# Patient Record
Sex: FEMALE | HISPANIC OR LATINO | Employment: OTHER | URBAN - METROPOLITAN AREA
[De-identification: names, ages, dates, MRNs, and addresses within clinical notes are randomized per-mention and may not be internally consistent; named-entity substitution may affect disease eponyms.]

---

## 2017-01-05 ENCOUNTER — TELEPHONE (OUTPATIENT)
Dept: FAMILY MEDICINE | Facility: CLINIC | Age: 77
End: 2017-01-05

## 2017-01-05 NOTE — TELEPHONE ENCOUNTER
Called # listed in message and it is the # for the son-in-law, Jeancarlos,  And he states that patient was to have surgery on 11/28/16, patient  Ate the am of the procedure and surgery was cancelled and re-scheduled  To Monday, 1/9/17. Wants to know if they need to have another pre-op  Physical before then. He also states that the patient tried taking the Sertraline   and then she threw it out. Please advise on pre-op.  Deyanira Cheng MA/  For Teams Alicia

## 2017-01-05 NOTE — TELEPHONE ENCOUNTER
Reason for Call:  Other     Detailed comments: surg again on 1/9 does she need another physical last one was 11/16    Phone Number Patient can be reached at: [phone]    Best Time: Any    Can we leave a detailed message on this number? NO    Call taken on 1/5/2017 at 12:45 PM by Thu Alcantar

## 2017-01-05 NOTE — TELEPHONE ENCOUNTER
Spoke to Jeancarlos and scheduled patient for a pre-op physical   For tomorrow with Lashawn Son.  Deyanira Cheng MA/  For Teams [employer] and Yasmeen

## 2017-01-06 ENCOUNTER — OFFICE VISIT (OUTPATIENT)
Dept: FAMILY MEDICINE | Facility: CLINIC | Age: 77
End: 2017-01-06
Payer: COMMERCIAL

## 2017-01-06 VITALS
HEIGHT: 56 IN | DIASTOLIC BLOOD PRESSURE: 71 MMHG | OXYGEN SATURATION: 98 % | SYSTOLIC BLOOD PRESSURE: 130 MMHG | HEART RATE: 64 BPM | WEIGHT: 134.2 LBS | TEMPERATURE: 97.4 F | BODY MASS INDEX: 30.19 KG/M2

## 2017-01-06 DIAGNOSIS — F32.1 MODERATE SINGLE CURRENT EPISODE OF MAJOR DEPRESSIVE DISORDER (H): ICD-10-CM

## 2017-01-06 DIAGNOSIS — Z01.818 PREOP GENERAL PHYSICAL EXAM: Primary | ICD-10-CM

## 2017-01-06 DIAGNOSIS — H02.839 DERMATOCHALASIS OF EYELID: ICD-10-CM

## 2017-01-06 PROCEDURE — 99214 OFFICE O/P EST MOD 30 MIN: CPT | Performed by: NURSE PRACTITIONER

## 2017-01-06 NOTE — PROGRESS NOTES
[address] [address]  [phone]  Dept: [phone]    PRE-OP EVALUATION:  Today's date: 2017    Bethany Logan (: 1940) presents for pre-operative evaluation assessment as requested by Keven Byrne.  She requires evaluation and anesthesia risk assessment prior to undergoing surgery/procedure for treatment of Repair ptosis with blepharoplasty .  Proposed procedure: COMBINED REPAIR PTOSIS WITH BLEPHAROPLASTY    Date of Surgery/ Procedure: 2017  Time of Surgery/ Procedure: 7:30AM  Hospital/Surgical Facility: [facility]   Fax number for surgical facility:   Primary Physician: Ernestina Alvarado  Type of Anesthesia Anticipated: Local with MAC    Patient has a Health Care Directive or Living Will:  NO    1. NO - Do you have a history of heart attack, stroke, stent, bypass or surgery on an artery in the head, neck, heart or legs?  2. NO - Do you ever have any pain or discomfort in your chest?  3. NO - Do you have a history of  Heart Failure?  4. NO - Are you troubled by shortness of breath when: walking on the level, up a slight hill or at night?  5. NO - Do you currently have a cold, bronchitis or other respiratory infection?  6. NO - Do you have a cough, shortness of breath or wheezing?  7. NO - Do you sometimes get pains in the calves of your legs when you walk?  8. NO - Do you or anyone in your family have previous history of blood clots?  9. NO - Do you or does anyone in your family have a serious bleeding problem such as prolonged bleeding following surgeries or cuts?  10. NO - Have you ever had problems with anemia or been told to take iron pills?  11. NO - Have you had any abnormal blood loss such as black, tarry or bloody stools, or abnormal vaginal bleeding?  12. NO - Have you ever had a blood transfusion?  13. NO - Have you or any of your relatives ever had problems with  anesthesia?  14. NO - Do you have sleep apnea, excessive snoring or daytime drowsiness?  15. NO - Do you have any prosthetic heart valves?  16. YES - Do you have prosthetic joints? Right knee replacement, left hip replacement.   17. NO - Is there any chance that you may be pregnant?      HPI:                                                      Brief HPI related to upcoming procedure:excessive upper eyelid skin blocking visual field./ Repair ptosis with blepharoplasty.      See problem list for active medical problems.  Problems all longstanding and stable, except as noted/documented.  See ROS for pertinent symptoms related to these conditions.                                                                                             DEPRESSION- well controlled on current 50 mg dose of Zoloft, tolerating the medication without adverse effects.         .    MEDICAL HISTORY:                                                      Patient Active Problem List    Diagnosis Date Noted     Moderate single current episode of major depressive disorder (H) 10/26/2016     Priority: Medium     Macular drusen, bilateral 10/25/2016     Priority: Medium     Dermatochalasis of eyelid 10/25/2016     Priority: Medium     Pseudophakia of both eyes 10/25/2016     Priority: Medium     Lumbar spinal stenosis 01/19/2016     Priority: Medium     History of total hip arthroplasty, left 12/08/2015     Priority: Medium     Osteoporosis 12/02/2015     Priority: Medium     Sciatica - left 05/19/2015     Priority: Medium     Advance care planning 01/28/2015     Priority: Medium     Advance Care Planning 8/3/2016 and 11/2/16: ACP Review of Chart / Resources Provided:  Reviewed chart for advance care plan.  Bethany Logan has no plan or code status on file. Discussed available resources and provided with information. Confirmed code status reflects current choices pending further ACP discussions.  Confirmed/documented legally designated  decision makers.  Added by Markus Caro  Advance Care Planning 2016: ACP Review of Chart / Resources Provided:  Reviewed chart for advance care plan.  Bethany Logan has no plan or code status on file. Confirmed/documented legally designated decision makers.  Added by Markus Caro  Advance Care Planning 2015: ACP Review and Resources Provided:  Reviewed chart for advance care plan.  Bethany Logan has no plan or code status on file. Mailed available resources and provided with information. Confirmed code status reflects current choices pending further ACP discussions.  Confirmed/documented designated decision maker(s). See permanent comments section of demographics in clinical tab. Added by Maki Esposito on 2015         CARDIOVASCULAR SCREENING; LDL GOAL LESS THAN 130 2014     Priority: Medium     SNHL (sensory-neural hearing loss), asymmetrical 2013     Priority: Medium     History of total knee arthroplasty - right 10/08/2013     Priority: Medium     Constipation      Priority: Medium     Health Care Home 2013     Priority: Medium     [facility]   Markus Caro RN  [phone]    [facility] CARE PLAN SUMMARY    Client Name:  Bethany Logan  Address:  [address] [place] [address] Phone: Member's Cell: [phone] (speaks Yakut)  [phone] (cell # at the home - speak English)     :  1940 Date of Assessment: 16   Health Plan:  [facility]  Health Plan Number: [id number]   Medical Assistance Number: [id number]  Financial Worker:  [facility]  Case #:  [id number]   [facility] :  Markus Caro RN CM Phone:  [phone]  CM Fax:  [phone]   [facility] Enrollment Date: 2013 Case Mix:  L  Rate Cell:  B  Waiver Type:  EW - open 16   Emergency Contact:  Extended Emergency Contact Information  Primary Emergency Contact: Kathy Willard (member lives with)   Home  "Phone: [phone] (cell # at the home)  Katya cell: [phone]  Relation: daughter & son-in-law  Secondary Emergency Contact: Gina High  Home Phone: [phone]  Relation: family friend  Sudhakar Jain (son) [phone]  Jem Jain (son) [phone]   Language:  Upper sorbian  :  Yes: Family Interprets   Health Care Agent/POA:  States son, Jem Hebert ([phone]) will make decisions Advanced Directives/Living Will:  No   Primary Care Clinic/Phone/Fax:  [facility]/(p) [phone], (f) [phone] Primary Dx:  M17.0 Osteoarthritis of knees - bilateral   Secondary Dx:   M48.06 Lumbar Spinal Stenosis   Primary Physician:  Dr. Ernestina Argueta Height:  4' 10\"  Weight:  137 lbs   Specialty Physician:  Dr. Jeancarlos Landa - Ortho - [facility]  Audiologist:  [place]    Eye Care Provider:  [facility]  Dental Care Provider:    [facility]: [employer] [phone]/See a Dentist [phone]   Other:          [facility] CURRENT SERVICES SUMMARY  Equipment owned/DME history: cane, walker, scooter, shower chair, grab bars   SERVICE TYPE/PROVIDER NAME/PHONE AUTH DATE FREQUENCY Units OR $ Amt DESCRIPTION   Medical Transportation: [facility] [phone] 11/1/16 - 10/31/17 as needed N/A  N/A     [facility]ult [facility] [phone]  Fax: [phone]        [facility] Transportation - [facility] 11/21/16 - 10/31/17          11/21/16 - 10/31/17 2x week            2 RT/week         ADC: 24 units x 2 days/week (48 units/week)        ADC Transportation: 2 RT/week        Go To Card - [employer]                  through [facility] (EW) 11/16/16 - 10/31/17 monthly $30/mo on Go To Card                    * For ADC please select ADC provider and EW Transportation in order to process auth                                                 OA (osteoarthritis) of knee - bilateral 11/14/2012     Priority: Medium      Past Medical History   Diagnosis Date     Constipation " "     Hypertension      Past Surgical History   Procedure Laterality Date     Surgical history of -        Left hip bone surgery     Cataract iol, rt/lt       C total knee arthroplasty  9/25/13     Right     Current Outpatient Prescriptions   Medication Sig Dispense Refill     sertraline (ZOLOFT) 50 MG tablet Take 1 tablet (50 mg) by mouth daily 90 tablet 1     Naproxen Sodium (ALEVE PO) Takes 2 tabs in a.m. And 2 tabs at night  PRN pain       [DISCONTINUED] sertraline (ZOLOFT) 50 MG tablet Take 1 tablet (50 mg) by mouth daily 30 tablet 1     OTC products: None, except as noted above    No Known Allergies   Latex Allergy: NO    Social History   Substance Use Topics     Smoking status: Never Smoker      Smokeless tobacco: Never Used     Alcohol Use: No     History   Drug Use No       REVIEW OF SYSTEMS:                                                    C: NEGATIVE for fever, chills, change in weight  E/M: NEGATIVE for ear, mouth and throat problems  R: NEGATIVE for significant cough or SOB  CV: NEGATIVE for chest pain, palpitations or peripheral edema  GI: NEGATIVE for nausea, abdominal pain, heartburn, or change in bowel habits  MUSCULOSKELETAL: NEGATIVE for significant arthralgias or myalgia  PSYCHIATRIC: NEGATIVE for changes in mood or affect  ROS otherwise negative    EXAM:                                                    /71 mmHg  Pulse 64  Temp(Src) 97.4  F (36.3  C) (Oral)  Ht 4' 8\" (1.422 m)  Wt 134 lb 3.2 oz (60.873 kg)  BMI 30.10 kg/m2  SpO2 98%  GENERAL APPEARANCE: healthy, alert and no distress  HENT: ear canals and TM's normal and nose and mouth without ulcers or lesions  RESP: lungs clear to auscultation - no rales, rhonchi or wheezes  CV: regular rate and rhythm, normal S1 S2, no S3 or S4 and no murmur, click or rub   ABDOMEN: soft, nontender, no HSM or masses and bowel sounds normal  NEURO: Normal strength and tone, sensory exam grossly normal, mentation intact and speech normal  PSYCH: " mentation appears normal and affect normal/bright    DIAGNOSTICS:                                                    EKG: Not indicated due to non-vascular surgery and last ekg on 16 (within 30 days for CAD history or last year for cardiac risk factors)  Patient Information      Patient Name Sex      Bethany Chirinos ([id number]) Female 1940       Results    EKG 12-lead complete w/read - Clinics (Order [id number])         Result Information      Status Provider Status        Final result (2016 10:07 AM) Open        Impression      NSR, no acute ST changes.  No significant change from previous.   Ernestina Springer M.D.            Recent Labs   Lab Test  16   1028  05/13/15   1116   HGB  13.0  13.1   PLT  280   --    NA  141  142   POTASSIUM  4.2  4.2   CR  0.65  0.65        IMPRESSION:                                                    Reason for surgery/procedure: ptosis/ COMBINED REPAIR PTOSIS WITH BLEPHAROPLASTY  Diagnosis/reason for consult: Preoperative physical    The proposed surgical procedure is considered LOW risk.    REVISED CARDIAC RISK INDEX  The patient has the following serious cardiovascular risks for perioperative complications such as (MI, PE, VFib and 3  AV Block):  No serious cardiac risks  INTERPRETATION: 0 risks: Class I (very low risk - 0.4% complication rate)    The patient has the following additional risks for perioperative complications:  No identified additional risks      ICD-10-CM    1. Preop general physical exam Z01.818    2. Dermatochalasis of eyelid H02.839    3. Moderate single current episode of major depressive disorder (H) F32.1 sertraline (ZOLOFT) 50 MG tablet       RECOMMENDATIONS:                                                          --Patient is to take all scheduled medications on the day of surgery EXCEPT for modifications listed below.    Anticoagulant or Antiplatelet Medication Use  NSAIDS: Naproxen (Naprosyn):   Stop 2-3 days  prior to surgery        APPROVAL GIVEN to proceed with proposed procedure, without further diagnostic evaluation   Refilled Zoloft, follow back in 3 months, sooner if concerns.    Signed Electronically by: NILS Mueller CNP    Copy of this evaluation report is provided to requesting physician.    [facility] Preop Guidelines

## 2017-01-06 NOTE — PROGRESS NOTES
Faxed Pre-op notes from today, and labs and EKG from 11/21/16,  To [facility], [phone], right fax confirmed at 1:37   pm today.  Deyanira Cheng MA/  For Teams [employer] and Yasmeen

## 2017-01-06 NOTE — MR AVS SNAPSHOT
After Visit Summary   1/6/2017    Bethany Logan    MRN: [id number]           Patient Information     Date Of Birth          1940        Visit Information        Provider Department      1/6/2017 11:15 AM Glo Son APRN CNP; [facility]        Today's Diagnoses     Preop general physical exam    -  1     Dermatochalasis of eyelid         Moderate single current episode of major depressive disorder (H)           Care Instructions    Based on your medical history and these are the current health maintenance or preventive care services that you are due for (some may have been done at this visit)  There are no preventive care reminders to display for this patient.    At [facility], we strive to deliver an exceptional experience to you, every time we see you.    If you receive a survey in the mail, please send us back your thoughts. We really do value your feedback.    Your care team's suggested websites for health information:  Www.Surround App.Oobafit : Up to date and easily searchable information on multiple topics.  Www.medlineplus.gov : medication info, interactive tutorials, watch real surgeries online  Www.familydoctor.org : good info from the Academy of Family Physicians  Www.cdc.gov : public health info, travel advisories, epidemics (H1N1)  Www.aap.org : children's health info, normal development, vaccinations  Www.health.[facility].[place].us : [place] dept of health, public health issues in [place], N1N1    How to contact your care team:   Team Yasmeen/Chance [phone]         Pharmacy [phone]    Dr. Springer, Natalia Del Toro PA-C, Mulu Sewell CNP, Kathleen Joyner PA-C, Dr. Rowell, and Lashawn Son, JACQUELIN    Team RNs: Reyna & Ryanne      Clinic hours  M-Th 7 am-7 pm   Fri 7 am-5 pm.   Urgent care M-F 11 am-9 pm,   Sat/Sun 9 am-5 pm.  Pharmacy M-Th 8 am-8 pm Fri 8 am-6 pm  Sat/Sun 9 am-5 pm.      All password changes, disabled accounts, or ID changes in Local [employer]/MyHealth will be done by our Access Services Department.    If you need help with your account or password, call: [phone]. Clinic staff no longer has the ability to change passwords.       Before Your Surgery      Call your surgeon if there is any change in your health. This includes signs of a cold or flu (such as a sore throat, runny nose, cough, rash or fever).    Do not smoke, drink alcohol or take over the counter medicine (unless your surgeon or primary care doctor tells you to) for the 24 hours before and after surgery.    If you take prescribed drugs: Follow your doctor s orders about which medicines to take and which to stop until after surgery.    Eating and drinking prior to surgery: follow the instructions from your surgeon  Take a shower or bath the night before surgery. Use the soap your surgeon gave you to gently clean your skin. If you do not have soap from your surgeon, use your regular soap. Do not shave or scrub the surgery site.  Wear clean pajamas and have clean sheets on your bed.   Presurgery Checklist  You are scheduled to have surgery. The healthcare staff will try to make your stay comfortable. Use the guidelines below to remind yourself what to do before surgery. Be sure to follow any specific pre-op instructions from your surgeon or nurse.  Preparing for Surgery    Ask your surgeon if you ll need a blood transfusion during surgery and if so, how to prepare for it. In some cases, you can donate blood before surgery. If needed, this blood can be given back (transfused) to you during or after surgery.    If you are having abdominal surgery, ask what you need to do to clear your bowel.    Tell your surgeon if you have allergies to any medications or foods.    Arrange for an adult family member or friend to drive you home after surgery. If possible, have someone ready to help you at home as you recover.    Call the  surgeon if you get a cold, fever, sore throat, diarrhea, or other health problem just before surgery. Your surgeon can decide whether or not to postpone the surgery.  Medications    Tell your surgeon about all medications you take, including prescription and over-the-counter products such as herbal remedies and vitamins. Ask if you should continue taking them.    If you take ibuprofen, naproxen, or  blood thinners  such as aspirin, clopidogrel (Plavix), or warfarin (Coumadin), ask your surgeon whether you should stop taking them and how long before surgery you should stop.    You may be told to take antibiotics just before surgery to prevent infection. If so, follow instructions carefully on how to take them.    If you are told to take medications called anticoagulants to prevent blood clots after surgery, be sure to follow the instructions on how to take them.  Stop Smoking  If you smoke, healing may take longer. So at least 2 week(s) before surgery, stop smoking.  Bathing or Showering Before Surgery    If instructed, wash with antibacterial soap. Afterward, do not use lotions or powders.    If you are having surgery on the head, you may be asked to shampoo with antibacterial soap. Follow instructions for doing so.  Do Not Remove Hair from the Surgery Site  Do not shave hair from the incision site, unless you are given specific instructions to do so. Usually, if hair needs to be removed, it will be done at the hospital right before surgery.  Don t Eat or Drink    Your doctor will tell you when to stop eating and drinking. If you do not follow your doctor's instructions, your procedure may be postponed or rescheduled for another day.    If your surgeon tells you to continue any medications, take them with small sips of water.    You can brush your teeth and rinse your mouth, but don t swallow any water.  Day of Surgery    Do not wear makeup. Do not use perfume, deodorant, or hairspray. Remove nail polish and  artificial nails.    Leave jewelry (including rings), watches, and other valuables at home.    Be sure to bring health insurance cards or forms and a photo ID.    Bring a list of your medications (include the name, dose, how often you take them, and the time last dose was taken).    Arrive on time at the hospital or surgery facility.    8445-7107 [facility], [address] [place] [address], [place] [address]. All rights reserved. This information is not intended as a substitute for professional medical care. Always follow your healthcare professional's instructions.            Follow-ups after your visit        Your next 10 appointments already scheduled     Jan 09, 2017  6:30 AM   [facility] with [facility]    Services Department ([facility])    [facility] [address]               Jan 09, 2017   Procedure with Keven Vázquez MD   [facility] (--)    [address] [place]  [facility] [address] [phone]              Who to contact     If you have questions or need follow up information about today's clinic visit or your schedule please contact [facility] directly at [phone].  Normal or non-critical lab and imaging results will be communicated to you by PerceptiMedhart, letter or phone within 4 business days after the clinic has received the results. If you do not hear from us within 7 days, please contact the clinic through PerceptiMedhart or phone. If you have a critical or abnormal lab result, we will notify you by phone as soon as possible.  Submit refill requests through eSecure Systems or call your pharmacy and they will forward the refill request to us. Please allow 3 business days for your refill to be completed.          Additional Information About Your Visit        PerceptiMedhart Information     e[employer]ure Systems lets you send messages to your doctor, view your test results, renew your  "prescriptions, schedule appointments and more. To sign up, go to [email or web address] . Click on \"Log in\" on the left side of the screen, which will take you to the Welcome page. Then click on \"Sign up Now\" on the right side of the page.     You will be asked to enter the access code listed below, as well as some personal information. Please follow the directions to create your username and password.     Your access code is: [email or web address]  Expires: 2017  9:43 AM     Your access code will  in 90 days. If you need help or a new code, please call your [place] clinic or [phone].        Care EveryWhere ID     This is your Care EveryWhere ID. This could be used by other organizations to access your [place] medical records  [id number]        Your Vitals Were     Pulse Temperature Height BMI (Body Mass Index) Pulse Oximetry       64 97.4  F (36.3  C) (Oral) 4' 8\" (1.422 m) 30.10 kg/m2 98%        Blood Pressure from Last 3 Encounters:   17 130/71   16 133/76   16 132/72    Weight from Last 3 Encounters:   17 134 lb 3.2 oz (60.873 kg)   17 130 lb (58.968 kg)   16 132 lb (59.875 kg)              Today, you had the following     No orders found for display         Today's Medication Changes          These changes are accurate as of: 17 12:08 PM.  If you have any questions, ask your nurse or doctor.               Start taking these medicines.        Dose/Directions    sertraline 50 MG tablet   Commonly known as:  ZOLOFT   Used for:  Moderate single current episode of major depressive disorder (H)   Started by:  Glo Son APRN CNP        Dose:  50 mg   Take 1 tablet (50 mg) by mouth daily   Quantity:  90 tablet   Refills:  1            Where to get your medicines      These medications were sent to [facility] [address] [place] [address]     Phone:  [phone]    - sertraline 50 MG " tablet             Primary Care Provider Office Phone # Fax #    Ernestina Ema Argueta [phone]       [facility] [address]  [facility] [address]        Thank you!     Thank you for choosing [facility]  for your care. Our goal is always to provide you with excellent care. Hearing back from our patients is one way we can continue to improve our services. Please take a few minutes to complete the written survey that you may receive in the mail after your visit with us. Thank you!             Your Updated Medication List - Protect others around you: Learn how to safely use, store and throw away your medicines at www.disposemymeds.org.          This list is accurate as of: 1/6/17 12:08 PM.  Always use your most recent med list.                   Brand Name Dispense Instructions for use    ALEVE PO      Takes 2 tabs in a.m. And 2 tabs at night  PRN pain       sertraline 50 MG tablet    ZOLOFT    90 tablet    Take 1 tablet (50 mg) by mouth daily

## 2017-01-06 NOTE — PATIENT INSTRUCTIONS
Based on your medical history and these are the current health maintenance or preventive care services that you are due for (some may have been done at this visit)  There are no preventive care reminders to display for this patient.    At [facility], we strive to deliver an exceptional experience to you, every time we see you.    If you receive a survey in the mail, please send us back your thoughts. We really do value your feedback.    Your care team's suggested websites for health information:  Www.Jones.org : Up to date and easily searchable information on multiple topics.  Www.medlineplus.gov : medication info, interactive tutorials, watch real surgeries online  Www.familydoctor.org : good info from the Academy of Family Physicians  Www.cdc.gov : public health info, travel advisories, epidemics (H1N1)  Www.aap.org : children's health info, normal development, vaccinations  Www.health.[facility].[place].us : [place] dept of health, public health issues in [place], N1N1    How to contact your care team:   Team Yasmeen/Spirit [phone]         Pharmacy [phone]    Dr. Springer, Natalia Del Toro PA-C, Dr. Amezcua, Mulu WRAY CNP, Kathleen Joyner PA-C, Dr. Rowell, and Lashawn Son CNP    Team RNs: Reyna Pearl      Clinic hours  M-Th 7 am-7 pm   Fri 7 am-5 pm.   Urgent care M-F 11 am-9 pm,   Sat/Sun 9 am-5 pm.  Pharmacy M-Th 8 am-8 pm Fri 8 am-6 pm  Sat/Sun 9 am-5 pm.     All password changes, disabled accounts, or ID changes in AlertaPhone/MyHealth will be done by our Access Services Department.    If you need help with your account or password, call: [phone]. Clinic staff no longer has the ability to change passwords.       Before Your Surgery      Call your surgeon if there is any change in your health. This includes signs of a cold or flu (such as a sore throat, runny nose, cough, rash or fever).    Do not smoke, drink alcohol or take over the counter medicine (unless your  surgeon or primary care doctor tells you to) for the 24 hours before and after surgery.    If you take prescribed drugs: Follow your doctor s orders about which medicines to take and which to stop until after surgery.    Eating and drinking prior to surgery: follow the instructions from your surgeon  Take a shower or bath the night before surgery. Use the soap your surgeon gave you to gently clean your skin. If you do not have soap from your surgeon, use your regular soap. Do not shave or scrub the surgery site.  Wear clean pajamas and have clean sheets on your bed.   Presurgery Checklist  You are scheduled to have surgery. The healthcare staff will try to make your stay comfortable. Use the guidelines below to remind yourself what to do before surgery. Be sure to follow any specific pre-op instructions from your surgeon or nurse.  Preparing for Surgery    Ask your surgeon if you ll need a blood transfusion during surgery and if so, how to prepare for it. In some cases, you can donate blood before surgery. If needed, this blood can be given back (transfused) to you during or after surgery.    If you are having abdominal surgery, ask what you need to do to clear your bowel.    Tell your surgeon if you have allergies to any medications or foods.    Arrange for an adult family member or friend to drive you home after surgery. If possible, have someone ready to help you at home as you recover.    Call the surgeon if you get a cold, fever, sore throat, diarrhea, or other health problem just before surgery. Your surgeon can decide whether or not to postpone the surgery.  Medications    Tell your surgeon about all medications you take, including prescription and over-the-counter products such as herbal remedies and vitamins. Ask if you should continue taking them.    If you take ibuprofen, naproxen, or  blood thinners  such as aspirin, clopidogrel (Plavix), or warfarin (Coumadin), ask your surgeon whether you should stop  taking them and how long before surgery you should stop.    You may be told to take antibiotics just before surgery to prevent infection. If so, follow instructions carefully on how to take them.    If you are told to take medications called anticoagulants to prevent blood clots after surgery, be sure to follow the instructions on how to take them.  Stop Smoking  If you smoke, healing may take longer. So at least 2 week(s) before surgery, stop smoking.  Bathing or Showering Before Surgery    If instructed, wash with antibacterial soap. Afterward, do not use lotions or powders.    If you are having surgery on the head, you may be asked to shampoo with antibacterial soap. Follow instructions for doing so.  Do Not Remove Hair from the Surgery Site  Do not shave hair from the incision site, unless you are given specific instructions to do so. Usually, if hair needs to be removed, it will be done at the hospital right before surgery.  Don t Eat or Drink    Your doctor will tell you when to stop eating and drinking. If you do not follow your doctor's instructions, your procedure may be postponed or rescheduled for another day.    If your surgeon tells you to continue any medications, take them with small sips of water.    You can brush your teeth and rinse your mouth, but don t swallow any water.  Day of Surgery    Do not wear makeup. Do not use perfume, deodorant, or hairspray. Remove nail polish and artificial nails.    Leave jewelry (including rings), watches, and other valuables at home.    Be sure to bring health insurance cards or forms and a photo ID.    Bring a list of your medications (include the name, dose, how often you take them, and the time last dose was taken).    Arrive on time at the hospital or surgery facility.    3902-5310 [employer], [address] [place] [address], [place] [address]. All rights reserved. This information is not intended as a substitute for professional medical care. Always follow your  healthcare professional's instructions.

## 2017-01-09 ENCOUNTER — ANESTHESIA (OUTPATIENT)
Dept: SURGERY | Facility: AMBULATORY SURGERY CENTER | Age: 77
End: 2017-01-09
Payer: COMMERCIAL

## 2017-01-09 ENCOUNTER — ANESTHESIA EVENT (OUTPATIENT)
Dept: SURGERY | Facility: AMBULATORY SURGERY CENTER | Age: 77
End: 2017-01-09
Payer: COMMERCIAL

## 2017-01-09 ENCOUNTER — CARE COORDINATION (OUTPATIENT)
Dept: GERIATRIC MEDICINE | Facility: CLINIC | Age: 77
End: 2017-01-09

## 2017-01-09 ENCOUNTER — TELEPHONE (OUTPATIENT)
Dept: FAMILY MEDICINE | Facility: CLINIC | Age: 77
End: 2017-01-09

## 2017-01-09 ENCOUNTER — SURGERY (OUTPATIENT)
Age: 77
End: 2017-01-09
Payer: COMMERCIAL

## 2017-01-09 PROCEDURE — 67900 REPAIR BROW DEFECT: CPT | Mod: 79 | Performed by: OPHTHALMOLOGY

## 2017-01-09 PROCEDURE — 15823 BLEPHARP UPR EYELID XCSV SKN: CPT | Mod: 79 | Performed by: OPHTHALMOLOGY

## 2017-01-09 RX ORDER — LIDOCAINE HYDROCHLORIDE 20 MG/ML
INJECTION, SOLUTION INFILTRATION; PERINEURAL PRN
Status: DISCONTINUED | OUTPATIENT
Start: 2017-01-09 | End: 2017-01-09

## 2017-01-09 RX ORDER — PROPOFOL 10 MG/ML
INJECTION, EMULSION INTRAVENOUS PRN
Status: DISCONTINUED | OUTPATIENT
Start: 2017-01-09 | End: 2017-01-09

## 2017-01-09 RX ORDER — ONDANSETRON 2 MG/ML
INJECTION INTRAMUSCULAR; INTRAVENOUS PRN
Status: DISCONTINUED | OUTPATIENT
Start: 2017-01-09 | End: 2017-01-09

## 2017-01-09 RX ADMIN — PROPOFOL 10 MG: 10 INJECTION, EMULSION INTRAVENOUS at 08:15

## 2017-01-09 RX ADMIN — Medication 7 ML: at 07:45

## 2017-01-09 RX ADMIN — PROPOFOL 20 MG: 10 INJECTION, EMULSION INTRAVENOUS at 07:45

## 2017-01-09 RX ADMIN — PROPOFOL 10 MG: 10 INJECTION, EMULSION INTRAVENOUS at 08:00

## 2017-01-09 RX ADMIN — SODIUM CHLORIDE, SODIUM LACTATE, POTASSIUM CHLORIDE, CALCIUM CHLORIDE: 600; 310; 30; 20 INJECTION, SOLUTION INTRAVENOUS at 07:37

## 2017-01-09 RX ADMIN — PROPOFOL 30 MG: 10 INJECTION, EMULSION INTRAVENOUS at 07:37

## 2017-01-09 RX ADMIN — ERYTHROMYCIN 2 INCH: 5 OINTMENT OPHTHALMIC at 08:35

## 2017-01-09 RX ADMIN — ONDANSETRON 4 MG: 2 INJECTION INTRAMUSCULAR; INTRAVENOUS at 08:30

## 2017-01-09 RX ADMIN — LIDOCAINE HYDROCHLORIDE 60 MG: 20 INJECTION, SOLUTION INFILTRATION; PERINEURAL at 07:37

## 2017-01-09 RX ADMIN — TETRACAINE HYDROCHLORIDE 1 DROP: 5 SOLUTION OPHTHALMIC at 07:40

## 2017-01-09 NOTE — TELEPHONE ENCOUNTER
Reason for Call:  Other prescription    Detailed comments: Too much sertraline please call and advise. Has been not taking any for a long time.     Phone Number Jeancarlos MULLINS can be reached at: [phone]    Best Time: Any    Can we leave a detailed message on this number? YES    Call taken on 1/9/2017 at 1:57 PM by Thu Alcantar

## 2017-01-09 NOTE — PROGRESS NOTES
1/9/17: Received epic notification that member had blepharoplasty surgery.  Spoke with Jeancarlos felipe, he states member is recovering at this time.  He also states that he is trying to assist and handle more of member's medical again now.  He is currently picking up her meds at the pharmacy for her.     Markus Caro RN, N  [facility]

## 2017-01-09 NOTE — ANESTHESIA CARE TRANSFER NOTE
Patient: Bethany Logan    COMBINED REPAIR PTOSIS WITH BLEPHAROPLASTY (Bilateral Eye)  Additional InformationProcedure(s):  Bilateral ptosis repair with bilateral blepharoplasty, bilateral brow ptosis repair - Wound Class: I-Clean    Diagnosis: ptosis, dermatochalasis  Diagnosis Additional Information: No value filed.    Anesthesia Type:   MAC     Note:  Airway :Room Air  Patient transferred to:Phase II  Comments: Patient calm cooperative, /78, HR 68, RR15, Temp 98.0, Sats 97. Patient stated no pain but needed to void.      Vitals: (Last set prior to Anesthesia Care Transfer)              Electronically Signed By: NILS Colvin CRNA  January 9, 2017  8:40 AM

## 2017-01-09 NOTE — ANESTHESIA PREPROCEDURE EVALUATION
Anesthesia Evaluation     .        ROS/MED HX    ENT/Pulmonary:  - neg pulmonary ROS     Neurologic:       Cardiovascular:  - neg cardiovascular ROS       METS/Exercise Tolerance:     Hematologic:         Musculoskeletal:         GI/Hepatic:         Renal/Genitourinary:         Endo:         Psychiatric:     (+) psychiatric history depression      Infectious Disease:         Malignancy:         Other:               Physical Exam  Normal systems: pulmonary and dental    Airway   Mallampati: II  TM distance: >3 FB  Neck ROM: full    Dental     Cardiovascular   Rhythm and rate: regular and normal      Pulmonary                     Anesthesia Plan      History & Physical Review  History and physical reviewed and following examination; no interval change.    ASA Status:  2 .    NPO Status:  > 8 hours    Plan for MAC Reason for MAC:  Procedure to face, neck, head or breast         Postoperative Care  Postoperative pain management:  Multi-modal analgesia.      Consents                          .

## 2017-01-09 NOTE — ANESTHESIA POSTPROCEDURE EVALUATION
Patient: Bethany Logan    COMBINED REPAIR PTOSIS WITH BLEPHAROPLASTY (Bilateral Eye)  Additional InformationProcedure(s):  Bilateral ptosis repair with bilateral blepharoplasty, bilateral brow ptosis repair - Wound Class: I-Clean    Diagnosis:ptosis, dermatochalasis  Diagnosis Additional Information: No value filed.    Anesthesia Type:  MAC    Note:  Anesthesia Post Evaluation    Patient location during evaluation: PACU  Patient participation: Able to fully participate in evaluation  Level of consciousness: awake  Pain management: adequate  Airway patency: patent  Cardiovascular status: acceptable  Respiratory status: acceptable  Hydration status: balanced  PONV: none     Anesthetic complications: None          Last vitals:  Filed Vitals:    01/09/17 0648   BP: 167/64   Pulse: 57   Temp: 98.6  F (37  C)   Resp: 18   SpO2: 97%       Electronically Signed By: Bert Worley MD  January 9, 2017  8:42 AM

## 2017-01-09 NOTE — TELEPHONE ENCOUNTER
Called Jeancarlos and discussed no LOLIS noted on file. He will come in to have that signed. He would like to let is know that the patient recently received a refill on sertraline and patient is not currently taking this. She stopped this a while back and does not feel the need to be on depression medications at this time. Per Jeancarlos, the patient was under the impression this medication was for pain. Advised Jeancarlos the patient should rtc for medication follow up. Understanding verbalized and they will follow up in the next week or so.     Patient just had eye surgery and everything is going well so far.     Will forward to pcp as GONZÁLEZ Altamirano, Clinical RN [facility].

## 2017-01-10 ENCOUNTER — CARE COORDINATION (OUTPATIENT)
Dept: GERIATRIC MEDICINE | Facility: CLINIC | Age: 77
End: 2017-01-10

## 2017-01-13 NOTE — PROGRESS NOTES
1/12/17:  CM received call from Tom stating that he apologized for not calling back yesterday however he talked with Katya and as member is safe (tom has spoke with member) he and his brothers and Katya are having a conversation on Saturday regarding this.  Tom agrees that best plan is for member to live with him or his brother.   Explained to Tom that Cm spoke with Melissa at [facility] and that member s name remains on housing waiting list.      Markus Caro RN, PHN  [employer]

## 2017-01-13 NOTE — PROGRESS NOTES
1/11/17: CM received vm from son, Sudhaakr.  CM placed call back and Sudhakar explained that since member s eye surgery on Monday, 1/9, family is taking turns helping member at her home (she lives with daughter, Katya, and jone, Jeancarlos).  Sudhakar states that he went to member s home last satnam and was about an hour late and Jeancarlos (JONE) became very upset that Sudhakar was late and stated that he could not come in the house if he was going to be late and Jeancarlos and Katya would just take care of everything.   Sudhakar feels bad for his mom.  Sudhakar has good relationship with sister, Katya. He states Katya was not home when this happened as she was at work but Katya became aware of it and apologized.  Sudhakar states that both he and his wife spoke with member and she is safe but  does think best to move out and move with Sudhakar or another son either permanently or temporarily.   CM discussed with Sudhakar and agreed that as member is safe and he has good relationship with Katya that he should discuss with Katya first.  Sudhakar states she is sleeping right now as she works nights however he would plan to call her this afternoon and discuss and then also call CM to inform her.     CM also had received email from member s family friend, Gina, asking CM to call Sudhakar as he had also called her.   CM placed call to Melissa at [facility] as per this Fall, Melissa had put member on housing waiting list where member s friends live.  In Nov., CM was notified that member was not going to move and was off the waiting list.   Per Melissa, member remained on the waiting list as Melissa and member agreed that best to stay on as waiting list can be long and when member s time came she could always decline.   Melissa did state that member is sad living with daughter and jone  - she is safe but just sad and wants to live alone and have own place. She knows she can live with her sons but she also wants to still live alone eventually when time is right.   LILO  will relay this to son.   Markus Caro RN, N  [employer]

## 2017-01-25 ENCOUNTER — OFFICE VISIT (OUTPATIENT)
Dept: OPHTHALMOLOGY | Facility: CLINIC | Age: 77
End: 2017-01-25
Payer: COMMERCIAL

## 2017-01-25 DIAGNOSIS — Z98.890 POSTOPERATIVE EYE STATE: Primary | ICD-10-CM

## 2017-01-25 PROCEDURE — 99024 POSTOP FOLLOW-UP VISIT: CPT | Performed by: OPHTHALMOLOGY

## 2017-01-25 RX ORDER — ERYTHROMYCIN 5 MG/G
OINTMENT OPHTHALMIC
Qty: 3.5 G | Refills: 3 | Status: SHIPPED | OUTPATIENT
Start: 2017-01-25 | End: 2017-08-08

## 2017-01-25 ASSESSMENT — VISUAL ACUITY
OD_SC: 20/40
METHOD: SNELLEN - LINEAR
OS_SC: 20/70

## 2017-01-25 NOTE — MR AVS SNAPSHOT
"              After Visit Summary   1/25/2017    Bethany Logan    MRN: [id number]           Patient Information     Date Of Birth          1940        Visit Information        Provider Department      1/25/2017 11:00 AM Keven Vázquez MD; [employer] [facility]        Today's Diagnoses     Postoperative eye state    -  1       Care Instructions    Erythromycin ophthalmic ointment 1/2\" strip right eye twice a day   Vaseline to incisions 2 x daily  Warm compresses both eyes 2 x daily for 4-5 minutes each        Follow-ups after your visit        Your next 10 appointments already scheduled     Mar 15, 2017 10:45 AM   Return Visit with Keven Vázquez MD   [facility]    [id number] [place] [address] [phone]              Who to contact     If you have questions or need follow up information about today's clinic visit or your schedule please contact [facility] directly at [phone].  Normal or non-critical lab and imaging results will be communicated to you by MyChart, letter or phone within 4 business days after the clinic has received the results. If you do not hear from us within 7 days, please contact the clinic through [employer] or phone. If you have a critical or abnormal lab result, we will notify you by phone as soon as possible.  Submit refill requests through [employer] or call your pharmacy and they will forward the refill request to us. Please allow 3 business days for your refill to be completed.          Additional Information About Your Visit        [employer] Information     [employer] is an electronic gateway that provides easy, online access to your medical records. With [employer], you can request a clinic appointment, read your test results, renew a prescription or communicate with your care team.     To sign up for [employer] visit the website at " "[email or web address]   You will be asked to enter the access code listed below, as well as some personal information. Please follow the directions to create your username and password.     Your access code is: [email or web address]  Expires: 2017 11:24 AM     Your access code will  in 90 days. If you need help or a new code, please contact your [facility] or call [phone] for assistance.        Care EveryWhere ID     This is your Care EveryWhere ID. This could be used by other organizations to access your [place] medical records  [id number]         Blood Pressure from Last 3 Encounters:   17 173/76   17 130/71   16 133/76    Weight from Last 3 Encounters:   17 58.968 kg (130 lb)   17 60.873 kg (134 lb 3.2 oz)   16 59.875 kg (132 lb)              Today, you had the following     No orders found for display         Today's Medication Changes          These changes are accurate as of: 17 11:24 AM.  If you have any questions, ask your nurse or doctor.               These medicines have changed or have updated prescriptions.        Dose/Directions    erythromycin ophthalmic ointment   Commonly known as:  ROMYCIN   This may have changed:  additional instructions   Used for:  Postoperative eye state   Changed by:  Keven Vázquez MD        Apply \" strip to right eye twice a day   Quantity:  3.5 g   Refills:  3            Where to get your medicines      These medications were sent to [facility] [address] [place] [address]  [address] [facility] [address]     Phone:  [phone]    - erythromycin ophthalmic ointment             Primary Care Provider Office Phone # Fax #    Ernestina Argueta [phone]       [facility] [address] [facility] [address]        Thank you!     Thank you for choosing [facility]  for your " "care. Our goal is always to provide you with excellent care. Hearing back from our patients is one way we can continue to improve our services. Please take a few minutes to complete the written survey that you may receive in the mail after your visit with us. Thank you!             Your Updated Medication List - Protect others around you: Learn how to safely use, store and throw away your medicines at www.disposemymeds.org.          This list is accurate as of: 1/25/17 11:24 AM.  Always use your most recent med list.                   Brand Name Dispense Instructions for use    ALEVE PO      Takes 2 tabs in a.m. And 2 tabs at night  PRN pain       erythromycin ophthalmic ointment    ROMYCIN    3.5 g    Apply 1/2\" strip to right eye twice a day       sertraline 50 MG tablet    ZOLOFT    90 tablet    Take 1 tablet (50 mg) by mouth daily         "

## 2017-01-25 NOTE — PROGRESS NOTES
Two weeks post   1. Bilateral upper blepharoplasty.   2. Bilateral internal browpexy with brassiere suture.   3. Bilateral upper eyelid ptosis repair by Muellers muscle conjunctival resection.    Doing well.   Right eye occasionally feels like grain of sand    punctate epithelial erosions but no other staining on sle right eye.  No lagophthalmos  Moderate edema, incisions healing well.    Use warm soaks  Ana to incisions  ees to right eye twice a day until resolution of symptoms    F/u 2 months, sooner as needed.     Attending Physician Attestation:  Complete documentation of historical and exam elements from today's encounter can be found in the full encounter summary report (not reduplicated in this progress note).  I personally obtained the chief complaint(s) and history of present illness.  I confirmed and edited as necessary the review of systems, past medical/surgical history, family history, social history, and examination findings as documented by others; and I examined the patient myself.  I personally reviewed the relevant tests, images, and reports as documented above.  I formulated and edited as necessary the assessment and plan and discussed the findings and management plan with the patient and family. - Keven Vázquez MD

## 2017-01-25 NOTE — NURSING NOTE
Patient presents with:  Post-op Blepharaplasty: Bilateral ptosis repair with bilateral blepharoplasty, bilateral brow ptosis repair 1/9/17      Referring Provider:  No referring provider defined for this encounter.    HPI    Affected eye(s):  Right   Symptoms:     Foreign body sensation (Comment: right eye)         Do you have eye pain now?:  No

## 2017-01-25 NOTE — PATIENT INSTRUCTIONS
"Erythromycin ophthalmic ointment 1/2\" strip right eye twice a day   Vaseline to incisions 2 x daily  Warm compresses both eyes 2 x daily for 4-5 minutes each  "

## 2017-01-30 NOTE — PROGRESS NOTES
1/17/17: CM spoke with sonSudhakar - he states that member moved in with him in [place] over the weekend.  Member is doing well.  He states that move is not permanent as she may move with her other son in [place].  Sudhakar states that he will contact CM next week when he knows more information on whether member will choose to stay in [place] with him or with his brother.  Member is not going to [facility] this week due to recovering from eye surgery.      CM will update [facility] once hear back on permanent residence.      Markus Caro RN, N  [employer]      CM also received call from Jeancarlos, son-in-law, also informing Cm that member moved out and her family will be caring for her.

## 2017-01-30 NOTE — PROGRESS NOTES
1/30/17: Received vm form Edward, stating that member is moving to [place] with her son.  They are planning to move her today.  He will call  back with address and information once he has it.     Markus Caro RN, N  [facility]

## 2017-01-31 NOTE — PROGRESS NOTES
1/31/17: Cm spoke with son, Sudhakar, he states member's move to his brother's went well.  Member is happy there.   Sudhakar states he does not have address but will notify CM with it.   will update and notify [facility] once received.  Sudhakar states that member still wants to remain on wait list for apartment and will determine at that time if she will remain in [place] or move.     Markus Caro RN, PHN  [employer]

## 2017-02-07 NOTE — PROGRESS NOTES
2/6/17: CM received notification from member's family friend, Gina, providing CM with updated address: [address] [place] [address]  [phone]    Continue using Sudhakar Shepherd as main contact.   CM confirmed with son, Sudhakar, of updated address and that member will plan to stay there until her name is up on the wait list and then she will determine what to do at that time.    CM will notify [facility] of address change with [phone].      CM reminded Sudhakar of MA renewal  - he is unsure when member is due.  Cm placed call to [facility] - member is due in May for renewal.  They will send out renewal paperwork in mid march and paperwork will be due by April 8th.   Relayed this to Sudhakar.     [phone] faxed to [facility].     Markus Caro RN, N  [employer]

## 2017-02-28 ENCOUNTER — TELEPHONE (OUTPATIENT)
Dept: OPHTHALMOLOGY | Facility: CLINIC | Age: 77
End: 2017-02-28

## 2017-02-28 NOTE — TELEPHONE ENCOUNTER
email rec'd from [employer] regarding prior authorization for code 27729 dos 1/9/17 w/ Dr Vázquez. [facility] denied the code for no PA aquired prior to procedure.  Notes in chart dated 11/7/16 state that PA was faxed in to [facility] on 11/3/17, and that there was a return fax dated 11/7/16 to clinic informing that no PA was needed.     Notes below copied from email from Ligia Reinoso in [employer]     got a hold of [facility] .. very long hold times.  I was told by Lashawn that she is not sure why the clinic was told a PA was not needed  it is on their grid.  So  a Status Adj Form(on their website) along with Medical Records needs to be faxed in.     Adjustment form for claim number Claim # [id number] was faxed to [facility] on February 28, 2017 at 0832 fax recall = success for 11 total pages.  The notes from the original office visit on 11/2/16. As well as a copy of the PA for that was filled out and faxed in originally stating that it was requested for a code of 57922.    Madison COURTNEY. CO[employer].

## 2017-03-15 ENCOUNTER — CARE COORDINATION (OUTPATIENT)
Dept: GERIATRIC MEDICINE | Facility: CLINIC | Age: 77
End: 2017-03-15

## 2017-03-15 ENCOUNTER — OFFICE VISIT (OUTPATIENT)
Dept: OPHTHALMOLOGY | Facility: CLINIC | Age: 77
End: 2017-03-15
Payer: COMMERCIAL

## 2017-03-15 DIAGNOSIS — H02.833 DERMATOCHALASIS OF EYELIDS OF BOTH EYES: Primary | ICD-10-CM

## 2017-03-15 DIAGNOSIS — H02.423 MYOGENIC PTOSIS, BILATERAL: ICD-10-CM

## 2017-03-15 DIAGNOSIS — Z76.89 HEALTH CARE HOME: ICD-10-CM

## 2017-03-15 DIAGNOSIS — H02.836 DERMATOCHALASIS OF EYELIDS OF BOTH EYES: Primary | ICD-10-CM

## 2017-03-15 PROCEDURE — 99024 POSTOP FOLLOW-UP VISIT: CPT | Performed by: OPHTHALMOLOGY

## 2017-03-15 ASSESSMENT — VISUAL ACUITY
OD_SC: 20/40
METHOD: SNELLEN - LINEAR
OS_SC: 20/70

## 2017-03-15 NOTE — PROGRESS NOTES
3/15/17: LILO had received email from Renee regarding Go To card with [facility] verifying that member received Go To card - checked with son, Sudhakar, he states that member states she did not received - most likely due to address not changed until 3/6/17 and therefore [facility] not having correct address.   Lilo sent email back to Renee informing her of address change and new card needed.     Renee clarified with [facility] that they will send new prepaid card - cannot put lump sum on - needs to be rush or non rush  - applied a book of rush (5 RT - $40) per month.     Markus Caro RN, N  [employer]

## 2017-03-15 NOTE — PROGRESS NOTES
3/14/17: CM received call from son, Sudhakar, following up with CM regarding MA renewal paperwork as he remember CM stating it would come sometime in March for a May renewal and then also confirming that address change was made to [facility] as Edward states that daughter/destinee are still receiving member's mail and destinee was upset that address wasn't changed.  CM confirmed that she faxed address change on 2/71/7 to [facility].   CM placed call to [facility] to confirm  - called general #  - member's financial worker is Gustavo at [phone]  - zpfi voice message.     Markus Caro RN, N  [employer]

## 2017-03-15 NOTE — PROGRESS NOTES
3/15/17: CM received call back from Ainsley with [facility] - she is covering for Gustavo.  Ainsley states they did not receive case/address change from [facility] until 3/6/17.  She verified that renewal is in May which means that paperwork will be sent out within next few weeks - she states that if not received by 27th then to f/u with Gustavo.  Paperwork will be due back by April 8th.  She confirmed that no one is listed as auth rep therefore if member does want her son she should put this in writing along with renewal form stating that she wants her son listed and then include son name, address, phone # and both member and son will need to sign and date.   Verified address with Ainsley as son had previously given a [address] end of address but then stated that it is not a PO box but rather a unit # (member lives with son in [place] and it's a unit box within the trailer courty, not a PO box at post office).   Erik stated will confirm with son one more time and will call back and leave  with correct address as Ainsley states that they have the street address but then also have mailing address as [address].    Ainsley also verified that no reason for member to come to office on Friday - Gustavo is on vacation and also paperwork is not ready and will be mailed when it is ready.     Erik spoke with radha De La Fuente, relayed above information to him.  He verbalized understanding.  He confirmed that address is not a PO box but rather a unit # (member lives with other son in [place] and it's a unit box within the trailer court, not a PO box at post office).   Also informed him member does not need to go to [facility] on Friday - he verbalized understanding.   He will contact CM when paperwork is received or by 27th if not received.     CM left  for Gustavo (ainsley stated that she will be reviewing voice messages all day) clarifying address.     Markus Caro RN, N  [employer]

## 2017-03-15 NOTE — MR AVS SNAPSHOT
After Visit Summary   3/15/2017    Bethany Logan    MRN: [id number]           Patient Information     Date Of Birth          1940        Visit Information        Provider Department      3/15/2017 10:30 AM Keven Vázquez MD; [employer] [facility]         Follow-ups after your visit        Your next 10 appointments already scheduled     Apr 10, 2017  3:00 PM CDT   Return Visit with Gabriele Whiting OD   [facility]    [address] [place] [address] [phone]              Who to contact     If you have questions or need follow up information about today's clinic visit or your schedule please contact [facility] directly at [phone].  Normal or non-critical lab and imaging results will be communicated to you by MyChart, letter or phone within 4 business days after the clinic has received the results. If you do not hear from us within 7 days, please contact the clinic through MyChart or phone. If you have a critical or abnormal lab result, we will notify you by phone as soon as possible.  Submit refill requests through [employer] or call your pharmacy and they will forward the refill request to us. Please allow 3 business days for your refill to be completed.          Additional Information About Your Visit        [employer] Information     [employer] is an electronic gateway that provides easy, online access to your medical records. With [employer] you can request a clinic appointment, read your test results, renew a prescription or communicate with your care team.     To sign up for [employer] visit the website at [email or web address]   You will be asked to enter the access code listed below, as well as some personal information. Please follow the directions to create your username and password.     Your access code is: [email or web address]  Expires: 4/25/2017 12:24  "PM     Your access code will  in 90 days. If you need help or a new code, please contact your [facility] or call [phone] for assistance.        Care EveryWhere ID     This is your Care EveryWhere ID. This could be used by other organizations to access your [place] medical records  [id number]         Blood Pressure from Last 3 Encounters:   17 173/76   17 130/71   16 133/76    Weight from Last 3 Encounters:   17 59 kg (130 lb)   17 60.9 kg (134 lb 3.2 oz)   16 59.9 kg (132 lb)              Today, you had the following     No orders found for display       Primary Care Provider Office Phone # Fax #    Ernestina Ema Argueta [phone]       [facility] [address] [facility] [address]        Thank you!     Thank you for choosing [facility]  for your care. Our goal is always to provide you with excellent care. Hearing back from our patients is one way we can continue to improve our services. Please take a few minutes to complete the written survey that you may receive in the mail after your visit with us. Thank you!             Your Updated Medication List - Protect others around you: Learn how to safely use, store and throw away your medicines at www.disposemymeds.org.          This list is accurate as of: 3/15/17 11:16 AM.  Always use your most recent med list.                   Brand Name Dispense Instructions for use    ALEVE PO      Takes 2 tabs in a.m. And 2 tabs at night  PRN pain       erythromycin ophthalmic ointment    ROMYCIN    3.5 g    Apply 1/2\" strip to right eye twice a day       sertraline 50 MG tablet    ZOLOFT    90 tablet    Take 1 tablet (50 mg) by mouth daily         "

## 2017-03-15 NOTE — PROGRESS NOTES
Doing well post both upper eyelid blepharoplasty, perea muscle conjunctival resection  Ptosis repair, brassiere browpexy. Conservative due to dry eye.   Happy with outcome.   Has been using ees sharon for lubrication and it helps but makes her vision blurry.   Also last time obtained transition lenses and does not like, would like new refraction.     I recommend waiting another month before refraction for swelling to go down.   F/u with Dr. Whiting for refraction in one month, last complete was in October.   Try refresh tears or similar instead of sharon for dry eye symptoms.    F/u with me as needed.    Complete documentation of historical and exam elements from today's encounter can be found in the full encounter summary report (not reduplicated in this progress note). I personally obtained the chief complaint(s) and history of present illness.  I confirmed and edited as necessary the review of systems, past medical/surgical history, family history, social history, and examination findings as documented by others; and I examined the patient myself. I personally reviewed the relevant tests, images, and reports as documented above. I formulated and edited as necessary the assessment and plan and discussed the findings and management plan with the patient and family. - Keven Vázquez MD

## 2017-03-15 NOTE — NURSING NOTE
Patient presents with:  Post Op (Ophthalmology) Both Eyes: Bilateral ptosis repair with bilateral blepharoplasty, bilateral brow ptosis repair 1-9-17      Referring Provider:  No referring provider defined for this encounter.    HPI    Affected eye(s):  Both   Symptoms:     Redness (Comment: right eye)   Foreign body sensation (Comment: right eye)            Comments:     Post Op (Ophthalmology) Both Eyes: Bilateral ptosis repair with bilateral blepharoplasty, bilateral brow ptosis repair 1-9-17

## 2017-04-03 ENCOUNTER — CARE COORDINATION (OUTPATIENT)
Dept: GERIATRIC MEDICINE | Facility: CLINIC | Age: 77
End: 2017-04-03

## 2017-04-28 ENCOUNTER — CARE COORDINATION (OUTPATIENT)
Dept: GERIATRIC MEDICINE | Facility: CLINIC | Age: 77
End: 2017-04-28

## 2017-05-01 NOTE — PROGRESS NOTES
4/3/17: Received call from Gina, member's friend, re: MA renewal.  Gina is in process of assisting member with annual renewal.  Gina states she will assure member's renewal is in by April 8th.     Markus Caro RN, N  [employer]

## 2017-05-01 NOTE — PROGRESS NOTES
[facility] Six-Month Telephone Assessment    6 month telephone assessment completed on 4/28/17 with member, son Sudhakar, and also friend, Gina.    ER visits: No  Hospitalizations: No  TCU stays: No  Significant health status changes: None  Falls/Injuries: No  ADL/IADL changes: No  Changes in services: No    Caregiver Assessment follow up:  N/A    Goals: See POC in chart for goal progress documentation.  Member is doing well - happy living with her son in [place].  At this time member is still on wait list to live independently at an apartment that [facility] has set up for her - member still states that her goal is to live alone at some point.  At this time - CM will keep member as unsure when she will be moving.    Gina assured that MA was renewed and no issues.       Will see client in 6 months for an annual health risk assessment.   Encouraged client to call CM with any questions or concerns in the meantime.     Markus Caro RN, PHN  [facility]

## 2017-07-27 ENCOUNTER — CARE COORDINATION (OUTPATIENT)
Dept: GERIATRIC MEDICINE | Facility: CLINIC | Age: 77
End: 2017-07-27

## 2017-07-27 NOTE — PROGRESS NOTES
7/27/17: CM received notification per [facility] that [facility] was [facility] 7[facility].   CM placed call to Sudhakar, son, left voice message.   CM spoke with Gina, family friend, she states that member was admitted for left hip pain - sharp shooting pain.  They have ruled out any infection however member is still having severe pain.  Plan was to d/c today.     CM received call back from Sudhakar - he states member is supposed to be d/c today but they are worried as member is till in pain.   He states PT is ordered for at home - they are unsure of agency but will let me know once they find out.  Encouraged Sudhakar to contact PCP for appt.     CM placed call to [facility] at [phone] - transferred to Ortho floor - spoke with member's nurse, Tram.  She states that member is having severe pain still and is currently down at MRI for further evaluation.   Discharge is on hold at this time.  Tram stated she is unsure if SW is following member but states if so SW is Verónica Aguayo - she does not have contact #.  LILO placed call back to main line/riri Sanches contact # is: [phone].  CM placed call and left detailed voice message and request call back with update.     BRIAN log started.  PCP notified.      Markus Caro RN, N  [employer]

## 2017-07-27 NOTE — PROGRESS NOTES
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   She was admitted on 7/24.  Anticipated d/c home is today however member continues with severe pain.     Markus Caro RN, PHN  [facility]

## 2017-07-28 NOTE — PROGRESS NOTES
7/28/17: CM spoke with son, Sudhakar, for update - he states member is still at [facility] - possible discarge today.  He believes that nothing showed on MRI but member continues to have pain in hip.   He will keep CM updated on d/c.     Markus Caro RN, N  [employer]

## 2017-07-29 ENCOUNTER — TRANSFERRED RECORDS (OUTPATIENT)
Dept: HEALTH INFORMATION MANAGEMENT | Facility: CLINIC | Age: 77
End: 2017-07-29

## 2017-07-29 NOTE — PROGRESS NOTES
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 She has an appt scheduled with you Monday post hospitalization.     Markus Caro RN, PHN  [facility]

## 2017-07-29 NOTE — PROGRESS NOTES
7/28/17: CM received call from Sudhakar, son, stating that member was d/c home today.   He states she is doing ok but continues with pain in left hip.  Conference call made to schedule f/u appt with PCP - scheduled for 7/31 at 11:20.   Sudhakar states they have no needs at this time but will f/u with CM.     PCP notified.     Markus Caro RN, PHN  [facility]

## 2017-08-01 ENCOUNTER — CARE COORDINATION (OUTPATIENT)
Dept: GERIATRIC MEDICINE | Facility: CLINIC | Age: 77
End: 2017-08-01

## 2017-08-01 DIAGNOSIS — Z76.89 HEALTH CARE HOME: ICD-10-CM

## 2017-08-01 NOTE — PROGRESS NOTES
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   She will be transferring to [facility] today, 8/1/17, for TCU stay.     Thank you,   Markus Caro RN, N  [facility]

## 2017-08-01 NOTE — PROGRESS NOTES
8/1/17: CM received voice message from ADAM Ariza, at [facility] at [phone] stating that member was admitted on 7/29/17 for left hip pain - she was recently d/c from [facility] on 7/28 but continue with increase left hip pain and swelling.   Annita states shows sacral insufficiency which should hopefully heal per the dr in the next few weeks.  CRP and ESR levels are also elevated and will continue to monitor these.   Not treating infection as of now.  Plan is for member to go to TCU however member wants private room with no extra cost so waiting to hear back from a few.  Also, the one place they are waiting for [facility] is having issues with Medicare card - they understand member has [employer] and can't have that without Medicare however it is their policy to have Medicare card and card is showing a different name - Annita is getting worked out and will let CM know.       CM received call back form ADAM Ariza, at [place] stating that [facility] has accepted member and will be d/c today.   BRIAN log started. PCP notified.  Brandie MASCORRO Notified.     Markus Caro RN, PHN  [facility]

## 2017-08-03 NOTE — PROGRESS NOTES
8/3/17: CM received message from family friend, Gina, regarding language barrier for member while at TCU.  TCU made exception last night and allowed member's DIL to spend night, however, this was only for one night.  They also have  24 hr line  service for member.   Member continues to still have pain and was having pain with repositioning last evening.   Gina states she spoke with the nurse regarding the pain meds as member was on Dilaudid while at [facility] and then on oxy while at [place] and is still on Oxy however neither seem to be taking care of the pain.   The nurse was going to look into switching pain meds and trying Morphine or Percocet.     CM has a vm into [facility] [employer].   CM also placed call to the floor - member's nurse was currently busy but left message for call back from nurse.     Markus Caro, RN, N  [facility]

## 2017-08-03 NOTE — PROGRESS NOTES
8/3/17: CM received call back form nurse, Harley, he states that Oxy was d/c for member as it was not helping her pain and she was feeling side effects form it.   They have switched her to Tramadol q6hrs, Flexeril, and Tylenol.       Markus Caro RN, PHN  [facility]

## 2017-08-03 NOTE — PROGRESS NOTES
8/2/17: LILO has vm into [facility], at [facility].      Markus Caro RN, N  [employer]

## 2017-08-08 ENCOUNTER — OFFICE VISIT (OUTPATIENT)
Dept: ORTHOPEDICS | Facility: CLINIC | Age: 77
End: 2017-08-08
Payer: COMMERCIAL

## 2017-08-08 VITALS
OXYGEN SATURATION: 96 % | SYSTOLIC BLOOD PRESSURE: 107 MMHG | DIASTOLIC BLOOD PRESSURE: 64 MMHG | HEART RATE: 82 BPM | RESPIRATION RATE: 16 BRPM

## 2017-08-08 DIAGNOSIS — S32.110A CLOSED NONDISPLACED ZONE I FRACTURE OF SACRUM, INITIAL ENCOUNTER (H): Primary | ICD-10-CM

## 2017-08-08 PROCEDURE — 99213 OFFICE O/P EST LOW 20 MIN: CPT | Performed by: ORTHOPAEDIC SURGERY

## 2017-08-08 NOTE — PROGRESS NOTES
Bethany Logan is a 76 year old female who is seen in hospital follow-up for left sacral fracture.  She sustained this in late July. She was initially admitted at [facility] in then [facility] in [place] have been [place] no problems with her left total hip arthroplasty. MRI scan at [facility] showed a left sacral alar fracture. 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 She was seen by Dr. Suman Hardwick who felt that the spine was not the source of pain. He felt the sacrum was the source. Her pain is gradually improved, but still has occasional sharp pains.    Past Medical History:   Diagnosis Date     Constipation      Hypertension        Past Surgical History:   Procedure Laterality Date     C TOTAL KNEE ARTHROPLASTY  9/25/13    Right     CATARACT IOL, RT/LT       COMBINED REPAIR PTOSIS WITH BLEPHAROPLASTY Bilateral 1/9/2017    Procedure: COMBINED REPAIR PTOSIS WITH BLEPHAROPLASTY;  Surgeon: Keven Vázquez MD;  Location: [facility] OR     SURGICAL HISTORY OF -       Left hip bone surgery       Family History   Problem Relation Age of Onset     C.A.D. No family hx of      DIABETES No family hx of      Hypertension No family hx of      CEREBROVASCULAR DISEASE No family hx of        Social History     Social History     Marital status: Single     Spouse name: N/A     Number of children: 9     Years of education: N/A     Occupational History     Farming Retired     Social History Main Topics     Smoking status: Never Smoker     Smokeless tobacco: Never Used     Alcohol use No     Drug use: No     Sexual activity: No     Other Topics Concern     Not on file     Social History Narrative       Current Outpatient Prescriptions   Medication Sig Dispense Refill     sertraline (ZOLOFT) 50 MG tablet Take 1 tablet (50 mg) by mouth daily 90 tablet 1     Naproxen Sodium (ALEVE PO) Takes 2 tabs in a.m. And 2 tabs at night  PRN pain         Allergies   Allergen Reactions      Aspirin Other (See Comments)       REVIEW OF SYSTEMS:  CONSTITUTIONAL:  NEGATIVE for fever, chills, change in weight, not feeling tired  SKIN:  NEGATIVE for worrisome rashes, no skin lumps, no skin ulcers and no non-healing wounds  EYES:  NEGATIVE for vision changes or irritation.  ENT/MOUTH:  NEGATIVE.  No hearing loss, no hoarseness, no difficulty swallowing.  RESP:  NEGATIVE. No cough or shortness of breath.  CV:  NEGATIVE for chest pain, palpitations or peripheral edema  GI:  NEGATIVE for nausea, abdominal pain, heartburn, or change in bowel habits  :  Negative. No dysuria, no hematuria  MUSCULOSKELETAL:  See HPI above  NEURO:  NEGATIVE . No headaches, no dizziness,  no numbness  ENDOCRINE:  NEGATIVE for temperature intolerance, skin/hair changes  HEME/ALLERGY/IMMUNE:  NEGATIVE for bleeding problems  PSYCHIATRIC:  NEGATIVE. no anxiety, no depression.     Exam:  Vitals: /64  Pulse 82  Resp 16  SpO2 96%  BMI= There is no height or weight on file to calculate BMI.  Constitutional:  healthy, alert and no distress  Neuro: Alert and Oriented x 3, Gait normal. Sensation grossly WNL.  Psych: Affect normal   Respiratory: Breathing not labored.  Cardiovascular: normal peripheral pulses  Lymph: no adenopathy  Skin: No rashes,worrisome lesions or skin problems  Positive tenderness at the left sacrum. No tenderness on the right sacrum.  No pain with rotation of the hips.  Negative straight leg raise to 90  bilaterally.  Sensation, motor and circulation are intact.    Assessment:  Left sacral fracture gradually healing.  This will take 6 weeks to heal.  She may resume activity as tolerated with a walker.  RTC 1 month with x-ray of the pelvis only if she has continued pain.

## 2017-08-08 NOTE — MR AVS SNAPSHOT
"              After Visit Summary   2017    Bethany Logan    MRN: [id number]           Patient Information     Date Of Birth          1940        Visit Information        Provider Department      2017 10:15 AM Jeancarlos Landa MD; [place] [employer] [facility]        Today's Diagnoses     Closed nondisplaced zone I fracture of sacrum, initial encounter (H)    -  1       Follow-ups after your visit        Who to contact     If you have questions or need follow up information about today's clinic visit or your schedule please contact [facility] directly at [phone].  Normal or non-critical lab and imaging results will be communicated to you by MyChart, letter or phone within 4 business days after the clinic has received the results. If you do not hear from us within 7 days, please contact the clinic through MyChart or phone. If you have a critical or abnormal lab result, we will notify you by phone as soon as possible.  Submit refill requests through [employer] or call your pharmacy and they will forward the refill request to us. Please allow 3 business days for your refill to be completed.          Additional Information About Your Visit        MyChart Information     [employer] lets you send messages to your doctor, view your test results, renew your prescriptions, schedule appointments and more. To sign up, go to [email or web address] . Click on \"Log in\" on the left side of the screen, which will take you to the Welcome page. Then click on \"Sign up Now\" on the right side of the page.     You will be asked to enter the access code listed below, as well as some personal information. Please follow the directions to create your username and password.     Your access code is: [email or web address]  Expires: 2017 11:14 AM     Your access code will  in 90 days. If you need help or a new code, please call your [facility] or " [phone].        Care EveryWhere ID     This is your Care EveryWhere ID. This could be used by other organizations to access your [facility] medical records  [id number]        Your Vitals Were     Pulse Respirations Pulse Oximetry             82 16 96%          Blood Pressure from Last 3 Encounters:   08/08/17 107/64   01/09/17 173/76   01/06/17 130/71    Weight from Last 3 Encounters:   01/03/17 59 kg (130 lb)   01/06/17 60.9 kg (134 lb 3.2 oz)   11/21/16 59.9 kg (132 lb)              Today, you had the following     No orders found for display       Primary Care Provider Office Phone # Fax #    Ernestina Emacliff Argueta [phone]       [facility] [address]  [facility] [address]        Equal Access to Services     [facility]: Hadii aad ku hadasho Soomaali, waaxda luqadaha, qaybta kaalmada adeegyada, waxay idiin hayaan adewyatt foyaraveronica gama . So [facility] [phone].    ATENCIÓN: Si habla español, tiene a harris disposición servicios gratuitos de asistencia lingüística. Llame al [phone].    We comply with applicable federal civil rights laws and [place] laws. We do not discriminate on the basis of race, color, national origin, age, disability sex, sexual orientation or gender identity.            Thank you!     Thank you for choosing [facility]  for your care. Our goal is always to provide you with excellent care. Hearing back from our patients is one way we can continue to improve our services. Please take a few minutes to complete the written survey that you may receive in the mail after your visit with us. Thank you!             Your Updated Medication List - Protect others around you: Learn how to safely use, store and throw away your medicines at www.disposemymeds.org.          This list is accurate as of: 8/8/17 11:14 AM.  Always use your most recent med list.                   Brand Name Dispense Instructions for use Diagnosis     ALEVE PO      Takes 2 tabs in a.m. And 2 tabs at night  PRN pain        sertraline 50 MG tablet    ZOLOFT    90 tablet    Take 1 tablet (50 mg) by mouth daily    Moderate single current episode of major depressive disorder (H)

## 2017-08-08 NOTE — LETTER
8/8/2017         RE: Bethany Logan  [address] [place] [address] [place] [address]        Dear Colleague,    Thank you for referring your patient, Bethany Logan, to the [facility]. Please see a copy of my visit note below.    Bethany Logan is a 76 year old female who is seen in hospital follow-up for left sacral fracture.  She sustained this in late July. She was initially admitted at [facility] in then [facility] in [place]rays that [place] no problems with her left total hip arthroplasty. MRI scan at [facility] showed a left sacral alar fracture. 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 She was seen by Dr. Suman Hardwick who felt that the spine was not the source of pain. He felt the sacrum was the source. Her pain is gradually improved, but still has occasional sharp pains.    Past Medical History:   Diagnosis Date     Constipation      Hypertension        Past Surgical History:   Procedure Laterality Date     C TOTAL KNEE ARTHROPLASTY  9/25/13    Right     CATARACT IOL, RT/LT       COMBINED REPAIR PTOSIS WITH BLEPHAROPLASTY Bilateral 1/9/2017    Procedure: COMBINED REPAIR PTOSIS WITH BLEPHAROPLASTY;  Surgeon: Keven Vázquez MD;  Location: [facility] OR     SURGICAL HISTORY OF -       Left hip bone surgery       Family History   Problem Relation Age of Onset     C.A.D. No family hx of      DIABETES No family hx of      Hypertension No family hx of      CEREBROVASCULAR DISEASE No family hx of        Social History     Social History     Marital status: Single     Spouse name: N/A     Number of children: 9     Years of education: N/A     Occupational History     Farming Retired     Social History Main Topics     Smoking status: Never Smoker     Smokeless tobacco: Never Used     Alcohol use No     Drug use: No     Sexual activity: No     Other Topics Concern     Not on file     Social History Narrative       Current  Outpatient Prescriptions   Medication Sig Dispense Refill     sertraline (ZOLOFT) 50 MG tablet Take 1 tablet (50 mg) by mouth daily 90 tablet 1     Naproxen Sodium (ALEVE PO) Takes 2 tabs in a.m. And 2 tabs at night  PRN pain         Allergies   Allergen Reactions     Aspirin Other (See Comments)       REVIEW OF SYSTEMS:  CONSTITUTIONAL:  NEGATIVE for fever, chills, change in weight, not feeling tired  SKIN:  NEGATIVE for worrisome rashes, no skin lumps, no skin ulcers and no non-healing wounds  EYES:  NEGATIVE for vision changes or irritation.  ENT/MOUTH:  NEGATIVE.  No hearing loss, no hoarseness, no difficulty swallowing.  RESP:  NEGATIVE. No cough or shortness of breath.  CV:  NEGATIVE for chest pain, palpitations or peripheral edema  GI:  NEGATIVE for nausea, abdominal pain, heartburn, or change in bowel habits  :  Negative. No dysuria, no hematuria  MUSCULOSKELETAL:  See HPI above  NEURO:  NEGATIVE . No headaches, no dizziness,  no numbness  ENDOCRINE:  NEGATIVE for temperature intolerance, skin/hair changes  HEME/ALLERGY/IMMUNE:  NEGATIVE for bleeding problems  PSYCHIATRIC:  NEGATIVE. no anxiety, no depression.     Exam:  Vitals: /64  Pulse 82  Resp 16  SpO2 96%  BMI= There is no height or weight on file to calculate BMI.  Constitutional:  healthy, alert and no distress  Neuro: Alert and Oriented x 3, Gait normal. Sensation grossly WNL.  Psych: Affect normal   Respiratory: Breathing not labored.  Cardiovascular: normal peripheral pulses  Lymph: no adenopathy  Skin: No rashes,worrisome lesions or skin problems  Positive tenderness at the left sacrum. No tenderness on the right sacrum.  No pain with rotation of the hips.  Negative straight leg raise to 90  bilaterally.  Sensation, motor and circulation are intact.    Assessment:  Left sacral fracture gradually healing.  This will take 6 weeks to heal.  She may resume activity as tolerated with a walker.  RTC 1 month with x-ray of the pelvis only if  she has continued pain.    Again, thank you for allowing me to participate in the care of your patient.        Sincerely,        Jeancarlos Landa MD

## 2017-08-14 NOTE — PROGRESS NOTES
8/14/17: CM received vm from ADAM Hendricks at [facility] at [phone] stating that member will be receiving therapy services for about another week or so.  CM placed call back and left vm for update.     Markus Caro RN, N  [facility]

## 2017-08-15 NOTE — PROGRESS NOTES
8/15/17: CM received vm from ADAM Hendricks, at [facility] stating that member will be d/c tomorrow. She states member is ambulating independently and getting in/out bed independently.   She is on Tylenol and Tramadol for pain.   She will be d/c with [facility] with [facility], [facility]HA, and PT.   Cm spoke with son, Sudhakar, he states member is still having pain but is ready for home.  Discussed f/u appt with PCP.  He will call CM once member is home.     Markus Caro RN, N  [employer] [facility]

## 2017-08-16 ENCOUNTER — CARE COORDINATION (OUTPATIENT)
Dept: GERIATRIC MEDICINE | Facility: CLINIC | Age: 77
End: 2017-08-16

## 2017-08-17 NOTE — PROGRESS NOTES
Dr. Juan Argueta -     This member was d/c from [facility] back to home with [facility] on 8/16/17.     Markus Caro RN, N  [facility]

## 2017-08-17 NOTE — PROGRESS NOTES
8/17/17: CM left message for son, Sudhakar, to f/u discharge.     Markus Caro RN, N  [employer]

## 2017-08-21 ENCOUNTER — MEDICAL CORRESPONDENCE (OUTPATIENT)
Dept: HEALTH INFORMATION MANAGEMENT | Facility: CLINIC | Age: 77
End: 2017-08-21

## 2017-08-21 ENCOUNTER — TELEPHONE (OUTPATIENT)
Dept: FAMILY MEDICINE | Facility: CLINIC | Age: 77
End: 2017-08-21

## 2017-08-21 NOTE — TELEPHONE ENCOUNTER
Left vague message at the number listed for someone to return call. No patient information left since the voice mail did not state company or that it was confidential.    Ryanne Santana RN, [facility]

## 2017-08-21 NOTE — TELEPHONE ENCOUNTER
Reason for Call: Request for an order or referral:    Order or referral being requested: Skilled nursing 3 times a week for 1 week , 2 times a week for 2 weeks, 1 time a week for 2 weeks.    Home health Aid 3 times a week for 5 weeks.  PT eval and treat.   eval and treat     Date needed: as soon as possible    Has the patient been seen by the PCP for this problem?     Additional comments:     Phone number Patient can be reached at:  Other phone number:  [phone]    Best Time:  Any    Can we leave a detailed message on this number?  YES    Call taken on 8/21/2017 at 11:25 AM by Nigel Graham

## 2017-08-22 NOTE — TELEPHONE ENCOUNTER
Left vague message at the number listed for someone to return call. No patient information left since the voice mail did not state company or that it was confidential.     Ryanne Santana RN, [facility]

## 2017-08-22 NOTE — PROGRESS NOTES
8/18/17: CM spoke with Sudhakar - he states member is doing pretty well at home - still having some pain but managing.  They are making sure to be with member and assist her as needed.  Home Care through [facility] have not contacted them yet but he believes this weekend.  He states he will f/u with CM next week.  Reminded Sudhakar to f/u with PT if any equip needs - shower chair, etc.   Encouraged to schedule and CM to assist PCP f/u s/p TCU stay - Sudhakar states they will discuss but states that they have f/u with Ortho.  Encouraged importance of meeting with PCP as well.     Markus Caro RN, PHN  [employer]

## 2017-08-22 NOTE — PROGRESS NOTES
8/22/17: CM placed call to [facility] to f/u with RN for status of member - left message for Ryanne at [phone]    Markus Caro RN, N  [facility]

## 2017-08-23 ENCOUNTER — MEDICAL CORRESPONDENCE (OUTPATIENT)
Dept: HEALTH INFORMATION MANAGEMENT | Facility: CLINIC | Age: 77
End: 2017-08-23

## 2017-08-23 NOTE — PROGRESS NOTES
8/22/17: LILO received call from Radha, [facility] nurse at [phone].   Spoke with Radha - she states member continues with pain - she states that member was not taking pain medications correctly or as often as she could.   She states that they have gone over with member and family and so hoping that member's pain will be under better control.  Radha is asking for a [place] med box (at least TID).  She states member is also in need of heating pad.  LILO will order via [facility].   Radha will talk with PT in regards to shower chair and measurements.       LILO placed order with [facility] - with [facility].  CPS and POC updated.       CM spoke with Sudhakar, son, updated him on equipment.  He states they are doing ok now - no needs at this time - will continue to f/u.     Markus Caro RN, PHN  [employer]

## 2017-08-25 ENCOUNTER — CARE COORDINATION (OUTPATIENT)
Dept: GERIATRIC MEDICINE | Facility: CLINIC | Age: 77
End: 2017-08-25

## 2017-08-25 NOTE — PROGRESS NOTES
Dr. Juan Gould I am Bethany Kimbroughos  with [facility].  Since her back/hip pain and hospital/TCU stay she is in need of DME/Supplies per recommendation of home care.   [facility] will be sending the follow rx for a heating pad and shower stool.     [facility] will be sending rx for pull ups and Ensure.      If you agree, will you please complete and send back.     Thank you,   Markus Caro RN, PHN  [employer]

## 2017-08-25 NOTE — PROGRESS NOTES
Order placed with Sheri (p: [phone]; f: [phone]) for Pull-Ups & Ensure.  Order placed on 08/25/2017. Access updated.  As required, authorization submitted to health plan.      Krystal Puentes  Case Management Specialist  [facility]   [phone]

## 2017-08-25 NOTE — PROGRESS NOTES
8/23/17: CM received email from Jacqueline at [facility] stating pill box was approved to send out - will be sent out next week.  Heating pad pending rx from PCP.     CM received call from Radha, [facility] nurse requesting pull ups for member - she is having some incontinence issues at night and is using pull ups at night - she does at times wear throughout day too.  Radha also states that member is not eating very well right now and is requesting Ensure.  CM placed referral for [facility] for order.   CPS/POC updated.     Radha also states she believes member/family have better handle on pain medication - continuing to work on getting member up and out of bed more throughout the day.      LILO spoke with Sudhakar, son, updated him on equipment.  He states he, his wife, and brother are helping member.  No other needs right now.  Suggested member stay with Sudhakar and his wife to ease burden of them driving to brother's house  - he agreed but stated that member prefers to stay at brother's as Sudhakar has dogs and member does not care for.  He states that he will continue to talk to member however.      Marksu Caro RN, PHN  [employer]

## 2017-08-28 NOTE — PROGRESS NOTES
8/24/17: LILO spoke with CARRIE Johnson with [facility] - she states member has a small stand up shower - there is a bench in the shower however member is afraid to sit on - Frida recommended putting a non skid pad on the bench so member could sit and prevent slipping - member declines to try.  Frida is recommending a shower stool w/ back for member to shower safely.  CM sent request to Jacqueline at [facility].  Elizabeth confirmed [facility] is assisting with showers for member.   Elizabeth states that member rates pain 8/10 however member is doing very well with ambulating and shows no facial grimace however with transferring member does show facial grimace and has more pain.  Encouraging member to be up out of bed more during day.       Markus Caro RN, N  [facility]

## 2017-08-31 ENCOUNTER — CARE COORDINATION (OUTPATIENT)
Dept: GERIATRIC MEDICINE | Facility: CLINIC | Age: 77
End: 2017-08-31

## 2017-08-31 NOTE — PATIENT INSTRUCTIONS
Per SCOOTER Gould CM notified  Emilee MelloBethany 1940 [place] Pill Box 8/23/2017 Delivered 8/25/17

## 2017-09-05 ENCOUNTER — RADIANT APPOINTMENT (OUTPATIENT)
Dept: GENERAL RADIOLOGY | Facility: CLINIC | Age: 77
End: 2017-09-05
Attending: FAMILY MEDICINE
Payer: COMMERCIAL

## 2017-09-05 ENCOUNTER — OFFICE VISIT (OUTPATIENT)
Dept: URGENT CARE | Facility: URGENT CARE | Age: 77
End: 2017-09-05
Payer: COMMERCIAL

## 2017-09-05 VITALS
DIASTOLIC BLOOD PRESSURE: 79 MMHG | SYSTOLIC BLOOD PRESSURE: 116 MMHG | WEIGHT: 125 LBS | BODY MASS INDEX: 28.02 KG/M2 | OXYGEN SATURATION: 96 % | HEART RATE: 79 BPM

## 2017-09-05 DIAGNOSIS — M54.42 ACUTE LEFT-SIDED LOW BACK PAIN WITH LEFT-SIDED SCIATICA: ICD-10-CM

## 2017-09-05 DIAGNOSIS — K29.00 OTHER ACUTE GASTRITIS: ICD-10-CM

## 2017-09-05 DIAGNOSIS — M54.42 ACUTE LEFT-SIDED LOW BACK PAIN WITH LEFT-SIDED SCIATICA: Primary | ICD-10-CM

## 2017-09-05 PROCEDURE — 72170 X-RAY EXAM OF PELVIS: CPT

## 2017-09-05 PROCEDURE — 99214 OFFICE O/P EST MOD 30 MIN: CPT | Performed by: FAMILY MEDICINE

## 2017-09-05 RX ORDER — CYCLOBENZAPRINE HCL 5 MG
5 TABLET ORAL
COMMUNITY
Start: 2017-07-28 | End: 2017-09-19

## 2017-09-05 RX ORDER — TRAMADOL HYDROCHLORIDE 50 MG/1
50 TABLET ORAL EVERY 6 HOURS PRN
Qty: 30 TABLET | Refills: 2 | Status: SHIPPED | OUTPATIENT
Start: 2017-09-05 | End: 2017-09-19

## 2017-09-05 RX ORDER — ACETAMINOPHEN 325 MG/1
650 TABLET ORAL
COMMUNITY
Start: 2017-07-28 | End: 2017-09-19

## 2017-09-05 ASSESSMENT — PAIN SCALES - GENERAL: PAINLEVEL: MODERATE PAIN (5)

## 2017-09-05 NOTE — PROGRESS NOTES
Some of this note was populated by a medical assistant.        SUBJECTIVE:   Bethany Logan is a 77 year old female who presents to clinic today for the following health issues:    Musculoskeletal problem/pain      Duration: this weekend    Description  Location: left leg and sciatic area over the past 3 days.     Intensity:  5/10    Accompanying signs and symptoms: numbness and tingling; nausea, loss of appetite, coughing at night    History  Previous similar problem: no   Previous evaluation:  none    Precipitating or alleviating factors:  Trauma or overuse: no   Aggravating factors include: standing, walking    Therapies tried and outcome: tylenol- no relief.      Hx of zone 1 sacral fracture from a fall 3 months ago and was seen by orthopedics at that time. Advised this will heal over 6 weeks time. This pain has improved. Also noted a hx of left hip arthroplasty.       Problem list and histories reviewed & adjusted, as indicated.  Additional history: as documented    Patient Active Problem List   Diagnosis     OA (osteoarthritis) of knee - bilateral     Health Care Home     Constipation     History of total knee arthroplasty - right     SNHL (sensory-neural hearing loss), asymmetrical     CARDIOVASCULAR SCREENING; LDL GOAL LESS THAN 130     Advance care planning     Sciatica - left     Osteoporosis     History of total hip arthroplasty, left     Lumbar spinal stenosis     Macular drusen, bilateral     Dermatochalasis of eyelid     Pseudophakia of both eyes     Moderate single current episode of major depressive disorder (H)     Closed nondisplaced zone I fracture of sacrum, initial encounter (H)     Past Surgical History:   Procedure Laterality Date     C TOTAL KNEE ARTHROPLASTY  9/25/13    Right     CATARACT IOL, RT/LT       COMBINED REPAIR PTOSIS WITH BLEPHAROPLASTY Bilateral 1/9/2017    Procedure: COMBINED REPAIR PTOSIS WITH BLEPHAROPLASTY;  Surgeon: Keven Vázquez MD;  Location:  OR      SURGICAL HISTORY OF -       Left hip bone surgery       Social History   Substance Use Topics     Smoking status: Never Smoker     Smokeless tobacco: Never Used     Alcohol use No     Family History   Problem Relation Age of Onset     C.A.D. No family hx of      DIABETES No family hx of      Hypertension No family hx of      CEREBROVASCULAR DISEASE No family hx of          Current Outpatient Prescriptions   Medication Sig Dispense Refill     acetaminophen (TYLENOL) 325 MG tablet Take 650 mg by mouth       calcium carb 1250 mg, 500 mg [place],/vitamin D 200 units (OSCAL WITH D) 500-200 MG-UNIT per tablet        cyclobenzaprine (FLEXERIL) 5 MG tablet Take 5 mg by mouth       ketotifen (ZADITOR/REFRESH ANTI-ITCH) 0.025 % SOLN ophthalmic solution 1 drop       sertraline (ZOLOFT) 50 MG tablet Take 1 tablet (50 mg) by mouth daily 90 tablet 1     Naproxen Sodium (ALEVE PO) Takes 2 tabs in a.m. And 2 tabs at night  PRN pain       TRAMADOL HCL PO Take 50 mg by mouth       Allergies   Allergen Reactions     Aspirin Other (See Comments)         Reviewed and updated as needed this visit by clinical staff     Reviewed and updated as needed this visit by Provider         ROS:  Constitutional, HEENT, cardiovascular, pulmonary, gi and gu systems are negative, except as otherwise noted.      OBJECTIVE:   /79 (BP Location: Left arm, Patient Position: Chair, Cuff Size: Adult Regular)  Pulse 79  Wt 125 lb (56.7 kg)  SpO2 96%  Breastfeeding? No  BMI 28.02 kg/m2  Body mass index is 28.02 kg/(m^2).  GENERAL: healthy, alert and no distress  NECK: no adenopathy, no asymmetry, masses, or scars and thyroid normal to palpation  RESP: lungs clear to auscultation - no rales, rhonchi or wheezes  CV: regular rate and rhythm, normal S1 S2, no S3 or S4, no murmur, click or rub, no peripheral edema and peripheral pulses strong  ABDOMEN: soft, nontender, no hepatosplenomegaly, no masses and bowel sounds normal  MS: hip not focally tender.  Noted sciatic area tenderness to palpation. Gait assist with walker.   Sacral area not particularly tender. Noted increased pain on lateral tilt and forward bend from waist.     Diagnostic Test Results:  none     ASSESSMENT/PLAN:       ICD-10-CM    1. Acute left-sided low back pain with left-sided sciatica M54.42 XR Pelvis 1/2 Views        PLAN  Conservative cares over the next 2-6 weeks as long as improving.   Stay active however avoid painful activities.   Close follow-up with primary doctor if pain worsening or directly to ER for red flags which were discussed verbally and in writing.   Patient educational/instructional material provided including reasons for follow-up   The patient indicates understanding of these issues and agrees with the plan.  Willi Cardoza MD      [facility]

## 2017-09-05 NOTE — NURSING NOTE
"Chief Complaint   Patient presents with     Musculoskeletal Problem     Pt c/o left leg pain since this weekend.        Initial /79 (BP Location: Left arm, Patient Position: Chair, Cuff Size: Adult Regular)  Pulse 79  Wt 125 lb (56.7 kg)  SpO2 96%  Breastfeeding? No  BMI 28.02 kg/m2 Estimated body mass index is 28.02 kg/(m^2) as calculated from the following:    Height as of 1/6/17: 4' 8\" (1.422 m).    Weight as of this encounter: 125 lb (56.7 kg).  Medication Reconciliation: complete     Carolyn Cheek CMA (AAMA)      "

## 2017-09-05 NOTE — MR AVS SNAPSHOT
"              After Visit Summary   2017    Bethany Logan    MRN: [id number]           Patient Information     Date Of Birth          1940        Visit Information        Provider Department      2017 1:50 PM Willi Cardoza MD [facility]        Today's Diagnoses     Acute left-sided low back pain with left-sided sciatica    -  1    Other acute gastritis           Follow-ups after your visit        Who to contact     If you have questions or need follow up information about today's clinic visit or your schedule please contact [facility] directly at [phone].  Normal or non-critical lab and imaging results will be communicated to you by MyChart, letter or phone within 4 business days after the clinic has received the results. If you do not hear from us within 7 days, please contact the clinic through [employer] or phone. If you have a critical or abnormal lab result, we will notify you by phone as soon as possible.  Submit refill requests through [employer] or call your pharmacy and they will forward the refill request to us. Please allow 3 business days for your refill to be completed.          Additional Information About Your Visit        MyChart Information     [employer] lets you send messages to your doctor, view your test results, renew your prescriptions, schedule appointments and more. To sign up, go to [email or web address] . Click on \"Log in\" on the left side of the screen, which will take you to the Welcome page. Then click on \"Sign up Now\" on the right side of the page.     You will be asked to enter the access code listed below, as well as some personal information. Please follow the directions to create your username and password.     Your access code is: [email or web address]  Expires: 2017 11:14 AM     Your access code will  in 90 days. If you need help or a new code, please call your [facility] or [phone].        Care " EveryWhere ID     This is your Care EveryWhere ID. This could be used by other organizations to access your [facility] medical records  [id number]        Your Vitals Were     Pulse Pulse Oximetry Breastfeeding? BMI (Body Mass Index)          79 96% No 28.02 kg/m2         Blood Pressure from Last 3 Encounters:   09/05/17 116/79   08/08/17 107/64   01/09/17 173/76    Weight from Last 3 Encounters:   09/05/17 125 lb (56.7 kg)   01/03/17 130 lb (59 kg)   01/06/17 134 lb 3.2 oz (60.9 kg)                 Today's Medication Changes          These changes are accurate as of: 9/5/17  3:09 PM.  If you have any questions, ask your nurse or doctor.               Start taking these medicines.        Dose/Directions    omeprazole 20 MG CR capsule   Commonly known as:  priLOSEC   Used for:  Other acute gastritis   Started by:  Willi Cardoza MD        Dose:  20 mg   Take 1 capsule (20 mg) by mouth daily for 21 days   Quantity:  21 capsule   Refills:  0         These medicines have changed or have updated prescriptions.        Dose/Directions    * TRAMADOL HCL PO   This may have changed:  Another medication with the same name was added. Make sure you understand how and when to take each.   Changed by:  Willi Cardoza MD        Dose:  50 mg   Take 50 mg by mouth   Refills:  0       * traMADol 50 MG tablet   Commonly known as:  ULTRAM   This may have changed:  You were already taking a medication with the same name, and this prescription was added. Make sure you understand how and when to take each.   Used for:  Acute left-sided low back pain with left-sided sciatica   Changed by:  Willi Cardoza MD        Dose:  50 mg   Take 1 tablet (50 mg) by mouth every 6 hours as needed for moderate pain or breakthrough pain   Quantity:  30 tablet   Refills:  2       * Notice:  This list has 2 medication(s) that are the same as other medications prescribed for you. Read the directions carefully, and ask your doctor or  other care provider to review them with you.         Where to get your medicines      These medications were sent to [facility] [address] [place] [address] - [address]  [address], [facility] [address]     Phone:  [phone]     omeprazole 20 MG CR capsule         Some of these will need a paper prescription and others can be bought over the counter.  Ask your nurse if you have questions.     Bring a paper prescription for each of these medications     traMADol 50 MG tablet                Primary Care Provider Office Phone # Fax #    Ernestina Emacliff Argueta [phone]       [address]  [facility] [address]        Equal Access to Services     DUYEN GONZALEZ : Hadii anusha duckworth hadasho Soomaali, waaxda luqadaha, qaybta kaalmada adeegyada, renae gama . So [facility] [phone].    ATENCIÓN: Si habla español, tiene a harris disposición servicios gratuitos de asistencia lingüística. Llame al [phone].    We comply with applicable federal civil rights laws and [place] laws. We do not discriminate on the basis of race, color, national origin, age, disability sex, sexual orientation or gender identity.            Thank you!     Thank you for choosing [facility]  for your care. Our goal is always to provide you with excellent care. Hearing back from our patients is one way we can continue to improve our services. Please take a few minutes to complete the written survey that you may receive in the mail after your visit with us. Thank you!             Your Updated Medication List - Protect others around you: Learn how to safely use, store and throw away your medicines at www.disposemymeds.org.          This list is accurate as of: 9/5/17  3:09 PM.  Always use your most recent med list.                   Brand Name Dispense Instructions for use Diagnosis    acetaminophen 325 MG tablet    TYLENOL     Take 650 mg by mouth         ALEVE PO      Takes 2 tabs in a.m. And 2 tabs at night  PRN pain        calcium carb 1250 mg (500 mg [place])/vitamin D 200 units 500-200 MG-UNIT per tablet    OSCAL with D          cyclobenzaprine 5 MG tablet    FLEXERIL     Take 5 mg by mouth        ketotifen 0.025 % Soln ophthalmic solution    ZADITOR/REFRESH ANTI-ITCH     1 drop        omeprazole 20 MG CR capsule    priLOSEC    21 capsule    Take 1 capsule (20 mg) by mouth daily for 21 days    Other acute gastritis       sertraline 50 MG tablet    ZOLOFT    90 tablet    Take 1 tablet (50 mg) by mouth daily    Moderate single current episode of major depressive disorder (H)       * TRAMADOL HCL PO      Take 50 mg by mouth        * traMADol 50 MG tablet    ULTRAM    30 tablet    Take 1 tablet (50 mg) by mouth every 6 hours as needed for moderate pain or breakthrough pain    Acute left-sided low back pain with left-sided sciatica       * Notice:  This list has 2 medication(s) that are the same as other medications prescribed for you. Read the directions carefully, and ask your doctor or other care provider to review them with you.

## 2017-09-06 ENCOUNTER — CARE COORDINATION (OUTPATIENT)
Dept: GERIATRIC MEDICINE | Facility: CLINIC | Age: 77
End: 2017-09-06

## 2017-09-06 NOTE — PROGRESS NOTES
Per Sheri - Pull-Ups (Size Lg); Ensure (1 can/day) shipped today. CM notified.    Rayne Digatobayron  Case Management Specialist   [facility]   [phone]

## 2017-09-11 DIAGNOSIS — Z53.9 DIAGNOSIS NOT YET DEFINED: Primary | ICD-10-CM

## 2017-09-11 PROCEDURE — G0180 MD CERTIFICATION HHA PATIENT: HCPCS | Performed by: FAMILY MEDICINE

## 2017-09-13 NOTE — PROGRESS NOTES
9/1/17: LILO spoke with son, Sudhakar, he states member continues with pain - he and his wife and brother are assisting member.  Updated him on equip - no further needs at this time.     Markus Caro RN, N  [employer]

## 2017-09-13 NOTE — PROGRESS NOTES
9/6/17: CM f/u with [facility] on shower bench w/ back - spoke with Jacqueline - she states the order did not get ordered  - CM had sent to Jacqueline Novoa at [facility] - LILO was speaking with Jacqueline Mason - she apologized and states that she will placed order now.       Markus Caro RN, N  [employer] [facility]

## 2017-09-13 NOTE — PROGRESS NOTES
9/13/17: CM received call from [employer] -  will be Ana.     Markus Caro RN, PHN  [facility]

## 2017-09-13 NOTE — PROGRESS NOTES
9/8/17: LILO received call from MEAGHAN Garcia with [employer] - she states that she was out at home today and was talking with dil, Ana.  Ana has not been working as she has been assiting with caring for member.  Ana states that member likes when Ana is there to help and is assisting her with IADL and ADL's.  Radha is wondering if CM can contact family to discuss options.   CM placed call to son, Sudhakar, unable to leave  - mailbox full.     Markus Caro RN, N  [employer] [employer] [facility]

## 2017-09-13 NOTE — PROGRESS NOTES
9/11/17: LILO placed f/u call to Sudhakar, son, regarding services - discussed PCA assessment, homemaking services, etc.   Cm offered home visit again - Sudhakar agreed.  Scheduled for 9/14/17 at 12:30 p.m.  LILO called [facility] to schedule .     Markus Caro RN, N  [facility]

## 2017-09-19 ENCOUNTER — CARE COORDINATION (OUTPATIENT)
Dept: GERIATRIC MEDICINE | Facility: CLINIC | Age: 77
End: 2017-09-19

## 2017-09-19 ENCOUNTER — OFFICE VISIT (OUTPATIENT)
Dept: ORTHOPEDICS | Facility: CLINIC | Age: 77
End: 2017-09-19
Payer: COMMERCIAL

## 2017-09-19 ENCOUNTER — RADIANT APPOINTMENT (OUTPATIENT)
Dept: GENERAL RADIOLOGY | Facility: CLINIC | Age: 77
End: 2017-09-19
Attending: ORTHOPAEDIC SURGERY
Payer: COMMERCIAL

## 2017-09-19 VITALS — WEIGHT: 125 LBS | TEMPERATURE: 98.6 F | HEIGHT: 56 IN | RESPIRATION RATE: 18 BRPM | BODY MASS INDEX: 28.12 KG/M2

## 2017-09-19 DIAGNOSIS — M54.42 ACUTE LEFT-SIDED LOW BACK PAIN WITH LEFT-SIDED SCIATICA: Primary | ICD-10-CM

## 2017-09-19 DIAGNOSIS — S32.110D CLOSED NONDISPLACED ZONE I FRACTURE OF SACRUM WITH ROUTINE HEALING, SUBSEQUENT ENCOUNTER: ICD-10-CM

## 2017-09-19 DIAGNOSIS — S32.110A CLOSED NONDISPLACED ZONE I FRACTURE OF SACRUM, INITIAL ENCOUNTER (H): ICD-10-CM

## 2017-09-19 DIAGNOSIS — Z71.89 ADVANCE CARE PLANNING: Chronic | ICD-10-CM

## 2017-09-19 DIAGNOSIS — Z76.89 HEALTH CARE HOME: ICD-10-CM

## 2017-09-19 PROCEDURE — 72170 X-RAY EXAM OF PELVIS: CPT

## 2017-09-19 PROCEDURE — 99213 OFFICE O/P EST LOW 20 MIN: CPT | Performed by: ORTHOPAEDIC SURGERY

## 2017-09-19 RX ORDER — ACETAMINOPHEN 325 MG/1
650 TABLET ORAL EVERY 6 HOURS PRN
Qty: 100 TABLET | Refills: 1 | Status: SHIPPED | OUTPATIENT
Start: 2017-09-19 | End: 2017-12-18

## 2017-09-19 RX ORDER — TRAMADOL HYDROCHLORIDE 50 MG/1
50 TABLET ORAL EVERY 6 HOURS PRN
Qty: 30 TABLET | Refills: 2 | Status: SHIPPED | OUTPATIENT
Start: 2017-09-19 | End: 2017-12-18

## 2017-09-19 NOTE — PATIENT INSTRUCTIONS
Fracture appears to be healing well.  Continue to use walker to prevent falling.  Use walker until you feel you have good strength.  Ok to do more walking now.  Pain will gradually fade.  May stop tramadol as pain gets less.    Fractura parece ser curativo sae.  Continuar con walker para evitar caer.  Walker de uso hasta que sientes que tiene buena fuerza.  OK para hacer más que caminar ahora.  Dolor se desvanecerá poco a poco.  Puede dejar de tramadol edmundo dolor obtiene menos.

## 2017-09-19 NOTE — NURSING NOTE
"Chief Complaint   Patient presents with     RECHECK     Left sacral fx late July 2017.       Initial Temp 98.6  F (37  C)  Resp 18  Ht 1.422 m (4' 8\")  Wt 56.7 kg (125 lb)  BMI 28.02 kg/m2 Estimated body mass index is 28.02 kg/(m^2) as calculated from the following:    Height as of this encounter: 1.422 m (4' 8\").    Weight as of this encounter: 56.7 kg (125 lb).  Medication Reconciliation: complete   Ewa Jain MA      "

## 2017-09-19 NOTE — LETTER
9/19/2017         RE: Bethany Logan  [address] [place] [address]        Dear Colleague,    Thank you for referring your patient, Bethany Logan, to the [facility]. Please see a copy of my visit note below.    Bethany Logan is a 76 year old female who is seen in hospital follow-up for left sacral fracture.  She sustained this in late July. She was initially admitted at [facility] and then [facility].   On  x-rays [facility] report no problems with her left total hip arthroplasty. MRI scan at [facility] showed a left sacral alar fracture. 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 She was seen by Dr. Suman Hardwick who felt that the spine was not the source of pain. He felt the sacrum was the source. Her pain is gradually improved, but still has occasional sharp pains.  Seen in follow up now.  She is using a walker for balance.,    X-ray shows no fracture displacement.    Past Medical History:   Diagnosis Date     Constipation      Hypertension        Past Surgical History:   Procedure Laterality Date     C TOTAL KNEE ARTHROPLASTY  9/25/13    Right     CATARACT IOL, RT/LT       COMBINED REPAIR PTOSIS WITH BLEPHAROPLASTY Bilateral 1/9/2017    Procedure: COMBINED REPAIR PTOSIS WITH BLEPHAROPLASTY;  Surgeon: Keven Vázquez MD;  Location:  OR     SURGICAL HISTORY OF -       Left hip bone surgery       Family History   Problem Relation Age of Onset     C.A.D. No family hx of      DIABETES No family hx of      Hypertension No family hx of      CEREBROVASCULAR DISEASE No family hx of        Social History     Social History     Marital status: Single     Spouse name: N/A     Number of children: 9     Years of education: N/A     Occupational History     Farming Retired     Social History Main Topics     Smoking status: Never Smoker     Smokeless tobacco: Never Used     Alcohol use No     Drug use: No      "Sexual activity: No     Other Topics Concern     Not on file     Social History Narrative       Current Outpatient Prescriptions   Medication Sig Dispense Refill     acetaminophen (TYLENOL) 325 MG tablet Take 2 tablets (650 mg) by mouth every 6 hours as needed for mild pain 100 tablet 1     traMADol (ULTRAM) 50 MG tablet Take 1 tablet (50 mg) by mouth every 6 hours as needed for moderate pain or breakthrough pain 30 tablet 2     calcium carb 1250 mg, 500 mg [place],/vitamin D 200 units (OSCAL WITH D) 500-200 MG-UNIT per tablet        ketotifen (ZADITOR/REFRESH ANTI-ITCH) 0.025 % SOLN ophthalmic solution 1 drop       omeprazole (PRILOSEC) 20 MG CR capsule Take 1 capsule (20 mg) by mouth daily for 21 days 21 capsule 0       Allergies   Allergen Reactions     Aspirin Other (See Comments)       REVIEW OF SYSTEMS:  CONSTITUTIONAL:  NEGATIVE for fever, chills, change in weight, not feeling tired  SKIN:  NEGATIVE for worrisome rashes, no skin lumps, no skin ulcers and no non-healing wounds  EYES:  NEGATIVE for vision changes or irritation.  ENT/MOUTH:  NEGATIVE.  No hearing loss, no hoarseness, no difficulty swallowing.  RESP:  NEGATIVE. No cough or shortness of breath.  CV:  NEGATIVE for chest pain, palpitations or peripheral edema  GI:  NEGATIVE for nausea, abdominal pain, heartburn, or change in bowel habits  :  Negative. No dysuria, no hematuria  MUSCULOSKELETAL:  See HPI above  NEURO:  NEGATIVE . No headaches, no dizziness,  no numbness  ENDOCRINE:  NEGATIVE for temperature intolerance, skin/hair changes  HEME/ALLERGY/IMMUNE:  NEGATIVE for bleeding problems  PSYCHIATRIC:  NEGATIVE. no anxiety, no depression.     Exam:  Vitals: Temp 98.6  F (37  C)  Resp 18  Ht 1.422 m (4' 8\")  Wt 56.7 kg (125 lb)  BMI 28.02 kg/m2  BMI= Body mass index is 28.02 kg/(m^2).  Constitutional:  healthy, alert and no distress  Neuro: Alert and Oriented x 3, Gait slow with walker. Sensation grossly WNL.  Psych: Affect normal   Respiratory: " Breathing not labored.  Cardiovascular: normal peripheral pulses  Lymph: no adenopathy  Skin: No rashes,worrisome lesions or skin problems  Positive tenderness at the left sacrum. No tenderness on the right sacrum.  No pain with rotation of the hips.  Negative straight leg raise to 90  bilaterally.  Sensation, motor and circulation are intact.    Assessment:  Left sacral fracture gradually healing.  She may resume activity as tolerated with a walker.  RTC as needed.    Again, thank you for allowing me to participate in the care of your patient.        Sincerely,        Jeancarlos Landa MD

## 2017-09-19 NOTE — MR AVS SNAPSHOT
After Visit Summary   9/19/2017    Bethany Logan    MRN: [id number]           Patient Information     Date Of Birth          1940        Visit Information        Provider Department      9/19/2017 10:15 AM Jeancarlos Landa MD; [employer] [facility]        Today's Diagnoses     Closed nondisplaced zone I fracture of sacrum, initial encounter (H)    -  1    Acute left-sided low back pain with left-sided sciatica          Care Instructions    Fracture appears to be healing well.  Continue to use walker to prevent falling.  Use walker until you feel you have good strength.  Ok to do more walking now.  Pain will gradually fade.  May stop tramadol as pain gets less.    Fractura parece ser curativo sae.  Continuar con walker para evitar caer.  Walker de uso hasta que sientes que tiene buena fuerza.  OK para hacer más que caminar ahora.  Dolor se desvanecerá poco a poco.  Puede dejar de tramadol edmundo dolor obtiene menos.          Follow-ups after your visit        Who to contact     If you have questions or need follow up information about today's clinic visit or your schedule please contact [facility] directly at [phone].  Normal or non-critical lab and imaging results will be communicated to you by [employer], letter or phone within 4 business days after the clinic has received the results. If you do not hear from us within 7 days, please contact the clinic through [employer] or phone. If you have a critical or abnormal lab result, we will notify you by phone as soon as possible.  Submit refill requests through [employer] or call your pharmacy and they will forward the refill request to us. Please allow 3 business days for your refill to be completed.          Additional Information About Your Visit        [employer] Information     [employer] lets you send messages to your doctor, view your test results, renew your prescriptions,  "schedule appointments and more. To sign up, go to [email or web address] . Click on \"Log in\" on the left side of the screen, which will take you to the Welcome page. Then click on \"Sign up Now\" on the right side of the page.     You will be asked to enter the access code listed below, as well as some personal information. Please follow the directions to create your username and password.     Your access code is: [email or web address]  Expires: 2017 11:14 AM     Your access code will  in 90 days. If you need help or a new code, please call your [place] clinic or [phone].        Care EveryWhere ID     This is your Care EveryWhere ID. This could be used by other organizations to access your [place] medical records  [id number]        Your Vitals Were     Temperature Respirations Height BMI (Body Mass Index)          98.6  F (37  C) 18 1.422 m (4' 8\") 28.02 kg/m2         Blood Pressure from Last 3 Encounters:   17 116/79   17 107/64   17 173/76    Weight from Last 3 Encounters:   17 56.7 kg (125 lb)   17 56.7 kg (125 lb)   17 59 kg (130 lb)                 Today's Medication Changes          These changes are accurate as of: 17 10:57 AM.  If you have any questions, ask your nurse or doctor.               These medicines have changed or have updated prescriptions.        Dose/Directions    acetaminophen 325 MG tablet   Commonly known as:  TYLENOL   This may have changed:    - when to take this  - reasons to take this   Used for:  Acute left-sided low back pain with left-sided sciatica   Changed by:  Jeancarlos Landa MD        Dose:  650 mg   Take 2 tablets (650 mg) by mouth every 6 hours as needed for mild pain   Quantity:  100 tablet   Refills:  1            Where to get your medicines      Some of these will need a paper prescription and others can be bought over the counter.  Ask your nurse if you have questions.     Bring a paper prescription for each of these " medications     acetaminophen 325 MG tablet    traMADol 50 MG tablet                Primary Care Provider Office Phone # Fax #    Ernestina Boo Juan Argueta [phone] [address]  [facility] [address]        Equal Access to Services     AZALEA GONZALEZ : Hadii aad ku hadasho Soomaali, waaxda luqadaha, qaybta kaalmada adeegyada, waxay calosin hayaan adewyatt nimeshveronica singh. So [facility] [phone].    ATENCIÓN: Si habla español, tiene a harris disposición servicios gratuitos de asistencia lingüística. Llame al [phone].    We comply with applicable federal civil rights laws and [place] laws. We do not discriminate on the basis of race, color, national origin, age, disability sex, sexual orientation or gender identity.            Thank you!     Thank you for choosing [facility]  for your care. Our goal is always to provide you with excellent care. Hearing back from our patients is one way we can continue to improve our services. Please take a few minutes to complete the written survey that you may receive in the mail after your visit with us. Thank you!             Your Updated Medication List - Protect others around you: Learn how to safely use, store and throw away your medicines at www.disposemymeds.org.          This list is accurate as of: 9/19/17 10:57 AM.  Always use your most recent med list.                   Brand Name Dispense Instructions for use Diagnosis    acetaminophen 325 MG tablet    TYLENOL    100 tablet    Take 2 tablets (650 mg) by mouth every 6 hours as needed for mild pain    Acute left-sided low back pain with left-sided sciatica       calcium carb 1250 mg (500 mg [place])/vitamin D 200 units 500-200 MG-UNIT per tablet    OSCAL with D          ketotifen 0.025 % Soln ophthalmic solution    ZADITOR/REFRESH ANTI-ITCH     1 drop        omeprazole 20 MG CR capsule    priLOSEC    21 capsule    Take 1 capsule (20 mg) by mouth daily for 21 days     Other acute gastritis       traMADol 50 MG tablet    ULTRAM    30 tablet    Take 1 tablet (50 mg) by mouth every 6 hours as needed for moderate pain or breakthrough pain    Acute left-sided low back pain with left-sided sciatica

## 2017-09-19 NOTE — PROGRESS NOTES
Bethany Logan is a 76 year old female who is seen in hospital follow-up for left sacral fracture.  She sustained this in late July. She was initially admitted at [facility] and then [facility].   On  x-rays [facility] report no problems with her left total hip arthroplasty. MRI scan at [facility] showed a left sacral alar fracture. 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 She was seen by Dr. Suman Hardwick who felt that the spine was not the source of pain. He felt the sacrum was the source. Her pain is gradually improved, but still has occasional sharp pains.  Seen in follow up now.  She is using a walker for balance.,    X-ray shows no fracture displacement.    Past Medical History:   Diagnosis Date     Constipation      Hypertension        Past Surgical History:   Procedure Laterality Date     C TOTAL KNEE ARTHROPLASTY  9/25/13    Right     CATARACT IOL, RT/LT       COMBINED REPAIR PTOSIS WITH BLEPHAROPLASTY Bilateral 1/9/2017    Procedure: COMBINED REPAIR PTOSIS WITH BLEPHAROPLASTY;  Surgeon: Keven Vázquez MD;  Location: [facility] OR     SURGICAL HISTORY OF -       Left hip bone surgery       Family History   Problem Relation Age of Onset     C.A.D. No family hx of      DIABETES No family hx of      Hypertension No family hx of      CEREBROVASCULAR DISEASE No family hx of        Social History     Social History     Marital status: Single     Spouse name: N/A     Number of children: 9     Years of education: N/A     Occupational History     Farming Retired     Social History Main Topics     Smoking status: Never Smoker     Smokeless tobacco: Never Used     Alcohol use No     Drug use: No     Sexual activity: No     Other Topics Concern     Not on file     Social History Narrative       Current Outpatient Prescriptions   Medication Sig Dispense Refill     acetaminophen (TYLENOL) 325 MG tablet Take 2 tablets (650 mg) by mouth every 6 hours as needed for mild  "pain 100 tablet 1     traMADol (ULTRAM) 50 MG tablet Take 1 tablet (50 mg) by mouth every 6 hours as needed for moderate pain or breakthrough pain 30 tablet 2     calcium carb 1250 mg, 500 mg [place],/vitamin D 200 units (OSCAL WITH D) 500-200 MG-UNIT per tablet        ketotifen (ZADITOR/REFRESH ANTI-ITCH) 0.025 % SOLN ophthalmic solution 1 drop       omeprazole (PRILOSEC) 20 MG CR capsule Take 1 capsule (20 mg) by mouth daily for 21 days 21 capsule 0       Allergies   Allergen Reactions     Aspirin Other (See Comments)       REVIEW OF SYSTEMS:  CONSTITUTIONAL:  NEGATIVE for fever, chills, change in weight, not feeling tired  SKIN:  NEGATIVE for worrisome rashes, no skin lumps, no skin ulcers and no non-healing wounds  EYES:  NEGATIVE for vision changes or irritation.  ENT/MOUTH:  NEGATIVE.  No hearing loss, no hoarseness, no difficulty swallowing.  RESP:  NEGATIVE. No cough or shortness of breath.  CV:  NEGATIVE for chest pain, palpitations or peripheral edema  GI:  NEGATIVE for nausea, abdominal pain, heartburn, or change in bowel habits  :  Negative. No dysuria, no hematuria  MUSCULOSKELETAL:  See HPI above  NEURO:  NEGATIVE . No headaches, no dizziness,  no numbness  ENDOCRINE:  NEGATIVE for temperature intolerance, skin/hair changes  HEME/ALLERGY/IMMUNE:  NEGATIVE for bleeding problems  PSYCHIATRIC:  NEGATIVE. no anxiety, no depression.     Exam:  Vitals: Temp 98.6  F (37  C)  Resp 18  Ht 1.422 m (4' 8\")  Wt 56.7 kg (125 lb)  BMI 28.02 kg/m2  BMI= Body mass index is 28.02 kg/(m^2).  Constitutional:  healthy, alert and no distress  Neuro: Alert and Oriented x 3, Gait slow with walker. Sensation grossly WNL.  Psych: Affect normal   Respiratory: Breathing not labored.  Cardiovascular: normal peripheral pulses  Lymph: no adenopathy  Skin: No rashes,worrisome lesions or skin problems  Positive tenderness at the left sacrum. No tenderness on the right sacrum.  No pain with rotation of the hips.  Negative straight " leg raise to 90  bilaterally.  Sensation, motor and circulation are intact.    Assessment:  Left sacral fracture gradually healing.  She may resume activity as tolerated with a walker.  RTC as needed.

## 2017-09-19 NOTE — PROGRESS NOTES
Home visit/Donovan Risk Assessment/EW screening completed on: 9/14/17.  Present were member, son - Sudhakar, daughter-in-law - Kusum,  through [employer] - Ana.  *Change in condition  - PCA assess as ADl's have changed.   Member resides: trailer home with son.  Member's other son, Sudhakar, and DIL, Kusum, come to member's home to assist her with IADL and ADL's.  Member prefers to continue to live in trailer with son as she prefers the one level and no stairs.   Member currently receiving the following services: Home Care - RN and HHA visits, Adult Day Care 2d/week, and monthly incontinence supplies.   See EMR for a list of client's diagnoses and medications.   Medication management: Medications reviewed. Family sets up medication and gives reminders. MTM offered.    Member Mood/behavior-PHQ9 score: declined.   Any needs to f/u with PCP on PHQ9 score.   Member has sacral fx  - has had hospitalization as well as TCU stay - change in ADL needs - requested PCA assessment.   Assessment completed - qualifies for 3.5 hours/day.  Kusum (daughter-in-law) will be PCA.  Discussed agencies with member and family.     Also, discussed need for lifeline unit - referral to  [facility] for cellular unit - spoke with Katey.  Unit is on backorder - they will contact family when in stock.   CM will f/u with MEAGHAN Garcia with [facility] re: HHA and RN visits. Will cont with HHA visits until PCA starts.  PT/OT is complete.   Plan of Care: 1. RN visits + HHA (until PCA is to start), 2. PCA 3.5 hours/day, 3. Lifeline, 4. ADC + transportation 2 day/week, 5. Monthly Incontinence supplies.    Follow-Up Plan: Member informed of future contact, plan to f/u with member with a 6 month telephone assessment.  Contact information shared with member and family, encouraged member to call with any questions or concerns prior to this.  See [facility] for further detailed information    Markus Caro RN, N  [facility]

## 2017-09-21 NOTE — PROGRESS NOTES
9/21/17: LILO spoke with Naty Gomez at [facility] at [phone].  Referral given.   Will have CMS fax PCA assess to Naty.  Naty will f/u with family and will mail out application to ASHLEY Noe.     LILO placed call to Sudhakar, radha, no answer - no voice mail.     Markus Caro RN, N  [facility]

## 2017-09-21 NOTE — PROGRESS NOTES
9/19/17: Spoke with member's son, Sudhakar, re: PCA agency.   Gina, friend, has  agency for [facility].   CM placed call and verified they are in network with [facility].   Contact for  Clients is Naty Gomez.   Discussed with Sudhakar - they would like to go with this agency.   CM unable to find on [facility] provider website  - spoke with [facility] PCA Intake - agency goes by [facility] for PCA - they are covered provider.  CM placed call and left vm for Naty for referral.    Sudhakar states that they are working through [employer]  - they will discussed with PCA agency as well.     Markus Caro RN, PHN  [facility]

## 2017-09-22 ENCOUNTER — TELEPHONE (OUTPATIENT)
Dept: FAMILY MEDICINE | Facility: CLINIC | Age: 77
End: 2017-09-22

## 2017-09-22 NOTE — TELEPHONE ENCOUNTER
Spoke with RN(Ryanne)Home[facility] and verbalize the note below.  The RN verbalize understood.  DANETTE Preston (AA)

## 2017-09-22 NOTE — TELEPHONE ENCOUNTER
Last office visit: 01/06/17 for Pre-op    Routing to provider. Please advise.  Svitlana Perales RN

## 2017-09-22 NOTE — TELEPHONE ENCOUNTER
Reason for Call: Request for an order or referral:    Order or referral being requested: ORDER    Date needed: as soon as possible    Has the patient been seen by the PCP for this problem? YES    Additional comments: ONE MORE SKILLED NURSE VISIT, FOR DISCHARGE    Phone number Patient can be reached at:  Other phone number:    [facility] [phone]         Best Time:  ANY    Can we leave a detailed message on this number?  YES    Call taken on 9/22/2017 at 10:14 AM by Negar Ribeiro

## 2017-09-25 ENCOUNTER — CARE COORDINATION (OUTPATIENT)
Dept: GERIATRIC MEDICINE | Facility: CLINIC | Age: 77
End: 2017-09-25

## 2017-09-25 NOTE — PROGRESS NOTES
Per [facility] - Item delivered CM notified    Bethany Ryder 1940 shower bench with back 9/6/2017 Delivered 9/15/17     Rayne Salazaratobayron  Case Management Specialist   [facility]   [phone]

## 2017-09-26 ENCOUNTER — CARE COORDINATION (OUTPATIENT)
Dept: GERIATRIC MEDICINE | Facility: CLINIC | Age: 77
End: 2017-09-26

## 2017-09-26 NOTE — PROGRESS NOTES
Received after visit chart from care coordinator.  Completed following tasks: Mailed copy of care plan to client  Emailed CPS to [facility] for [facility]  Updated services in access  Entered MMIS  Chart was returned to CC.     [facility]:  Emailed completed PCA assessment to [facility].  Faxed copy of PCA assessment to PCA Agency and mailed copy to member.  Faxed MD Communication to PCP.     Rayne Olivo  Case Management Specialist   [facility]   [phone]

## 2017-09-26 NOTE — PROGRESS NOTES
9/26/17: LILO spoke with Sudhakar, son, and member - provided them with info for [facility]. Sudhakar will look for application for his wife, Kusum.  They will work with Naty at [facility].     Member states she got her shower bench.   Member states she has no other needs at this time.     Markus Caro RN, PHN  [facility]

## 2017-10-03 ENCOUNTER — CARE COORDINATION (OUTPATIENT)
Dept: GERIATRIC MEDICINE | Facility: CLINIC | Age: 77
End: 2017-10-03

## 2017-10-03 DIAGNOSIS — Z76.89 HEALTH CARE HOME: ICD-10-CM

## 2017-10-03 NOTE — PROGRESS NOTES
Per [facility] Secure Site:   Bethany Hebert Sidney Anglin ([birth year]) - [facility] - 09/27/17-09/30/18 - 3.5 hrs/day PCA    CPS and POC updated.     Spoke with family - they are aware - Kusum RAMIREZ, working with agency on hire paperowrk.     Markus Caro RN, PHN  [facility]

## 2017-10-04 ENCOUNTER — TRANSFERRED RECORDS (OUTPATIENT)
Dept: HEALTH INFORMATION MANAGEMENT | Facility: CLINIC | Age: 77
End: 2017-10-04

## 2017-10-13 NOTE — PROGRESS NOTES
10/13/17: CM spoke with son, Sudhakar, he states that member has meeting today at [facility] to discuss moving in.  Asked Sudhakar about furnishings, etc.  He states member has furnishings.  He declines My Moving Plan as states they have everything in place.  He states he will f/u with CM next week re: member's meeting today.     Sudhakar states Kusum is f/u with [facility] agency this weekend - she is still waiting to complete hiring paperwork.     Markus Caro RN, PHN  [facility]

## 2017-10-13 NOTE — PROGRESS NOTES
10/9/17: LILO received email from Kelsy:   Luis Aparicio,    We're wondering if it's possible for Bethany to get a motor scooter through her waiver.    Her [place] than [place] mile from [place], so she would be able to get there on a scooter.     Would this be possible? Thanks,  Josie NAGY emailed Kelsy back letting her know the process and that could be covered under insurance if she qualifies.   Explained to schedule a face to face   with her PCP and discuss need for power mobility device.  PCP can then make a referral/order to see the OT (occupational therapist) at the  [facility]. PCP needs to document   why member needs a mobility device.    When/if the referral/order is placed then someone from the [facility] will call to schedule an appt and then they will work with Bethany and choose a vendor for the mobility device and determine what is the best option.    Advised member/Kelsy to schedule PCP appt as soon as possible as process can be lengthy.   They verbalized understanding.       Markus Caro RN, PHN  [facility]

## 2017-10-13 NOTE — PROGRESS NOTES
10/4/17: Kelsy states she spoke with member and family feels better about member moving as Kusum (ASHLEY) will be PCA and will see her daily.  Member also has a Uzbek speaking friend in the building.   She will be moving to [place] [address] hopefully by end of month if all paperwork is in. Kelsy will keep CM up to date.     Markus Caro RN, PHN  [facility]

## 2017-10-13 NOTE — PROGRESS NOTES
10/3/17: CM received call from son, Sudhakar, stating that member came up on wait list at [facility] for senior apartment.   Discussed further with Sudhakar as at 9/14 home visit, member had stated that she wanted to stay living with her son in [place] and did not want to live alone.   Sudhakar states that is what he thought as well however when member was notified of apartment she decided she wanted to live independently.   He states they prefer member to live with family however he states he is comfortable with member living on her own.  His wife, Kusum, will stay as PCA and will care for her. She will continue with [facility] as well and he states she will be close that family can visit and she can visit family anytime.   Sudhakar states that Kelsy, family friend, will be going with member this Thurs to [place] to review paperwork.   Sudhakar states they have no needs and have everything they need for her to move if she chooses to.  Declines need for completing move plan form as member is set.     CM received email from Kelsy stating they are going to [place] on 10/5 and are in need of paperwork verifying income, etc. From [facility].  LILO provided member/Kelsy with financial worker contact information.  LILO also contacted Gertrude at [facility] - she states she is able to mail some of the information - she states that verification of SSI will need to come from  office and advised member to call  at [phone].  She also states if the housing office sends her a signed release she can fax the [facility] verification directly to housing office.   Relayed this to member/Kelsy.       Markus Caro RN, PHN  [facility]

## 2017-10-17 ENCOUNTER — TRANSFERRED RECORDS (OUTPATIENT)
Dept: HEALTH INFORMATION MANAGEMENT | Facility: CLINIC | Age: 77
End: 2017-10-17

## 2017-10-18 NOTE — PROGRESS NOTES
10/16/17: CM received email from Gina and call from Sudhakar stating that member moved this weekend on 10/15 to [facility] apartment and is very happy.   Her new address is: [address] [place] [id number], [address].    Member has all furniture needed.  Daughter in law, Kusum, is assisting member daily and will be PCA once hire paperwork is completed.   CM placed call to  [employer] and updated address as well as switch to [employer] unit as member will better benefit from this as living alone.  Per Katey, [employer] is on back order - they will reach out to member and install current unit and then switch out when [employer] unit is available.    Brittany is aware.       Will send [phone] to [facility] with new address.  Notified CMS of updated address.     Markus Caro RN, PHN  [facility]

## 2017-10-23 ENCOUNTER — OFFICE VISIT (OUTPATIENT)
Dept: FAMILY MEDICINE | Facility: CLINIC | Age: 77
End: 2017-10-23
Payer: COMMERCIAL

## 2017-10-23 VITALS
DIASTOLIC BLOOD PRESSURE: 82 MMHG | BODY MASS INDEX: 28.47 KG/M2 | OXYGEN SATURATION: 98 % | SYSTOLIC BLOOD PRESSURE: 134 MMHG | WEIGHT: 127 LBS | TEMPERATURE: 98.1 F | HEART RATE: 78 BPM

## 2017-10-23 DIAGNOSIS — F32.1 MODERATE SINGLE CURRENT EPISODE OF MAJOR DEPRESSIVE DISORDER (H): ICD-10-CM

## 2017-10-23 DIAGNOSIS — S32.110D CLOSED NONDISPLACED ZONE I FRACTURE OF SACRUM WITH ROUTINE HEALING, SUBSEQUENT ENCOUNTER: ICD-10-CM

## 2017-10-23 DIAGNOSIS — M81.0 AGE-RELATED OSTEOPOROSIS WITHOUT CURRENT PATHOLOGICAL FRACTURE: ICD-10-CM

## 2017-10-23 DIAGNOSIS — H10.13 ALLERGIC CONJUNCTIVITIS, BILATERAL: ICD-10-CM

## 2017-10-23 DIAGNOSIS — Z23 NEED FOR PROPHYLACTIC VACCINATION AND INOCULATION AGAINST INFLUENZA: ICD-10-CM

## 2017-10-23 DIAGNOSIS — M48.062 SPINAL STENOSIS OF LUMBAR REGION WITH NEUROGENIC CLAUDICATION: Primary | ICD-10-CM

## 2017-10-23 DIAGNOSIS — Z91.81 AT RISK FOR FALLING: ICD-10-CM

## 2017-10-23 PROCEDURE — 99214 OFFICE O/P EST MOD 30 MIN: CPT | Mod: 25 | Performed by: FAMILY MEDICINE

## 2017-10-23 PROCEDURE — G0008 ADMIN INFLUENZA VIRUS VAC: HCPCS | Performed by: FAMILY MEDICINE

## 2017-10-23 PROCEDURE — 90662 IIV NO PRSV INCREASED AG IM: CPT | Performed by: FAMILY MEDICINE

## 2017-10-23 NOTE — PATIENT INSTRUCTIONS
Based on your medical history and these are the current health maintenance or preventive care services that you are due for (some may have been done at this visit)  Health Maintenance Due   Topic Date Due     INFLUENZA VACCINE (SYSTEM ASSIGNED)  09/01/2017     EYE EXAM Q1 YEAR  10/25/2017     FALL RISK ASSESSMENT  10/26/2017         At [facility], we strive to deliver an exceptional experience to you, every time we see you.    If you receive a survey in the mail, please send us back your thoughts. We really do value your feedback.    Your care team's suggested websites for health information:  Www.[employer].org : Up to date and easily searchable information on multiple topics.  Www.medlineplus.gov : medication info, interactive tutorials, watch real surgeries online  Www.familydoctor.org : good info from the Academy of Family Physicians  Www.cdc.gov : public health info, travel advisories, epidemics (H1N1)  Www.aap.org : children's health info, normal development, vaccinations  Www.health.[facility].[place].us : [place] dept of health, public health issues in [place], N1N1    How to contact your care team:   Team Yasmeen/Spirit [phone]         Pharmacy [phone]    Dr. Springer, Natalia Del Toro PA-C, Dr. Amezcua, Mulu WRAY CNP, Kathleen Joyner PA-C, Dr. Rowell, and NILS Thakur CNP    Team RN: Ryanne      Clinic hours  M-Th 7 am-7 pm   Fri 7 am-5 pm.   Urgent care M-F 11 am-9 pm,   Sat/Sun 9 am-5 pm.  Pharmacy M-Th 8 am-8 pm Fri 8 am-6 pm  Sat/Sun 9 am-5 pm.     All password changes, disabled accounts, or ID changes in [facility]MyHealth will be done by our Access Services Department.    If you need help with your account or password, call: [phone]. Clinic staff no longer has the ability to change passwords.

## 2017-10-23 NOTE — MR AVS SNAPSHOT
After Visit Summary   10/23/2017    Bethany Logan    MRN: [id number]           Patient Information     Date Of Birth          1940        Visit Information        Provider Department      10/23/2017 3:00 PM Ernestina Alvarado MD; [employer] [facility]        Today's Diagnoses     Spinal stenosis of lumbar region with neurogenic claudication    -  1    Moderate single current episode of major depressive disorder (H)        Allergic conjunctivitis, bilateral        Closed nondisplaced zone I fracture of sacrum with routine healing, subsequent encounter        Age-related osteoporosis without current pathological fracture        At risk for falling        Need for prophylactic vaccination and inoculation against influenza          Care Instructions    Based on your medical history and these are the current health maintenance or preventive care services that you are due for (some may have been done at this visit)  Health Maintenance Due   Topic Date Due     INFLUENZA VACCINE (SYSTEM ASSIGNED)  09/01/2017     EYE EXAM Q1 YEAR  10/25/2017     FALL RISK ASSESSMENT  10/26/2017         At [facility], we strive to deliver an exceptional experience to you, every time we see you.    If you receive a survey in the mail, please send us back your thoughts. We really do value your feedback.    Your care team's suggested websites for health information:  Www.[employer].org : Up to date and easily searchable information on multiple topics.  Www.medlineplus.gov : medication info, interactive tutorials, watch real surgeries online  Www.familydoctor.org : good info from the Academy of Family Physicians  Www.cdc.gov : public health info, travel advisories, epidemics (H1N1)  Www.aap.org : children's health info, normal development, vaccinations  Www.health.[facility].[place].us : [place] dept of health, public health issues in [place], N1N1    How to  contact your care team:   Team Yasmeen/Chance [phone]         Pharmacy [phone]    Dr. Springer, Natalia Del Toro PA-C, Dr. Amezcua, Mulu WRAY CNP, Kathleen Joyner PA-C, Dr. Rowell, and NILS Thakur CNP    Team RN: Ryanne      Clinic hours  M-Th 7 am-7 pm   Fri 7 am-5 pm.   Urgent care M-F 11 am-9 pm,   Sat/Sun 9 am-5 pm.  Pharmacy M-Th 8 am-8 pm Fri 8 am-6 pm  Sat/Sun 9 am-5 pm.     All password changes, disabled accounts, or ID changes in [employer]/MyHealth will be done by our Access Services Department.    If you need help with your account or password, call: [phone]. Clinic staff no longer has the ability to change passwords.             Follow-ups after your visit        Additional Services     MENTAL HEALTH REFERRAL       Your provider has referred you to: FM[facility]: [facility] - Counseling (Individual/Couples/Family) - [facility] [phone]   [email or web address]/   *Patient will be contacted by [facility]'s scheduling partner, [facility]), to schedule an appointment.  Patients may also call P to schedule.    All scheduling is subject to the client's specific insurance plan & benefits, provider/location availability, and provider clinical specialities.  Please arrive 15 minutes early for your first appointment and bring your completed paperwork.    Please be aware that coverage of these services is subject to the terms and limitations of your health insurance plan.  Call member services at your health plan with any benefit or coverage questions.            OCCUPATIONAL THERAPY REFERRAL       *This therapy referral will be filtered to a centralized scheduling office at [facility] and the patient will receive a call to schedule an appointment at a [facility] location most convenient for them. *     [facility] provides  "Occupational Therapy evaluation and treatment and many specialty services across the [facility] system.  If requesting a specialty program, please choose from the list below.    If you have not heard from the scheduling office within 2 business days, please call [phone] for all locations, with the exception of [place], please call [phone].     Treatment: Evaluation & Treatment  Special Instructions/Modalities: scooter  Special Programs: Assistive Technology    Please be aware that coverage of these services is subject to the terms and limitations of your health insurance plan.  Call member services at your health plan with any benefit or coverage questions.      **Note to Provider:  If you are referring outside of [facility] for the therapy appointment, please list the name of the location in the \"special instructions\" above, print the referral and give to the patient to schedule the appointment.                  Who to contact     If you have questions or need follow up information about today's clinic visit or your schedule please contact [facility] directly at [phone].  Normal or non-critical lab and imaging results will be communicated to you by [employer], letter or phone within 4 business days after the clinic has received the results. If you do not hear from us within 7 days, please contact the clinic through [employer] or phone. If you have a critical or abnormal lab result, we will notify you by phone as soon as possible.  Submit refill requests through [employer] or call your pharmacy and they will forward the refill request to us. Please allow 3 business days for your refill to be completed.          Additional Information About Your Visit        [employer] Information     [employer] lets you send messages to your doctor, view your test results, renew your prescriptions, schedule appointments and more. To sign up, go to [email or web address] . Click on \"Log in\" on the left side of the " "screen, which will take you to the Welcome page. Then click on \"Sign up Now\" on the right side of the page.     You will be asked to enter the access code listed below, as well as some personal information. Please follow the directions to create your username and password.     Your access code is: [email or web address]  Expires: 2017 11:14 AM     Your access code will  in 90 days. If you need help or a new code, please call your [facility] clinic or [phone].        Care EveryWhere ID     This is your Care EveryWhere ID. This could be used by other organizations to access your [facility] medical records  [id number]        Your Vitals Were     Pulse Temperature Pulse Oximetry Breastfeeding? BMI (Body Mass Index)       78 98.1  F (36.7  C) (Oral) 98% No 28.47 kg/m2        Blood Pressure from Last 3 Encounters:   10/23/17 134/82   17 116/79   17 107/64    Weight from Last 3 Encounters:   10/23/17 127 lb (57.6 kg)   17 125 lb (56.7 kg)   17 125 lb (56.7 kg)              We Performed the Following     FLU VACCINE, INCREASED ANTIGEN, PRESV FREE, AGE 65+ [07358]     MENTAL HEALTH REFERRAL     OCCUPATIONAL THERAPY REFERRAL     PAF COMPLETED     Vaccine Administration, Initial [43820]          Today's Medication Changes          These changes are accurate as of: 10/23/17  3:55 PM.  If you have any questions, ask your nurse or doctor.               Start taking these medicines.        Dose/Directions    ketotifen 0.025 % Soln ophthalmic solution   Commonly known as:  ZADITOR/REFRESH ANTI-ITCH   Used for:  Allergic conjunctivitis, bilateral   Started by:  Ernestina Alvarado MD        Dose:  1 drop   Place 1 drop into both eyes 2 times daily   Quantity:  10 mL   Refills:  1       order for DME   Used for:  Spinal stenosis of lumbar region with neurogenic claudication   Started by:  Ernestina Alvarado MD        Equipment being ordered: Scooter   Quantity:  1 Device   Refills:  " 0            Where to get your medicines      Some of these will need a paper prescription and others can be bought over the counter.  Ask your nurse if you have questions.     Bring a paper prescription for each of these medications     ketotifen 0.025 % Soln ophthalmic solution    order for DME                Primary Care Provider Office Phone # Fax Cora Boo Juan Argueta [phone] [address]  [facility] [address]        Equal Access to Services     DUYEN GONZALEZ : Hadii aad ku hadasho Soomaali, waaxda luqadaha, qaybta kaalmada adeegyada, waxay idiin hayaan adeeg kharash la'wolf . So [facility] [phone].    ATENCIÓN: Si habla español, tiene a harris disposición servicios gratuitos de asistencia lingüística. [facility] [phone].    We comply with applicable federal civil rights laws and [place] laws. We do not discriminate on the basis of race, color, national origin, age, disability, sex, sexual orientation, or gender identity.            Thank you!     Thank you for choosing [facility]  for your care. Our goal is always to provide you with excellent care. Hearing back from our patients is one way we can continue to improve our services. Please take a few minutes to complete the written survey that you may receive in the mail after your visit with us. Thank you!             Your Updated Medication List - Protect others around you: Learn how to safely use, store and throw away your medicines at www.disposemymeds.org.          This list is accurate as of: 10/23/17  3:55 PM.  Always use your most recent med list.                   Brand Name Dispense Instructions for use Diagnosis    acetaminophen 325 MG tablet    TYLENOL    100 tablet    Take 2 tablets (650 mg) by mouth every 6 hours as needed for mild pain    Acute left-sided low back pain with left-sided sciatica       calcium carb 1250 mg (500 mg [place])/vitamin D 200 units 500-200 MG-UNIT per  tablet    OSCAL with D          ketotifen 0.025 % Soln ophthalmic solution    ZADITOR/REFRESH ANTI-ITCH    10 mL    Place 1 drop into both eyes 2 times daily    Allergic conjunctivitis, bilateral       order for DME     1 Device    Equipment being ordered: Scooter    Spinal stenosis of lumbar region with neurogenic claudication       traMADol 50 MG tablet    ULTRAM    30 tablet    Take 1 tablet (50 mg) by mouth every 6 hours as needed for moderate pain or breakthrough pain    Acute left-sided low back pain with left-sided sciatica

## 2017-10-23 NOTE — PROGRESS NOTES
Injectable Influenza Immunization Documentation    1.  Is the person to be vaccinated sick today?   No    2. Does the person to be vaccinated have an allergy to a component   of the vaccine?   No  Egg Allergy Algorithm Link    3. Has the person to be vaccinated ever had a serious reaction   to influenza vaccine in the past?   No    4. Has the person to be vaccinated ever had Guillain-Barré syndrome?   No    Form completed by Kary,DANETTE (AMAA)           SUBJECTIVE:   Bethany Logan is a 77 year old female who presents to clinic today for the following health issues:     The  verbalize notes below:    Pt is here for a mobility device documentation needed   Her requesting scooter to help with mobility.  She fell a few week ago which lead to hospital and extended care treatment.  Having trouble getting around due to knee and back pain. Has a walker with seat but still having trouble keeping up with the family.  Currently living independently and will have more mobility with scooter.  No trouble with arms and upper back. Has lumbar spinal stenosis with previous fracture related to fall contributing to low walk tolerance.  Age related osteoporosis contributed to current symptoms.    Depression - feeling more down with all the changes.  Not interested in medication but would like someone to talk to.    Allergic conjunctivitis - well controlled with zaditor and would like a prescription for this.    Problem list and histories reviewed & adjusted, as indicated.  Additional history: as documented    Patient Active Problem List   Diagnosis     OA (osteoarthritis) of knee - bilateral     Health Care Home     Constipation     History of total knee arthroplasty - right     SNHL (sensory-neural hearing loss), asymmetrical     CARDIOVASCULAR SCREENING; LDL GOAL LESS THAN 130     Advance care planning     Sciatica - left     Osteoporosis     History of total hip arthroplasty, left     Lumbar spinal stenosis      Macular drusen, bilateral     Dermatochalasis of eyelid     Pseudophakia of both eyes     Moderate single current episode of major depressive disorder (H)     Closed nondisplaced zone I fracture of sacrum with routine healing, subsequent encounter     Past Surgical History:   Procedure Laterality Date     C TOTAL KNEE ARTHROPLASTY  9/25/13    Right     CATARACT IOL, RT/LT       COMBINED REPAIR PTOSIS WITH BLEPHAROPLASTY Bilateral 1/9/2017    Procedure: COMBINED REPAIR PTOSIS WITH BLEPHAROPLASTY;  Surgeon: Keven Vázquez MD;  Location: [facility] OR     SURGICAL HISTORY OF -       Left hip bone surgery       Social History   Substance Use Topics     Smoking status: Never Smoker     Smokeless tobacco: Never Used     Alcohol use No     Family History   Problem Relation Age of Onset     C.A.D. No family hx of      DIABETES No family hx of      Hypertension No family hx of      CEREBROVASCULAR DISEASE No family hx of              Reviewed and updated as needed this visit by clinical staffTobacco  Allergies  Meds  Problems       Reviewed and updated as needed this visit by Provider  Allergies  Meds  Problems         ROS:  Constitutional, HEENT, cardiovascular, pulmonary, gi and gu systems are negative, except as otherwise noted.      OBJECTIVE:   /82 (BP Location: Right arm, Patient Position: Chair, Cuff Size: Adult Large)  Pulse 78  Temp 98.1  F (36.7  C) (Oral)  Wt 127 lb (57.6 kg)  SpO2 98%  Breastfeeding? No  BMI 28.47 kg/m2  Body mass index is 28.47 kg/(m^2).  GENERAL: healthy, alert and no distress  NECK: no adenopathy, no asymmetry, masses, or scars and thyroid normal to palpation  RESP: lungs clear to auscultation - no rales, rhonchi or wheezes  CV: regular rate and rhythm, normal S1 S2, no S3 or S4, no murmur, click or rub, no peripheral edema and peripheral pulses strong  ABDOMEN: soft, nontender, no hepatosplenomegaly, no masses and bowel sounds normal  MS: slow gait with walker  SKIN: no  suspicious lesions or rashes  NEURO: Normal strength and tone, mentation intact and speech normal  PSYCH: mentation appears normal and affect flat    Diagnostic Test Results:  none     ASSESSMENT/PLAN:     1. Spinal stenosis of lumbar region with neurogenic claudication  Scooter rx with OT referral for assessment  - PAF COMPLETED  - order for DME; Equipment being ordered: Scooter  Dispense: 1 Device; Refill: 0  - OCCUPATIONAL THERAPY REFERRAL    2. Moderate single current episode of major depressive disorder (H)  Referral to therapy  - MENTAL HEALTH REFERRAL    3. Allergic conjunctivitis, bilateral  New rx given  - ketotifen (ZADITOR/REFRESH ANTI-ITCH) 0.025 % SOLN ophthalmic solution; Place 1 drop into both eyes 2 times daily  Dispense: 10 mL; Refill: 1    4. Closed nondisplaced zone I fracture of sacrum with routine healing, subsequent encounter  Contributing to mobility issues    5. Age-related osteoporosis without current pathological fracture  Contributing to mobility issues    6. At risk for falling  MA assessment done    7. Need for prophylactic vaccination and inoculation against influenza    - FLU VACCINE, INCREASED ANTIGEN, PRESV FREE, AGE 65+ [81620]  - Vaccine Administration, Initial [26676]    The uses and side effects, including black box warnings as appropriate, were discussed in detail.  All patient questions were answered.  The patient was instructed to call immediately if any side effects developed.     FUTURE APPOINTMENTS:       - Follow-up visit nery Argueta MD  [facility]

## 2017-10-23 NOTE — NURSING NOTE
"Chief Complaint   Patient presents with     Forms     Mobility Device Documentation needed       Initial /82 (BP Location: Right arm, Patient Position: Chair, Cuff Size: Adult Large)  Pulse 78  Temp 98.1  F (36.7  C) (Oral)  Wt 127 lb (57.6 kg)  SpO2 98%  Breastfeeding? No  BMI 28.47 kg/m2 Estimated body mass index is 28.47 kg/(m^2) as calculated from the following:    Height as of 9/19/17: 4' 8\" (1.422 m).    Weight as of this encounter: 127 lb (57.6 kg).  Medication Reconciliation: complete   An,CMA (AMAA)      "

## 2017-10-26 NOTE — PROGRESS NOTES
10/26/17: CM spoke with family friend, Gina, asking if CM is aware if member's case has been transferred from [facility] to [facility] - CM informed them that received letter in mail dated 10/19 that case was transferred.     Gina also states that no on has contacted them from [employer] for install - CM placed call to [employer] - spoke with Madison - she spoke with Makeda - Makeda spoke with son, Sudhakar and installation is tomorrow.  They will then switch out units once [employer] is in stock.   Relayed this to Gina and .      Gina states that member had appt with PCP re: power mobility device on 10/23.  PCP was to make referral to mobility clinic for OT.   CM reviewed in Epic - appt for OT is scheuled on 11/9.      Gina states member is doing really well in her apartment.   [employer] is caring for member - she is waiting for paperwork to be processed still with SS before she can start as PCA.     CM spoke with Naty at [facility] re: PCA.  Naty states she has been keeping in contact with member/family - and still waiting on [employer]'s SS - Naty states that she has discussed with member/family to have another family member be hired as PCA in mean time or they also have agency staff.  Member/family stated they would think about.  Naty states so far they want to just wait for [employer]'s paperwork to be processed. [employer] continues to do the care for member.     Markus Caro RN, PHN  [facility]

## 2017-11-01 NOTE — PROGRESS NOTES
10/31/17: CM received vm from Ligia at  [employer] stating that member's lifeline was installed on 10/27/17.     Markus Caro RN, N  [facility]

## 2017-11-07 ENCOUNTER — CARE COORDINATION (OUTPATIENT)
Dept: GERIATRIC MEDICINE | Facility: CLINIC | Age: 77
End: 2017-11-07

## 2017-11-08 NOTE — PROGRESS NOTES
11/7/17CM received request from Gina, friend, and member requesting another day at [facility].   Now that member is living alone she would like to attend [facility] another day to help with socialization/depression as well as there is a therapist that helps her with exercises for her knee pain.   CM reviewed POC and budget - will approve 3 days/week of ADC + transportation.  POC and CPS updated - CPS sent to CMS to process. [facility] notified.     Markus Caro RN, PHN  [facility]

## 2017-11-09 ENCOUNTER — HOSPITAL ENCOUNTER (OUTPATIENT)
Dept: OCCUPATIONAL THERAPY | Facility: CLINIC | Age: 77
Setting detail: THERAPIES SERIES
End: 2017-11-09
Attending: FAMILY MEDICINE
Payer: COMMERCIAL

## 2017-11-09 PROCEDURE — 40000132 ZZH STATISTIC OT SEATING/WHEELED MOBILITY VISIT: Performed by: OCCUPATIONAL THERAPIST

## 2017-11-09 PROCEDURE — 97542 WHEELCHAIR MNGMENT TRAINING: CPT | Mod: GO | Performed by: OCCUPATIONAL THERAPIST
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
## 2017-11-15 ENCOUNTER — OFFICE VISIT (OUTPATIENT)
Dept: PSYCHOLOGY | Facility: CLINIC | Age: 77
End: 2017-11-15
Payer: COMMERCIAL

## 2017-11-15 DIAGNOSIS — F32.89 OTHER SPECIFIED DEPRESSIVE EPISODES: Primary | ICD-10-CM

## 2017-11-15 PROCEDURE — 90837 PSYTX W PT 60 MINUTES: CPT | Performed by: SOCIAL WORKER

## 2017-11-15 ASSESSMENT — ANXIETY QUESTIONNAIRES
3. WORRYING TOO MUCH ABOUT DIFFERENT THINGS: SEVERAL DAYS
7. FEELING AFRAID AS IF SOMETHING AWFUL MIGHT HAPPEN: NOT AT ALL
5. BEING SO RESTLESS THAT IT IS HARD TO SIT STILL: SEVERAL DAYS
6. BECOMING EASILY ANNOYED OR IRRITABLE: SEVERAL DAYS
2. NOT BEING ABLE TO STOP OR CONTROL WORRYING: SEVERAL DAYS
GAD7 TOTAL SCORE: 6
1. FEELING NERVOUS, ANXIOUS, OR ON EDGE: SEVERAL DAYS

## 2017-11-15 ASSESSMENT — PATIENT HEALTH QUESTIONNAIRE - PHQ9
5. POOR APPETITE OR OVEREATING: SEVERAL DAYS
SUM OF ALL RESPONSES TO PHQ QUESTIONS 1-9: 3

## 2017-11-15 NOTE — MR AVS SNAPSHOT
"                  MRN:[id number]                      After Visit Summary   11/15/2017    Bethany Logan    MRN: [id number]           Visit Information        Provider Department      11/15/2017 10:00 AM Alicia Lobo; Lashay Ryan LGSW [facility] Generic      Your next 10 appointments already scheduled     Dec 01, 2017  9:00 AM CST   Return Visit with DANII Schafer   [facility] ([facility])    [facility]  [address] [facility]  [facility] [address]   [phone]              MyChart Information     [employer] lets you send messages to your doctor, view your test results, renew your prescriptions, schedule appointments and more. To sign up, go to [email or web address] . Click on \"Log in\" on the left side of the screen, which will take you to the Welcome page. Then click on \"Sign up Now\" on the right side of the page.     You will be asked to enter the access code listed below, as well as some personal information. Please follow the directions to create your username and password.     Your access code is: [email or web address]  Expires: 2018  9:56 AM     Your access code will  in 90 days. If you need help or a new code, please call your [place] clinic or [phone].        Care EveryWhere ID     This is your Care EveryWhere ID. This could be used by other organizations to access your [facility] medical records  [id number]        Equal Access to Services     DUYEN GONZALEZ : Hadii anusha arreolao Sobeatriz, waaxda luqadaha, qaybta kaalmada adeegyada, renae gama . So [facility] [phone].    ATENCIÓN: Si habla español, tiene a harris disposición servicios gratuitos de asistencia lingüística. Llame al [phone].    We comply with applicable federal civil rights laws and [place] laws. We do not discriminate on the basis of race, color, national origin, age, disability, sex, sexual " orientation, or gender identity.

## 2017-11-15 NOTE — Clinical Note
Luis Flores, I completed this session with client and an . Please review if I coded the LOS correctly.  Lashay
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
## 2017-11-16 ASSESSMENT — ANXIETY QUESTIONNAIRES: GAD7 TOTAL SCORE: 6

## 2017-11-22 ENCOUNTER — MEDICAL CORRESPONDENCE (OUTPATIENT)
Dept: HEALTH INFORMATION MANAGEMENT | Facility: CLINIC | Age: 77
End: 2017-11-22

## 2017-11-29 ENCOUNTER — CARE COORDINATION (OUTPATIENT)
Dept: GERIATRIC MEDICINE | Facility: CLINIC | Age: 77
End: 2017-11-29

## 2017-11-29 DIAGNOSIS — Z76.89 HEALTH CARE HOME: ICD-10-CM

## 2017-11-29 NOTE — PROGRESS NOTES
CM will internally tx member to [facility] effective 12/1.  CM placed call to son, Sudhakar, to f/u on member.  He and member are aware of internal tx.  Sudhakar states that member is very happy living in her new apartment.  They are still waiting for [employer] to be hired as PCA.  They have continued to be in contact with PCA agency and waiting for [employer] - she has appt with immigration on 12/5.  Sudhakar states they decided not to have another family get hired as PCA but rather wait for [employer].   Erik also spoke with Naty at PCA agency - she verified waiting for [employer] to obtain   Sudhakar states member had appt on 11/9 with seating eval clinic and is in process of obtaining power mobility device through:   Radha Gleason           OTR/L, [facility]                                      Occupational Therapist, Assistive   [phone]      fax: [phone]      [email or web address]  Seating Clinic- [facility], [address]  [address]  [place]   [address]     Sudhakar states no other needs at this time.     Markus Caro RN, PHN  [facility]

## 2017-11-30 NOTE — ADDENDUM NOTE
Encounter addended by: Radha Gleason OT on: 11/30/2017 11:54 AM<BR>     Actions taken: Sign clinical note

## 2017-11-30 NOTE — PROGRESS NOTES
REQUISITION AND JUSTIFICATION FOR DURABLE MEDICAL EQUIPMENT    Patient Name:  Bethany Logan  MR #:  [id number]  :  1940  Age/Gender:  77 year old female  Visit Date:  Patient seen for seating and wheeled mobility evaluation by Radha Gleason OTR/L, ATP and ATP from [facility] on 17.      CLINICAL CRITERIA FOR MOBILITY ASSISTIVE EQUIPMENT   Coverage Criteria Per Local Coverage Determination    A) Bethany Logan has  mobility limitations due to Spinal stenosis, OA, sacrum fracture, SNHL, depression, L ISHAN, and L TKA that significantly impairs her ability to participate in all of her mobility-related activities of daily living (MRADL). Specifically affected are toileting (being able to get there in time to prevent accidents), dressing, and bathing (getting into the bathroom of designated place). Current equipment used is four wheeled walker. This patient needs the new equipment requested to be able to increase safe and independent participation in all MRADLS. Please see additional documentation in the seating and wheeled mobility report for details.   Bethany had a successful clinical trial here, and also a successful trial at home with the recommended equipment. She is very willing and physically / cognitively able to use the recommended equipment to assist her with mobility related activities of daily living and general mobility.     B) Bethany's mobility limitation cannot be sufficiently and safely resolved by the use of an appropriately fitted cane or walker because she is limited due to pain and weakness. Distance and time to ambulate 25 feet in 15.74 seconds. Strength of legs is 3+/5 for one maximal repetition. Fatigue also  impacts this patient's ability to ambulate, regardless of the gait aid.    C) Bethany does not have sufficient upper extremity function to self-propel an optimally-configured manual wheelchair in her home to perform MRADLs during a typical day due to  limitations in strength, endurance, range of motion, and coordination. Distance and time to propel a light weight manual wheelchair NT due to pain and range limitations.  Strength of arms is 3+/5 at shoulders and 4/5 otherwise.    D)  Bethany is able to use a POV/scooter because it will fit in her home environment. She is able to safely transfer to and from a POV, able to operate the tiller steering system, and able to maintain postural stability and position while operating the POV.     E) The need for this equipment is LIFETIME.     RECOMMENDATIONS FOR MOBILITY BASE, SEATING SYSTEM AND COMPONENTS  Victory 9 4 wheeled Power Operated Vehicle / Scooter -  This device is being requested for this patient with mobility impairments to allow her to be able to complete all of her mobility related activities of daily living in a safe fashion, without risk of injury from falling, and in a reasonable time frame. She demonstrated during the clinical evaluation that she was able to transfer to/from the requested scooter, operate the tiller steering system, and the on/off mechanism and the speed dial appropriately and safely. The same has been seen during a home trial as provided by the rehabilitation technology supplier.    This equipment is reasonable and necessary with reference to accepted standards of medical practice and treatment of this patient's condition and is not being recommended as a convenience item. Without this recommended equipment, she is highly likely to sustain injuries from falls, develop pressure sores or postural compensation, and/or be bed confined, which those costs far exceed the cost of the requested equipment.    Electronically signed by:  Radha XAVIER/L, ATP       Occupational Therapist, Assistive   [phone]      fax: [phone]      [email or web address]  Seating Clinic- [facility], [address]  [address]  [place]    [address]  November 30, 2017    I have read and concur with the above recommendations.    Physician Printed Name __________________________________________    Physician SIgnature  _____________________________________________    Date of SIgnature ______________________________    Physician Phone  ______________________________

## 2017-12-01 ENCOUNTER — FCC EXTENDED DOCUMENTATION (OUTPATIENT)
Dept: PSYCHOLOGY | Facility: CLINIC | Age: 77
End: 2017-12-01

## 2017-12-01 ENCOUNTER — OFFICE VISIT (OUTPATIENT)
Dept: PSYCHOLOGY | Facility: CLINIC | Age: 77
End: 2017-12-01
Payer: COMMERCIAL

## 2017-12-01 DIAGNOSIS — F43.23 ADJUSTMENT DISORDER WITH MIXED ANXIETY AND DEPRESSED MOOD: Primary | ICD-10-CM

## 2017-12-01 PROCEDURE — 90791 PSYCH DIAGNOSTIC EVALUATION: CPT | Performed by: SOCIAL WORKER

## 2017-12-01 NOTE — Clinical Note
Luis Flores, I believe I made the necessary corrections on this. Please let me know if I missed anything.   Lashay

## 2017-12-01 NOTE — MR AVS SNAPSHOT
"                  MRN:[id number]                      After Visit Summary   2017    Bethany Logan    MRN: [id number]           Visit Information        Provider Department      2017 8:45 AM Lashay Ryan, DANII; [employer] [facility] Generic      Your next 10 appointments already scheduled     2018 10:40 AM CST   [facility]    [address] [facility] [address] [phone]              MyChart Information     [employer] lets you send messages to your doctor, view your test results, renew your prescriptions, schedule appointments and more. To sign up, go to [email or web address] . Click on \"Log in\" on the left side of the screen, which will take you to the Welcome page. Then click on \"Sign up Now\" on the right side of the page.     You will be asked to enter the access code listed below, as well as some personal information. Please follow the directions to create your username and password.     Your access code is: [email or web address]  Expires: 2018  9:56 AM     Your access code will  in 90 days. If you need help or a new code, please call your [facility] clinic or [phone].        Care EveryWhere ID     This is your Care EveryWhere ID. This could be used by other organizations to access your [facility] medical records  [id number]        Equal Access to Services     DUYEN GONZALEZ : Hadii anusha arerolao Sobeatriz, waaxda luqadaha, qaybta kaalmada adequyen, renae gama . So [facility] [phone].    ATENCIÓN: Si habla español, tiene a harris disposición servicios gratuitos de asistencia lingüística. Llame al [phone].    We comply with applicable federal civil rights laws and [place] laws. We do not discriminate on the basis of race, color, national origin, age, disability, sex, sexual orientation, or gender " identity.

## 2017-12-04 NOTE — PROGRESS NOTES
12/4/17: CM handed case off to Jennifer Mahmood.     CM followed up with  [facility] re: changing out to [facility] unit.  Spoke with Madison - she states this was completed 11/10/17.  CM updated POC and CPS - sent to CMS to update auth.     This encounter will be closed.     Markus Caro RN, N  [facility]

## 2017-12-08 PROBLEM — F43.23 ADJUSTMENT DISORDER WITH MIXED ANXIETY AND DEPRESSED MOOD: Status: ACTIVE | Noted: 2017-12-08
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
## 2017-12-18 ENCOUNTER — OFFICE VISIT (OUTPATIENT)
Dept: FAMILY MEDICINE | Facility: CLINIC | Age: 77
End: 2017-12-18
Payer: COMMERCIAL

## 2017-12-18 VITALS
DIASTOLIC BLOOD PRESSURE: 74 MMHG | BODY MASS INDEX: 29.47 KG/M2 | WEIGHT: 131 LBS | HEART RATE: 76 BPM | HEIGHT: 56 IN | TEMPERATURE: 97.3 F | SYSTOLIC BLOOD PRESSURE: 136 MMHG | OXYGEN SATURATION: 97 %

## 2017-12-18 DIAGNOSIS — H90.3 SNHL (SENSORY-NEURAL HEARING LOSS), ASYMMETRICAL: ICD-10-CM

## 2017-12-18 DIAGNOSIS — H10.13 ALLERGIC CONJUNCTIVITIS, BILATERAL: ICD-10-CM

## 2017-12-18 DIAGNOSIS — M54.42 ACUTE LEFT-SIDED LOW BACK PAIN WITH LEFT-SIDED SCIATICA: ICD-10-CM

## 2017-12-18 DIAGNOSIS — Z91.81 AT RISK FOR FALLING: ICD-10-CM

## 2017-12-18 DIAGNOSIS — Z00.00 ENCOUNTER FOR ROUTINE ADULT HEALTH EXAMINATION WITHOUT ABNORMAL FINDINGS: Primary | ICD-10-CM

## 2017-12-18 PROCEDURE — 99397 PER PM REEVAL EST PAT 65+ YR: CPT | Performed by: FAMILY MEDICINE

## 2017-12-18 RX ORDER — TRAMADOL HYDROCHLORIDE 50 MG/1
50 TABLET ORAL EVERY 6 HOURS PRN
Qty: 30 TABLET | Refills: 2 | Status: SHIPPED | OUTPATIENT
Start: 2017-12-18 | End: 2018-03-01

## 2017-12-18 RX ORDER — ACETAMINOPHEN 325 MG/1
650 TABLET ORAL EVERY 6 HOURS PRN
Qty: 100 TABLET | Refills: 1 | Status: SHIPPED | OUTPATIENT
Start: 2017-12-18 | End: 2018-05-02

## 2017-12-18 NOTE — NURSING NOTE
"Chief Complaint   Patient presents with     Physical       Initial /74 (BP Location: Left arm, Patient Position: Chair, Cuff Size: Adult Regular)  Pulse 76  Temp 97.3  F (36.3  C) (Oral)  Ht 4' 8\" (1.422 m)  Wt 131 lb (59.4 kg)  SpO2 97%  BMI 29.37 kg/m2 Estimated body mass index is 29.37 kg/(m^2) as calculated from the following:    Height as of this encounter: 4' 8\" (1.422 m).    Weight as of this encounter: 131 lb (59.4 kg).  Medication Reconciliation: complete     João Ryan MA      "

## 2017-12-18 NOTE — PROGRESS NOTES
SUBJECTIVE:   Bethany Logan is a 77 year old female who presents for Preventive Visit.    Are you in the first 12 months of your Medicare Part B coverage?  No    Healthy Habits:    Do you get at least three servings of calcium containing foods daily (dairy, green leafy vegetables, etc.)? yes    Amount of exercise or daily activities, outside of work: 4 day(s) per week    Problems taking medications regularly No    Medication side effects: No    Have you had an eye exam in the past two years? yes    Do you see a dentist twice per year? yes    Do you have sleep apnea, excessive snoring or daytime drowsiness?no      Ability to successfully perform activities of daily living: No, needs assistance with: phone, transportation, shopping, preparing meals, housework, bathing, laundry and medications    Home safety:  none identified     Hearing impairment: Yes, Feel that people are mumbling or not speaking clearly.    Need to ask people to speak up or repeat themselves.    Fall risk:  Fallen 2 or more times in the past year?: Yes  Any fall with injury in the past year?: Yes  Timed Up and Go Test (>13.5 is fall risk; contact physician) :  (unable to stand up)        COGNITIVE SCREEN  1) Repeat 3 items (Banana, Sunrise, Chair)    2) Clock draw: unable to draw  3) 3 item recall: Recalls 3 objects  Results: 3 items recalled: COGNITIVE IMPAIRMENT LESS LIKELY    Mini-CogTM Copyright ALICIA Burnett. Licensed by the author for use in [facility]; reprinted with permission (rubi@[facility]). All rights reserved.              -------------------------------------    Reviewed and updated as needed this visit by clinical staff  Tobacco  Allergies  Meds  Problems  Med Hx  Surg Hx  Fam Hx  Soc Hx          Reviewed and updated as needed this visit by Provider  Allergies  Meds  Problems        Social History   Substance Use Topics     Smoking status: Never Smoker     Smokeless tobacco: Never Used     Alcohol use  No       If you drink alcohol do you typically have >3 drinks per day or >7 drinks per week? No                        Today's PHQ-2 Score:   PHQ-2 ( 1999 [employer]) 12/18/2017 10/26/2016   Q1: Little interest or pleasure in doing things 0 0   Q2: Feeling down, depressed or hopeless 1 0   PHQ-2 Score 1 0         Do you feel safe in your environment - Yes    Do you have a Health Care Directive?: No: Advance care planning reviewed with patient; information given to patient to review.      Current providers sharing in care for this patient include: Patient Care Team:  Ernestina Alvarado MD as PCP - General (Family Practice)  Diane Wang as Other (see comments)  Alyssa Gonzalez as Other (see comments)  Jennifer Mahmood RN as     The following health maintenance items are reviewed in Epic and correct as of today:  Health Maintenance   Topic Date Due     DEPRESSION ACTION PLAN Q1 YR  08/18/1958     EYE EXAM Q1 YEAR  10/25/2017     FALL RISK ASSESSMENT  10/26/2017     PHQ-9 Q6 MONTHS  05/15/2018     LIPID SCREEN Q5 YR FEMALE (SYSTEM ASSIGNED)  05/19/2020     ADVANCE DIRECTIVE PLANNING Q5 YRS  09/19/2022     TETANUS IMMUNIZATION (SYSTEM ASSIGNED)  07/25/2024     INFLUENZA VACCINE (SYSTEM ASSIGNED)  Completed     DEXA SCAN SCREENING (SYSTEM ASSIGNED)  Completed     PNEUMOCOCCAL  Completed     Patient Active Problem List   Diagnosis     OA (osteoarthritis) of knee - bilateral     Health Care Home     Constipation     History of total knee arthroplasty - right     SNHL (sensory-neural hearing loss), asymmetrical     CARDIOVASCULAR SCREENING; LDL GOAL LESS THAN 130     Advance care planning     Sciatica - left     Osteoporosis     History of total hip arthroplasty, left     Lumbar spinal stenosis     Macular drusen, bilateral     Dermatochalasis of eyelid     Pseudophakia of both eyes     Moderate single current episode of major depressive disorder (H)     Closed nondisplaced zone I fracture of sacrum with  "routine healing, subsequent encounter     Other specified depressive episodes     Adjustment disorder with mixed anxiety and depressed mood     Past Surgical History:   Procedure Laterality Date     C TOTAL KNEE ARTHROPLASTY  9/25/13    Right     CATARACT IOL, RT/LT       COMBINED REPAIR PTOSIS WITH BLEPHAROPLASTY Bilateral 1/9/2017    Procedure: COMBINED REPAIR PTOSIS WITH BLEPHAROPLASTY;  Surgeon: Keven Vázquez MD;  Location:  OR     SURGICAL HISTORY OF -       Left hip bone surgery       Social History   Substance Use Topics     Smoking status: Never Smoker     Smokeless tobacco: Never Used     Alcohol use No     Family History   Problem Relation Age of Onset     C.A.D. No family hx of      DIABETES No family hx of      Hypertension No family hx of      CEREBROVASCULAR DISEASE No family hx of                ROS:  Constitutional, HEENT, cardiovascular, pulmonary, GI, , musculoskeletal, neuro, skin, endocrine and psych systems are negative, except as otherwise noted.      OBJECTIVE:   /74 (BP Location: Left arm, Patient Position: Chair, Cuff Size: Adult Regular)  Pulse 76  Temp 97.3  F (36.3  C) (Oral)  Ht 4' 8\" (1.422 m)  Wt 131 lb (59.4 kg)  SpO2 97%  BMI 29.37 kg/m2 Estimated body mass index is 29.37 kg/(m^2) as calculated from the following:    Height as of this encounter: 4' 8\" (1.422 m).    Weight as of this encounter: 131 lb (59.4 kg).  EXAM:   GENERAL APPEARANCE: healthy, alert and no distress  EYES: Eyes grossly normal to inspection, PERRL and conjunctivae and sclerae normal  HENT: ear canals and TM's normal, nose and mouth without ulcers or lesions, oropharynx clear and oral mucous membranes moist  NECK: no adenopathy, no asymmetry, masses, or scars and thyroid normal to palpation  RESP: lungs clear to auscultation - no rales, rhonchi or wheezes  CV: regular rate and rhythm, normal S1 S2, no S3 or S4, no murmur, click or rub, no peripheral edema and peripheral pulses strong  ABDOMEN: " "soft, nontender, no hepatosplenomegaly, no masses and bowel sounds normal  MS: arthritic changes of joints, slow gait  SKIN: no suspicious lesions or rashes  NEURO: Normal strength and tone, sensory exam grossly normal, mentation intact, speech normal and hearing loss right ear  PSYCH: mentation appears normal and affect normal/bright    ASSESSMENT / PLAN:   1. Encounter for routine adult health examination without abnormal findings  Routine preventive discussed.  Paperwork for adult  completed last week    2. Acute left-sided low back pain with left-sided sciatica  Stable - continue current treatment  - traMADol (ULTRAM) 50 MG tablet; Take 1 tablet (50 mg) by mouth every 6 hours as needed for moderate pain or breakthrough pain  Dispense: 30 tablet; Refill: 2  - acetaminophen (TYLENOL) 325 MG tablet; Take 2 tablets (650 mg) by mouth every 6 hours as needed for mild pain  Dispense: 100 tablet; Refill: 1  - calcium carbonate-vitamin D 600-400 MG-UNIT CHEW; Take 1 chew tab by mouth 2 times daily  Dispense: 180 tablet; Refill: 3    3. Allergic conjunctivitis, bilateral  Stable - refilled  - ketotifen (ZADITOR/REFRESH ANTI-ITCH) 0.025 % SOLN ophthalmic solution; Place 1 drop into both eyes 2 times daily  Dispense: 10 mL; Refill: 1    4. At risk for falling  MA assessment done    5. SNHL (sensory-neural hearing loss), asymmetrical  Referral for audiology assessment for new hearing aid since other is not working even after battery change.  - AUDIOLOGY ADULT REFERRAL    End of Life Planning:  Patient currently has an advanced directive: Yes.  Practitioner is supportive of decision.    COUNSELING:  Reviewed preventive health counseling, as reflected in patient instructions      Estimated body mass index is 29.37 kg/(m^2) as calculated from the following:    Height as of this encounter: 4' 8\" (1.422 m).    Weight as of this encounter: 131 lb (59.4 kg).       reports that she has never smoked. She has never used " smokeless tobacco.        Appropriate preventive services were discussed with this patient, including applicable screening as appropriate for cardiovascular disease, diabetes, osteopenia/osteoporosis, and glaucoma.  As appropriate for age/gender, discussed screening for colorectal cancer, prostate cancer, breast cancer, and cervical cancer. Checklist reviewing preventive services available has been given to the patient.    Reviewed patients plan of care and provided an AVS. The Intermediate Care Plan ( asthma action plan, low back pain action plan, and migraine action plan) for Bethany meets the Care Plan requirement. This Care Plan has been established and reviewed with the Patient and daughter.    Counseling Resources:  ATP IV Guidelines  Pooled Cohorts Equation Calculator  Breast Cancer Risk Calculator  FRAX Risk Assessment  ICSI Preventive Guidelines  Dietary Guidelines for Americans, 2010  USDA's MyPlate  ASA Prophylaxis  Lung CA Screening    Ernestina Argueta MD  [facility]

## 2017-12-19 ENCOUNTER — CARE COORDINATION (OUTPATIENT)
Dept: GERIATRIC MEDICINE | Facility: CLINIC | Age: 77
End: 2017-12-19

## 2017-12-19 NOTE — PROGRESS NOTES
Client transferred to this  effective 12/01/17. CM change letter mailed to client. Message left on client's phone using  to introduce CM to client. Call back number left with client. CM also spoke to client's son Sudhakar. He states client is doing well. CM also spoke to Ira at [facility] to inform her of the change in CM. She also states client seems to be doing well since she moved to her own apartment. She has CM's phone number if there are any questions or concerns.   Jennifer Mahmood RN  [facility]   [phone]

## 2018-01-03 ENCOUNTER — MEDICAL CORRESPONDENCE (OUTPATIENT)
Dept: HEALTH INFORMATION MANAGEMENT | Facility: CLINIC | Age: 78
End: 2018-01-03

## 2018-01-09 ENCOUNTER — OFFICE VISIT (OUTPATIENT)
Dept: OPTOMETRY | Facility: CLINIC | Age: 78
End: 2018-01-09
Payer: COMMERCIAL

## 2018-01-09 DIAGNOSIS — H52.03 HYPERMETROPIA OF BOTH EYES: ICD-10-CM

## 2018-01-09 DIAGNOSIS — H10.13 ALLERGIC CONJUNCTIVITIS, BILATERAL: ICD-10-CM

## 2018-01-09 DIAGNOSIS — H52.4 PRESBYOPIA: Primary | ICD-10-CM

## 2018-01-09 DIAGNOSIS — Z96.1 PSEUDOPHAKIA OF BOTH EYES: ICD-10-CM

## 2018-01-09 DIAGNOSIS — H52.223 REGULAR ASTIGMATISM OF BOTH EYES: ICD-10-CM

## 2018-01-09 DIAGNOSIS — H35.363 MACULAR DRUSEN, BILATERAL: ICD-10-CM

## 2018-01-09 DIAGNOSIS — H04.123 INSUFFICIENCY OF TEAR FILM OF BOTH EYES: ICD-10-CM

## 2018-01-09 PROCEDURE — 92015 DETERMINE REFRACTIVE STATE: CPT | Performed by: OPTOMETRIST

## 2018-01-09 PROCEDURE — 92014 COMPRE OPH EXAM EST PT 1/>: CPT | Performed by: OPTOMETRIST

## 2018-01-09 ASSESSMENT — REFRACTION_WEARINGRX
OS_SPHERE: +1.75
OD_AXIS: 165
OS_CYLINDER: SPHERE
OD_ADD: +2.75
OS_AXIS: 003
SPECS_TYPE: DIDNT BRING
OS_ADD: +2.75
OD_SPHERE: +1.75
OS_SPHERE: PLANO
SPECS_TYPE: OTC READERS
OS_CYLINDER: +1.50
OD_SPHERE: -1.25
OD_CYLINDER: SPHERE
OD_CYLINDER: +0.75

## 2018-01-09 ASSESSMENT — REFRACTION_MANIFEST
OD_CYLINDER: +1.50
OS_AXIS: 003
OD_AXIS: 160
OD_SPHERE: -0.75
OS_CYLINDER: +1.25
OD_ADD: +2.75
OS_SPHERE: PLANO
OS_ADD: +2.75

## 2018-01-09 ASSESSMENT — EXTERNAL EXAM - LEFT EYE: OS_EXAM: NORMAL

## 2018-01-09 ASSESSMENT — VISUAL ACUITY
OS_CC: 20/120
OD_CC: 20/40
METHOD: SNELLEN - LINEAR
OS_SC+: -1
OD_SC: 20/40
OS_SC: 20/40
OD_SC+: -1

## 2018-01-09 ASSESSMENT — CONF VISUAL FIELD
OD_NORMAL: 1
OS_NORMAL: 1

## 2018-01-09 ASSESSMENT — CUP TO DISC RATIO
OD_RATIO: 0.3
OS_RATIO: 0.3

## 2018-01-09 ASSESSMENT — TONOMETRY
OS_IOP_MMHG: 13
IOP_METHOD: TONOPEN
OD_IOP_MMHG: 14

## 2018-01-09 ASSESSMENT — EXTERNAL EXAM - RIGHT EYE: OD_EXAM: NORMAL

## 2018-01-09 NOTE — PROGRESS NOTES
Chief Complaint   Patient presents with     COMPREHENSIVE EYE EXAM      Accompanied by  and Accompanied by daughter in law  Last Eye Exam: 10-  Dilated Previously: Yes    What are you currently using to see? otc readers,pt didn't like bifocals       Distance Vision Acuity: Satisfied with vision with bifocals which she does not wear    Near Vision Acuity: Satisfied with vision while reading  With otc readers    Eye Comfort: dry, watery and itchy  Do you use eye drops? : Yes: zaditor  Occupation or Hobbies: none    Clara Alvarez Optometric Assistant, A.B.O.C.          Medical, surgical and family histories reviewed and updated 1/9/2018.       OBJECTIVE: See Ophthalmology exam    ASSESSMENT:    ICD-10-CM    1. Presbyopia H52.4    2. Regular astigmatism of both eyes H52.223    3. Hypermetropia of both eyes H52.03    4. Allergic conjunctivitis, bilateral H10.13 ketotifen (ZADITOR/REFRESH ANTI-ITCH) 0.025 % SOLN ophthalmic solution   5. Insufficiency of tear film of both eyes H04.123 carboxymethylcellulose (REFRESH) 1 % ophthalmic solution   6. Macular drusen, bilateral H35.363    7. Pseudophakia of both eyes Z96.1       PLAN:     Patient Instructions   Recommend new glasses.    Optivar- 1 drop both eyes 2 x day as needed for itchy eyes.  Refresh tears 1 drop 2-4x daily.    You have mild macular degeneration.  I recommend taking supplements for the eyes, PreserVision AREDS 2 formula daily as recommended dosage on the bottle.  Check with your pharmacist first to make sure there are not any interactions with your medications.    Return in 1 year for a complete eye exam or sooner if needed.    Gabriele Whiting, OD

## 2018-01-09 NOTE — PATIENT INSTRUCTIONS
Recommend new glasses.    Optivar- 1 drop both eyes 2 x day as needed for itchy eyes.  Refresh tears 1 drop 2-4x daily.    You have mild macular degeneration.  I recommend taking supplements for the eyes, PreserVision AREDS 2 formula daily as recommended dosage on the bottle.  Check with your pharmacist first to make sure there are not any interactions with your medications.    Return in 1 year for a complete eye exam or sooner if needed.    Gabriele Whiting, OD

## 2018-01-09 NOTE — MR AVS SNAPSHOT
After Visit Summary   1/9/2018    Bethany Logan    MRN: [id number]           Patient Information     Date Of Birth          1940        Visit Information        Provider Department      1/9/2018 10:30 AM Gabriele Whiting, OD; [employer] [facility]        Today's Diagnoses     Presbyopia    -  1    Regular astigmatism of both eyes        Hypermetropia of both eyes        Allergic conjunctivitis, bilateral        Insufficiency of tear film of both eyes        Macular drusen, bilateral        Pseudophakia of both eyes          Care Instructions    Recommend new glasses.    Optivar- 1 drop both eyes 2 x day as needed for itchy eyes.  Refresh tears 1 drop 2-4x daily.    You have mild macular degeneration.  I recommend taking supplements for the eyes, PreserVision AREDS 2 formula daily as recommended dosage on the bottle.  Check with your pharmacist first to make sure there are not any interactions with your medications.    Return in 1 year for a complete eye exam or sooner if needed.    Gabriele Whiting, ERUM            Follow-ups after your visit        Follow-up notes from your care team     Return in about 1 year (around 1/9/2019) for Annual Visit.      Your next 10 appointments already scheduled     Jan 16, 2018  2:15 PM CST   New Visit with Lashon Hernandez   [facility]    [address] [place] [id number] [phone]              Who to contact     If you have questions or need follow up information about today's clinic visit or your schedule please contact [facility] directly at [phone].  Normal or non-critical lab and imaging results will be communicated to you by MyChart, letter or phone within 4 business days after the clinic has received the results. If you do not hear from us within 7 days, please contact the clinic through MyChart or phone. If you  "have a critical or abnormal lab result, we will notify you by phone as soon as possible.  Submit refill requests through [employer] or call your pharmacy and they will forward the refill request to us. Please allow 3 business days for your refill to be completed.          Additional Information About Your Visit        [employer] Information     [employer] lets you send messages to your doctor, view your test results, renew your prescriptions, schedule appointments and more. To sign up, go to [email or web address] . Click on \"Log in\" on the left side of the screen, which will take you to the Welcome page. Then click on \"Sign up Now\" on the right side of the page.     You will be asked to enter the access code listed below, as well as some personal information. Please follow the directions to create your username and password.     Your access code is: [email or web address]  Expires: 2018  9:56 AM     Your access code will  in 90 days. If you need help or a new code, please call your [facility] clinic or [phone].        Care EveryWhere ID     This is your Care EveryWhere ID. This could be used by other organizations to access your [facility] medical records  [id number]         Blood Pressure from Last 3 Encounters:   17 136/74   10/23/17 134/82   17 116/79    Weight from Last 3 Encounters:   17 59.4 kg (131 lb)   10/23/17 57.6 kg (127 lb)   17 56.7 kg (125 lb)              We Performed the Following     EYE EXAM (SIMPLE-NONBILLABLE)     REFRACTION          Today's Medication Changes          These changes are accurate as of: 18  1:51 PM.  If you have any questions, ask your nurse or doctor.               Start taking these medicines.        Dose/Directions    carboxymethylcellulose 1 % ophthalmic solution   Commonly known as:  REFRESH   Used for:  Insufficiency of tear film of both eyes   Started by:  Gabriele Whiting, OD        Dose:  1 drop   Place 1 drop into both eyes 3 times daily "   Quantity:  1 Bottle   Refills:  11            Where to get your medicines      These medications were sent to [facility], [place] - [address] [facility]  [address] [facility] [address]     Phone:  [phone]     carboxymethylcellulose 1 % ophthalmic solution    ketotifen 0.025 % Soln ophthalmic solution                Primary Care Provider Office Phone # Fax #    Ernestina Bhagatmi Argueta [phone]       [address]  [facility] [address]        Equal Access to Services     [facility]: Hadii aad ku hadasho Soomaali, waaxda luqadaha, qaybta kaalmada adeegyada, waxay idiin hayaan kedar gama . So [facility] [phone].    ATENCIÓN: Si habla español, tiene a harris disposición servicios gratuitos de asistencia lingüística. [facility] [phone].    We comply with applicable federal civil rights laws and [place] laws. We do not discriminate on the basis of race, color, national origin, age, disability, sex, sexual orientation, or gender identity.            Thank you!     Thank you for choosing [facility]  for your care. Our goal is always to provide you with excellent care. Hearing back from our patients is one way we can continue to improve our services. Please take a few minutes to complete the written survey that you may receive in the mail after your visit with us. Thank you!             Your Updated Medication List - Protect others around you: Learn how to safely use, store and throw away your medicines at www.disposemymeds.org.          This list is accurate as of: 1/9/18  1:51 PM.  Always use your most recent med list.                   Brand Name Dispense Instructions for use Diagnosis    acetaminophen 325 MG tablet    TYLENOL    100 tablet    Take 2 tablets (650 mg) by mouth every 6 hours as needed for mild pain    Acute left-sided low back pain with left-sided sciatica       calcium carbonate-vitamin D  600-400 MG-UNIT Chew     180 tablet    Take 1 chew tab by mouth 2 times daily    Acute left-sided low back pain with left-sided sciatica       carboxymethylcellulose 1 % ophthalmic solution    REFRESH    1 Bottle    Place 1 drop into both eyes 3 times daily    Insufficiency of tear film of both eyes       ketotifen 0.025 % Soln ophthalmic solution    ZADITOR/REFRESH ANTI-ITCH    10 mL    Place 1 drop into both eyes 2 times daily    Allergic conjunctivitis, bilateral       order for DME     1 Device    Equipment being ordered: Scooter    Spinal stenosis of lumbar region with neurogenic claudication       traMADol 50 MG tablet    ULTRAM    30 tablet    Take 1 tablet (50 mg) by mouth every 6 hours as needed for moderate pain or breakthrough pain    Acute left-sided low back pain with left-sided sciatica

## 2018-01-16 ENCOUNTER — OFFICE VISIT (OUTPATIENT)
Dept: AUDIOLOGY | Facility: CLINIC | Age: 78
End: 2018-01-16
Payer: COMMERCIAL

## 2018-01-16 DIAGNOSIS — H90.3 SENSORINEURAL HEARING LOSS, BILATERAL: Primary | ICD-10-CM

## 2018-01-16 PROCEDURE — 92567 TYMPANOMETRY: CPT | Performed by: AUDIOLOGIST

## 2018-01-16 PROCEDURE — 92553 AUDIOMETRY AIR & BONE: CPT | Performed by: AUDIOLOGIST

## 2018-01-16 PROCEDURE — 92555 SPEECH THRESHOLD AUDIOMETRY: CPT | Performed by: AUDIOLOGIST

## 2018-01-16 PROCEDURE — 92593 HC HEARING AID CHECK, BINAURAL: CPT | Performed by: AUDIOLOGIST

## 2018-01-16 PROCEDURE — 99207 ZZC NO CHARGE LOS: CPT | Performed by: AUDIOLOGIST

## 2018-01-16 NOTE — PROGRESS NOTES
AUDIOLOGY REPORT    SUBJECTIVE:  Bethany Logan is a 77 year old female who was seen in the Audiology Clinic at [place] for audiologic evaluation, referred by Ernestina Argueta. The patient has been seen previously in this clinic on 11/6/2013 for assessment and results indicated moderate sensorineural hearing loss bilaterally. Patient currently wears two Widex Clear 220 half shell hearing aids, fit at this clinic in December 2013. The patient reports a suspected decline in hearing, as she has noticed that her hearing aids do not seem to be helping as much as they used to. Patient reports longstanding bilateral tinnitus for approximately the past 8 years. The patient denies dizziness/vertigo, bilateral otalgia, bilateral drainage and bilateral aural fullness.  The patient notes difficulty with communication in a variety of listening situations.  They were accompanied today by their daughter-in-law and .    OBJECTIVE:    Otoscopic exam indicates ears are clear of cerumen bilaterally     Pure Tone Thresholds assessed using conventional audiometry with good  reliability from 250-8000 Hz bilaterally using circumaural headphones     RIGHT:  moderate and moderate-severe sensorineural hearing loss    LEFT:    moderate and moderate-severe sensorineural hearing loss    Tympanogram:    RIGHT: restricted eardrum mobility (Type As)     LEFT:   normal eardrum mobility  NOTE: tympanometric results are consistent with those obtained at patient's last evaluation in 2013.    Speech Detection Threshold:    RIGHT: 60 dB HL    LEFT:   50 dB HL    Word Recognition Score: could not be obtained due to language barrier.    Visual examination of patient's hearing aids finds occluded wax guards. Replaced both wax guards. Subsequent listening check finds left hearing aid amplifying sound appropriately, while right hearing aid is weak and nearly non-functional. Right hearing aid sent for  repair.        ASSESSMENT:   Bilateral sensorineural hearing loss     Compared to patient's previous audiogram dated 11/6/2013, hearing has declined in the left ear by 10 dB at 5334-8053 Hz and by 30 dB at 8000 Hz, as well as in the right eat by 20 dB at 8000 Hz.Today s results were discussed with the patient in detail.     PLAN:  Right hearing aid sent for repair. It is recommended that the patient schedule an appointment for a hearing aid check in 2-3 weeks. At that time, will ensure that hearing aids are amplifying sound appropriately and make any necessary changes to reflect the minor decline in patient's hearing. Please call this clinic with questions regarding these results or recommendations.        Solange Rice, CCC-A  Licensed Audiologist #[id number]  1/16/2018

## 2018-01-16 NOTE — MR AVS SNAPSHOT
"              After Visit Summary   1/16/2018    Bethany Logan    MRN: [id number]           Patient Information     Date Of Birth          1940        Visit Information        Provider Department      1/16/2018 2:15 PM Theresa Connor AuD; LANGUAGE BANC [facility]        Today's Diagnoses     Sensorineural hearing loss, bilateral    -  1       Follow-ups after your visit        Your next 10 appointments already scheduled     Feb 05, 2018 11:00 AM CST   [facility]on [facility]   [facility]    [address] [place] [id number] [phone]              Who to contact     If you have questions or need follow up information about today's clinic visit or your schedule please contact [facility] directly at [phone].  Normal or non-critical lab and imaging results will be communicated to you by MyChart, letter or phone within 4 business days after the clinic has received the results. If you do not hear from us within 7 days, please contact the clinic through MyChart or phone. If you have a critical or abnormal lab result, we will notify you by phone as soon as possible.  Submit refill requests through Kymab or call your pharmacy and they will forward the refill request to us. Please allow 3 business days for your refill to be completed.          Additional Information About Your Visit        MyChart Information     Kymab lets you send messages to your doctor, view your test results, renew your prescriptions, schedule appointments and more. To sign up, go to [email or web address] . Click on \"Log in\" on the left side of the screen, which will take you to the Welcome page. Then click on \"Sign up Now\" on the right side of the page.     You will be asked to enter the access code listed below, as well as some personal information. Please follow the directions to create " your username and password.     Your access code is: [email or web address]  Expires: 2018  9:56 AM     Your access code will  in 90 days. If you need help or a new code, please call your [place] clinic or [phone].        Care EveryWhere ID     This is your Care EveryWhere ID. This could be used by other organizations to access your [place] medical records  [id number]         Blood Pressure from Last 3 Encounters:   17 136/74   10/23/17 134/82   17 116/79    Weight from Last 3 Encounters:   17 131 lb (59.4 kg)   10/23/17 127 lb (57.6 kg)   17 125 lb (56.7 kg)              We Performed the Following     AUD AUDIOMETRY - AIR/BONE     AUDIOGRAM/TYMPANOGRAM - INTERFACE     AUDIOM THRESHOLD     HEARING AID CHECK, BINAURAL     TYMPANOMETRY        Primary Care Provider Office Phone # Fax #    Ernestina Emacliff Argueta [phone]       [address] [facility] [address]        Equal Access to Services     [facility]: Hadii aad ku hadasho Soomaali, waaxda luqadaha, qaybta kaalmada adeegyada, waxay idiin hayaan kedar gama . So [facility] [phone].    ATENCIÓN: Si habla español, tiene a harris disposición servicios gratuitos de asistencia lingüística. Llame al [phone].    We comply with applicable federal civil rights laws and [place] laws. We do not discriminate on the basis of race, color, national origin, age, disability, sex, sexual orientation, or gender identity.            Thank you!     Thank you for choosing [facility]  for your care. Our goal is always to provide you with excellent care. Hearing back from our patients is one way we can continue to improve our services. Please take a few minutes to complete the written survey that you may receive in the mail after your visit with us. Thank you!             Your Updated Medication List - Protect others around you: Learn how to safely use, store and throw away your  medicines at www.disposemymeds.org.          This list is accurate as of: 1/16/18  4:37 PM.  Always use your most recent med list.                   Brand Name Dispense Instructions for use Diagnosis    acetaminophen 325 MG tablet    TYLENOL    100 tablet    Take 2 tablets (650 mg) by mouth every 6 hours as needed for mild pain    Acute left-sided low back pain with left-sided sciatica       calcium carbonate-vitamin D 600-400 MG-UNIT Chew     180 tablet    Take 1 chew tab by mouth 2 times daily    Acute left-sided low back pain with left-sided sciatica       carboxymethylcellulose 1 % ophthalmic solution    REFRESH    1 Bottle    Place 1 drop into both eyes 3 times daily    Insufficiency of tear film of both eyes       ketotifen 0.025 % Soln ophthalmic solution    ZADITOR/REFRESH ANTI-ITCH    10 mL    Place 1 drop into both eyes 2 times daily    Allergic conjunctivitis, bilateral       order for DME     1 Device    Equipment being ordered: Scooter    Spinal stenosis of lumbar region with neurogenic claudication       traMADol 50 MG tablet    ULTRAM    30 tablet    Take 1 tablet (50 mg) by mouth every 6 hours as needed for moderate pain or breakthrough pain    Acute left-sided low back pain with left-sided sciatica

## 2018-01-22 ENCOUNTER — MEDICAL CORRESPONDENCE (OUTPATIENT)
Dept: HEALTH INFORMATION MANAGEMENT | Facility: CLINIC | Age: 78
End: 2018-01-22

## 2018-02-05 ENCOUNTER — OFFICE VISIT (OUTPATIENT)
Dept: AUDIOLOGY | Facility: CLINIC | Age: 78
End: 2018-02-05
Payer: COMMERCIAL

## 2018-02-05 DIAGNOSIS — H90.3 SENSORINEURAL HEARING LOSS, BILATERAL: Primary | ICD-10-CM

## 2018-02-05 PROCEDURE — V5014 HEARING AID REPAIR/MODIFYING: HCPCS | Mod: RB | Performed by: AUDIOLOGIST

## 2018-02-05 NOTE — PROGRESS NOTES
HEARING AID RECHECK    Patient Name:  Bethany Logan    Patient Age:   77 year old    :  1940    Background:   Patient is seen today, accompanied by her daughter-in-law and an , for pick-up of her repaired right hearing aid. Real Ear Measures and possible reprogramming were also planned for today. However, patient reported that she lost her left hearing aid on the way to today's appointment. Patient believes that the hearing aid was lost on the [employer] bus when she removed her hat and scarf.     Procedures:   Patient picked up right hearing aid. She reports improved sound quality and volume from the hearing aid. Repair warranty expires 2019.    Discussed procedure for obtaining replacement of lost left hearing aid if patient cannot find it. Replacement would need to be prior authorized through patient's insurance, as the loss & damage policy  in . Encouraged patient and daughter-in-law to continue to look for the hearing aid and to contact [employer] to see if it can be found. Also informed patient that she would be eligible for new hearing aids in November of this year.      Plan:   Patient will continue looking for lost left hearing aid. If she finds it and wishes to have Real Ear Measures and potential reprogramming done, she will schedule another appointment. If patient cannot find the left hearing aid and needs to obtain a replacement, they will call to begin the prior authorization process.     CHARGES: .002 Hearing Aid Repair with 12 month warranty ($275). Bill to Solange Gandhi, CCC-A  Licensed Audiologist  2018

## 2018-02-05 NOTE — MR AVS SNAPSHOT
"              After Visit Summary   2018    Bethany Logan    MRN: [id number]           Patient Information     Date Of Birth          1940        Visit Information        Provider Department      2018 11:00 AM Theresa Connor AuD; [employer] [facility]        Today's Diagnoses     Sensorineural hearing loss, bilateral    -  1       Follow-ups after your visit        Who to contact     If you have questions or need follow up information about today's clinic visit or your schedule please contact [facility] directly at [phone].  Normal or non-critical lab and imaging results will be communicated to you by MyChart, letter or phone within 4 business days after the clinic has received the results. If you do not hear from us within 7 days, please contact the clinic through [employer] or phone. If you have a critical or abnormal lab result, we will notify you by phone as soon as possible.  Submit refill requests through [employer]Mag or call your pharmacy and they will forward the refill request to us. Please allow 3 business days for your refill to be completed.          Additional Information About Your Visit        MyChart Information     [employer]Mag lets you send messages to your doctor, view your test results, renew your prescriptions, schedule appointments and more. To sign up, go to [email or web address] . Click on \"Log in\" on the left side of the screen, which will take you to the Welcome page. Then click on \"Sign up Now\" on the right side of the page.     You will be asked to enter the access code listed below, as well as some personal information. Please follow the directions to create your username and password.     Your access code is: [email or web address]  Expires: 2018  9:56 AM     Your access code will  in 90 days. If you need help or a new code, please call your [facility] or [phone].        Care EveryWhere ID     " This is your Care EveryWhere ID. This could be used by other organizations to access your [place] medical records  [id number]         Blood Pressure from Last 3 Encounters:   12/18/17 136/74   10/23/17 134/82   09/05/17 116/79    Weight from Last 3 Encounters:   12/18/17 131 lb (59.4 kg)   10/23/17 127 lb (57.6 kg)   09/19/17 125 lb (56.7 kg)              We Performed the Following     REPAIR/MOD OF HA DIGITAL 12 MO FACTORY        Primary Care Provider Office Phone # Fax #    Ernestina Boo Juan Argueta [phone] [address]  [facility] [address]        Equal Access to Services     AZALEA GONZALEZ : Hadii aad ku hadasho Soomaali, waaxda luqadaha, qaybta kaalmada adeegyada, waxay idiin hayaan kedar gama . So [facility] [phone].    ATENCIÓN: Si habla español, tiene a harris disposición servicios gratuitos de asistencia lingüística. [facility] [phone].    We comply with applicable federal civil rights laws and [place] laws. We do not discriminate on the basis of race, color, national origin, age, disability, sex, sexual orientation, or gender identity.            Thank you!     Thank you for choosing [facility]  for your care. Our goal is always to provide you with excellent care. Hearing back from our patients is one way we can continue to improve our services. Please take a few minutes to complete the written survey that you may receive in the mail after your visit with us. Thank you!             Your Updated Medication List - Protect others around you: Learn how to safely use, store and throw away your medicines at www.disposemymeds.org.          This list is accurate as of 2/5/18 11:40 AM.  Always use your most recent med list.                   Brand Name Dispense Instructions for use Diagnosis    acetaminophen 325 MG tablet    TYLENOL    100 tablet    Take 2 tablets (650 mg) by mouth every 6 hours as needed for mild pain    Acute left-sided  low back pain with left-sided sciatica       calcium carbonate-vitamin D 600-400 MG-UNIT Chew     180 tablet    Take 1 chew tab by mouth 2 times daily    Acute left-sided low back pain with left-sided sciatica       carboxymethylcellulose 1 % ophthalmic solution    REFRESH    1 Bottle    Place 1 drop into both eyes 3 times daily    Insufficiency of tear film of both eyes       ketotifen 0.025 % Soln ophthalmic solution    ZADITOR/REFRESH ANTI-ITCH    10 mL    Place 1 drop into both eyes 2 times daily    Allergic conjunctivitis, bilateral       order for DME     1 Device    Equipment being ordered: Scooter    Spinal stenosis of lumbar region with neurogenic claudication       traMADol 50 MG tablet    ULTRAM    30 tablet    Take 1 tablet (50 mg) by mouth every 6 hours as needed for moderate pain or breakthrough pain    Acute left-sided low back pain with left-sided sciatica

## 2018-02-15 ENCOUNTER — TELEPHONE (OUTPATIENT)
Dept: AUDIOLOGY | Facility: CLINIC | Age: 78
End: 2018-02-15

## 2018-02-15 NOTE — TELEPHONE ENCOUNTER
Reason for Call:  Other call back    Detailed comments: PT lost her right hearing aid and needs to get a new one ordered if possible. Dr. Connor said to contact if needing a new one.    Phone Number Patient can be reached at: Other phone number:  [phone]    Best Time: any    Can we leave a detailed message on this number? YES    Call taken on 2/15/2018 at 4:09 PM by Arpan Lucas

## 2018-02-19 NOTE — TELEPHONE ENCOUNTER
Spoke with patient's daughter (patient does not speak English). Informed her that we will need to obtain prior authorization for a replacement hearing aid, as patient's loss and damage coverage has . Warned her that this process can take time. Patient's family will be contacted pending prior authorization approval.    Solange Rice, CCC-A

## 2018-03-01 DIAGNOSIS — M54.42 ACUTE LEFT-SIDED LOW BACK PAIN WITH LEFT-SIDED SCIATICA: ICD-10-CM

## 2018-03-01 NOTE — TELEPHONE ENCOUNTER
Requested Prescriptions   Pending Prescriptions Disp Refills     traMADol (ULTRAM) 50 MG tablet  Last Written Prescription Date:  12/18/17  Last Fill Quantity: 30,  # refills: 2   Last Office Visit with [facility], [facility] or [facility] prescribing provider:  12/18/17   Future Office Visit:    30 tablet 2     Sig: Take 1 tablet (50 mg) by mouth every 6 hours as needed for moderate pain or breakthrough pain    There is no refill protocol information for this order

## 2018-03-01 NOTE — TELEPHONE ENCOUNTER
Routing refill request to provider for review/approval because:  Drug not on the [facility] refill protocol   Kell Gutierres RN

## 2018-03-02 RX ORDER — TRAMADOL HYDROCHLORIDE 50 MG/1
50 TABLET ORAL EVERY 6 HOURS PRN
Qty: 30 TABLET | Refills: 2 | Status: SHIPPED | OUTPATIENT
Start: 2018-03-02 | End: 2018-06-06

## 2018-03-02 NOTE — TELEPHONE ENCOUNTER
Faxed Rx for the Ultram to [facility], [place], right  Fax confirmed at 4:07 pm today.  Deyanira Cheng MA/  For [employer] and Yasmeen

## 2018-03-09 ENCOUNTER — CARE COORDINATION (OUTPATIENT)
Dept: GERIATRIC MEDICINE | Facility: CLINIC | Age: 78
End: 2018-03-09

## 2018-03-09 NOTE — PROGRESS NOTES
[facility] Six-Month Telephone Assessment    6 month telephone assessment completed on 03/09/18.    ER visits: No  Hospitalizations: No  TCU stays: No  Significant health status changes: None  Falls/Injuries: No  ADL/IADL changes: No  Changes in services: Yes: Client received a scooter her PCP ordered. Client states she has not started PCA services yet. CM to call agency to find out why this has not started yet. Client only wants her daughter-in-law to assist. She has applied with the agency but has not heard back from them yet.     Caregiver Assessment follow up:  N/A    Goals: See POC in chart for goal progress documentation.      Will see member in 6 months for an annual health risk assessment.   Encouraged member to call CC with any questions or concerns in the meantime.   Jennifer Mahmood RN  [email or web address]   [phone]

## 2018-03-12 NOTE — PROGRESS NOTES
LILO spoke with Naty Gomez from the PCA agency and she stated she spoke with Peggy today and she will be coming in for orientation on 03/14 and will be able to start working as a PCA after this training is completed.   Jennifer Mahmood RN  [facility]   [phone]

## 2018-04-23 ENCOUNTER — PATIENT OUTREACH (OUTPATIENT)
Dept: GERIATRIC MEDICINE | Facility: CLINIC | Age: 78
End: 2018-04-23

## 2018-04-23 NOTE — PROGRESS NOTES
[facility] Care Coordination Contact  Received a call from client's son Sudhakar stating his mother received a letter from [facility] which said she was no longer active on MA. He states they mailed the renewal forms in March. CM called [facility] [employer] and was told the automatic letter was generated on 4/16 and client's forms were processed on 4/19. She stated client was still active on MA and there is no lapse in service. She will be receiving a new letter stating this. CM called client's son back with this information.   Jennifer Mahmood RN  [facility]   [phone]

## 2018-05-02 ENCOUNTER — OFFICE VISIT (OUTPATIENT)
Dept: FAMILY MEDICINE | Facility: CLINIC | Age: 78
End: 2018-05-02
Payer: COMMERCIAL

## 2018-05-02 VITALS
WEIGHT: 135 LBS | DIASTOLIC BLOOD PRESSURE: 77 MMHG | OXYGEN SATURATION: 97 % | HEIGHT: 56 IN | SYSTOLIC BLOOD PRESSURE: 148 MMHG | BODY MASS INDEX: 30.37 KG/M2 | TEMPERATURE: 98.8 F | HEART RATE: 77 BPM

## 2018-05-02 DIAGNOSIS — M81.0 AGE-RELATED OSTEOPOROSIS WITHOUT CURRENT PATHOLOGICAL FRACTURE: ICD-10-CM

## 2018-05-02 DIAGNOSIS — R10.13 ABDOMINAL PAIN, EPIGASTRIC: Primary | ICD-10-CM

## 2018-05-02 DIAGNOSIS — M54.42 ACUTE LEFT-SIDED LOW BACK PAIN WITH LEFT-SIDED SCIATICA: ICD-10-CM

## 2018-05-02 LAB
ALBUMIN SERPL-MCNC: 3.4 G/DL (ref 3.4–5)
ALBUMIN UR-MCNC: NEGATIVE MG/DL
ALP SERPL-CCNC: 117 U/L (ref 40–150)
ALT SERPL W P-5'-P-CCNC: 41 U/L (ref 0–50)
AMYLASE SERPL-CCNC: 80 U/L (ref 30–110)
ANION GAP SERPL CALCULATED.3IONS-SCNC: 7 MMOL/L (ref 3–14)
APPEARANCE UR: CLEAR
AST SERPL W P-5'-P-CCNC: 33 U/L (ref 0–45)
BASOPHILS # BLD AUTO: 0 10E9/L (ref 0–0.2)
BASOPHILS NFR BLD AUTO: 0 %
BILIRUB SERPL-MCNC: 0.2 MG/DL (ref 0.2–1.3)
BILIRUB UR QL STRIP: NEGATIVE
BUN SERPL-MCNC: 14 MG/DL (ref 7–30)
CALCIUM SERPL-MCNC: 9.3 MG/DL (ref 8.5–10.1)
CHLORIDE SERPL-SCNC: 108 MMOL/L (ref 94–109)
CO2 SERPL-SCNC: 27 MMOL/L (ref 20–32)
COLOR UR AUTO: YELLOW
CREAT SERPL-MCNC: 0.76 MG/DL (ref 0.52–1.04)
DIFFERENTIAL METHOD BLD: NORMAL
EOSINOPHIL # BLD AUTO: 0.1 10E9/L (ref 0–0.7)
EOSINOPHIL NFR BLD AUTO: 1.5 %
ERYTHROCYTE [DISTWIDTH] IN BLOOD BY AUTOMATED COUNT: 13.7 % (ref 10–15)
GFR SERPL CREATININE-BSD FRML MDRD: 74 ML/MIN/1.7M2
GLUCOSE SERPL-MCNC: 86 MG/DL (ref 70–99)
GLUCOSE UR STRIP-MCNC: NEGATIVE MG/DL
HCT VFR BLD AUTO: 40 % (ref 35–47)
HGB BLD-MCNC: 12.9 G/DL (ref 11.7–15.7)
HGB UR QL STRIP: ABNORMAL
KETONES UR STRIP-MCNC: NEGATIVE MG/DL
LEUKOCYTE ESTERASE UR QL STRIP: NEGATIVE
LIPASE SERPL-CCNC: 316 U/L (ref 73–393)
LYMPHOCYTES # BLD AUTO: 3.1 10E9/L (ref 0.8–5.3)
LYMPHOCYTES NFR BLD AUTO: 52.8 %
MCH RBC QN AUTO: 30.9 PG (ref 26.5–33)
MCHC RBC AUTO-ENTMCNC: 32.3 G/DL (ref 31.5–36.5)
MCV RBC AUTO: 96 FL (ref 78–100)
MONOCYTES # BLD AUTO: 0.6 10E9/L (ref 0–1.3)
MONOCYTES NFR BLD AUTO: 10.1 %
NEUTROPHILS # BLD AUTO: 2.1 10E9/L (ref 1.6–8.3)
NEUTROPHILS NFR BLD AUTO: 35.6 %
NITRATE UR QL: NEGATIVE
NON-SQ EPI CELLS #/AREA URNS LPF: NORMAL /LPF
PH UR STRIP: 6 PH (ref 5–7)
PLATELET # BLD AUTO: 279 10E9/L (ref 150–450)
POTASSIUM SERPL-SCNC: 4.2 MMOL/L (ref 3.4–5.3)
PROT SERPL-MCNC: 7.4 G/DL (ref 6.8–8.8)
RBC # BLD AUTO: 4.17 10E12/L (ref 3.8–5.2)
RBC #/AREA URNS AUTO: NORMAL /HPF
SODIUM SERPL-SCNC: 142 MMOL/L (ref 133–144)
SOURCE: ABNORMAL
SP GR UR STRIP: <=1.005 (ref 1–1.03)
UROBILINOGEN UR STRIP-ACNC: 0.2 EU/DL (ref 0.2–1)
WBC # BLD AUTO: 5.9 10E9/L (ref 4–11)
WBC #/AREA URNS AUTO: NORMAL /HPF

## 2018-05-02 PROCEDURE — 36415 COLL VENOUS BLD VENIPUNCTURE: CPT | Performed by: PREVENTIVE MEDICINE

## 2018-05-02 PROCEDURE — 82150 ASSAY OF AMYLASE: CPT | Performed by: PREVENTIVE MEDICINE

## 2018-05-02 PROCEDURE — 99214 OFFICE O/P EST MOD 30 MIN: CPT | Performed by: PREVENTIVE MEDICINE

## 2018-05-02 PROCEDURE — 80053 COMPREHEN METABOLIC PANEL: CPT | Performed by: PREVENTIVE MEDICINE

## 2018-05-02 PROCEDURE — 85025 COMPLETE CBC W/AUTO DIFF WBC: CPT | Performed by: PREVENTIVE MEDICINE

## 2018-05-02 PROCEDURE — 81001 URINALYSIS AUTO W/SCOPE: CPT | Performed by: PREVENTIVE MEDICINE

## 2018-05-02 PROCEDURE — 83690 ASSAY OF LIPASE: CPT | Performed by: PREVENTIVE MEDICINE

## 2018-05-02 RX ORDER — NICOTINE POLACRILEX 4 MG/1
20 GUM, CHEWING ORAL DAILY
Qty: 30 TABLET | Refills: 0 | Status: SHIPPED | OUTPATIENT
Start: 2018-05-02 | End: 2019-01-10

## 2018-05-02 RX ORDER — ACETAMINOPHEN 325 MG/1
650 TABLET ORAL EVERY 6 HOURS PRN
Qty: 100 TABLET | Refills: 1 | Status: SHIPPED | OUTPATIENT
Start: 2018-05-02 | End: 2018-06-06

## 2018-05-02 ASSESSMENT — ANXIETY QUESTIONNAIRES
5. BEING SO RESTLESS THAT IT IS HARD TO SIT STILL: SEVERAL DAYS
GAD7 TOTAL SCORE: 11
6. BECOMING EASILY ANNOYED OR IRRITABLE: MORE THAN HALF THE DAYS
1. FEELING NERVOUS, ANXIOUS, OR ON EDGE: MORE THAN HALF THE DAYS
7. FEELING AFRAID AS IF SOMETHING AWFUL MIGHT HAPPEN: MORE THAN HALF THE DAYS
2. NOT BEING ABLE TO STOP OR CONTROL WORRYING: MORE THAN HALF THE DAYS
3. WORRYING TOO MUCH ABOUT DIFFERENT THINGS: SEVERAL DAYS

## 2018-05-02 ASSESSMENT — PATIENT HEALTH QUESTIONNAIRE - PHQ9: 5. POOR APPETITE OR OVEREATING: SEVERAL DAYS

## 2018-05-02 ASSESSMENT — PAIN SCALES - GENERAL: PAINLEVEL: MILD PAIN (3)

## 2018-05-02 NOTE — PROGRESS NOTES
SUBJECTIVE:   Bethany Logan is a 77 year old female who presents to clinic today for the following health issues:    Phone interpretor used for this visit   Family member also present       Abdominal Pain      Duration: x 2 weeks    Description (location/character/radiation): middle       Associated flank pain: None    Intensity:  3/10    Accompanying signs and symptoms:        Fever/Chills: no        Gas/Bloating: YES, gas       Nausea/vomitting: YES both       Diarrhea: YES       Dysuria or Hematuria: YES    History (previous similar pain/trauma/previous testing): npne    Precipitating or alleviating factors:       Pain worse with eating/BM/urination: yes, urination and eats       Pain relieved by BM: YES    Therapies tried and outcome: antacid and tea     LMP:  not applicable    Not had pain like this before  Decreased appetite  Fluctuates in severity  Pain, nausea with meals   Diarrhea+  No rectal bleeding or melena  No history of gallbladder issues   Nausea with food      Refills on medication also requested.    Problem list and histories reviewed & adjusted, as indicated.  Additional history: as documented    Patient Active Problem List   Diagnosis     OA (osteoarthritis) of knee - bilateral     Health Care Home     Constipation     History of total knee arthroplasty - right     SNHL (sensory-neural hearing loss), asymmetrical     CARDIOVASCULAR SCREENING; LDL GOAL LESS THAN 130     Advance care planning     Sciatica - left     Osteoporosis     History of total hip arthroplasty, left     Lumbar spinal stenosis     Macular drusen, bilateral     Dermatochalasis of eyelid     Pseudophakia of both eyes     Moderate single current episode of major depressive disorder (H)     Closed nondisplaced zone I fracture of sacrum with routine healing, subsequent encounter     Other specified depressive episodes     Adjustment disorder with mixed anxiety and depressed mood     Past Surgical History:   Procedure  Laterality Date     C TOTAL KNEE ARTHROPLASTY  9/25/13    Right     CATARACT IOL, RT/LT       COMBINED REPAIR PTOSIS WITH BLEPHAROPLASTY Bilateral 1/9/2017    Procedure: COMBINED REPAIR PTOSIS WITH BLEPHAROPLASTY;  Surgeon: Keven Vázquez MD;  Location: [facility] OR     SURGICAL HISTORY OF -       Left hip bone surgery       Social History   Substance Use Topics     Smoking status: Never Smoker     Smokeless tobacco: Never Used     Alcohol use No     Family History   Problem Relation Age of Onset     C.A.D. No family hx of      DIABETES No family hx of      Hypertension No family hx of      CEREBROVASCULAR DISEASE No family hx of          Current Outpatient Prescriptions   Medication Sig Dispense Refill     acetaminophen (TYLENOL) 325 MG tablet Take 2 tablets (650 mg) by mouth every 6 hours as needed for mild pain 100 tablet 1     calcium carbonate-vitamin D 600-400 MG-UNIT CHEW Take 1 chew tab by mouth 2 times daily 180 tablet 3     carboxymethylcellulose (REFRESH) 1 % ophthalmic solution Place 1 drop into both eyes 3 times daily 1 Bottle 11     ketotifen (ZADITOR/REFRESH ANTI-ITCH) 0.025 % SOLN ophthalmic solution Place 1 drop into both eyes 2 times daily 10 mL 1     omeprazole 20 MG tablet Take 1 tablet (20 mg) by mouth daily Take 30-60 minutes before a meal. 30 tablet 0     order for DME Equipment being ordered: Scooter 1 Device 0     traMADol (ULTRAM) 50 MG tablet Take 1 tablet (50 mg) by mouth every 6 hours as needed for moderate pain or breakthrough pain 30 tablet 2     Allergies   Allergen Reactions     Aspirin Other (See Comments)     BP Readings from Last 3 Encounters:   05/02/18 148/77   12/18/17 136/74   10/23/17 134/82    Wt Readings from Last 3 Encounters:   05/02/18 135 lb (61.2 kg)   12/18/17 131 lb (59.4 kg)   10/23/17 127 lb (57.6 kg)                  Labs reviewed in EPIC    Reviewed and updated as needed this visit by clinical staff  Tobacco  Allergies  Meds       Reviewed and updated as needed  "this visit by Provider         ROS:  Constitutional, HEENT, cardiovascular, pulmonary, gi and gu systems are negative, except as otherwise noted.    OBJECTIVE:                                                    /77  Pulse 77  Temp 98.8  F (37.1  C) (Oral)  Ht 4' 8\" (1.422 m)  Wt 135 lb (61.2 kg)  SpO2 97%  Breastfeeding? No  BMI 30.27 kg/m2  Body mass index is 30.27 kg/(m^2).  GENERAL APPEARANCE: healthy, alert and no distress  EYES: Eyes grossly normal to inspection and conjunctivae and sclerae normal  RESP: lungs clear to auscultation - no rales, rhonchi or wheezes  CV: regular rates and rhythm, normal S1 S2, no S3 or S4 and no murmur, click or rub  ABDOMEN: Non distended, tender in the epigastric region, no rebound or guarding, bowel sounds+  MS: extremities normal- no gross deformities noted  SKIN: no suspicious lesions or rashes  NEURO: Normal strength and tone, mentation intact and speech normal  PSYCH: mentation appears normal    Diagnostic test results:  Diagnostic Test Results:  No results found for this or any previous visit (from the past 24 hour(s)).     ASSESSMENT/PLAN:                                                    1. Abdominal pain, epigastric  -Await labs   - UA reflex to Microscopic and Culture  - Lipase  - Amylase  - Comprehensive metabolic panel  - CBC with platelets differential  - H Pylori antigen, stool; Future  - omeprazole 20 MG tablet; Take 1 tablet (20 mg) by mouth daily Take 30-60 minutes before a meal.  Dispense: 30 tablet; Refill: 0  - US Abdomen Limited; Future  -ER precautions reviewed in detail. If increased abdominal pain, fever over 101 F, emesis, rectal bleeding, melena, then needs to be seen in ER, patient expressed comprehension of this.     2. Age-related osteoporosis without current pathological fracture  -Refills on medication provided  - calcium carbonate-vitamin D 600-400 MG-UNIT CHEW; Take 1 chew tab by mouth 2 times daily  Dispense: 180 tablet; Refill: " 3    3. Acute left-sided low back pain with left-sided sciatica  -Also wanted refills on Tramadol, those need to come from PCP   - acetaminophen (TYLENOL) 325 MG tablet; Take 2 tablets (650 mg) by mouth every 6 hours as needed for mild pain  Dispense: 100 tablet; Refill: 1    I ended our visit today by discussing the patient's diagnoses and recommended treatment. Please refer to today's diagnoses and orders for further details. I briefly discussed the pathophysiology of these conditions and outlined their expected course. I discussed the warning symptoms and signs that indicate an atypical course that would need urgent or emergent care. I also discussed self care strategies for symptom relief.  Patient voiced complete understanding of plan of care and was in full agreement to proceed. After visit summary discussed and handed to patient.    Common side effects of medications prescribed at this visit were discussed with the patient. Severe side effects, including current applicable black box warnings, were discussed.       Follow up with Provider - will contact with results when available, plan to be determined then      Chanell Amezcua MD MPH    [facility]

## 2018-05-02 NOTE — LETTER
May 7, 2018      Bethany HernándezosSantos  [address]  [facility] [address]              Dear Bethany,    Urine sample did not show an infection.  Basic blood count did not show anemia or infection.  Electrolytes, glucose,kidney function, liver function and pancreas are normal.  Plan of care and follow up as discussed in clinic.   Please let me know if you have any questions and thank you for choosing [place].    Regards,    Chanell Amezcua MD MPH/ag        Results for orders placed or performed in visit on 05/02/18   UA reflex to Microscopic and Culture   Result Value Ref Range    Color Urine Yellow     Appearance Urine Clear     Glucose Urine Negative NEG^Negative mg/dL    Bilirubin Urine Negative NEG^Negative    Ketones Urine Negative NEG^Negative mg/dL    Specific Gravity Urine <=1.005 1.003 - 1.035    Blood Urine Trace (A) NEG^Negative    pH Urine 6.0 5.0 - 7.0 pH    Protein Albumin Urine Negative NEG^Negative mg/dL    Urobilinogen Urine 0.2 0.2 - 1.0 EU/dL    Nitrite Urine Negative NEG^Negative    Leukocyte Esterase Urine Negative NEG^Negative    Source Midstream Urine    Lipase   Result Value Ref Range    Lipase 316 73 - 393 U/L   Amylase   Result Value Ref Range    Amylase 80 30 - 110 U/L   Comprehensive metabolic panel   Result Value Ref Range    Sodium 142 133 - 144 mmol/L    Potassium 4.2 3.4 - 5.3 mmol/L    Chloride 108 94 - 109 mmol/L    Carbon Dioxide 27 20 - 32 mmol/L    Anion Gap 7 3 - 14 mmol/L    Glucose 86 70 - 99 mg/dL    Urea Nitrogen 14 7 - 30 mg/dL    Creatinine 0.76 0.52 - 1.04 mg/dL    GFR Estimate 74 >60 mL/min/1.7m2    GFR Estimate If Black 90 >60 mL/min/1.7m2    Calcium 9.3 8.5 - 10.1 mg/dL    Bilirubin Total 0.2 0.2 - 1.3 mg/dL    Albumin 3.4 3.4 - 5.0 g/dL    Protein Total 7.4 6.8 - 8.8 g/dL    Alkaline Phosphatase 117 40 - 150 U/L    ALT 41 0 - 50 U/L    AST 33 0 - 45 U/L   CBC with platelets differential   Result Value Ref Range    WBC 5.9 4.0 - 11.0 10e9/L    RBC  Count 4.17 3.8 - 5.2 10e12/L    Hemoglobin 12.9 11.7 - 15.7 g/dL    Hematocrit 40.0 35.0 - 47.0 %    MCV 96 78 - 100 fl    MCH 30.9 26.5 - 33.0 pg    MCHC 32.3 31.5 - 36.5 g/dL    RDW 13.7 10.0 - 15.0 %    Platelet Count 279 150 - 450 10e9/L    Diff Method Automated Method     % Neutrophils 35.6 %    % Lymphocytes 52.8 %    % Monocytes 10.1 %    % Eosinophils 1.5 %    % Basophils 0.0 %    Absolute Neutrophil 2.1 1.6 - 8.3 10e9/L    Absolute Lymphocytes 3.1 0.8 - 5.3 10e9/L    Absolute Monocytes 0.6 0.0 - 1.3 10e9/L    Absolute Eosinophils 0.1 0.0 - 0.7 10e9/L    Absolute Basophils 0.0 0.0 - 0.2 10e9/L   Urine Microscopic   Result Value Ref Range    WBC Urine 0 - 5 OTO5^0 - 5 /HPF    RBC Urine O - 2 OTO2^O - 2 /HPF    Squamous Epithelial /LPF Urine Few FEW^Few /LPF

## 2018-05-02 NOTE — MR AVS SNAPSHOT
After Visit Summary   5/2/2018    Bethany Logan    MRN: [id number]           Patient Information     Date Of Birth          1940        Visit Information        Provider Department      5/2/2018 1:45 PM Chanell Amezcua MD; PHONE,  [facility]        Today's Diagnoses     Abdominal pain, epigastric    -  1    Age-related osteoporosis without current pathological fracture          Care Instructions    At [facility], we strive to deliver an exceptional experience to you, every time we see you.  If you receive a survey in the mail, please send us back your thoughts. We really do value your feedback.    Based on your medical history, these are the current health maintenance/preventive care services that you are due for (some may have been done at this visit.)  Health Maintenance Due   Topic Date Due     PHQ-9 Q6 MONTHS  05/15/2018       Suggested websites for health information:  WwwSongwhale : Up to date and easily searchable information on multiple topics.  Www.JollyDeck.gov : medication info, interactive tutorials, watch real surgeries online  Www.familydoctor.org : good info from the Academy of Family Physicians  Www.cdc.gov : public health info, travel advisories, epidemics (H1N1)  Www.aap.org : children's health info, normal development, vaccinations  Www.health.state[place] : [place] dept of health, public health issues in [place], N1N1    Your care team:                            Family Medicine Internal Medicine   MD Ivan Barlow MD Shantel Branch-Fleming, MD Katya Georgiev PA-C Megan Hill, APRN CNP Nam Ho, MD Pediatrics   SAI Kruse, MD Mulu Thornton APRN CNP   MD Janessa Cotton MD Deborah Mielke, MD Kim Thein, APRN [facility]      Clinic hours: Monday - Thursday 7 am-7 pm; Fridays 7 am-5 pm.   Urgent care: Monday - Friday 11 am-9 pm; Saturday and  " 9 am-5 pm.  Pharmacy : Monday -Thursday 8 am-8 pm; Friday 8 am-6 pm; Saturday and  9 am-5 pm.     Clinic: [phone]   Pharmacy: [phone]              Follow-ups after your visit        Future tests that were ordered for you today     Open Future Orders        Priority Expected Expires Ordered    US Abdomen Limited Routine  2019    H Pylori antigen, stool Routine  2018            Who to contact     If you have questions or need follow up information about today's clinic visit or your schedule please contact [facility] directly at [phone].  Normal or non-critical lab and imaging results will be communicated to you by MyChart, letter or phone within 4 business days after the clinic has received the results. If you do not hear from us within 7 days, please contact the clinic through Niterohart or phone. If you have a critical or abnormal lab result, we will notify you by phone as soon as possible.  Submit refill requests through Yamli or call your pharmacy and they will forward the refill request to us. Please allow 3 business days for your refill to be completed.          Additional Information About Your Visit        NiteroharPerformLine Information     Yamli lets you send messages to your doctor, view your test results, renew your prescriptions, schedule appointments and more. To sign up, go to [email or web address] . Click on \"Log in\" on the left side of the screen, which will take you to the Welcome page. Then click on \"Sign up Now\" on the right side of the page.     You will be asked to enter the access code listed below, as well as some personal information. Please follow the directions to create your username and password.     Your access code is: [email or web address]  Expires: 2018  2:49 PM     Your access code will  in 90 days. If you need help or a new code, please call your [place] clinic or [phone].        Care EveryWhere ID     This " "is your Care EveryWhere ID. This could be used by other organizations to access your [place] medical records  [id number]        Your Vitals Were     Pulse Temperature Height Pulse Oximetry Breastfeeding? BMI (Body Mass Index)    77 98.8  F (37.1  C) (Oral) 4' 8\" (1.422 m) 97% No 30.27 kg/m2       Blood Pressure from Last 3 Encounters:   05/02/18 148/77   12/18/17 136/74   10/23/17 134/82    Weight from Last 3 Encounters:   05/02/18 135 lb (61.2 kg)   12/18/17 131 lb (59.4 kg)   10/23/17 127 lb (57.6 kg)              We Performed the Following     Amylase     CBC with platelets differential     Comprehensive metabolic panel     Lipase     UA reflex to Microscopic and Culture          Today's Medication Changes          These changes are accurate as of 5/2/18  2:49 PM.  If you have any questions, ask your nurse or doctor.               Start taking these medicines.        Dose/Directions    omeprazole 20 MG tablet   Used for:  Abdominal pain, epigastric   Started by:  Chanell Amezcua MD        Dose:  20 mg   Take 1 tablet (20 mg) by mouth daily Take 30-60 minutes before a meal.   Quantity:  30 tablet   Refills:  0            Where to get your medicines      Some of these will need a paper prescription and others can be bought over the counter.  Ask your nurse if you have questions.     Bring a paper prescription for each of these medications     calcium carbonate-vitamin D 600-400 MG-UNIT Chew    omeprazole 20 MG tablet                Primary Care Provider Office Phone # Fax #    Ernestina Emacliff Argueta [phone]       [address]  [place] [facility] [address]        Equal Access to Services     [facility] AH: Hadhardeep Davidson, waaxda luqadaha, qaybta kaalmada mechelle, renae singh. So [facility] [phone].    ATENCIÓN: Si habla español, tiene a harris disposición servicios gratuitos de asistencia lingüística. Llame al [phone].    We comply " with applicable federal civil rights laws and [place] laws. We do not discriminate on the basis of race, color, national origin, age, disability, sex, sexual orientation, or gender identity.            Thank you!     Thank you for choosing [facility]  for your care. Our goal is always to provide you with excellent care. Hearing back from our patients is one way we can continue to improve our services. Please take a few minutes to complete the written survey that you may receive in the mail after your visit with us. Thank you!             Your Updated Medication List - Protect others around you: Learn how to safely use, store and throw away your medicines at www.disposemymeds.org.          This list is accurate as of 5/2/18  2:49 PM.  Always use your most recent med list.                   Brand Name Dispense Instructions for use Diagnosis    acetaminophen 325 MG tablet    TYLENOL    100 tablet    Take 2 tablets (650 mg) by mouth every 6 hours as needed for mild pain    Acute left-sided low back pain with left-sided sciatica       calcium carbonate-vitamin D 600-400 MG-UNIT Chew     180 tablet    Take 1 chew tab by mouth 2 times daily    Age-related osteoporosis without current pathological fracture       carboxymethylcellulose 1 % ophthalmic solution    REFRESH    1 Bottle    Place 1 drop into both eyes 3 times daily    Insufficiency of tear film of both eyes       ketotifen 0.025 % Soln ophthalmic solution    ZADITOR/REFRESH ANTI-ITCH    10 mL    Place 1 drop into both eyes 2 times daily    Allergic conjunctivitis, bilateral       omeprazole 20 MG tablet     30 tablet    Take 1 tablet (20 mg) by mouth daily Take 30-60 minutes before a meal.    Abdominal pain, epigastric       order for DME     1 Device    Equipment being ordered: Scooter    Spinal stenosis of lumbar region with neurogenic claudication       traMADol 50 MG tablet    ULTRAM    30 tablet    Take 1 tablet (50 mg) by mouth every 6  hours as needed for moderate pain or breakthrough pain    Acute left-sided low back pain with left-sided sciatica

## 2018-05-03 ENCOUNTER — TELEPHONE (OUTPATIENT)
Dept: FAMILY MEDICINE | Facility: CLINIC | Age: 78
End: 2018-05-03

## 2018-05-03 DIAGNOSIS — R10.13 ABDOMINAL PAIN, EPIGASTRIC: Primary | ICD-10-CM

## 2018-05-03 ASSESSMENT — PATIENT HEALTH QUESTIONNAIRE - PHQ9: SUM OF ALL RESPONSES TO PHQ QUESTIONS 1-9: 16

## 2018-05-03 ASSESSMENT — ANXIETY QUESTIONNAIRES: GAD7 TOTAL SCORE: 11

## 2018-05-03 NOTE — TELEPHONE ENCOUNTER
[facility] sending request for new script for omeprazole 20 MG CAPSULES.      Tablets are not covered by insurance.

## 2018-05-04 DIAGNOSIS — R10.13 ABDOMINAL PAIN, EPIGASTRIC: ICD-10-CM

## 2018-05-04 PROCEDURE — 87338 HPYLORI STOOL AG IA: CPT | Performed by: PREVENTIVE MEDICINE

## 2018-05-04 NOTE — LETTER
[facility]  [address] [facility] [address]  [phone]                                                                                                           Bethany Logan  [address]  [facility] [address]    May 8, 2018    Dear Bethany Logan,     Stool test for bacteria H pylori was negative. Plan of care and follow up as discussed in clinic. Please let me know if you have any questions and thank you for choosing [place].     Regards,     Chanell Amezcua MD MPH/smj    Results for orders placed or performed in visit on 05/04/18   H Pylori antigen, stool   Result Value Ref Range    Specimen Description Feces     H Pylori Antigen       Negative for Helicobacter pylori antigen by enzyme immunoassay. A negative result   indicates the absence of H. pylori antigen or that the level of antigen is below the level   of detection.

## 2018-05-04 NOTE — TELEPHONE ENCOUNTER
Pharmacy stated capsules are covered do you want to change from tablets to capsules? Or just buy OTC?  Rajiv BONNER

## 2018-05-04 NOTE — TELEPHONE ENCOUNTER
Patient can purchase Omeprazole 20 mg tablets over the counter if their insurance does not cover this.   Thanks,  Chanell Amezcua MD MPH

## 2018-05-04 NOTE — PROGRESS NOTES
Please send a letter:    Dear Bethany Logan,    Urine sample did not show an infection.  Basic blood count did not show anemia or infection.  Electrolytes, glucose,kidney function, liver function and pancreas are normal.  Plan of care and follow up as discussed in clinic.   Please let me know if you have any questions and thank you for choosing [place].    Regards,    Chanell Amezcua MD MPH

## 2018-05-07 LAB
H PYLORI AG STL QL IA: NORMAL
SPECIMEN SOURCE: NORMAL

## 2018-05-08 NOTE — PROGRESS NOTES
Please send a letter:    Dear Bethany Logan,    Stool test for bacteria H pylori was negative. Plan of care and follow up as discussed in clinic. Please let me know if you have any questions and thank you for choosing [place].    Regards,    Chanell Amezcua MD MPH

## 2018-05-11 ENCOUNTER — TELEPHONE (OUTPATIENT)
Dept: FAMILY MEDICINE | Facility: CLINIC | Age: 78
End: 2018-05-11

## 2018-05-11 ENCOUNTER — RADIANT APPOINTMENT (OUTPATIENT)
Dept: ULTRASOUND IMAGING | Facility: CLINIC | Age: 78
End: 2018-05-11
Payer: COMMERCIAL

## 2018-05-11 DIAGNOSIS — R10.13 ABDOMINAL PAIN, EPIGASTRIC: ICD-10-CM

## 2018-05-11 PROCEDURE — 76705 ECHO EXAM OF ABDOMEN: CPT

## 2018-05-11 NOTE — TELEPHONE ENCOUNTER
This writer attempted to contact Bethany and Emergency Contact son Jem Jain (Consent to Communicate on file) on 05/11/18 via  # [phone]      Reason for call abnormal results and left message. Attempted patient's home and mobile numbers but voicemail box was not set-up, unable to leave voicemail. Writer found a Consent to Communicate on file with patient's son and Emergency Contact Jem Jain and left a VM asking to have patient or son call clinic back.      If patient calls back:   Patient contacted by a Registered Nurse. Inform patient that someone from the RN group will contact them, document that pt called and route to P DYAD 3 RN POOL [[id number]]      Adeline Mauricio RN      Notes Recorded by Ada Yanes APRN CNP on 5/11/2018 at 10:18 AM  Gallbladder polyps likely evidence of high cholesterol.  Would recommend lipid panel, repeating in 12 months.  Not the cause of her discomfort    Please call patient:  -her ultrasound did not show any reasons for her abdominal pain  -they did find fatty liver which is a result of excess weight and diet  -I recommend patient come in for a follow up visit to discuss further and to reassess her abdominal pain  Thanks!  Ada (covering)

## 2018-05-11 NOTE — LETTER
May 15, 2018      Bethany Logan  [address]  [facility] [address]            Dear Bethany Logan    -Your ultrasound did not show any reasons for your abdominal pain.  -They did find a fatty liver which is a result of excess weight and diet.  -I recommend that you come in for a follow up visit to discuss further   and to reassess your abdominal pain.    Thanks!  Ada Yanes (covering for Dr Amezcua)          Results for orders placed or performed in visit on 05/11/18   US Abdomen Limited    Narrative    RIGHT UPPER QUADRANT ULTRASOUND 5/11/2018 9:09 AM    HISTORY:  ; Abdominal pain, epigastric    COMPARISON: None.    FINDINGS:    Gallbladder: Tiny gallbladder polyps otherwise normal no gallstones.    Bile ducts:  No biliary duct dilatation    Liver:  Echogenicity consistent with fatty infiltration    Pancreas:  Normal where visualized    Right kidney:  Normal 9.2 cm in length    No evidence for abdominal aortic aneurysm.    Proximal IVC unremarkable.      Impression    IMPRESSION:    1. Tiny gallbladder polyps. No gallstones or evidence for acute  cholecystitis.  2. Fatty liver.    IGLESIA SHAFER MD

## 2018-05-14 NOTE — TELEPHONE ENCOUNTER
This writer attempted to contact Bethany Parish's son on 05/14/18      Reason for call abnormal lab results and left message.      If patient calls back:   Patient contacted by a Registered Nurse. Inform patient that someone from the RN group will contact them, document that pt called and route to P DYAD 3 RN POOL [[id number]]    Trenton Saldivar RN, BSN

## 2018-05-15 NOTE — TELEPHONE ENCOUNTER
Requesting to send patient a letter for 5/11/18 results. Per documentation unable to reach patient by phone. Patient does not have working phone number and RN tried calling patient's son and was unsuccessful at reaching patient.    Provider please review and advise.    Kell Gutierres RN

## 2018-05-15 NOTE — TELEPHONE ENCOUNTER
Team please send letter to patient with results from 5/11/18 per Ada WRAY CNP. See message below.  Kell Gutierres RN

## 2018-06-06 ENCOUNTER — OFFICE VISIT (OUTPATIENT)
Dept: FAMILY MEDICINE | Facility: CLINIC | Age: 78
End: 2018-06-06
Payer: COMMERCIAL

## 2018-06-06 VITALS
OXYGEN SATURATION: 97 % | HEIGHT: 56 IN | TEMPERATURE: 98.7 F | RESPIRATION RATE: 20 BRPM | HEART RATE: 87 BPM | WEIGHT: 137 LBS | BODY MASS INDEX: 30.82 KG/M2 | SYSTOLIC BLOOD PRESSURE: 135 MMHG | DIASTOLIC BLOOD PRESSURE: 76 MMHG

## 2018-06-06 DIAGNOSIS — G89.29 CHRONIC LEFT-SIDED LOW BACK PAIN WITH LEFT-SIDED SCIATICA: ICD-10-CM

## 2018-06-06 DIAGNOSIS — M54.42 CHRONIC LEFT-SIDED LOW BACK PAIN WITH LEFT-SIDED SCIATICA: ICD-10-CM

## 2018-06-06 DIAGNOSIS — M54.42 ACUTE LEFT-SIDED LOW BACK PAIN WITH LEFT-SIDED SCIATICA: ICD-10-CM

## 2018-06-06 DIAGNOSIS — K76.0 FATTY LIVER: Primary | ICD-10-CM

## 2018-06-06 DIAGNOSIS — K82.4 GALLBLADDER POLYP: ICD-10-CM

## 2018-06-06 DIAGNOSIS — M81.0 AGE-RELATED OSTEOPOROSIS WITHOUT CURRENT PATHOLOGICAL FRACTURE: ICD-10-CM

## 2018-06-06 PROCEDURE — 99214 OFFICE O/P EST MOD 30 MIN: CPT | Performed by: FAMILY MEDICINE

## 2018-06-06 RX ORDER — ACETAMINOPHEN 325 MG/1
650 TABLET ORAL EVERY 6 HOURS PRN
Qty: 100 TABLET | Refills: 1 | Status: SHIPPED | OUTPATIENT
Start: 2018-06-06 | End: 2019-01-10

## 2018-06-06 RX ORDER — TRAMADOL HYDROCHLORIDE 50 MG/1
50 TABLET ORAL EVERY 6 HOURS PRN
Qty: 30 TABLET | Refills: 2 | Status: SHIPPED | OUTPATIENT
Start: 2018-06-06 | End: 2018-10-04

## 2018-06-06 ASSESSMENT — PAIN SCALES - GENERAL: PAINLEVEL: NO PAIN (0)

## 2018-06-06 ASSESSMENT — ANXIETY QUESTIONNAIRES
6. BECOMING EASILY ANNOYED OR IRRITABLE: NOT AT ALL
IF YOU CHECKED OFF ANY PROBLEMS ON THIS QUESTIONNAIRE, HOW DIFFICULT HAVE THESE PROBLEMS MADE IT FOR YOU TO DO YOUR WORK, TAKE CARE OF THINGS AT HOME, OR GET ALONG WITH OTHER PEOPLE: NOT DIFFICULT AT ALL
2. NOT BEING ABLE TO STOP OR CONTROL WORRYING: SEVERAL DAYS
5. BEING SO RESTLESS THAT IT IS HARD TO SIT STILL: NOT AT ALL
3. WORRYING TOO MUCH ABOUT DIFFERENT THINGS: NOT AT ALL
1. FEELING NERVOUS, ANXIOUS, OR ON EDGE: SEVERAL DAYS
GAD7 TOTAL SCORE: 2
7. FEELING AFRAID AS IF SOMETHING AWFUL MIGHT HAPPEN: NOT AT ALL

## 2018-06-06 ASSESSMENT — PATIENT HEALTH QUESTIONNAIRE - PHQ9: 5. POOR APPETITE OR OVEREATING: NOT AT ALL

## 2018-06-06 NOTE — PATIENT INSTRUCTIONS
At [facility], we strive to deliver an exceptional experience to you, every time we see you.  If you receive a survey in the mail, please send us back your thoughts. We really do value your feedback.    Based on your medical history, these are the current health maintenance/preventive care services that you are due for (some may have been done at this visit.)  Health Maintenance Due   Topic Date Due     URINE DRUG SCREEN Q1 YR  08/18/1955       Suggested websites for health information:  Www.[facility] Hospital[facility].org : Up to date and easily searchable information on multiple topics.  Www.[employer].gov : medication info, interactive tutorials, watch real surgeries online  Www.familydoctor.org : good info from the Academy of Family Physicians  Www.cdc.gov : public health info, travel advisories, epidemics (H1N1)  Www.aap.org : children's health info, normal development, vaccinations  Www.health.[facility].[place].us : [place] dept of health, public health issues in [place], N1N1    Your care team:                            Family Medicine Internal Medicine   MD Ivan Barlow MD Shantel Branch-Fleming, MD Katya Georgiev PA-C Megan Hill, APRN CNP    Golden Connor MD Pediatrics   Jj Rodgers, PABryannaC  Ada Yanes, CNP MD Mulu Ortiz APRN CNP   MD Janessa Cotton MD Deborah Mielke, MD Kim Thein, APRN CNP      Clinic hours: Monday - Thursday 7 am-7 pm; Fridays 7 am-5 pm.   Urgent care: Monday - Friday 11 am-9 pm; Saturday and Sunday 9 am-5 pm.  Pharmacy : Monday -Thursday 8 am-8 pm; Friday 8 am-6 pm; Saturday and Sunday 9 am-5 pm.     Clinic: [phone]   Pharmacy: [phone]

## 2018-06-06 NOTE — MR AVS SNAPSHOT
After Visit Summary   6/6/2018    Bethany Logan    MRN: [id number]           Patient Information     Date Of Birth          1940        Visit Information        Provider Department      6/6/2018 2:15 PM Ernestina Alvarado MD; [employer] [facility]        Today's Diagnoses     Fatty liver    -  1    Gallbladder polyps        Chronic left-sided low back pain with left-sided sciatica        Acute left-sided low back pain with left-sided sciatica        Age-related osteoporosis without current pathological fracture          Care Instructions    At [facility], we strive to deliver an exceptional experience to you, every time we see you.  If you receive a survey in the mail, please send us back your thoughts. We really do value your feedback.    Based on your medical history, these are the current health maintenance/preventive care services that you are due for (some may have been done at this visit.)  Health Maintenance Due   Topic Date Due     URINE DRUG SCREEN Q1 YR  08/18/1955       Suggested websites for health information:  Www.[place].org : Up to date and easily searchable information on multiple topics.  Www.medlineplus.gov : medication info, interactive tutorials, watch real surgeries online  Www.familydoctor.org : good info from the Academy of Family Physicians  Www.cdc.gov : public health info, travel advisories, epidemics (H1N1)  Www.aap.org : children's health info, normal development, vaccinations  Www.health.state.[place].us : [place] dept of health, public health issues in [place], N1N1    Your care team:                            Family Medicine Internal Medicine   MD Ivan Barlow MD Shantel Branch-Fleming, MD Katya Georgiev PA-C Megan Hill, APRN CNP    Golden Connor MD Pediatrics   SAI Kruse, MD Mulu Thornton APRN CNP   MD Janessa Cotton  "MD Mariann Rowell MD Kim Thein, APRN CNP      Clinic hours: Monday - Thursday 7 am-7 pm; Fridays 7 am-5 pm.   Urgent care: Monday - Friday 11 am-9 pm; Saturday and Sunday 9 am-5 pm.  Pharmacy : Monday -Thursday 8 am-8 pm; Friday 8 am-6 pm; Saturday and Sunday 9 am-5 pm.     Clinic: [phone]   Pharmacy: [phone]             Follow-ups after your visit        Who to contact     If you have questions or need follow up information about today's clinic visit or your schedule please contact [facility] directly at [phone].  Normal or non-critical lab and imaging results will be communicated to you by MyChart, letter or phone within 4 business days after the clinic has received the results. If you do not hear from us within 7 days, please contact the clinic through MyChart or phone. If you have a critical or abnormal lab result, we will notify you by phone as soon as possible.  Submit refill requests through [employer] or call your pharmacy and they will forward the refill request to us. Please allow 3 business days for your refill to be completed.          Additional Information About Your Visit        Care EveryWhere ID     This is your Care EveryWhere ID. This could be used by other organizations to access your [place] medical records  [id number]        Your Vitals Were     Pulse Temperature Respirations Height Pulse Oximetry Breastfeeding?    87 98.7  F (37.1  C) (Oral) 20 4' 8\" (1.422 m) 97% No    BMI (Body Mass Index)                   30.71 kg/m2            Blood Pressure from Last 3 Encounters:   06/06/18 135/76   05/02/18 148/77   12/18/17 136/74    Weight from Last 3 Encounters:   06/06/18 137 lb (62.1 kg)   05/02/18 135 lb (61.2 kg)   12/18/17 131 lb (59.4 kg)              We Performed the Following     PAF COMPLETED          Where to get your medicines      Some of these will need a paper prescription and others can be bought over the counter.  Ask your nurse " if you have questions.     Bring a paper prescription for each of these medications     acetaminophen 325 MG tablet    calcium carbonate-vitamin D 600-400 MG-UNIT Chew    traMADol 50 MG tablet         Information about OPIOIDS     PRESCRIPTION OPIOIDS: WHAT YOU NEED TO KNOW   You have a prescription for an opioid (narcotic) pain medicine. Opioids can cause addiction. If you have a history of chemical dependency of any type, you are at a higher risk of becoming addicted to opioids. Only take this medicine after all other options have been tried. Take it for as short a time and as few doses as possible.     Do not:    Drive. If you drive while taking these medicines, you could be arrested for driving under the influence (DUI).    Operate heavy machinery    Do any other dangerous activities while taking these medicines.     Drink any alcohol while taking these medicines.      Take with any other medicines that contain acetaminophen. Read all labels carefully. Look for the word  acetaminophen  or  Tylenol.  Ask your pharmacist if you have questions or are unsure.    Store your pills in a secure place, locked if possible. We will not replace any lost or stolen medicine. If you don t finish your medicine, please throw away (dispose) as directed by your pharmacist. The [place] Pollution Control Agency has more information about safe disposal: https://www.pca.[facility].[place].us/living-green/managing-unwanted-medications    All opioids tend to cause constipation. Drink plenty of water and eat foods that have a lot of fiber, such as fruits, vegetables, prune juice, apple juice and high-fiber cereal. Take a laxative (Miralax, milk of magnesia, Colace, Senna) if you don t move your bowels at least every other day.          Primary Care Provider Office Phone # Fax #    Ernestina Argueta [phone]       [address]  [facility] [address]        Equal Access to Services     DUYEN IGLESIAS:  Hadii anusha arreolao Sokarenali, waaxda luqadaha, qaybta kaalmada mechelle, renae calosin hayaan tayawyatt bedolla lafabriciocliff samantha. So [facility] [phone].    ATENCIÓN: Si karlie delcid, tiene a harris disposición servicios gratuitos de asistencia lingüística. Reid al [phone].    We comply with applicable federal civil rights laws and [place] laws. We do not discriminate on the basis of race, color, national origin, age, disability, sex, sexual orientation, or gender identity.            Thank you!     Thank you for choosing [facility]  for your care. Our goal is always to provide you with excellent care. Hearing back from our patients is one way we can continue to improve our services. Please take a few minutes to complete the written survey that you may receive in the mail after your visit with us. Thank you!             Your Updated Medication List - Protect others around you: Learn how to safely use, store and throw away your medicines at www.disposemymeds.org.          This list is accurate as of 6/6/18  2:38 PM.  Always use your most recent med list.                   Brand Name Dispense Instructions for use Diagnosis    acetaminophen 325 MG tablet    TYLENOL    100 tablet    Take 2 tablets (650 mg) by mouth every 6 hours as needed for mild pain    Acute left-sided low back pain with left-sided sciatica       calcium carbonate-vitamin D 600-400 MG-UNIT Chew     180 tablet    Take 1 chew tab by mouth 2 times daily    Age-related osteoporosis without current pathological fracture       carboxymethylcellulose 1 % ophthalmic solution    REFRESH    1 Bottle    Place 1 drop into both eyes 3 times daily    Insufficiency of tear film of both eyes       ketotifen 0.025 % Soln ophthalmic solution    ZADITOR/REFRESH ANTI-ITCH    10 mL    Place 1 drop into both eyes 2 times daily    Allergic conjunctivitis, bilateral       * omeprazole 20 MG tablet     30 tablet    Take 1 tablet (20 mg) by mouth daily Take 30-60  minutes before a meal.    Abdominal pain, epigastric       * omeprazole 20 MG CR capsule    priLOSEC    30 capsule    Take 1 capsule (20 mg) by mouth daily    Abdominal pain, epigastric       order for DME     1 Device    Equipment being ordered: Scooter    Spinal stenosis of lumbar region with neurogenic claudication       traMADol 50 MG tablet    ULTRAM    30 tablet    Take 1 tablet (50 mg) by mouth every 6 hours as needed for moderate pain or breakthrough pain    Chronic left-sided low back pain with left-sided sciatica       * Notice:  This list has 2 medication(s) that are the same as other medications prescribed for you. Read the directions carefully, and ask your doctor or other care provider to review them with you.

## 2018-06-06 NOTE — PROGRESS NOTES
SUBJECTIVE:   Bethany Logan is a 77 year old female who presents to clinic today for the following health issues:    Follow up  Radiology Test (5/11/18)-Abdominal Pain,Epigastric results.  Feeling fine, no pain today. Had Ultrasound which showed gallbladder polyps and fatty liver.  Abd pain worse with fast foods.    Additional: Recheck medication on tylenol,tramadol, calcium. May need refills    Tylenol for mild pain and tramadol for more severe pain.  Using sparingly but previous rx's not covered at [facility] would like to try RX's at new pharmacy.    Osteoporosis with history of pathologic fracture - would like to get calcium with vitamin D. Not interested in other medication at this time.    Problem list and histories reviewed & adjusted, as indicated.  Additional history: as documented    Patient Active Problem List   Diagnosis     OA (osteoarthritis) of knee - bilateral     Health Care Home     Constipation     History of total knee arthroplasty - right     SNHL (sensory-neural hearing loss), asymmetrical     Chronic left-sided low back pain with left-sided sciatica     CARDIOVASCULAR SCREENING; LDL GOAL LESS THAN 130     Advance care planning     Sciatica - left     Osteoporosis     History of total hip arthroplasty, left     Lumbar spinal stenosis     Macular drusen, bilateral     Dermatochalasis of eyelid     Pseudophakia of both eyes     Moderate single current episode of major depressive disorder (H)     Closed nondisplaced zone I fracture of sacrum with routine healing, subsequent encounter     Other specified depressive episodes     Adjustment disorder with mixed anxiety and depressed mood     Fatty liver     Gallbladder polyps     Past Surgical History:   Procedure Laterality Date     C TOTAL KNEE ARTHROPLASTY  9/25/13    Right     CATARACT IOL, RT/LT       COMBINED REPAIR PTOSIS WITH BLEPHAROPLASTY Bilateral 1/9/2017    Procedure: COMBINED REPAIR PTOSIS WITH BLEPHAROPLASTY;  Surgeon:  "Keven Vázquez MD;  Location: [facility] OR     SURGICAL HISTORY OF -       Left hip bone surgery       Social History   Substance Use Topics     Smoking status: Never Smoker     Smokeless tobacco: Never Used     Alcohol use No     Family History   Problem Relation Age of Onset     C.A.D. No family hx of      DIABETES No family hx of      Hypertension No family hx of      CEREBROVASCULAR DISEASE No family hx of            Reviewed and updated as needed this visit by clinical staff  Tobacco  Allergies  Meds  Problems       Reviewed and updated as needed this visit by Provider  Allergies  Meds  Problems         ROS:  Constitutional, HEENT, cardiovascular, pulmonary, gi and gu systems are negative, except as otherwise noted.    OBJECTIVE:     /76 (BP Location: Left arm, Patient Position: Chair, Cuff Size: Adult Regular)  Pulse 87  Temp 98.7  F (37.1  C) (Oral)  Resp 20  Ht 4' 8\" (1.422 m)  Wt 137 lb (62.1 kg)  SpO2 97%  Breastfeeding? No  BMI 30.71 kg/m2  Body mass index is 30.71 kg/(m^2).  GENERAL: healthy, alert and no distress  NECK: no adenopathy, no asymmetry, masses, or scars and thyroid normal to palpation  RESP: lungs clear to auscultation - no rales, rhonchi or wheezes  CV: regular rate and rhythm, normal S1 S2, no S3 or S4, no murmur, click or rub, no peripheral edema and peripheral pulses strong  ABDOMEN: soft, nontender, no hepatosplenomegaly, no masses and bowel sounds normal  MS: no gross musculoskeletal defects noted, no edema, ambulating with walker  PSYCH: mentation appears normal, affect normal/bright    Diagnostic Test Results:  none     ASSESSMENT/PLAN:     1. Fatty liver  Reassured and recommended she eat low carb    2. Gallbladder polyps  Monitor    3. Chronic left-sided low back pain with left-sided sciatica  Refilled for severe pain  - traMADol (ULTRAM) 50 MG tablet; Take 1 tablet (50 mg) by mouth every 6 hours as needed for moderate pain or breakthrough pain  Dispense: 30 " tablet; Refill: 2    4. Acute left-sided low back pain with left-sided sciatica  Refilled for prn use  - acetaminophen (TYLENOL) 325 MG tablet; Take 2 tablets (650 mg) by mouth every 6 hours as needed for mild pain  Dispense: 100 tablet; Refill: 1    5. Age-related osteoporosis without current pathological fracture  Refilled  - calcium carbonate-vitamin D 600-400 MG-UNIT CHEW; Take 1 chew tab by mouth 2 times daily  Dispense: 180 tablet; Refill: 3    The uses and side effects, including black box warnings as appropriate, were discussed in detail.  All patient questions were answered.  The patient was instructed to call immediately if any side effects developed.     FUTURE APPOINTMENTS:       - Follow-up visit in 6 - 12 months.    Ernestina Argueta MD  [facility]

## 2018-06-07 ASSESSMENT — ANXIETY QUESTIONNAIRES: GAD7 TOTAL SCORE: 2

## 2018-06-07 ASSESSMENT — PATIENT HEALTH QUESTIONNAIRE - PHQ9: SUM OF ALL RESPONSES TO PHQ QUESTIONS 1-9: 0

## 2018-07-02 ENCOUNTER — OFFICE VISIT (OUTPATIENT)
Dept: AUDIOLOGY | Facility: CLINIC | Age: 78
End: 2018-07-02
Payer: COMMERCIAL

## 2018-07-02 DIAGNOSIS — H90.3 SENSORINEURAL HEARING LOSS, BILATERAL: Primary | ICD-10-CM

## 2018-07-02 PROCEDURE — 92592 HC HEARING AID CHECK, MONAURAL: CPT | Mod: LT | Performed by: AUDIOLOGIST

## 2018-07-02 PROCEDURE — V5241 DISPENSING FEE, MONAURAL: HCPCS | Performed by: AUDIOLOGIST

## 2018-07-02 PROCEDURE — V5266 BATTERY FOR HEARING DEVICE: HCPCS | Performed by: AUDIOLOGIST

## 2018-07-02 PROCEDURE — V5020 CONFORMITY EVALUATION: HCPCS | Mod: LT | Performed by: AUDIOLOGIST

## 2018-07-02 PROCEDURE — V5011 HEARING AID FITTING/CHECKING: HCPCS | Mod: LT | Performed by: AUDIOLOGIST

## 2018-07-02 PROCEDURE — V5256 HEARING AID, DIGIT, MON, ITE: HCPCS | Mod: NU | Performed by: AUDIOLOGIST

## 2018-07-02 PROCEDURE — 99207 ZZC NO CHARGE LOS: CPT | Performed by: AUDIOLOGIST

## 2018-07-02 NOTE — PROGRESS NOTES
AUDIOLOGY REPORT    SUBJECTIVE: Bethany Logan, a 77 year old female, was seen in the Audiology Clinic at [facility] today for a Left hearing aid fitting. Previous results have revealed a bilateral  sensorineural hearing loss. The patient was given medical clearance to pursue amplification by  Ernestina Argueta MD. This hearing aid is a replacement for her previous hearing aid which was lost by patient.  Pre-approval for the replacement aid was provided by her insurance.  OBJECTIVE:  Prior to fitting, a hearing aid check was performed to ensure device functionality. The hearing aid conformity evaluation was completed.The hearing aids were placed and they provided a good fit. Real-ear-probe-microphone measurements were completed on the [employer] system and were a good match to NAL-NL2 target with soft sounds audible, moderate sounds comfortable, and loud sounds below discomfort. UCLs are verified through maximum power output measures and demonstrate appropriate limiting of loud inputs. Ms. Emilee Logan was oriented to proper hearing aid use, care, cleaning (no water, dry brush), batteries (size 312, insertion/removal, toxicity, low-battery signal), aid insertion/removal, user booklet, warranty information, storage cases, and other hearing aid details. The patient confirmed understanding of hearing aid use and care, and showed proper insertion of hearing aid and batteries while in the office today. Ms. Emilee Logan reported good volume and sound quality today.    EAR(S) FIT: Left  MA HEARING AID MODEL NAME:  Widex Unique 220   MA HEARING AID MODEL NUMBER: [id number]  HEARING AID STYLE: Full Shell  SERIAL NUMBERS:  Left: [id number]  WARRANTY END DATE: 7/28/2020    CHARGES:   Hearing Aid Check: Monaural, 04634, $49.00  Dispensing Fee: Monaural, , $400.00  Fit/Orientation: Monaural, , $161.00  Hearing Aid Conformity Evaluation: , Qty:1, $87.00  Hearing Aid  Digital: Monaural, ITE, , $1103.00  Batteries: 3 month supply (24 cells) given $24.00    ASSESSMENT: Left hearing aid fitting completed today. Verification measures were performed. The 45 day trial period was explained to patient, and they expressed understanding. Ms. Emilee Logan signed the Hearing Aid Purchase Agreement and was given a copy, as well as details on her hearing aids.    PLAN: Ms. Emilee Logan will return for follow-up as needed. Please call this clinic with questions regarding today s appointment.    Octaviano Galloway MA, CCC-A  [place] Licensed Audiologist #[id number]  7/2/2018

## 2018-07-02 NOTE — MR AVS SNAPSHOT
After Visit Summary   7/2/2018    Bethany Logan    MRN: [id number]           Patient Information     Date Of Birth          1940        Visit Information        Provider Department      7/2/2018 2:45 PM Dex Galloway AuD; PHONE,  [facility]        Today's Diagnoses     Sensorineural hearing loss, bilateral    -  1       Follow-ups after your visit        Who to contact     If you have questions or need follow up information about today's clinic visit or your schedule please contact [facility] directly at [phone].  Normal or non-critical lab and imaging results will be communicated to you by MyChart, letter or phone within 4 business days after the clinic has received the results. If you do not hear from us within 7 days, please contact the clinic through MyChart or phone. If you have a critical or abnormal lab result, we will notify you by phone as soon as possible.  Submit refill requests through [employer] or call your pharmacy and they will forward the refill request to us. Please allow 3 business days for your refill to be completed.          Additional Information About Your Visit        Care EveryWhere ID     This is your Care EveryWhere ID. This could be used by other organizations to access your [place] medical records  [id number]         Blood Pressure from Last 3 Encounters:   06/06/18 135/76   05/02/18 148/77   12/18/17 136/74    Weight from Last 3 Encounters:   06/06/18 137 lb (62.1 kg)   05/02/18 135 lb (61.2 kg)   12/18/17 131 lb (59.4 kg)              We Performed the Following     DISPENSING FEE, MONAURAL HEARING AID     HC HEARING DEVICE BATTERY     HEARING AID CHECK, MONAURAL     HEARING AID CONFORMITY EVALUATION     HEARING AID FIT/ORIENTATION/CHECK     HEARING AID ITE DIGITAL, MONAURAL        Primary Care Provider Office Phone # Fax #    Ernestina Ema Argueta [phone]  [facility] [id number]        Equal Access to Services     DUYEN GONZALEZ : Hadii aad ku hadbelgicanik Sobeatriz, waaxda luqadaha, qaybta kaalmayvette min, renae foychristoveronica gama . So [facility] [phone].    ATENCIÓN: Si habla español, tiene a harris disposición servicios gratuitos de asistencia lingüística. Llame al [phone].    We comply with applicable federal civil rights laws and [place] laws. We do not discriminate on the basis of race, color, national origin, age, disability, sex, sexual orientation, or gender identity.            Thank you!     Thank you for choosing [facility]  for your care. Our goal is always to provide you with excellent care. Hearing back from our patients is one way we can continue to improve our services. Please take a few minutes to complete the written survey that you may receive in the mail after your visit with us. Thank you!             Your Updated Medication List - Protect others around you: Learn how to safely use, store and throw away your medicines at www.disposemymeds.org.          This list is accurate as of 7/2/18  3:21 PM.  Always use your most recent med list.                   Brand Name Dispense Instructions for use Diagnosis    acetaminophen 325 MG tablet    TYLENOL    100 tablet    Take 2 tablets (650 mg) by mouth every 6 hours as needed for mild pain    Acute left-sided low back pain with left-sided sciatica       calcium carbonate-vitamin D 600-400 MG-UNIT Chew     180 tablet    Take 1 chew tab by mouth 2 times daily    Age-related osteoporosis without current pathological fracture       carboxymethylcellulose 1 % ophthalmic solution    REFRESH    1 Bottle    Place 1 drop into both eyes 3 times daily    Insufficiency of tear film of both eyes       ketotifen 0.025 % Soln ophthalmic solution    ZADITOR/REFRESH ANTI-ITCH    10 mL    Place 1 drop into both eyes 2 times daily    Allergic conjunctivitis, bilateral       *  omeprazole 20 MG tablet     30 tablet    Take 1 tablet (20 mg) by mouth daily Take 30-60 minutes before a meal.    Abdominal pain, epigastric       * omeprazole 20 MG CR capsule    priLOSEC    30 capsule    Take 1 capsule (20 mg) by mouth daily    Abdominal pain, epigastric       order for DME     1 Device    Equipment being ordered: Scooter    Spinal stenosis of lumbar region with neurogenic claudication       traMADol 50 MG tablet    ULTRAM    30 tablet    Take 1 tablet (50 mg) by mouth every 6 hours as needed for moderate pain or breakthrough pain    Chronic left-sided low back pain with left-sided sciatica       * Notice:  This list has 2 medication(s) that are the same as other medications prescribed for you. Read the directions carefully, and ask your doctor or other care provider to review them with you.

## 2018-08-06 ENCOUNTER — TELEPHONE (OUTPATIENT)
Dept: FAMILY MEDICINE | Facility: CLINIC | Age: 78
End: 2018-08-06

## 2018-08-06 NOTE — TELEPHONE ENCOUNTER
Reason for call: hearing aid problems  Patient called regarding (reason for call): Son is calling to say that his mom's hearing aids making loud noise, beeping fast. He would like a call back to discuss further  Additional comments:Please call to discuss what to do    Phone number to reach patient: [phone]    Best Time: anytime    Can we leave a detailed message on this number?  YES     -----------------------------------------------------------------  I called and spoke with patient's son, Sudhakar.  Hearing aids are feeding back and patient is unable to wear.  I suspect she is not inserting the aids correctly.  They will come in tomorrow afternoon for me to assess the situation.

## 2018-08-07 ENCOUNTER — OFFICE VISIT (OUTPATIENT)
Dept: AUDIOLOGY | Facility: CLINIC | Age: 78
End: 2018-08-07
Payer: COMMERCIAL

## 2018-08-07 DIAGNOSIS — H90.3 SENSORINEURAL HEARING LOSS, BILATERAL: Primary | ICD-10-CM

## 2018-08-07 PROCEDURE — V5299 HEARING SERVICE: HCPCS | Performed by: AUDIOLOGIST

## 2018-08-07 PROCEDURE — 99207 ZZC NO CHARGE LOS: CPT | Performed by: AUDIOLOGIST

## 2018-08-07 NOTE — PROGRESS NOTES
HEARING AID RECHECK    Patient Name:  Bethany Logan    Patient Age:   77 year old    :  1940    Background:   Patient was fit with a new hearing aid on the left side on 18.  She comes in today with the complaint of feedback.  She is accompanied by her daughter and an .    Procedures:   When asked to insert the hearing aids, she promptly attempted to put the left hearing aid in the right ear.  She had forgotten how to differentiate the sides and was doing so based on the hearing aid box she had used for storage, rather than the color of the hearing aid imprinting.  I showed her how to tell and wrote up a note indicating Red=Right (in Slovak). Additionally, I paired her RC-Dex with the left hearing aid and verified that the remote controlled both instruments.    Plan:   Patient was reminded to contact us in early October to order her 90 day supply of batteries.  She will contact Audiology as needed for additional services.    NO CHARGE VISIT    Octaviano Galloway MA, CCC-A  [place] Licensed Audiologist #[id number]  2018

## 2018-08-07 NOTE — MR AVS SNAPSHOT
After Visit Summary   8/7/2018    Bethany Logan    MRN: [id number]           Patient Information     Date Of Birth          1940        Visit Information        Provider Department      8/7/2018 3:15 PM Dex Galloway AuD; [place] [employer] [facility]        Today's Diagnoses     Sensorineural hearing loss, bilateral    -  1       Follow-ups after your visit        Who to contact     If you have questions or need follow up information about today's clinic visit or your schedule please contact [facility] directly at [phone].  Normal or non-critical lab and imaging results will be communicated to you by MyChart, letter or phone within 4 business days after the clinic has received the results. If you do not hear from us within 7 days, please contact the clinic through MyChart or phone. If you have a critical or abnormal lab result, we will notify you by phone as soon as possible.  Submit refill requests through Shweeb or call your pharmacy and they will forward the refill request to us. Please allow 3 business days for your refill to be completed.          Additional Information About Your Visit        Care EveryWhere ID     This is your Care EveryWhere ID. This could be used by other organizations to access your [place] medical records  [id number]         Blood Pressure from Last 3 Encounters:   06/06/18 135/76   05/02/18 148/77   12/18/17 136/74    Weight from Last 3 Encounters:   06/06/18 137 lb (62.1 kg)   05/02/18 135 lb (61.2 kg)   12/18/17 131 lb (59.4 kg)              We Performed the Following     HEARING AID CHECK/NO CHARGE        Primary Care Provider Office Phone # Fax #    Ernestina Argueta [phone]       [address]  [facility] [address]        Equal Access to Services     DUYEN GONZALEZ : Osvaldo Davidson, werner gustafson, renae espinal  leonor bainwyatt house'aan ah. So [facility] [phone].    ATENCIÓN: Si karlie delcid, tiene a harris disposición servicios gratuitos de asistencia lingüística. Reid al [phone].    We comply with applicable federal civil rights laws and [place] laws. We do not discriminate on the basis of race, color, national origin, age, disability, sex, sexual orientation, or gender identity.            Thank you!     Thank you for choosing [facility]  for your care. Our goal is always to provide you with excellent care. Hearing back from our patients is one way we can continue to improve our services. Please take a few minutes to complete the written survey that you may receive in the mail after your visit with us. Thank you!             Your Updated Medication List - Protect others around you: Learn how to safely use, store and throw away your medicines at www.disposemymeds.org.          This list is accurate as of 8/7/18  3:55 PM.  Always use your most recent med list.                   Brand Name Dispense Instructions for use Diagnosis    acetaminophen 325 MG tablet    TYLENOL    100 tablet    Take 2 tablets (650 mg) by mouth every 6 hours as needed for mild pain    Acute left-sided low back pain with left-sided sciatica       calcium carbonate-vitamin D 600-400 MG-UNIT Chew     180 tablet    Take 1 chew tab by mouth 2 times daily    Age-related osteoporosis without current pathological fracture       carboxymethylcellulose 1 % ophthalmic solution    REFRESH    1 Bottle    Place 1 drop into both eyes 3 times daily    Insufficiency of tear film of both eyes       ketotifen 0.025 % Soln ophthalmic solution    ZADITOR/REFRESH ANTI-ITCH    10 mL    Place 1 drop into both eyes 2 times daily    Allergic conjunctivitis, bilateral       * omeprazole 20 MG tablet     30 tablet    Take 1 tablet (20 mg) by mouth daily Take 30-60 minutes before a meal.    Abdominal pain, epigastric       * omeprazole 20 MG CR  capsule    priLOSEC    30 capsule    Take 1 capsule (20 mg) by mouth daily    Abdominal pain, epigastric       order for DME     1 Device    Equipment being ordered: Scooter    Spinal stenosis of lumbar region with neurogenic claudication       traMADol 50 MG tablet    ULTRAM    30 tablet    Take 1 tablet (50 mg) by mouth every 6 hours as needed for moderate pain or breakthrough pain    Chronic left-sided low back pain with left-sided sciatica       * Notice:  This list has 2 medication(s) that are the same as other medications prescribed for you. Read the directions carefully, and ask your doctor or other care provider to review them with you.

## 2018-08-17 ENCOUNTER — PATIENT OUTREACH (OUTPATIENT)
Dept: GERIATRIC MEDICINE | Facility: CLINIC | Age: 78
End: 2018-08-17

## 2018-08-17 NOTE — PROGRESS NOTES
[facility] Care Coordination Contact  Call placed to member's son to schedule a home visit appointment to complete the annual HRA.  A home visit has been scheduled for 08/27/2018 at 1:30.   has been set up.  Jennifer Mahmood RN  [facility] Care Coordinator  [phone]

## 2018-08-27 ENCOUNTER — PATIENT OUTREACH (OUTPATIENT)
Dept: GERIATRIC MEDICINE | Facility: CLINIC | Age: 78
End: 2018-08-27

## 2018-08-27 ASSESSMENT — ACTIVITIES OF DAILY LIVING (ADL): DEPENDENT_IADLS:: CLEANING;COOKING;LAUNDRY;SHOPPING;MEAL PREPARATION;MEDICATION MANAGEMENT;TRANSPORTATION

## 2018-08-28 NOTE — PROGRESS NOTES
[facility] Care Coordination Contact    [facility] Home Visit Assessment     Home visit for Health Risk Assessment with Bethany Logan completed on August 27, 2018    Type of residence:: Apartment - handicap accessible  Current living arrangement:: I live alone          Current Care Plan  Member currently receiving the following home care services:   None  Member currently receiving the following community resources: Day Care, DME, Lifeline, PCA, Transportation Services      Medication Review  Medication reconciliation completed in Epic: Yes  Medication set-up & administration: Independent-does not set up.  Self-administers medications.  Medication understanding concerns (by member, family or CC): No  Medication adherence concerns (by member, family or CC): No    Mental/Behavioral Health   Depression Screening: See PHQ assessment flowsheet.   Mental health DX:: No      No current MH services-will place referral for None    Falls Assessment:   Fallen 2 or more times in the past year?: No   Any fall with injury in the past year?: No    ADL/IADL Dependencies:   Dependent ADLs:: Ambulation-walker, Bathing, Dressing, Transfers  Dependent IADLs:: Cleaning, Cooking, Laundry, Shopping, Meal Preparation, Medication Management, Transportation    [facility] sponsored benefits: Shared information re: Silver Sneakers/gym memberships, ASA, Calcium +D.    PCA Assessment completed at visit: Yes     Elderly Waiver Eligibility: Yes-will continue on EW    Care Plan & Recommendations: Continue current services. Client would like a walker     See LTCC for detailed assessment information.    Follow-Up Plan: Member informed of future contact, plan to f/u with member with a 6 month telephone assessment.  Contact information shared with member and family, encouraged member to call with any questions or concerns at any time.    [place] care continuum providers: Please refer to Health Care Home on the [facility]  Problem List to view this patient's [facility] Care Plan Summary.  Jennifer Mahmood RN  [facility] Care Coordinator  [phone]

## 2018-08-31 ENCOUNTER — PATIENT OUTREACH (OUTPATIENT)
Dept: GERIATRIC MEDICINE | Facility: CLINIC | Age: 78
End: 2018-08-31

## 2018-08-31 NOTE — PROGRESS NOTES
[facility] Care Coordination Contact  Order placed with [facility] (p: [phone]; f: [phone]) for 4 wheeled walker w/seat.  Order placed on 08/31/2018. Access updated.  As required, authorization submitted to health plan.      Krystal Puentes  Case Management Specialist  [facility]   [phone]

## 2018-09-04 ENCOUNTER — PATIENT OUTREACH (OUTPATIENT)
Dept: GERIATRIC MEDICINE | Facility: CLINIC | Age: 78
End: 2018-09-04

## 2018-09-04 NOTE — PROGRESS NOTES
[facility] Care Coordination Contact  Received after visit chart from care coordinator.  Completed following tasks: Mailed copy of care plan to client, Updated services in access, Submitted referrals/auths for ADC, [employer], and PERS and Entered MMIS  Chart was returned to CC.     [facility]:  Emailed completed PCA assessment to [facility].  Faxed copy of PCA assessment to PCA Agency and mailed copy to member.  Faxed MD Communication to PCP.     Provider Signature - No POC Shared:  Member indicates that they do not want their POC shared with any EW providers.    Krystal Puentes  Case Management Specialist  [facility]   [phone]

## 2018-09-04 NOTE — LETTER
September 4, 2018    KARINA SAUNDERS  [address]  [facility] [address]    Dear Karina,     At [facility], we re dedicated to improving the health and well-being of our members.  Enclosed you will find the Comprehensive Care Plan that was developed with you on 08/27/2018. Please review the Care Plan carefully.    As a reminder, some of the things we discussed at your visit include:    Ways to improve or maintain your physical health such as walking 20 minutes a day.     Ways to reduce the risk of falls.     Health care needs you may have.     Don t forget to contact your care coordinator if you:    Have been hospitalized or plan to be hospitalized.     Have experienced a fall.      Have experienced a change in physical health, which may include bladder control and pain issues.      Are experiencing emotional problems.     If you do not agree with your Care Plan, have questions about it, or have experienced a change in your needs, please contact me, your care coordinator, at [phone]. If you are hearing impaired, please call the [place] Relay at 981 or [phone] (ymwrrv-jc-jftbze relay service).    Sincerely,      Jennifer Mahmood RN, [facility]     [facility] is a health plan that contracts with both Medicare and the [place] Medical Assistance (Medicaid) program to provide benefits of both programs to enrollees. Enrollment in [facility] depends on contract renewal.    MSC+ [id number] ([id number])                                                  (12/16)

## 2018-09-28 DIAGNOSIS — R10.13 ABDOMINAL PAIN, EPIGASTRIC: ICD-10-CM

## 2018-10-02 NOTE — TELEPHONE ENCOUNTER
Contacted the  and talk to the son of the patient to explain providers message below son verbally understands and on the way to get the medication to the pharmacy. No further questions at this time.    Rachell Desir MA 10/2/2018

## 2018-10-04 DIAGNOSIS — M54.42 CHRONIC LEFT-SIDED LOW BACK PAIN WITH LEFT-SIDED SCIATICA: ICD-10-CM

## 2018-10-04 DIAGNOSIS — G89.29 CHRONIC LEFT-SIDED LOW BACK PAIN WITH LEFT-SIDED SCIATICA: ICD-10-CM

## 2018-10-04 NOTE — TELEPHONE ENCOUNTER
Requested Prescriptions   Pending Prescriptions Disp Refills     traMADol (ULTRAM) 50 MG tablet        Last Written Prescription Date:  06/06/18  Last Fill Quantity: 30,   # refills: 2  Last Office Visit: /06/06/18-Juan Argueta  Future Office visit:       Routing refill request to provider for review/approval because:  Drug not on the [facility], [facility] or [facility] refill protocol or controlled substance 30 tablet 2     Sig: Take 1 tablet (50 mg) by mouth every 6 hours as needed for moderate pain or breakthrough pain    There is no refill protocol information for this order

## 2018-10-05 RX ORDER — TRAMADOL HYDROCHLORIDE 50 MG/1
50 TABLET ORAL EVERY 6 HOURS PRN
Qty: 30 TABLET | Refills: 2 | Status: SHIPPED | OUTPATIENT
Start: 2018-10-05 | End: 2019-08-30

## 2018-10-05 NOTE — TELEPHONE ENCOUNTER
Faxed Rx for the Ultram to [facility], [phone], right fax confirmed at 3:16 pm today.  Deyanira Cheng MA/  For [employer] and Yasmeen

## 2018-11-14 ENCOUNTER — OFFICE VISIT (OUTPATIENT)
Dept: AUDIOLOGY | Facility: CLINIC | Age: 78
End: 2018-11-14
Payer: COMMERCIAL

## 2018-11-14 ENCOUNTER — APPOINTMENT (OUTPATIENT)
Dept: OPTOMETRY | Facility: CLINIC | Age: 78
End: 2018-11-14
Payer: COMMERCIAL

## 2018-11-14 DIAGNOSIS — H90.3 SENSORINEURAL HEARING LOSS, BILATERAL: Primary | ICD-10-CM

## 2018-11-14 PROCEDURE — 92593 HC HEARING AID CHECK, BINAURAL: CPT | Performed by: AUDIOLOGIST

## 2018-11-14 PROCEDURE — 92341 FIT SPECTACLES BIFOCAL: CPT | Performed by: OPTOMETRIST

## 2018-11-14 PROCEDURE — V5266 BATTERY FOR HEARING DEVICE: HCPCS | Performed by: AUDIOLOGIST

## 2018-11-14 PROCEDURE — 99207 ZZC NO CHARGE LOS: CPT | Performed by: AUDIOLOGIST

## 2018-11-14 NOTE — MR AVS SNAPSHOT
After Visit Summary   11/14/2018    Bethany Logan    MRN: [id number]           Patient Information     Date Of Birth          1940        Visit Information        Provider Department      11/14/2018 4:15 PM Dex Galloway AuD; [place] [employer] [facility]        Today's Diagnoses     Sensorineural hearing loss, bilateral    -  1       Follow-ups after your visit        Your next 10 appointments already scheduled     Nov 14, 2018  5:15 PM CST   New Visit with [facility]   [facility]    [address] [id number] [phone]            Jan 11, 2019  3:00 PM CST   New Visit with Coco De Jesus OD   [facility]    [address] [id number] [phone]              Who to contact     If you have questions or need follow up information about today's clinic visit or your schedule please contact [facility] directly at [phone].  Normal or non-critical lab and imaging results will be communicated to you by MyChart, letter or phone within 4 business days after the clinic has received the results. If you do not hear from us within 7 days, please contact the clinic through MyChart or phone. If you have a critical or abnormal lab result, we will notify you by phone as soon as possible.  Submit refill requests through [email or web address] or call your pharmacy and they will forward the refill request to us. Please allow 3 business days for your refill to be completed.          Additional Information About Your Visit        Care EveryWhere ID     This is your Care EveryWhere ID. This could be used by other organizations to access your [place] medical records  [id number]         Blood Pressure from Last 3 Encounters:   06/06/18 135/76   05/02/18 148/77   12/18/17 136/74    Weight from  Last 3 Encounters:   06/06/18 137 lb (62.1 kg)   05/02/18 135 lb (61.2 kg)   12/18/17 131 lb (59.4 kg)              We Performed the Following     BATTERY FOR HEARING DEVICE PKG     HEARING AID CHECK, BINAURAL        Primary Care Provider Office Phone # Fax #    Ernestina Boo Juan Argueta [phone]       [address]  [facility] [address]        Equal Access to Services     AZALEA GONZALEZ : Hadii aad ku hadasho Soomaali, waaxda luqadaha, qaybta kaalmada adeegyada, waxay idiin hayaan adeeg kharash laflor . So [facility] [phone].    ATENCIÓN: Si habla español, tiene a harris disposición servicios gratuitos de asistencia lingüística. Llame al [phone].    We comply with applicable federal civil rights laws and [place] laws. We do not discriminate on the basis of race, color, national origin, age, disability, sex, sexual orientation, or gender identity.            Thank you!     Thank you for choosing [facility]  for your care. Our goal is always to provide you with excellent care. Hearing back from our patients is one way we can continue to improve our services. Please take a few minutes to complete the written survey that you may receive in the mail after your visit with us. Thank you!             Your Updated Medication List - Protect others around you: Learn how to safely use, store and throw away your medicines at www.disposemymeds.org.          This list is accurate as of 11/14/18  5:10 PM.  Always use your most recent med list.                   Brand Name Dispense Instructions for use Diagnosis    acetaminophen 325 MG tablet    TYLENOL    100 tablet    Take 2 tablets (650 mg) by mouth every 6 hours as needed for mild pain    Acute left-sided low back pain with left-sided sciatica       calcium carbonate-vitamin D 600-400 MG-UNIT Chew     180 tablet    Take 1 chew tab by mouth 2 times daily    Age-related osteoporosis without current pathological fracture        carboxymethylcellulose 1 % ophthalmic solution    REFRESH    1 Bottle    Place 1 drop into both eyes 3 times daily    Insufficiency of tear film of both eyes       ketotifen 0.025 % Soln ophthalmic solution    ZADITOR/REFRESH ANTI-ITCH    10 mL    Place 1 drop into both eyes 2 times daily    Allergic conjunctivitis, bilateral       * omeprazole 20 MG tablet     30 tablet    Take 1 tablet (20 mg) by mouth daily Take 30-60 minutes before a meal.    Abdominal pain, epigastric       * omeprazole 20 MG CR capsule    priLOSEC    30 capsule    Take 1 capsule by mouth once daily. **TOME DIAMOND CAPSULA POR LA BOCA DIAMOND VES AL MIKHAIL    Abdominal pain, epigastric       order for DME     1 Device    Equipment being ordered: Scooter    Spinal stenosis of lumbar region with neurogenic claudication       traMADol 50 MG tablet    ULTRAM    30 tablet    Take 1 tablet (50 mg) by mouth every 6 hours as needed for moderate pain or breakthrough pain    Chronic left-sided low back pain with left-sided sciatica       * Notice:  This list has 2 medication(s) that are the same as other medications prescribed for you. Read the directions carefully, and ask your doctor or other care provider to review them with you.

## 2018-11-29 DIAGNOSIS — E55.9 HYPOVITAMINOSIS D: ICD-10-CM

## 2018-11-29 DIAGNOSIS — M81.0 AGE-RELATED OSTEOPOROSIS WITHOUT CURRENT PATHOLOGICAL FRACTURE: Primary | ICD-10-CM

## 2018-11-29 DIAGNOSIS — R10.13 ABDOMINAL PAIN, EPIGASTRIC: ICD-10-CM

## 2018-11-29 NOTE — TELEPHONE ENCOUNTER
"Requested Prescriptions   Pending Prescriptions Disp Refills     omeprazole (PRILOSEC) 20 MG DR capsule [Pharmacy Med Name: Omeprazole 20 MG Oral Capsule Delayed Release]    Last Written Prescription Date:  10/1/18  Last Fill Quantity: 30,  # refills: 1   Last Office Visit with [facility], [facility] or [facility] prescribing provider:  6/6/18   Future Office Visit:      30 capsule 0     Sig: Take 1 capsule by mouth once daily. **TOME DIAMOND CAPSULA POR LA BOCA DIAMOND VEZ AL MIKHAIL.    PPI Protocol Failed    11/29/2018  3:49 PM       Failed - No diagnosis of osteoporosis on record       Passed - Not on Clopidogrel (unless Pantoprazole ordered)       Passed - Recent (12 mo) or future (30 days) visit within the authorizing provider's specialty    Patient had office visit in the last 12 months or has a visit in the next 30 days with authorizing provider or within the authorizing provider's specialty.  See \"Patient Info\" tab in inbasket, or \"Choose Columns\" in Meds & Orders section of the refill encounter.             Passed - Patient is age 18 or older       Passed - No active pregnacy on record       Passed - No positive pregnancy test in past 12 months              Wade Faarax  [facility]  "

## 2018-12-03 NOTE — TELEPHONE ENCOUNTER
Routing refill request to provider for review/approval because:  Diagnosis of osteoporosis exists  Kell Gutierres RN

## 2018-12-04 NOTE — TELEPHONE ENCOUNTER
"This writer attempted to contact patient on 12/04/18      Reason for call Patient needs a lab only appointment and unable to leave message.\"No Voicemail\" will try tomorrow.    If patient calls back:   Schedule Lab appointment within 2 weeks with lab, postponing message.        Deyanira Cheng MA    "

## 2018-12-05 NOTE — TELEPHONE ENCOUNTER
This writer attempted to contact Patient on 12/05/18      Reason for call Patient due for a lab only appointment and left message with person answering the phone      If patient calls back:   Schedule Lab appointment within 2 weeks with the Lab.        Deyanira Cheng MA

## 2018-12-06 NOTE — PROGRESS NOTES
Per SCOOTER, CC notified.  Bethany Chirinos 1940 4WW w/seat 8/31/2018 Delivered 11/16/18     Florecita Mancera  Case Management Specialist  [facility]  [phone]

## 2018-12-17 ENCOUNTER — TELEPHONE (OUTPATIENT)
Dept: FAMILY MEDICINE | Facility: CLINIC | Age: 78
End: 2018-12-17

## 2018-12-19 ENCOUNTER — OFFICE VISIT (OUTPATIENT)
Dept: URGENT CARE | Facility: URGENT CARE | Age: 78
End: 2018-12-19
Payer: COMMERCIAL

## 2018-12-19 ENCOUNTER — MEDICAL CORRESPONDENCE (OUTPATIENT)
Dept: HEALTH INFORMATION MANAGEMENT | Facility: CLINIC | Age: 78
End: 2018-12-19

## 2018-12-19 VITALS
SYSTOLIC BLOOD PRESSURE: 137 MMHG | DIASTOLIC BLOOD PRESSURE: 73 MMHG | HEART RATE: 73 BPM | WEIGHT: 138.2 LBS | TEMPERATURE: 98.1 F | OXYGEN SATURATION: 95 % | BODY MASS INDEX: 30.98 KG/M2

## 2018-12-19 DIAGNOSIS — M81.0 AGE-RELATED OSTEOPOROSIS WITHOUT CURRENT PATHOLOGICAL FRACTURE: ICD-10-CM

## 2018-12-19 DIAGNOSIS — M62.830 BACK MUSCLE SPASM: Primary | ICD-10-CM

## 2018-12-19 LAB
ALBUMIN SERPL-MCNC: 3 G/DL (ref 3.4–5)
ALP SERPL-CCNC: 106 U/L (ref 40–150)
ALT SERPL W P-5'-P-CCNC: 23 U/L (ref 0–50)
ANION GAP SERPL CALCULATED.3IONS-SCNC: 3 MMOL/L (ref 3–14)
AST SERPL W P-5'-P-CCNC: 31 U/L (ref 0–45)
BILIRUB SERPL-MCNC: 0.2 MG/DL (ref 0.2–1.3)
BUN SERPL-MCNC: 13 MG/DL (ref 7–30)
CALCIUM SERPL-MCNC: 8.7 MG/DL (ref 8.5–10.1)
CHLORIDE SERPL-SCNC: 109 MMOL/L (ref 94–109)
CO2 SERPL-SCNC: 30 MMOL/L (ref 20–32)
CREAT SERPL-MCNC: 0.82 MG/DL (ref 0.52–1.04)
GFR SERPL CREATININE-BSD FRML MDRD: 68 ML/MIN/{1.73_M2}
GLUCOSE SERPL-MCNC: 111 MG/DL (ref 70–99)
POTASSIUM SERPL-SCNC: 4.4 MMOL/L (ref 3.4–5.3)
PROT SERPL-MCNC: 6.6 G/DL (ref 6.8–8.8)
SODIUM SERPL-SCNC: 142 MMOL/L (ref 133–144)

## 2018-12-19 PROCEDURE — 36415 COLL VENOUS BLD VENIPUNCTURE: CPT | Performed by: FAMILY MEDICINE

## 2018-12-19 PROCEDURE — 99213 OFFICE O/P EST LOW 20 MIN: CPT | Mod: 25 | Performed by: PHYSICIAN ASSISTANT

## 2018-12-19 PROCEDURE — 80053 COMPREHEN METABOLIC PANEL: CPT | Performed by: FAMILY MEDICINE

## 2018-12-19 PROCEDURE — 82306 VITAMIN D 25 HYDROXY: CPT | Performed by: FAMILY MEDICINE

## 2018-12-19 PROCEDURE — 20552 NJX 1/MLT TRIGGER POINT 1/2: CPT | Performed by: PHYSICIAN ASSISTANT

## 2018-12-19 RX ORDER — TRIAMCINOLONE ACETONIDE 40 MG/ML
40 INJECTION, SUSPENSION INTRA-ARTICULAR; INTRAMUSCULAR ONCE
Status: DISCONTINUED | OUTPATIENT
Start: 2018-12-19 | End: 2019-05-15

## 2018-12-19 ASSESSMENT — ENCOUNTER SYMPTOMS
RESPIRATORY NEGATIVE: 1
BRUISES/BLEEDS EASILY: 0
CHILLS: 0
LIGHT-HEADEDNESS: 0
SHORTNESS OF BREATH: 0
FEVER: 0
ALLERGIC/IMMUNOLOGIC NEGATIVE: 1
VOMITING: 0
NECK STIFFNESS: 0
NECK PAIN: 0
NAUSEA: 0
BACK PAIN: 0
MYALGIAS: 0
ARTHRALGIAS: 1
DIARRHEA: 0
WEAKNESS: 0
EYES NEGATIVE: 1
RHINORRHEA: 0
PALPITATIONS: 0
WOUND: 0
CARDIOVASCULAR NEGATIVE: 1
COUGH: 0
DIZZINESS: 0
ENDOCRINE NEGATIVE: 1
SORE THROAT: 0
HEMATOLOGIC/LYMPHATIC NEGATIVE: 1
HEADACHES: 0
JOINT SWELLING: 0

## 2018-12-19 NOTE — LETTER
December 20, 2018      Bethany Logan  [address]  [place] [address]        Ms. Emilee Logan,     Your liver and kidney function is normal.   Your Vitamin D level is deficient.  Start taking 50,000 units of vitamin D 3 weekly for 8 week then start 2000 units of vitamin D3 daily with calcium intake of 1200 mg.  Vitamin D is important in preventing bone loss, may reduce fatigue and may help to preventing certain cancers. I have sent a prescription to your pharmacy.  Be sure to have this rechecked in 2 months.     Please contact the clinic if you have additional questions.  Thank you.          Resulted Orders   Comprehensive metabolic panel   Result Value Ref Range    Sodium 142 133 - 144 mmol/L    Potassium 4.4 3.4 - 5.3 mmol/L    Chloride 109 94 - 109 mmol/L    Carbon Dioxide 30 20 - 32 mmol/L    Anion Gap 3 3 - 14 mmol/L    Glucose 111 (H) 70 - 99 mg/dL      Comment:      Non Fasting    Urea Nitrogen 13 7 - 30 mg/dL    Creatinine 0.82 0.52 - 1.04 mg/dL    GFR Estimate 68 >60 mL/min/[1.73_m2]      Comment:      Non  GFR Calc  Starting 12/18/2018, serum creatinine based estimated GFR (eGFR) will be   calculated using the Chronic Kidney Disease Epidemiology Collaboration   (CKD-EPI) equation.      GFR Estimate If Black 79 >60 mL/min/[1.73_m2]      Comment:       GFR Calc  Starting 12/18/2018, serum creatinine based estimated GFR (eGFR) will be   calculated using the Chronic Kidney Disease Epidemiology Collaboration   (CKD-EPI) equation.      Calcium 8.7 8.5 - 10.1 mg/dL    Bilirubin Total 0.2 0.2 - 1.3 mg/dL    Albumin 3.0 (L) 3.4 - 5.0 g/dL    Protein Total 6.6 (L) 6.8 - 8.8 g/dL    Alkaline Phosphatase 106 40 - 150 U/L    ALT 23 0 - 50 U/L    AST 31 0 - 45 U/L   Vitamin D Deficiency   Result Value Ref Range    Vitamin D Deficiency screening 19 (L) 20 - 75 ug/L      Comment:      Season, race, dietary intake, and treatment affect the concentration of    25-hydroxy-Vitamin D. Values may decrease during winter months and increase   during summer months. Values 20-29 ug/L may indicate Vitamin D insufficiency   and values <20 ug/L may indicate Vitamin D deficiency.  Vitamin D determination is routinely performed by an immunoassay specific for   25 hydroxyvitamin D3.  If an individual is on vitamin D2 (ergocalciferol)   supplementation, please specify 25 OH vitamin D2 and D3 level determination by   LCMSMS test VITD23.         If you have any questions or concerns, please call the clinic at the number listed above.       Sincerely,        Ernestina Argueta MD/AGUSTIN

## 2018-12-19 NOTE — PROGRESS NOTES
Chief Complaint:    Chief Complaint   Patient presents with     Shoulder Pain     Patient has pain in left shoulder        HPI: Bethany Logan is an 78 year old female who presents for evaluation and treatment of L shoulder pain.  Patient has had shoulder pain for roughly 2 months now.  She is here with her daughter in law who is translating.  She has not injured the shoulder.  The pain does not radiate.  It is worse with movement.  She has not tried anything for the pain.  She denies any Hx of injury or surgery to the L shoulder.  No numbness or tingling in the L arm.  No L arm weakness.      ROS:      Review of Systems   Constitutional: Negative for chills and fever.   HENT: Negative for congestion, ear pain, rhinorrhea and sore throat.    Eyes: Negative.    Respiratory: Negative.  Negative for cough and shortness of breath.    Cardiovascular: Negative.  Negative for chest pain and palpitations.   Gastrointestinal: Negative for diarrhea, nausea and vomiting.   Endocrine: Negative.    Genitourinary: Negative.    Musculoskeletal: Positive for arthralgias. Negative for back pain, joint swelling, myalgias, neck pain and neck stiffness.   Skin: Negative.  Negative for rash and wound.   Allergic/Immunologic: Negative.  Negative for immunocompromised state.   Neurological: Negative for dizziness, weakness, light-headedness and headaches.   Hematological: Negative.  Does not bruise/bleed easily.        Family History   Family History   Problem Relation Age of Onset     C.A.D. No family hx of      Diabetes No family hx of      Hypertension No family hx of      Cerebrovascular Disease No family hx of        Social History  Social History     Socioeconomic History     Marital status: Single     Spouse name: Not on file     Number of children: 9     Years of education: Not on file     Highest education level: Not on file   Social Needs     Financial resource strain: Not on file     Food insecurity - worry: Not on  file     Food insecurity - inability: Not on file     Transportation needs - medical: Not on file     Transportation needs - non-medical: Not on file   Occupational History     Occupation: Farming     Employer: RETIRED   Tobacco Use     Smoking status: Never Smoker     Smokeless tobacco: Never Used   Substance and Sexual Activity     Alcohol use: No     Drug use: No     Sexual activity: No   Other Topics Concern     Parent/sibling w/ CABG, MI or angioplasty before 65F 55M? Not Asked   Social History Narrative     Not on file        Surgical History:  Past Surgical History:   Procedure Laterality Date     C TOTAL KNEE ARTHROPLASTY  9/25/13    Right     CATARACT IOL, RT/LT       COMBINED REPAIR PTOSIS WITH BLEPHAROPLASTY Bilateral 1/9/2017    Procedure: COMBINED REPAIR PTOSIS WITH BLEPHAROPLASTY;  Surgeon: Keven Vázquez MD;  Location:  OR     SURGICAL HISTORY OF -       Left hip bone surgery        Problem List:  Patient Active Problem List   Diagnosis     OA (osteoarthritis) of knee - bilateral     Health Care Home     Constipation     History of total knee arthroplasty - right     SNHL (sensory-neural hearing loss), asymmetrical     Chronic left-sided low back pain with left-sided sciatica     CARDIOVASCULAR SCREENING; LDL GOAL LESS THAN 130     Advance care planning     Sciatica - left     Osteoporosis     History of total hip arthroplasty, left     Lumbar spinal stenosis     Macular drusen, bilateral     Dermatochalasis of eyelid     Pseudophakia of both eyes     Moderate single current episode of major depressive disorder (H)     Closed nondisplaced zone I fracture of sacrum with routine healing, subsequent encounter     Other specified depressive episodes     Adjustment disorder with mixed anxiety and depressed mood     Fatty liver     Gallbladder polyps        Allergies:  Allergies   Allergen Reactions     Aspirin Other (See Comments)     Not advised due to GI bleed.        Current Meds:    Current  Outpatient Medications:      acetaminophen (TYLENOL) 325 MG tablet, Take 2 tablets (650 mg) by mouth every 6 hours as needed for mild pain, Disp: 100 tablet, Rfl: 1     calcium carbonate-vitamin D 600-400 MG-UNIT CHEW, Take 1 chew tab by mouth 2 times daily, Disp: 180 tablet, Rfl: 3     carboxymethylcellulose (REFRESH) 1 % ophthalmic solution, Place 1 drop into both eyes 3 times daily, Disp: 1 Bottle, Rfl: 11     omeprazole (PRILOSEC) 20 MG DR capsule, Take 1 capsule by mouth once daily. **TOME DIAMOND CAPSULA POR LA BOCA DIAMOND VEZ AL MIKHAIL., Disp: 30 capsule, Rfl: 0     order for DME, Equipment being ordered: Scooter, Disp: 1 Device, Rfl: 0     traMADol (ULTRAM) 50 MG tablet, Take 1 tablet (50 mg) by mouth every 6 hours as needed for moderate pain or breakthrough pain, Disp: 30 tablet, Rfl: 2     ketotifen (ZADITOR/REFRESH ANTI-ITCH) 0.025 % SOLN ophthalmic solution, Place 1 drop into both eyes 2 times daily (Patient not taking: Reported on 8/27/2018), Disp: 10 mL, Rfl: 1     omeprazole 20 MG tablet, Take 1 tablet (20 mg) by mouth daily Take 30-60 minutes before a meal. (Patient not taking: Reported on 8/27/2018), Disp: 30 tablet, Rfl: 0     PHYSICAL EXAM:     Vital signs noted and reviewed by Heriberto Hand  /73 (BP Location: Left arm, Patient Position: Chair, Cuff Size: Adult Regular)   Pulse 73   Temp 98.1  F (36.7  C) (Oral)   Wt 62.7 kg (138 lb 3.2 oz)   SpO2 95%   BMI 30.98 kg/m       PEFR:    Physical Exam   Constitutional: She is oriented to person, place, and time. She appears well-developed and well-nourished. No distress.   HENT:   Head: Normocephalic and atraumatic.   Right Ear: Tympanic membrane and external ear normal.   Left Ear: Tympanic membrane and external ear normal.   Mouth/Throat: Oropharynx is clear and moist.   Eyes: EOM are normal. Pupils are equal, round, and reactive to light.   Neck: Trachea normal, normal range of motion and full passive range of motion without pain. Neck supple.    Cardiovascular: Normal rate, regular rhythm, S1 normal, S2 normal, normal heart sounds and intact distal pulses. Exam reveals no gallop and no friction rub.   No murmur heard.  Pulmonary/Chest: Effort normal and breath sounds normal. No respiratory distress. She has no decreased breath sounds. She has no wheezes. She has no rhonchi. She has no rales.   Abdominal: Soft. Bowel sounds are normal. She exhibits no distension and no mass. There is no hepatosplenomegaly. There is no tenderness. There is no rigidity, no rebound, no guarding and no CVA tenderness.   Musculoskeletal:        Right shoulder: She exhibits tenderness and spasm. She exhibits normal range of motion, no bony tenderness, no swelling, no effusion, no deformity, no pain, normal pulse and normal strength.        Arms:  Pain in posterior upper L back with palpation see diagram for location.    Full range of motion of shoulder.   Shoulder strength 5/5  L arm is neurologically intact.   Lymphadenopathy:     She has no cervical adenopathy.   Neurological: She is alert and oriented to person, place, and time. She has normal reflexes. No cranial nerve deficit.   Skin: Skin is warm and dry. She is not diaphoretic.   Psychiatric: She has a normal mood and affect. Her behavior is normal. Judgment and thought content normal.   Nursing note and vitals reviewed.        Medical Decision Making:    Differential Diagnosis:  MS Injury Pain: sprain, tendonitis, muscle strain and muscle spasm      ASSESSMENT:     No diagnosis found.       PLAN:     Trigger Point Injection  After informed consent was obtained including risks, benefits, and alternatives, I did perform a trigger point injection of the patient's L upper back.  After I was able to demarcate the painful area, I cleansed the skin with alcohol. I then used sterile technique and a 27-gauge 1.5-inch needle to infuse 40 mg of Kenalog along with 4 mL of 0.25% Bupivicaine using sterile technique. Patient tolerated  this well with no complications. Patient verbalized significant relief if symptoms.    Patient advised to ice the affected area.  Ibuprofen or tylenol for pain.  She is to give this 1 week.  If symptoms return she was instructed to follow up with her PCP for possible physical therapy.    Worrisome symptoms discussed with instructions to go to the ED.  Patient and daughter in law verbalized understanding and agreed with this plan.     Heriberto Hand  12/19/2018, 4:23 PM

## 2018-12-20 PROBLEM — E55.9 HYPOVITAMINOSIS D: Status: ACTIVE | Noted: 2018-12-20

## 2018-12-20 LAB — DEPRECATED CALCIDIOL+CALCIFEROL SERPL-MC: 19 UG/L (ref 20–75)

## 2018-12-20 RX ORDER — ERGOCALCIFEROL 1.25 MG/1
50000 CAPSULE, LIQUID FILLED ORAL DAILY
Qty: 8 CAPSULE | Refills: 0 | Status: SHIPPED | OUTPATIENT
Start: 2018-12-20 | End: 2018-12-21

## 2018-12-20 NOTE — RESULT ENCOUNTER NOTE
Ms. Emilee Doreen,    Your liver and kidney function is normal.  Your Vitamin D level is deficient.  Start taking 50,000 units of vitamin D 3 weekly for 8 week then start 2000 units of vitamin D3 daily with calcium intake of 1200 mg.  Vitamin D is important in preventing bone loss, may reduce fatigue and may help to preventing certain cancers. I have sent a prescription to your pharmacy.  Be sure to have this rechecked in 2 months.     Please contact the clinic if you have additional questions.  Thank you.    Sincerely,    Ernestina Argueta

## 2018-12-21 ENCOUNTER — TELEPHONE (OUTPATIENT)
Dept: FAMILY MEDICINE | Facility: CLINIC | Age: 78
End: 2018-12-21

## 2018-12-21 DIAGNOSIS — E55.9 HYPOVITAMINOSIS D: ICD-10-CM

## 2018-12-21 RX ORDER — ERGOCALCIFEROL 1.25 MG/1
50000 CAPSULE, LIQUID FILLED ORAL WEEKLY
Qty: 8 CAPSULE | Refills: 0 | Status: SHIPPED | OUTPATIENT
Start: 2018-12-21 | End: 2019-03-25

## 2018-12-21 NOTE — TELEPHONE ENCOUNTER
..Reason for Call:    prescription (dose) question    Detailed comments: questions about the vitamin d dose - script was written 12-    Phone Number Patient can be reached at: Other phone number:  [phone]([facility]– [place]note 2], formerly known as [facility] [place])     Best Time: anytiem    Can we leave a detailed message on this number? Not Applicable    Call taken on 12/21/2018 at 10:12 AM by Maddison White                Questions on the dose the vitamin D written 12-20

## 2019-01-10 ENCOUNTER — OFFICE VISIT (OUTPATIENT)
Dept: FAMILY MEDICINE | Facility: CLINIC | Age: 79
End: 2019-01-10
Payer: COMMERCIAL

## 2019-01-10 VITALS
BODY MASS INDEX: 27.46 KG/M2 | HEART RATE: 67 BPM | OXYGEN SATURATION: 98 % | WEIGHT: 136.2 LBS | TEMPERATURE: 98.3 F | DIASTOLIC BLOOD PRESSURE: 73 MMHG | HEIGHT: 59 IN | SYSTOLIC BLOOD PRESSURE: 142 MMHG

## 2019-01-10 DIAGNOSIS — M62.9 MUSCULOSKELETAL DISORDER INVOLVING UPPER TRAPEZIUS MUSCLE: ICD-10-CM

## 2019-01-10 DIAGNOSIS — Z23 NEED FOR PROPHYLACTIC VACCINATION AND INOCULATION AGAINST INFLUENZA: ICD-10-CM

## 2019-01-10 DIAGNOSIS — H10.13 ALLERGIC CONJUNCTIVITIS, BILATERAL: ICD-10-CM

## 2019-01-10 DIAGNOSIS — M19.049 HAND ARTHRITIS: ICD-10-CM

## 2019-01-10 DIAGNOSIS — H04.123 INSUFFICIENCY OF TEAR FILM OF BOTH EYES: ICD-10-CM

## 2019-01-10 DIAGNOSIS — M81.0 AGE-RELATED OSTEOPOROSIS WITHOUT CURRENT PATHOLOGICAL FRACTURE: ICD-10-CM

## 2019-01-10 DIAGNOSIS — J01.90 ACUTE SINUSITIS WITH SYMPTOMS > 10 DAYS: Primary | ICD-10-CM

## 2019-01-10 LAB
DEPRECATED S PYO AG THROAT QL EIA: NORMAL
SPECIMEN SOURCE: NORMAL

## 2019-01-10 PROCEDURE — 90662 IIV NO PRSV INCREASED AG IM: CPT | Performed by: PHYSICIAN ASSISTANT

## 2019-01-10 PROCEDURE — 87081 CULTURE SCREEN ONLY: CPT | Performed by: PHYSICIAN ASSISTANT

## 2019-01-10 PROCEDURE — 87880 STREP A ASSAY W/OPTIC: CPT | Performed by: PHYSICIAN ASSISTANT

## 2019-01-10 PROCEDURE — G0008 ADMIN INFLUENZA VIRUS VAC: HCPCS | Performed by: PHYSICIAN ASSISTANT

## 2019-01-10 PROCEDURE — 99214 OFFICE O/P EST MOD 30 MIN: CPT | Mod: 25 | Performed by: PHYSICIAN ASSISTANT

## 2019-01-10 RX ORDER — ACETAMINOPHEN 325 MG/1
650 TABLET ORAL EVERY 6 HOURS PRN
Qty: 100 TABLET | Refills: 1 | Status: SHIPPED | OUTPATIENT
Start: 2019-01-10 | End: 2019-08-30

## 2019-01-10 RX ORDER — GUAIFENESIN AND DEXTROMETHORPHAN HYDROBROMIDE 1200; 60 MG/1; MG/1
1 TABLET, EXTENDED RELEASE ORAL 2 TIMES DAILY
Qty: 28 TABLET | Refills: 0 | Status: SHIPPED | OUTPATIENT
Start: 2019-01-10 | End: 2019-03-25

## 2019-01-10 RX ORDER — METHOCARBAMOL 500 MG/1
500 TABLET, FILM COATED ORAL
Qty: 30 TABLET | Refills: 1 | Status: SHIPPED | OUTPATIENT
Start: 2019-01-10 | End: 2019-03-25

## 2019-01-10 RX ORDER — AMOXICILLIN AND CLAVULANATE POTASSIUM 500; 125 MG/1; MG/1
1 TABLET, FILM COATED ORAL 2 TIMES DAILY
Qty: 20 TABLET | Refills: 0 | Status: SHIPPED | OUTPATIENT
Start: 2019-01-10 | End: 2019-03-25

## 2019-01-10 ASSESSMENT — PATIENT HEALTH QUESTIONNAIRE - PHQ9: SUM OF ALL RESPONSES TO PHQ QUESTIONS 1-9: 6

## 2019-01-10 ASSESSMENT — MIFFLIN-ST. JEOR: SCORE: 995.49

## 2019-01-10 ASSESSMENT — PAIN SCALES - GENERAL: PAINLEVEL: EXTREME PAIN (8)

## 2019-01-10 NOTE — PATIENT INSTRUCTIONS
Augmentin 500 mg, 1 tablet twice a day for 10 days  Mucinex DM 1 tablet twice a day for 10-14 days for cough and mucous   Increase fluids  Nasal wash  Follow up if not better after the antibiotic course.     For back pain take Tylenol 650 mg 1 tablet every 6-8 hours for pain  Methocarbamol 1 tablet at bedtime as needed for upper back muscle tightness     Apply Diclofenac gel to hand for pain 3-4 times a day as needed     Patient Education     La sinusitis aguda    La sinusitis aguda es alexy inflamación (irritación e hinchazón) de los senos paranasales. Por lo general es producida por alexy infección viral después de un resfriado común. Harris médico puede ayudarle a obtener alivio para remedios síntomas. En esta publicación, encontrará más información.   Qué es la sinusitis aguda?  Los senos paranasales son espacios llenos de aire dentro del cráneo, detrás de la stas. Se mantienen húmedos y limpios jamia a harris recubrimiento de mucosa. Cuando el recubrimiento de mucosa se expone a polen, humo u otros agentes irritantes puede inflamarse (hincharse). En respuesta a la irritación, la mucosa comienza entonces a producir más mucosidad y otros líquidos. El exceso de mucosidad puede hacer que los cilios, unos diminutos filamentos parecidos a pelos situados sobre la mucosa que ayudan a transportar el moco hacia la abertura del seno, dejen de funcionar temporalmente. La abertura de los senos puede obstruirse, con lo que la cavidad sinusal se llena de líquido y produce dolor profundo y presión. [place] también puede favorecer el crecimiento de bacterias dentro de los senos paranasales.  Síntomas comunes de la sinusitis aguda  Es posible que tenga:    Dolor facial (en la stas)    Dolor de octavio    Fiebre    Goteo posnasal    Congestión nasal    Drenaje que es espeso y de color, en lugar de ser zuly    Tos  Diagnóstico de la sinusitis aguda  El médico le preguntará sobre remedios síntomas y remedios antecedentes de deo. Le examinarán los oídos, la  nariz y la garganta. Por lo general no se requieren radiografías.  En ocasiones, se hace un cultivo para krupa si hay bacterias. Si tiene varios episodios de sinusitis, puede que le cat pruebas con imágenes (edmundo radiografías o tomografías computarizadas) para krupa si hay alguna causa anatómica que produzca la infección.  Tratamiento de la sinusitis aguda  El tratamiento de la sinusitis aguda está diseñado para aliviar la obstrucción de la abertura de los senos y ayudar a que los cilios vuelvan a funcionar. Con antihistamínicos y descongestionantes se puede reducir la inflamación y disminuir la producción de líquido. La infección por bacterias puede tratarse con antibióticos jaciel 10 a 14 días. [place] remedios antibióticos hasta que se le acaben, aunque se sienta mejor.  Date Last Reviewed: 5/16/2014 2000-2018 [employer]. [address] [place] [address], [place] [address]. Todos los derechos reservados. Esta información no pretende sustituir la atención médica profesional. Sólo harris médico puede diagnosticar y tratar un problema de deo.           Patient Education     Sinusitis [Sinusitis, No Abx Tx]    Los senos paranasales son cavidades llenas de aire dentro de los huesos de la stas. Se conectan con la parte interna de la nariz. La sinusitis es alexy inflamación del tejido que recubre la cavidad del seno paranasal. Ralph inflamación puede darse jaciel un resfriado o la fiebre del heno (alergia al polen y a otras partículas que haya en el aire) y puede provocar congestión de los senos paranasales, la sensación de tener la octavio  pesada  y, quizás, algo de fiebre no muy [place]. En mindy roque, no requiere tratamiento con antibióticos.  Cuidados En [place]:  1. Mary mucha agua, té caliente y otros líquidos para mantenerse sae hidratado. Eso diluye la mucosidad y facilita el drenaje de los senos paranasales.  2. Aplique calor sobre las áreas de la stas que le duelen. Use alexy toalla empapada en [place]. También  puede pararse en la ducha y dejar que le caiga el [place] sobre la stas. Ésta es alexy buena manera de inhalar el vapor tibio del agua al tiempo que aplica calor sobre la stas. (Cúbrase la boca y la nariz con las bryson para poder respirar mientras le  el agua sobre la stas.)  3. Emplee un vaporizador con productos edmundo Semaj s VapoRub (que contiene mentol) por la noche. Jaciel el día, chupe caramelos duros de menta, mentol o eucalipto.  4. Un expectorante que contenga guaifenesina (edmundo Robitussin) ayuda a diluir la mucosidad y a facilitar el drenaje de los senos paranasales.  5. Puede hanny algún descongestionante sin receta a menos que le hayan recetado algún medicamento similar. Los sprays nasales son los que tienen efecto más rápido. Use alguno que contenga fenilefrina (Kevin-Synephrine, Sinex, entre otros) o oximetazolina (Afrin). Suzie, suénese la nariz suavemente para eliminar la mucosidad y luego coloque las gotas. No use esos medicamentos con mayor frecuencia que la indicada en la etiqueta o jaciel más de marlene días, dado que los síntomas pueden empeorar. También puede usar tabletas que contengan pseudoefedrina (Sudafed). Muchos medicamentos para la sinusitis combinan componentes, lo que podría aumentar los efectos secundarios. Charlette las etiquetas o pida ayuda al farmacéutico. NOTA: Las personas con presión arterial [place] no deberían hanny descongestionantes, porque éstos pueden subir la presión.  6. Los antihistamínicos son útiles si la sinusitis se debe a alexy alergia. El más suave es la clorfeniramina (de venta sin receta). La dosis para los adultos es de 8-12 mg marlene veces por día. [NOTA: No tome clorfeniramina si tiene glaucoma o si tiene problemas al orinar debido a alexy próstata agrandada.] Claritin (loratadina) es un antihistamínico que provoca menos somnolencia y resulta alexy buena alternativa para hanny jaciel el día.  7. Si la sinusitis se debe a alexy alergia, un enjuague nasal salino puede  "aliviar los síntomas. El enjuague reduce la hinchazón y elimina el exceso de mucosidad. Permite que los senos paranasales drenen. En la mayoría de las tiendas de venta de alimentos y medicamentos, encontrará kits ya listos para hacerse los enjuagues. Traen paquetes de sal premezcladas y un dispositivo de irrigación.  8. Puede usar acetaminofén (Tylenol) o ibuprofeno (Motrin o Advil) para controlar el dolor, a menos que le hayan recetado otro medicamento. [ NOTA: Si tiene alexy enfermedad hepática o renal crónica, o ha tenido alguna vez alexy úlcera estomacal, consulte con harris médico antes de hanny estos medicamentos.] (La aspirina no debe usarse nunca en personas menores de 18 años enfermas con fiebre, ya que puede causar daños graves al hígado.)  Livan alexy VISITA DE CONTROL a harris médico o a mindy centro, o según le indique nuestro personal médico, si no se siente mejor.  Busque Prontamente Atención Médica  si algo de lo siguiente ocurre:    Secreciones verdosas o amarillentas de la nariz o por la parte posterior de la garganta (goteo post-nasal).    Dolor de octavio o dolor en los senos paranasales que empeora.    Rigidez en el kings.    Inusual mareo o confusión; se siente \"raro\" o diferente a lo habitual.    Hinchazón de la frente o los párpados.    Problemas de visión; por ejemplo, visión borrosa o doble.    Fiebre de 100.4 F (38 C) o más [place], o edmundo le haya indicado harris proveedor de atención médica.    Convulsiones.  Date Last Reviewed: 4/13/2015 2000-2018 [employer]. [address] [place] [address] [place] [id number]. Todos los derechos reservados. Esta información no pretende sustituir la atención médica profesional. Sólo harris médico puede diagnosticar y tratar un problema de deo.           "

## 2019-01-10 NOTE — PROGRESS NOTES
SUBJECTIVE:   Bethany Logan is a 78 year old female who presents to clinic today for the following health issues:    ENT Symptoms             Symptoms: cc Present Absent Comment   Fever/Chills   x    Fatigue  x     Muscle Aches  x  back   Eye Irritation  x  Swelling    Sneezing  x  A lot    Nasal Akbar/Drg  x     Sinus Pressure/Pain  x  Head   Loss of smell  x     Dental pain   x    Sore Throat  x  Burning and dry    Swollen Glands  x     Ear Pain/Fullness  x  clogged   Cough  x  Productive yellow    Wheeze   x    Chest Pain  x     Shortness of breath   x    Rash   x    Other   x      Symptom duration:  2 weeks    Symptom severity:  severe    Treatments tried:  wal-tussin DM; no relief and cough drops    Contacts:  none          Musculoskeletal problem/pain  1.    Duration: 2 weeks    Description  Location: left and right upper back, but worse on the left     Intensity:  moderate    Accompanying signs and symptoms: none    History  Previous similar problem: YES  Previous evaluation:  none    Precipitating or alleviating factors:  Trauma or overuse: YES- overuse, she lifted a heavy item 3 weeks ago  Aggravating factors include: lifting and coughing caused by current cold    Therapies tried and outcome: rest/inactivity and tramadol-helps with pain, but patient feels too dizzy from it  2. Patient has pain in hands due to arthritis and Tylenol only helps partially, she would like a stronger medication , but not oral due to opioids make her dizzy and she can't take NSAIDS due to gastritis    Problem list and histories reviewed & adjusted, as indicated.  Additional history: as documented    Patient Active Problem List   Diagnosis     OA (osteoarthritis) of knee - bilateral     Health Care Home     Constipation     History of total knee arthroplasty - right     SNHL (sensory-neural hearing loss), asymmetrical     Chronic left-sided low back pain with left-sided sciatica     CARDIOVASCULAR SCREENING; LDL GOAL  LESS THAN 130     Advance care planning     Sciatica - left     Osteoporosis     History of total hip arthroplasty, left     Lumbar spinal stenosis     Macular drusen, bilateral     Dermatochalasis of eyelid     Pseudophakia of both eyes     Closed nondisplaced zone I fracture of sacrum with routine healing, subsequent encounter     Other specified depressive episodes     Adjustment disorder with mixed anxiety and depressed mood     Fatty liver     Gallbladder polyps     Hypovitaminosis D     Past Surgical History:   Procedure Laterality Date     C TOTAL KNEE ARTHROPLASTY  9/25/13    Right     CATARACT IOL, RT/LT       COMBINED REPAIR PTOSIS WITH BLEPHAROPLASTY Bilateral 1/9/2017    Procedure: COMBINED REPAIR PTOSIS WITH BLEPHAROPLASTY;  Surgeon: Keven Vázquez MD;  Location: [facility]     SURGICAL HISTORY OF -       Left hip bone surgery       Social History     Tobacco Use     Smoking status: Never Smoker     Smokeless tobacco: Never Used   Substance Use Topics     Alcohol use: No     Family History   Problem Relation Age of Onset     C.A.D. No family hx of      Diabetes No family hx of      Hypertension No family hx of      Cerebrovascular Disease No family hx of          Current Outpatient Medications   Medication Sig Dispense Refill     acetaminophen (TYLENOL) 325 MG tablet Take 2 tablets (650 mg) by mouth every 6 hours as needed for mild pain 100 tablet 1     amoxicillin-clavulanate (AUGMENTIN) 500-125 MG tablet Take 1 tablet by mouth 2 times daily for 10 days 20 tablet 0     calcium carbonate-vitamin D (OS-KATHI) 600-400 MG-UNIT chewable tablet Take 1 chew tab by mouth 2 times daily 180 tablet 3     carboxymethylcellulose (REFRESH) 1 % ophthalmic solution Place 1 drop into both eyes 3 times daily 1 Bottle 11     Dextromethorphan-Guaifenesin  MG TB12 Take 1 tablet by mouth 2 times daily 28 tablet 0     diclofenac (VOLTAREN) 1 % topical gel Place 2 g onto the skin 4 times daily 100 g 11     ketotifen  "(ZADITOR/REFRESH ANTI-ITCH) 0.025 % ophthalmic solution Place 1 drop into both eyes 2 times daily 10 mL 3     methocarbamol (ROBAXIN) 500 MG tablet Take 1 tablet (500 mg) by mouth nightly as needed for muscle spasms 30 tablet 1     omeprazole (PRILOSEC) 20 MG DR capsule Take 1 capsule by mouth once daily. **TOME DIAMOND CAPSULA POR LA BOCA DIAMOND VEZ AL MIKHAIL. 30 capsule 0     order for DME Equipment being ordered: Scooter 1 Device 0     vitamin D2 (ERGOCALCIFEROL) 24503 units (1250 mcg) capsule Take 1 capsule (50,000 Units) by mouth once a week 8 capsule 0     traMADol (ULTRAM) 50 MG tablet Take 1 tablet (50 mg) by mouth every 6 hours as needed for moderate pain or breakthrough pain (Patient not taking: Reported on 1/10/2019) 30 tablet 2     Allergies   Allergen Reactions     Aspirin Other (See Comments)     Not advised due to GI bleed.       Reviewed and updated as needed this visit by clinical staff  Tobacco  Allergies  Meds  Problems  Med Hx  Surg Hx  Fam Hx  Soc Hx        Reviewed and updated as needed this visit by Provider  Tobacco  Allergies  Meds  Problems  Med Hx  Surg Hx  Fam Hx         ROS:  Constitutional, HEENT, cardiovascular, pulmonary, gi and gu systems are negative, except as otherwise noted.    OBJECTIVE:     /73 (BP Location: Right arm, Patient Position: Sitting, Cuff Size: Adult Large)   Pulse 67   Temp 98.3  F (36.8  C) (Oral)   Ht 1.486 m (4' 10.5\")   Wt 61.8 kg (136 lb 3.2 oz)   SpO2 98%   BMI 27.98 kg/m     Body mass index is 27.98 kg/m .  GENERAL: healthy, alert and no distress  EYES: Eyes grossly normal to inspection, PERRL and conjunctivae and sclerae normal  HENT: normal cephalic/atraumatic, ear canals and TM's normal, nasal mucosa edematous , rhinorrhea green, oral mucous membranes moist, sinuses: frontal tenderness on bilaterally and thick green drainage in the posterior pahrynx  NECK: no adenopathy, no asymmetry, masses, or scars and thyroid normal to palpation  RESP: " lungs clear to auscultation - no rales, rhonchi or wheezes  CV: regular rate and rhythm, normal S1 S2, no S3 or S4, no murmur, click or rub, no peripheral edema and peripheral pulses strong  ABDOMEN: soft, nontender, no hepatosplenomegaly, no masses and bowel sounds normal  MS: no gross musculoskeletal defects noted, no edema    Diagnostic Test Results:  Results for orders placed or performed in visit on 01/10/19 (from the past 24 hour(s))   Strep, Rapid Screen   Result Value Ref Range    Specimen Description Throat     Rapid Strep A Screen       NEGATIVE: No Group A streptococcal antigen detected by immunoassay, await culture report.       ASSESSMENT/PLAN:       ICD-10-CM    1. Acute sinusitis with symptoms > 10 days J01.90 Dextromethorphan-Guaifenesin  MG TB12     amoxicillin-clavulanate (AUGMENTIN) 500-125 MG tablet     DISCONTINUED: amoxicillin-clavulanate (AUGMENTIN) 875-125 MG tablet   2. Musculoskeletal disorder involving upper trapezius muscle M53.82 methocarbamol (ROBAXIN) 500 MG tablet   3. Hand arthritis M19.049 diclofenac (VOLTAREN) 1 % topical gel     acetaminophen (TYLENOL) 325 MG tablet   4. Age-related osteoporosis without current pathological fracture M81.0 calcium carbonate-vitamin D (OS-KATHI) 600-400 MG-UNIT chewable tablet   5. Allergic conjunctivitis, bilateral H10.13 ketotifen (ZADITOR/REFRESH ANTI-ITCH) 0.025 % ophthalmic solution   6. Insufficiency of tear film of both eyes H04.123 carboxymethylcellulose (REFRESH) 1 % ophthalmic solution   7. Need for prophylactic vaccination and inoculation against influenza Z23 FLU VACCINE, INCREASED ANTIGEN, PRESV FREE, AGE 65+ [73049]     1,Augmentin 500 mg, 1 tablet twice a day for 10 days  Mucinex DM 1 tablet twice a day for 10-14 days for cough and mucous   Increase fluids  Nasal wash  Follow up if not better after the antibiotic course.     2.For back pain take Tylenol 650 mg 1 tablet every 6-8 hours for pain  Methocarbamol 1 tablet at bedtime  as needed for upper back muscle tightness     3.Apply Diclofenac gel 2 g to hands for pain 3-4 times a day as needed   4. Continue Tylenol   5,6. Continue current eye drops   7. Flu shot was given    Krystin Del Toro PA-C  [facility]

## 2019-01-10 NOTE — PROGRESS NOTES
Injectable Influenza Immunization Documentation    1.  Is the person to be vaccinated sick today?   No    2. Does the person to be vaccinated have an allergy to a component   of the vaccine?   No  Egg Allergy Algorithm Link    3. Has the person to be vaccinated ever had a serious reaction   to influenza vaccine in the past?   No    4. Has the person to be vaccinated ever had Guillain-Barré syndrome?   No    Form completed by Bruce Guidry MA on 1/10/2019 at 12:24 PM

## 2019-01-11 ENCOUNTER — APPOINTMENT (OUTPATIENT)
Dept: OPTOMETRY | Facility: CLINIC | Age: 79
End: 2019-01-11
Payer: COMMERCIAL

## 2019-01-11 ENCOUNTER — OFFICE VISIT (OUTPATIENT)
Dept: OPTOMETRY | Facility: CLINIC | Age: 79
End: 2019-01-11
Payer: COMMERCIAL

## 2019-01-11 DIAGNOSIS — Z96.1 PSEUDOPHAKIA: ICD-10-CM

## 2019-01-11 DIAGNOSIS — H52.13 MYOPIA OF BOTH EYES WITH ASTIGMATISM: Primary | ICD-10-CM

## 2019-01-11 DIAGNOSIS — H52.203 MYOPIA OF BOTH EYES WITH ASTIGMATISM: Primary | ICD-10-CM

## 2019-01-11 DIAGNOSIS — H35.363 DRUSEN (DEGENERATIVE) OF MACULA, BILATERAL: ICD-10-CM

## 2019-01-11 LAB
BACTERIA SPEC CULT: NORMAL
SPECIMEN SOURCE: NORMAL

## 2019-01-11 PROCEDURE — 92015 DETERMINE REFRACTIVE STATE: CPT | Performed by: OPTOMETRIST

## 2019-01-11 PROCEDURE — 92341 FIT SPECTACLES BIFOCAL: CPT | Performed by: OPTOMETRIST

## 2019-01-11 PROCEDURE — 92014 COMPRE OPH EXAM EST PT 1/>: CPT | Performed by: OPTOMETRIST

## 2019-01-11 ASSESSMENT — CONF VISUAL FIELD
METHOD: COUNTING FINGERS
OD_NORMAL: 1
OS_NORMAL: 1

## 2019-01-11 ASSESSMENT — REFRACTION_WEARINGRX
OS_ADD: +2.75
OD_SPHERE: -0.75
OD_CYLINDER: +1.50
OD_ADD: +2.75
OS_SPHERE: PLANO
SPECS_TYPE: BIFOCAL
OS_AXIS: 003
OD_AXIS: 160
OS_CYLINDER: +1.25

## 2019-01-11 ASSESSMENT — KERATOMETRY
OS_K2POWER_DIOPTERS: 46.50
OD_AXISANGLE2_DEGREES: 2
OS_K1POWER_DIOPTERS: 45.75
OD_K1POWER_DIOPTERS: 46.25
OS_AXISANGLE2_DEGREES: 120
OD_K2POWER_DIOPTERS: 47.50

## 2019-01-11 ASSESSMENT — REFRACTION_MANIFEST
OD_AXIS: 175
OS_CYLINDER: +1.50
OD_SPHERE: -1.25
OS_CYLINDER: +1.50
OD_CYLINDER: +1.50
METHOD_AUTOREFRACTION: 1
OS_ADD: +2.75
OD_SPHERE: -1.50
OS_SPHERE: -0.25
OS_AXIS: 180
OS_SPHERE: PLANO
OD_CYLINDER: +1.50
OS_AXIS: 012
OD_ADD: +2.75
OD_AXIS: 156

## 2019-01-11 ASSESSMENT — VISUAL ACUITY
OD_CC+: -2
CORRECTION_TYPE: GLASSES
OS_CC+: -1
OD_CC: 20/40
METHOD: SNELLEN - LINEAR
OS_CC: 20/40
OD_CC: 20/25
OS_CC: 20/20-1

## 2019-01-11 ASSESSMENT — CUP TO DISC RATIO
OS_RATIO: 0.3
OD_RATIO: 0.3

## 2019-01-11 ASSESSMENT — TONOMETRY
IOP_METHOD: TONOPEN
OS_IOP_MMHG: 17
OD_IOP_MMHG: 19

## 2019-01-11 ASSESSMENT — EXTERNAL EXAM - LEFT EYE: OS_EXAM: NORMAL

## 2019-01-11 ASSESSMENT — EXTERNAL EXAM - RIGHT EYE: OD_EXAM: NORMAL

## 2019-01-11 NOTE — PATIENT INSTRUCTIONS
Recommend new glasses.    Ketofen 1 drop both eyes 2 x day as needed for itchy eyes.  Refresh tears 1 drop 2-4x daily.    You have mild macular degeneration.  I recommend taking supplements for the eyes, PreserVision AREDS 2 formula daily as recommended dosage on the bottle.  Check with your pharmacist first to make sure there are not any interactions with your medications.    Return in 1 year for a complete eye exam or sooner if needed.    Coco De Jesus, OD

## 2019-01-11 NOTE — PROGRESS NOTES
Chief Complaint   Patient presents with     Annual Eye Exam      Accompanied by  and family member    Last Eye Exam: 1/9/2018  Dilated Previously: Yes    What are you currently using to see?  glasses       Distance Vision Acuity: Satisfied with vision, glasses working well     Near Vision Acuity: Satisfied with vision while reading  with glasses, but thinks that the right eye might have some changes, sometimes blurry    Eye Comfort: dry and sometimes gets some puffiness around her eyes  Do you use eye drops? : Yes: Uses Refresh and Zaditor as needed   Occupation or Hobbies: Retired     [employer] Assistant           Medical, surgical and family histories reviewed and updated 1/11/2019.       OBJECTIVE: See Ophthalmology exam    ASSESSMENT:    ICD-10-CM    1. Myopia of both eyes with astigmatism H52.13 EYE EXAM (SIMPLE-NONBILLABLE)    H52.203 REFRACTION   2. Pseudophakia Z96.1 EYE EXAM (SIMPLE-NONBILLABLE)   3. Drusen (degenerative) of macula, bilateral H35.363 EYE EXAM (SIMPLE-NONBILLABLE)      PLAN:     Patient Instructions   Recommend new glasses.    Ketofen 1 drop both eyes 2 x day as needed for itchy eyes.  Refresh tears 1 drop 2-4x daily.    You have mild macular degeneration.  I recommend taking supplements for the eyes, PreserVision AREDS 2 formula daily as recommended dosage on the bottle.  Check with your pharmacist first to make sure there are not any interactions with your medications.    Return in 1 year for a complete eye exam or sooner if needed.    Coco De Jesus, OD

## 2019-01-11 NOTE — LETTER
1/11/2019         RE: Bethany Logan  [address]  [place] [address]        Dear Colleague,    Thank you for referring your patient, Bethany Logan, to the [facility]. Please see a copy of my visit note below.    Chief Complaint   Patient presents with     Annual Eye Exam      Accompanied by  and family member    Last Eye Exam: 1/9/2018  Dilated Previously: Yes    What are you currently using to see?  glasses       Distance Vision Acuity: Satisfied with vision, glasses working well     Near Vision Acuity: Satisfied with vision while reading  with glasses, but thinks that the right eye might have some changes, sometimes blurry    Eye Comfort: dry and sometimes gets some puffiness around her eyes  Do you use eye drops? 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       OBJECTIVE: See Ophthalmology exam    ASSESSMENT:    ICD-10-CM    1. Myopia of both eyes with astigmatism H52.13 EYE EXAM (SIMPLE-NONBILLABLE)    H52.203 REFRACTION   2. Pseudophakia Z96.1 EYE EXAM (SIMPLE-NONBILLABLE)   3. Drusen (degenerative) of macula, bilateral H35.363 EYE EXAM (SIMPLE-NONBILLABLE)      PLAN:     Patient Instructions   Recommend new glasses.    Ketofen 1 drop both eyes 2 x day as needed for itchy eyes.  Refresh tears 1 drop 2-4x daily.    You have mild macular degeneration.  I recommend taking supplements for the eyes, PreserVision AREDS 2 formula daily as recommended dosage on the bottle.  Check with your pharmacist first to make sure there are not any interactions with your medications.    Return in 1 year for a complete eye exam or sooner if needed.    Coco De Jesus, ERUM           Again, thank you for allowing me to participate in the care of your patient.        Sincerely,        Coco De Jesus, OD

## 2019-01-17 ENCOUNTER — OFFICE VISIT (OUTPATIENT)
Dept: AUDIOLOGY | Facility: CLINIC | Age: 79
End: 2019-01-17
Payer: COMMERCIAL

## 2019-01-17 DIAGNOSIS — H90.3 SENSORINEURAL HEARING LOSS, BILATERAL: Primary | ICD-10-CM

## 2019-01-17 PROCEDURE — V5299 HEARING SERVICE: HCPCS | Performed by: AUDIOLOGIST

## 2019-01-17 PROCEDURE — 99207 ZZC NO CHARGE LOS: CPT | Performed by: AUDIOLOGIST

## 2019-01-17 NOTE — PROGRESS NOTES
HEARING AID RECHECK    Patient Name:  Bethany Logan    Patient Age:   78 year old    :  1940    Background:   Patient is here today with her daughter and an , reporting that her left hearing aid is not working.    Procedures:   Upon inspection, the battery is dead.  I also did a listening and visual check of both hearing aids and both sides seem to have good gain and sound quality.    Plan:   I counseled patient regarding wearing both hearing aids at the same time, which she has not been doing and this leads to confusion.  I also suggested that she throw out all batteries that she did not get from [facility] and consider purchasing a .  Patient will return as needed for additional service.    NO CHARGE VISIT    Octaviano Galloway MA, CCC-A  [place] Licensed Audiologist #[id number]  2019

## 2019-01-30 DIAGNOSIS — R10.13 ABDOMINAL PAIN, EPIGASTRIC: ICD-10-CM

## 2019-01-30 NOTE — TELEPHONE ENCOUNTER
"Requested Prescriptions   Pending Prescriptions Disp Refills     omeprazole (PRILOSEC) 20 MG DR capsule [Pharmacy Med Name: Omeprazole 20 MG Oral Capsule Delayed Release] 30 capsule 0        Last Written Prescription Date:  12/3/18  Last Fill Quantity: 30,  # refills: 2   Last Office Visit with [facility], [facility] or [facility] prescribing provider:  1/10/19   Future Office Visit:      Sig: Take 1 capsule by mouth once daily. **TOME DIAMOND CAPSULA POR LA BOCA DIAMOND VEZ AL MIKHAIL.    PPI Protocol Failed - 1/30/2019 11:52 AM       Failed - No diagnosis of osteoporosis on record       Passed - Not on Clopidogrel (unless Pantoprazole ordered)       Passed - Recent (12 mo) or future (30 days) visit within the authorizing provider's specialty    Patient had office visit in the last 12 months or has a visit in the next 30 days with authorizing provider or within the authorizing provider's specialty.  See \"Patient Info\" tab in inbasket, or \"Choose Columns\" in Meds & Orders section of the refill encounter.             Passed - Medication is active on med list       Passed - Patient is age 18 or older       Passed - No active pregnacy on record       Passed - No positive pregnancy test in past 12 months              Wade Faarax  [facility]  "

## 2019-01-30 NOTE — TELEPHONE ENCOUNTER
Routing refill request to provider for review/approval because:  Osteoporosis is on diagnosis list - cannot fill per protocol.     Adeline Mauricio RN

## 2019-02-27 DIAGNOSIS — E55.9 HYPOVITAMINOSIS D: ICD-10-CM

## 2019-02-27 NOTE — LETTER
Bethany Logan  [address]  [place] [address]          03/01/19      Dear Bethany Logan        At [facility] we care about your health and are committed to providing quality patient care. Regular appointments are a vital part of the care and management of your health and can help prevent many of the complications that can occur.      It has come to our attention that you are due for an office visit to address your refill request. Please call [facility] at [phone] soon to schedule your appointment.    If you have transferred care to another clinic please call to inform us so that we do not continue to send you reminder letters.      Sincerely,      [facility] Care Team

## 2019-02-27 NOTE — TELEPHONE ENCOUNTER
Requested Prescriptions   Pending Prescriptions Disp Refills     vitamin D2 (ERGOCALCIFEROL) 26444 units (1250 mcg) capsule [Pharmacy Med Name: Vitamin D (Ergocalciferol) 83799 UNIT Oral Capsule]        Last Written Prescription Date:  12/21/18  Last Fill Quantity: 8,  # refills: 0   Last Office Visit with [facility], P or [facility] prescribing provider:  1/10/19   Future Office Visit:      8 capsule 0     Sig: Take 1 capsule (50,000 Units) by mouth once a week * TOME DIAMOND CAPSULA ORALMENTE CADA SEMANA    There is no refill protocol information for this order              Wade Faarax  [facility]

## 2019-03-01 RX ORDER — ERGOCALCIFEROL 1.25 MG/1
CAPSULE, LIQUID FILLED ORAL
Qty: 8 CAPSULE | Refills: 0 | OUTPATIENT
Start: 2019-03-01

## 2019-03-01 NOTE — TELEPHONE ENCOUNTER
Routing refill request to provider for review/approval because:  Drug not on the [facility] refill protocol.    Ryanne Santana RN, [facility]

## 2019-03-25 ENCOUNTER — OFFICE VISIT (OUTPATIENT)
Dept: FAMILY MEDICINE | Facility: CLINIC | Age: 79
End: 2019-03-25
Payer: COMMERCIAL

## 2019-03-25 VITALS
WEIGHT: 144 LBS | HEART RATE: 76 BPM | DIASTOLIC BLOOD PRESSURE: 74 MMHG | RESPIRATION RATE: 16 BRPM | TEMPERATURE: 98 F | HEIGHT: 59 IN | BODY MASS INDEX: 29.03 KG/M2 | SYSTOLIC BLOOD PRESSURE: 133 MMHG | OXYGEN SATURATION: 97 %

## 2019-03-25 DIAGNOSIS — Z00.00 MEDICARE ANNUAL WELLNESS VISIT, SUBSEQUENT: Primary | ICD-10-CM

## 2019-03-25 DIAGNOSIS — L29.9 ITCHING: ICD-10-CM

## 2019-03-25 DIAGNOSIS — E55.9 HYPOVITAMINOSIS D: ICD-10-CM

## 2019-03-25 DIAGNOSIS — Z23 NEED FOR SHINGLES VACCINE: ICD-10-CM

## 2019-03-25 DIAGNOSIS — R10.13 ABDOMINAL PAIN, EPIGASTRIC: ICD-10-CM

## 2019-03-25 PROCEDURE — 99213 OFFICE O/P EST LOW 20 MIN: CPT | Mod: 25 | Performed by: FAMILY MEDICINE

## 2019-03-25 PROCEDURE — 99397 PER PM REEVAL EST PAT 65+ YR: CPT | Performed by: FAMILY MEDICINE

## 2019-03-25 PROCEDURE — 36415 COLL VENOUS BLD VENIPUNCTURE: CPT | Performed by: FAMILY MEDICINE

## 2019-03-25 PROCEDURE — 82306 VITAMIN D 25 HYDROXY: CPT | Performed by: FAMILY MEDICINE

## 2019-03-25 ASSESSMENT — MIFFLIN-ST. JEOR: SCORE: 1030.87

## 2019-03-25 NOTE — LETTER
March 26, 2019      Bethany Logan  [address]  [place] [address]    Ms. Emilee Logan,     Your Vitamin D level is deficient.  Start taking 50,000 units of vitamin D 3 weekly for 8 week then start 2000 units of vitamin D3 daily with calcium intake of 1200 mg.  Vitamin D is important in preventing bone loss, may reduce fatigue and may help to preventing certain cancers. I have sent a prescription to your pharmacy.  Be sure to have this rechecked in 2 months.     Please contact the clinic if you have additional questions.  Thank you.     Sincerely,     Ernestina Argueta/siomara    Resulted Orders   **Vitamin D Deficiency FUTURE 2mo   Result Value Ref Range    Vitamin D Deficiency screening 18 (L) 20 - 75 ug/L      Comment:      Season, race, dietary intake, and treatment affect the concentration of   25-hydroxy-Vitamin D. Values may decrease during winter months and increase   during summer months. Values 20-29 ug/L may indicate Vitamin D insufficiency   and values <20 ug/L may indicate Vitamin D deficiency.  Vitamin D determination is routinely performed by an immunoassay specific for   25 hydroxyvitamin D3.  If an individual is on vitamin D2 (ergocalciferol)   supplementation, please specify 25 OH vitamin D2 and D3 level determination by   LCMSMS test [id number].

## 2019-03-25 NOTE — LETTER
[address] [address]  [phone]        July 30, 2019    Bethany Logan  [address]  [place] [address]              Dear Bethany Logan    This is to remind you that your Non-fasting lab is due.    You may call our office at [phone] to schedule an appointment.    Please disregard this notice if you have already had your labs drawn or made an appointment.        Sincerely,        Ernestina [employer]

## 2019-03-25 NOTE — PATIENT INSTRUCTIONS
At [facility], we strive to deliver an exceptional experience to you, every time we see you.  If you receive a survey in the mail, please send us back your thoughts. We really do value your feedback.    Based on your medical history, these are the current health maintenance/preventive care services that you are due for (some may have been done at this visit.)  Health Maintenance Due   Topic Date Due     URINE DRUG SCREEN Q1 YR  08/18/1955     ZOSTER IMMUNIZATION (1 of 2) 08/18/1990     MEDICARE ANNUAL WELLNESS VISIT  12/18/2018         Suggested websites for health information:  Www.[facility] HospitalScreenz.StackAdapt : Up to date and easily searchable information on multiple topics.  Www.[employer].gov : medication info, interactive tutorials, watch real surgeries online  Www.familydoctor.org : good info from the Academy of Family Physicians  Www.cdc.gov : public health info, travel advisories, epidemics (H1N1)  Www.aap.org : children's health info, normal development, vaccinations  Www.health.[facility].[place].us : [place] dept of health, public health issues in [place], N1N1    Your care team:                            Family Medicine Internal Medicine   MD Ivan Barlow MD Shantel Branch-Fleming, MD Katya Georgiev PA-C Nam Ho, MD Pediatrics   SAI Kruse, MD Janessa Whalen CNP, MD Deborah Mielke, MD Kim Thein, APRN [facility]      Clinic hours: Monday - Thursday 7 am-7 pm; Fridays 7 am-5 pm.   Urgent care: Monday - Friday 11 am-9 pm; Saturday and Sunday 9 am-5 pm.  Pharmacy : Monday -Thursday 8 am-8 pm; Friday 8 am-6 pm; Saturday and Sunday 9 am-5 pm.     Clinic: [phone]   Pharmacy: [phone]    Preventive Health Recommendations    See your health care provider every year to    Review health changes.     Discuss preventive care.      Review your medicines if your doctor has prescribed any.      You no longer need a yearly Pap test  unless you've had an abnormal Pap test in the past 10 years. If you have vaginal symptoms, such as bleeding or discharge, be sure to talk with your provider about a Pap test.      Every 1 to 2 years, have a mammogram.  If you are over 69, talk with your health care provider about whether or not you want to continue having screening mammograms.      Every 10 years, have a colonoscopy. Or, have a yearly FIT test (stool test). These exams will check for colon cancer.       Have a cholesterol test every 5 years, or more often if your doctor advises it.       Have a diabetes test (fasting glucose) every three years. If you are at risk for diabetes, you should have this test more often.       At age 65, have a bone density scan (DEXA) to check for osteoporosis (brittle bone disease).    Shots:    Get a flu shot each year.    Get a tetanus shot every 10 years.    Talk to your doctor about your pneumonia vaccines. There are now two you should receive - Pneumovax (PPSV 23) and Prevnar (PCV 13).    Talk to your pharmacist about the shingles vaccine.    Talk to your doctor about the hepatitis B vaccine.    Nutrition:     Eat at least 5 servings of fruits and vegetables each day.      Eat whole-grain bread, whole-wheat pasta and brown rice instead of white grains and rice.      Get adequate about Calcium and Vitamin D.     Lifestyle    Exercise at least 150 minutes a week (30 minutes a day, 5 days a week). This will help you control your weight and prevent disease.      Limit alcohol to one drink per day.      No smoking.       Wear sunscreen to prevent skin cancer.       See your dentist twice a year for an exam and cleaning.      See your eye doctor every 1 to 2 years to screen for conditions such as glaucoma, macular degeneration, cataracts, etc.    Personalized Prevention Plan  You are due for the preventive services outlined below.  Your care team is available to assist you in scheduling these services.  If you have  already completed any of these items, please share that information with your care team to update in your medical record.    Health Maintenance Due   Topic Date Due     URINE DRUG SCREEN Q1 YR  08/18/1955     Zoster (Shingles) Vaccine (1 of 2) 08/18/1990     Annual Wellness Visit  12/18/2018

## 2019-03-25 NOTE — TELEPHONE ENCOUNTER
Requested Prescriptions   Pending Prescriptions Disp Refills     vitamin D2 (ERGOCALCIFEROL) 85069 units (1250 mcg) capsule [Pharmacy Med Name: Vitamin D (Ergocalciferol) 28029 UNIT Oral Capsule] 8 capsule 0     Sig: Take 1 capsule (50,000 Units) by mouth once a week * TOME DIAMOND CAPSULA ORALMENTE CADA SEMANA    There is no refill protocol information for this order        Last Written Prescription Date:  12/21/18  Last Fill Quantity: 8,  # refills: 0   Last Office Visit with [facility], [facility] or [facility] prescribing provider:  3/25/19   Future Office Visit:

## 2019-03-25 NOTE — PROGRESS NOTES
"  SUBJECTIVE:   Bethany Logan is a 78 year old female who presents for Preventive Visit.      Are you in the first 12 months of your Medicare Part B coverage?  No    Physical Health:    In general, how would you rate your overall physical health? fair    Outside of work, how many days during the week do you exercise? 1 day/week    Outside of work, approximately how many minutes a day do you exercise?45-60 minutes    If you drink alcohol do you typically have >3 drinks per day or >7 drinks per week? No    Do you usually eat at least 4 servings of fruit and vegetables a day, include whole grains & fiber and avoid regularly eating high fat or \"junk\" foods? Yes    Do you have any problems taking medications regularly?  No    Do you have any side effects from medications? none    Needs assistance for the following daily activities: no assistance needed    Which of the following safety concerns are present in your home?  none identified     Hearing impairment: Yes, wears hearing aid    In the past 6 months, have you been bothered by leaking of urine? no    Mental Health:    In general, how would you rate your overall mental or emotional health? poor  PHQ-2 Score:      Do you feel safe in your environment? No, patient lives alone and is becoming forgetful    Do you have a Health Care Directive? No: Advance care planning was reviewed with patient; patient declined at this time.    Additional concerns to address?  YES    Fall risk:  Fallen 2 or more times in the past year?: No  Any fall with injury in the past year?: No    Cognitive Screenin) Repeat 3 items (Leader, Season, Table)    2) Clock draw: Patient declined  3) 3 item recall: Recalls 3 objects  Results: 3 items recalled: COGNITIVE IMPAIRMENT LESS LIKELY    Mini-CogTM Copyright ALICIA Burnett. Licensed by the author for use in [facility]; reprinted with permission (rubi@.[facility]). All rights reserved.      Do you have sleep apnea, excessive " snoring or daytime drowsiness?: no        Forgetfulness: ongoing 4-5 years  Son was sent to prison and that was when problems started    Abd pain - stable with medication without gerd.  Hypovitamin D - took weekly supplement but did not recheck.  Itching skin for months.  No specific trigger known. No one else with similar.    Reviewed and updated as needed this visit by clinical staff  Tobacco  Allergies  Meds  Problems  Med Hx  Surg Hx  Fam Hx  Soc Hx          Reviewed and updated as needed this visit by Provider  Tobacco  Allergies  Meds  Problems  Med Hx  Surg Hx  Fam Hx        Social History     Tobacco Use     Smoking status: Never Smoker     Smokeless tobacco: Never Used   Substance Use Topics     Alcohol use: No                           Current providers sharing in care for this patient include:   Patient Care Team:  Ernestina Alvarado MD as PCP - General (Family Practice)  Diane Wang as Other (see comments)  Ernestina Alvarado MD as Assigned PCP  Alyssa Gonzalez as Other (see comments)  Jennifer Mahmood, RN as Lead Care Coordinator    The following health maintenance items are reviewed in Epic and correct as of today:  Health Maintenance   Topic Date Due     URINE DRUG SCREEN Q1 YR  08/18/1955     ZOSTER IMMUNIZATION (1 of 2) 08/18/1990     MEDICARE ANNUAL WELLNESS VISIT  12/18/2018     CARO QUESTIONNAIRE 1 YEAR  06/06/2019     PHQ-9 Q6 MONTHS  07/10/2019     FALL RISK ASSESSMENT  08/27/2019     EYE EXAM Q1 YEAR  01/11/2020     LIPID SCREEN Q5 YR FEMALE (SYSTEM ASSIGNED)  05/19/2020     ADVANCE DIRECTIVE PLANNING Q5 YRS  09/19/2022     DTAP/TDAP/TD IMMUNIZATION (2 - Td) 07/25/2024     DEXA SCAN SCREENING (SYSTEM ASSIGNED)  Completed     DEPRESSION ACTION PLAN  Completed     INFLUENZA VACCINE  Completed     PNEUMOCOCCAL IMMUNIZATION 65+ LOW/MEDIUM RISK  Completed     IPV IMMUNIZATION  Aged Out     MENINGITIS IMMUNIZATION  Aged Out     Patient Active Problem List  "  Diagnosis     OA (osteoarthritis) of knee - bilateral     Health Care Home     Constipation     History of total knee arthroplasty - right     SNHL (sensory-neural hearing loss), asymmetrical     Chronic left-sided low back pain with left-sided sciatica     CARDIOVASCULAR SCREENING; LDL GOAL LESS THAN 130     Advance care planning     Sciatica - left     Osteoporosis     History of total hip arthroplasty, left     Lumbar spinal stenosis     Macular drusen, bilateral     Dermatochalasis of eyelid     Pseudophakia of both eyes     Closed nondisplaced zone I fracture of sacrum with routine healing, subsequent encounter     Other specified depressive episodes     Adjustment disorder with mixed anxiety and depressed mood     Fatty liver     Gallbladder polyps     Hypovitaminosis D     Past Surgical History:   Procedure Laterality Date     C TOTAL KNEE ARTHROPLASTY  9/25/13    Right     CATARACT IOL, RT/LT       COMBINED REPAIR PTOSIS WITH BLEPHAROPLASTY Bilateral 1/9/2017    Procedure: COMBINED REPAIR PTOSIS WITH BLEPHAROPLASTY;  Surgeon: Keven Vázquez MD;  Location: [facility] OR     SURGICAL HISTORY OF -       Left hip bone surgery       Social History     Tobacco Use     Smoking status: Never Smoker     Smokeless tobacco: Never Used   Substance Use Topics     Alcohol use: No     Family History   Problem Relation Age of Onset     C.A.D. No family hx of      Diabetes No family hx of      Hypertension No family hx of      Cerebrovascular Disease No family hx of              ROS:  Constitutional, HEENT, cardiovascular, pulmonary, GI, , musculoskeletal, neuro, skin, endocrine and psych systems are negative, except as otherwise noted.    OBJECTIVE:   /74   Pulse 76   Temp 98  F (36.7  C) (Oral)   Resp 16   Ht 1.486 m (4' 10.5\")   Wt 65.3 kg (144 lb)   LMP  (LMP Unknown)   SpO2 97%   Breastfeeding? No   BMI 29.58 kg/m   Estimated body mass index is 29.58 kg/m  as calculated from the following:    Height as of " "this encounter: 1.486 m (4' 10.5\").    Weight as of this encounter: 65.3 kg (144 lb).  EXAM:   GENERAL: healthy, alert and no distress  HENT: ear canals and TM's normal, nose and mouth without ulcers or lesions  NECK: no adenopathy, no asymmetry, masses, or scars and thyroid normal to palpation  RESP: lungs clear to auscultation - no rales, rhonchi or wheezes  CV: regular rate and rhythm, normal S1 S2, no S3 or S4, no murmur, click or rub, no peripheral edema and peripheral pulses strong  ABDOMEN: soft, nontender, no hepatosplenomegaly, no masses and bowel sounds normal  SKIN: dry with excoriations  PSYCH: mentation appears normal, affect normal/bright    Diagnostic Test Results:  none     ASSESSMENT / PLAN:   1. Medicare annual wellness visit, subsequent  Routine preventive discussed    2. Hypovitaminosis D  Recheck today and supplement as needed  - **Vitamin D Deficiency FUTURE 2mo    3. Abdominal pain, epigastric  Refilled as stable  - omeprazole (PRILOSEC) 20 MG DR capsule; Take 1 capsule by mouth once daily. **TOME DIAMOND CAPSULA POR LA BOCA DIAMOND VEZ AL MIKHAIL.  Dispense: 90 capsule; Refill: 3    4. Need for shingles vaccine  Will get at a pharmacy  - zoster vaccine recombinant adjuvanted (SHINGRIX) injection; Inject 0.5 mLs into the muscle once for 1 dose  Dispense: 0.5 mL; Refill: 0    5. Itching  Topical protectant.  - Skin Protectants, Misc. (EUCERIN) cream; Apply topically as needed for dry skin  Dispense: 240 g; Refill: 3    The uses and side effects, including black box warnings as appropriate, were discussed in detail.  All patient questions were answered.  The patient was instructed to call immediately if any side effects developed.    Follow up in 1 year or sooner as determined by labs.    End of Life Planning:  Patient currently has an advanced directive: Yes.  Practitioner is supportive of decision.    COUNSELING:  Reviewed preventive health counseling, as reflected in patient instructions    BP Readings " "from Last 1 Encounters:   03/25/19 133/74     Estimated body mass index is 29.58 kg/m  as calculated from the following:    Height as of this encounter: 1.486 m (4' 10.5\").    Weight as of this encounter: 65.3 kg (144 lb).           reports that  has never smoked. she has never used smokeless tobacco.      Appropriate preventive services were discussed with this patient, including applicable screening as appropriate for cardiovascular disease, diabetes, osteopenia/osteoporosis, and glaucoma.  As appropriate for age/gender, discussed screening for colorectal cancer, prostate cancer, breast cancer, and cervical cancer. Checklist reviewing preventive services available has been given to the patient.    Reviewed patients plan of care and provided an AVS. The Basic Care Plan (routine screening as documented in Health Maintenance) for Bethany meets the Care Plan requirement. This Care Plan has been established and reviewed with the Patient and daughter.    Counseling Resources:  ATP IV Guidelines  Pooled Cohorts Equation Calculator  Breast Cancer Risk Calculator  FRAX Risk Assessment  ICSI Preventive Guidelines  Dietary Guidelines for Americans, 2010  USDA's MyPlate  ASA Prophylaxis  Lung CA Screening    Ernestina Argueta MD  [facility]  "

## 2019-03-26 ENCOUNTER — TELEPHONE (OUTPATIENT)
Dept: AUDIOLOGY | Facility: CLINIC | Age: 79
End: 2019-03-26
Payer: COMMERCIAL

## 2019-03-26 DIAGNOSIS — H90.3 SENSORINEURAL HEARING LOSS, BILATERAL: Primary | ICD-10-CM

## 2019-03-26 LAB — DEPRECATED CALCIDIOL+CALCIFEROL SERPL-MC: 18 UG/L (ref 20–75)

## 2019-03-26 PROCEDURE — V5266 BATTERY FOR HEARING DEVICE: HCPCS | Performed by: AUDIOLOGIST

## 2019-03-26 RX ORDER — ERGOCALCIFEROL 1.25 MG/1
50000 CAPSULE, LIQUID FILLED ORAL WEEKLY
Qty: 8 CAPSULE | Refills: 0 | Status: SHIPPED | OUTPATIENT
Start: 2019-03-26 | End: 2019-08-08

## 2019-03-26 RX ORDER — ERGOCALCIFEROL 1.25 MG/1
CAPSULE, LIQUID FILLED ORAL
Qty: 8 CAPSULE | Refills: 0 | OUTPATIENT
Start: 2019-03-26

## 2019-03-26 NOTE — RESULT ENCOUNTER NOTE
Ms. Emilee Logan,    Your Vitamin D level is deficient.  Start taking 50,000 units of vitamin D 3 weekly for 8 week then start 2000 units of vitamin D3 daily with calcium intake of 1200 mg.  Vitamin D is important in preventing bone loss, may reduce fatigue and may help to preventing certain cancers. I have sent a prescription to your pharmacy.  Be sure to have this rechecked in 2 months.     Please contact the clinic if you have additional questions.  Thank you.    Sincerely,    Ernestina Argueta

## 2019-03-26 NOTE — TELEPHONE ENCOUNTER
Someone called on Hebert's behalf asking for batteries to be mailed to patient.  I mailed out out a 90 day supply (24 cells) of size 312 batteries to her.    Octaviano Galloway MA, CCC-A  [place] Licensed Audiologist #[id number]  [facility]    3/26/2019

## 2019-03-29 ENCOUNTER — PATIENT OUTREACH (OUTPATIENT)
Dept: GERIATRIC MEDICINE | Facility: CLINIC | Age: 79
End: 2019-03-29

## 2019-03-29 NOTE — PROGRESS NOTES
[facility] Care Coordination Contact    CC attempted to call member's son Sudhakar for six month assessment. There was no answer and CC was unable to leave a message because voicemail has not been set up.   Jennifer Mahmood RN  [facility] Care Coordinator  [phone]

## 2019-05-15 ENCOUNTER — OFFICE VISIT (OUTPATIENT)
Dept: FAMILY MEDICINE | Facility: CLINIC | Age: 79
End: 2019-05-15
Payer: COMMERCIAL

## 2019-05-15 VITALS
DIASTOLIC BLOOD PRESSURE: 80 MMHG | TEMPERATURE: 98.1 F | OXYGEN SATURATION: 96 % | BODY MASS INDEX: 29.22 KG/M2 | WEIGHT: 139.2 LBS | HEART RATE: 82 BPM | SYSTOLIC BLOOD PRESSURE: 144 MMHG | HEIGHT: 58 IN

## 2019-05-15 DIAGNOSIS — J30.2 SEASONAL ALLERGIC RHINITIS, UNSPECIFIED TRIGGER: Primary | ICD-10-CM

## 2019-05-15 PROCEDURE — 99213 OFFICE O/P EST LOW 20 MIN: CPT | Performed by: FAMILY MEDICINE

## 2019-05-15 RX ORDER — FLUTICASONE PROPIONATE 50 MCG
1 SPRAY, SUSPENSION (ML) NASAL DAILY
Qty: 16 G | Refills: 3 | Status: SHIPPED | OUTPATIENT
Start: 2019-05-15 | End: 2019-08-30

## 2019-05-15 ASSESSMENT — MIFFLIN-ST. JEOR: SCORE: 1001.16

## 2019-05-15 ASSESSMENT — PAIN SCALES - GENERAL: PAINLEVEL: NO PAIN (0)

## 2019-05-15 NOTE — PROGRESS NOTES
SUBJECTIVE:   Bethany Logan is a 78 year old female who presents to clinic today for the following   health issues:        Acute Illness   Acute illness concerns: cough  Onset: 1 month    Fever: no     Chills/Sweats: no     Headache (location?): YES    Sinus Pressure:no    Conjunctivitis:  YES: bilateral    Ear Pain: no    Rhinorrhea: YES- started today    Congestion: YES    Sore Throat: YES x1 month     Cough: YES-non-productive    Wheeze: YES    Decreased Appetite: YES    Nausea: no     Vomiting: YES    Diarrhea:  YES    Dysuria/Freq.: no     Fatigue/Achiness: YES    Sick/Strep Exposure: no        Therapies Tried and outcome: tea and syrup- no relief      Additional history: as documented    Reviewed  and updated as needed this visit by clinical staff  Tobacco  Allergies  Meds  Problems  Med Hx  Surg Hx  Fam Hx  Soc Hx          Reviewed and updated as needed this visit by Provider  Tobacco  Allergies  Meds  Problems  Med Hx  Surg Hx  Fam Hx         Patient Active Problem List   Diagnosis     OA (osteoarthritis) of knee - bilateral     Health Care Home     Constipation     History of total knee arthroplasty - right     SNHL (sensory-neural hearing loss), asymmetrical     Chronic left-sided low back pain with left-sided sciatica     CARDIOVASCULAR SCREENING; LDL GOAL LESS THAN 130     Advance care planning     Sciatica - left     Osteoporosis     History of total hip arthroplasty, left     Lumbar spinal stenosis     Macular drusen, bilateral     Dermatochalasis of eyelid     Pseudophakia of both eyes     Closed nondisplaced zone I fracture of sacrum with routine healing, subsequent encounter     Other specified depressive episodes     Adjustment disorder with mixed anxiety and depressed mood     Fatty liver     Gallbladder polyps     Hypovitaminosis D     Past Surgical History:   Procedure Laterality Date     C TOTAL KNEE ARTHROPLASTY  9/25/13    Right     CATARACT IOL, RT/LT        "COMBINED REPAIR PTOSIS WITH BLEPHAROPLASTY Bilateral 1/9/2017    Procedure: COMBINED REPAIR PTOSIS WITH BLEPHAROPLASTY;  Surgeon: Keven Vázquez MD;  Location:  OR     SURGICAL HISTORY OF -       Left hip bone surgery       Social History     Tobacco Use     Smoking status: Never Smoker     Smokeless tobacco: Never Used   Substance Use Topics     Alcohol use: No     Family History   Problem Relation Age of Onset     C.A.D. No family hx of      Diabetes No family hx of      Hypertension No family hx of      Cerebrovascular Disease No family hx of            ROS:  Constitutional, HEENT, cardiovascular, pulmonary, gi and gu systems are negative, except as otherwise noted.    OBJECTIVE:     /80 (BP Location: Right arm)   Pulse 82   Temp 98.1  F (36.7  C) (Oral)   Ht 1.473 m (4' 10\")   Wt 63.1 kg (139 lb 3.2 oz)   LMP  (LMP Unknown)   SpO2 96%   Breastfeeding? No   BMI 29.09 kg/m    Body mass index is 29.09 kg/m .  GENERAL: healthy, alert and no distress  EYES: Eyes grossly normal to inspection, PERRL and conjunctivae and sclerae normal  HENT: normal cephalic/atraumatic, ear canals and TM's normal, nasal mucosa edematous , oropharynx clear and oral mucous membranes moist  NECK: no adenopathy, no asymmetry, masses, or scars and thyroid normal to palpation  RESP: lungs clear to auscultation - no rales, rhonchi or wheezes  CV: regular rate and rhythm, normal S1 S2, no S3 or S4, no murmur, click or rub, no peripheral edema and peripheral pulses strong  ABDOMEN: soft, nontender, no hepatosplenomegaly, no masses and bowel sounds normal  MS: no gross musculoskeletal defects noted, no edema    Diagnostic Test Results:  none     ASSESSMENT/PLAN:     1. Seasonal allergic rhinitis, unspecified trigger  Nasal treatment  - fluticasone (FLONASE) 50 MCG/ACT nasal spray; Spray 1 spray into both nostrils daily  Dispense: 16 g; Refill: 3    The uses and side effects, including black box warnings as appropriate, were " discussed in detail.  All patient questions were answered.  The patient was instructed to call immediately if any side effects developed.     FUTURE APPOINTMENTS:       - Follow-up visit in 5 days if not improved. See PI.    Ernestina Argueta MD  [facility]

## 2019-05-15 NOTE — PATIENT INSTRUCTIONS
"Use the flonase every day and let me know if you aren't improved by Monday. If you are better, continue the spray until it is all gone.  If your symptoms return once your finish, the get a refill and continue taking it until Summer.    Patient Education     Rinitis alérgica  La rinitis alérgica es alexy reacción alérgica que afecta la nariz y, con frecuencia, los ojos. Se la suele conocer edmundo alergias nasales. Es frecuente que las alergias nasales se deban a elementos que están presentes en el medioambiente y se respiran. Según a qué sea sensible, las alergias nasales pueden presentarse únicamente jaciel ciertas estaciones. O también pueden ocurrir jaciel todo el año. Los alérgenos comunes de interior incluyen los ácaros del polvo, el moho, las cucarachas y la caspa de las mascotas. Los alérgenos de exterior incluyen el polen de los árboles, los pastos y las hierbas.  Los síntomas son, por ejemplo, goteo nasal, congestión y comezón en la nariz. También incluyen estornudos, ojos rojos y con comezón, y areolas oscuras (\"ojeras alérgicas\") debajo de los ojos. Además, usted puede estar irritable y cansado. Las alergias johana también pueden afectar la respiración y desencadenar alexy afección llamada asma.  Se pueden realizar pruebas para detectar a qué alérgenos reacciona usted. Es posible que le remitan a un especialista en alergias para que le evalúe y le fahad pruebas.  Cuidados en harris casa  Probablemente el proveedor de atención médica le recete medicamentos para ayudar a aliviar los síntomas de alergia. Siga las instrucciones para hanny estos medicamentos. Pueden incluir medicamentos orales, esprays nasales o gotas para los ojos.  Pida consejo al proveedor sobre cómo evitar las sustancias a las que es alérgico. Los siguientes son algunos consejos para cada tipo de alérgeno.  Caspa de mascota:    No tenga mascotas con pelaje o plumas.    Si no puede evitar tenerlas, manténgalas fuera del dormitorio y de los muebles " "tapizados.  Polen:    Cuando el nivel de polen en el ambiente está alto, mantenga cerradas las ventanas de harris casa y harris automóvil. De ser posible, use en cambio el aire acondicionado.    Use alexy mascarilla con filtro cuando lucio el césped o trabaje en el jardín.  Ácaros del polvo en la casa:    Lave la ropa de cama todas las semanas con Kashia y detergente, y séquela en un ambiente caliente.    Cubra el colchón, el somier y las almohadas con fundas antialérgicas.    Si es posible, duerma en un cuarto que no tenga tapete, raj ni muebles tapizados.  Cucarachas:    Guarde la comida en recipientes cerrados herméticamente.    Saque la basura de harris casa rápidamente.    Arregle las filtraciones de agua.  Moho:    Mantenga bajo el nivel de humedad usando un deshumidificador o el aparato de aire acondicionado. Mantenga el deshumidificador y el aire acondicionado limpios y sin moho.    Limpie áreas que tengan moho con agua y blanqueador.  En general:    Pase la aspiradora alexy o dos veces por semana. Si es posible, use alexy aspiradora que tenga un filtro de aire de austin eficiencia para partículas (\"HEPA\", por remedios siglas en inglés).    No fume. Evite el humo de cigarrillo. Kristina humo es irritante y puede empeorar los síntomas.  Visitas de control  Programe alexy visita de control según le indique el proveedor de atención médica o nuestro personal. Si le remitieron a un especialista en alergias, coordine tae mario sin demoras.  Cuándo debe buscar atención médica  Llame enseguida a harris proveedor de atención médica si se presenta cualquiera de las siguientes situaciones:    Tos o silbidos al respirar    Fiebre superior a 100.4  F (38  C)    Urticaria (bultos rojizos)    Continuidad de los síntomas, síntomas nuevos o empeoramiento de los síntomas  Llame al 911 de inmediato si presenta los siguientes síntomas:    Dificultad para respirar    Hinchazón grave de la stas o comezón intensa de los ojos o la boca   Date Last " Reviewed: 10/22/2017    [id number] [employer] [address] [place] [address], [place] [address]. Todos los derechos reservados. Esta información no pretende sustituir la atención médica profesional. Sólo harris médico puede diagnosticar y tratar un problema de deo.

## 2019-05-20 ENCOUNTER — TELEPHONE (OUTPATIENT)
Dept: FAMILY MEDICINE | Facility: CLINIC | Age: 79
End: 2019-05-20

## 2019-06-21 DIAGNOSIS — M81.0 AGE-RELATED OSTEOPOROSIS WITHOUT CURRENT PATHOLOGICAL FRACTURE: Primary | ICD-10-CM

## 2019-06-21 NOTE — TELEPHONE ENCOUNTER
Rx sent with refills:    calcium carbonate-vitamin D (OS-KATHI) 600-400 MG-UNIT chewable tablet 180 tablet 3 1/10/2019

## 2019-06-25 NOTE — TELEPHONE ENCOUNTER
Routing refill request to provider for review/approval because:  Change in medication requested by pharmacy; this is the closest formulary that will be covered for patient.       Trenton Saldivar RN, BSN, PHN

## 2019-07-26 ENCOUNTER — TRANSFERRED RECORDS (OUTPATIENT)
Dept: HEALTH INFORMATION MANAGEMENT | Facility: CLINIC | Age: 79
End: 2019-07-26

## 2019-08-07 ENCOUNTER — DOCUMENTATION ONLY (OUTPATIENT)
Dept: FAMILY MEDICINE | Facility: CLINIC | Age: 79
End: 2019-08-07

## 2019-08-07 DIAGNOSIS — E55.9 HYPOVITAMINOSIS D: ICD-10-CM

## 2019-08-07 PROCEDURE — 82306 VITAMIN D 25 HYDROXY: CPT | Performed by: FAMILY MEDICINE

## 2019-08-07 PROCEDURE — 36415 COLL VENOUS BLD VENIPUNCTURE: CPT | Performed by: FAMILY MEDICINE

## 2019-08-07 NOTE — PROGRESS NOTES
Bethany arrived for a fasting lab appt today 8/7/19 but only a Vit D was ordered.  Does she need any other tests completed?  Extra blood drawn, please place future orders if needed

## 2019-08-07 NOTE — PROGRESS NOTES
Bethany arrived for a fasting lab appt today, 8/7/19.  Only a Vit D was ordered.  Extra blood collected, please place orders if needed.

## 2019-08-07 NOTE — LETTER
August 8, 2019      Bethany Logan  [address]  [place] [address]        Ms. Emilee Logan,     Your vitamin D level is still low and not much improved. I has prescribed a vitamin D supplement twice weekly 8 weeks and sent this to your [facility] pharmacy.  Please follow up in 8 weeks for a recheck with lab.     Please contact the clinic if you have additional questions.  Thank you.     Resulted Orders   **Vitamin D Deficiency FUTURE 2mo   Result Value Ref Range    Vitamin D Deficiency screening 19 (L) 20 - 75 ug/L      Comment:      Season, race, dietary intake, and treatment affect the concentration of   25-hydroxy-Vitamin D. Values may decrease during winter months and increase   during summer months. Values 20-29 ug/L may indicate Vitamin D insufficiency   and values <20 ug/L may indicate Vitamin D deficiency.  Vitamin D determination is routinely performed by an immunoassay specific for   25 hydroxyvitamin D3.  If an individual is on vitamin D2 (ergocalciferol)   supplementation, please specify 25 OH vitamin D2 and D3 level determination by   LCMSMS test [id number].         If you have any questions or concerns, please call the clinic at the number listed above.       Sincerely,        Ernestina Argueta MD/AGUSTIN

## 2019-08-08 DIAGNOSIS — E55.9 HYPOVITAMINOSIS D: ICD-10-CM

## 2019-08-08 LAB — DEPRECATED CALCIDIOL+CALCIFEROL SERPL-MC: 19 UG/L (ref 20–75)

## 2019-08-08 RX ORDER — ERGOCALCIFEROL 1.25 MG/1
50000 CAPSULE, LIQUID FILLED ORAL
Qty: 16 CAPSULE | Refills: 0 | Status: SHIPPED | OUTPATIENT
Start: 2019-08-08 | End: 2019-11-01

## 2019-08-08 NOTE — RESULT ENCOUNTER NOTE
Ms. Emilee Doreen,    Your vitamin D level is still low and not much improved. I has prescribed a vitamin D supplement twice weekly 8 weeks and sent this to your [facility] pharmacy.  Please follow up in 8 weeks for a recheck with lab.    Please contact the clinic if you have additional questions.  Thank you.    Sincerely,    Ernestina Argueta

## 2019-08-09 ENCOUNTER — PATIENT OUTREACH (OUTPATIENT)
Dept: GERIATRIC MEDICINE | Facility: CLINIC | Age: 79
End: 2019-08-09

## 2019-08-09 NOTE — PROGRESS NOTES
[facility] Care Coordination Contact    Called  Ira from [facility] to schedule annual HRA home visit. HRA has been scheduled for 08/15/19 at 3:00.  Called Lashawn Mckeon and scheduled an  for the home visit.   Jennifer Mahmood RN  [facility] Care Coordinator  [phone]

## 2019-08-15 ENCOUNTER — PATIENT OUTREACH (OUTPATIENT)
Dept: GERIATRIC MEDICINE | Facility: CLINIC | Age: 79
End: 2019-08-15

## 2019-08-15 ASSESSMENT — ACTIVITIES OF DAILY LIVING (ADL): DEPENDENT_IADLS:: CLEANING;COOKING;LAUNDRY;SHOPPING;MEAL PREPARATION;MEDICATION MANAGEMENT;TRANSPORTATION

## 2019-08-16 NOTE — PROGRESS NOTES
[facility] Care Coordination Contact    [facility] Home Visit Assessment     Home visit for Health Risk Assessment with eBthany Logan completed on August 15, 2019        Current Care Plan  Member currently receiving the following home care services:   None  Member currently receiving the following community resources: Day Care, DME, Lifeline, PCA, Transportation Services      Medication Review  Medication reconciliation completed in Epic: Yes  Medication set-up & administration: Independent-does not set up.  Self-administers medications.  Medication Risk Assessment Medication (1 or more, place referral to MTM): Difficulty adhering to medication regimen  MTM Referral Placed: No: Declines    Mental/Behavioral Health   Depression Screening: See PHQ assessment flowsheet.   Mental health DX:: No      Mental Health Diagnosis: No  Mental Health Services: None: No further intervention needed at this time.    Falls Assessment:   Fallen 2 or more times in the past year?: No   Any fall with injury in the past year?: No    ADL/IADL Dependencies:   Dependent ADLs:: Ambulation-walker, Dressing, Bathing  Dependent IADLs:: Cleaning, Cooking, Laundry, Shopping, Meal Preparation, Medication Management, Transportation    [facility] Plan sponsored benefits: Shared information re: Silver Sneakers/gym memberships, ASA, Calcium +D.    PCA Assessment completed at visit: Yes     Elderly Waiver Eligibility: Yes-will continue on EW    Care Plan & Recommendations: Continue in home services, Adult Day Care, EW transportation, lifeline, ensure and incontinence supplies.     See [facility] for detailed assessment information.    Follow-Up Plan: Member informed of future contact, plan to f/u with member with a 6 month telephone assessment.  Contact information shared with member and family, encouraged member to call with any questions or concerns at any time.    [facility] care continuum providers: Please refer to Health Care  Home on the Epic Problem List to view this patient's [facility] Care Plan Summary.  Jennifer Mahmood RN  [facility] Care Coordinator  [phone]

## 2019-08-29 ENCOUNTER — PATIENT OUTREACH (OUTPATIENT)
Dept: GERIATRIC MEDICINE | Facility: CLINIC | Age: 79
End: 2019-08-29

## 2019-08-29 NOTE — PROGRESS NOTES
[facility] Care Coordination Contact    [facility]:  Emailed completed PCA assessment to [facility].  Faxed copy of PCA assessment to [facility] Agency and mailed copy to member.  Faxed MD Communication to PCP.   Florecita Mancera  Case Management Specialist  [facility]  [phone]

## 2019-08-29 NOTE — LETTER
[facility]  [address] [place] [address], [address]  [place] [address]  Phone:  [phone]  Fax:  [phone]        August 29, 2019    KARINA SAUNDERS  [address]  [facility] [address]    Dear Grant Sims is a copy of your completed PCA Assessment and Service Plan.  This is for your records and no action is required by you.  If you have additional questions regarding your assessment please contact me at [phone]. If you feel that your needs are not being met, please contact the Clinical Supervisor at [phone].    Sincerely,    Jennifer Mahmood RN, MA    E-mail: [email or web address]  Phone: [phone]      [facility]            Enclosure:  Completed PCA assessment

## 2019-08-30 ENCOUNTER — OFFICE VISIT (OUTPATIENT)
Dept: FAMILY MEDICINE | Facility: CLINIC | Age: 79
End: 2019-08-30
Payer: COMMERCIAL

## 2019-08-30 VITALS
RESPIRATION RATE: 16 BRPM | HEART RATE: 68 BPM | WEIGHT: 138 LBS | DIASTOLIC BLOOD PRESSURE: 74 MMHG | BODY MASS INDEX: 28.84 KG/M2 | OXYGEN SATURATION: 98 % | SYSTOLIC BLOOD PRESSURE: 134 MMHG

## 2019-08-30 DIAGNOSIS — H90.3 SNHL (SENSORY-NEURAL HEARING LOSS), ASYMMETRICAL: ICD-10-CM

## 2019-08-30 DIAGNOSIS — M19.049 HAND ARTHRITIS: Primary | ICD-10-CM

## 2019-08-30 DIAGNOSIS — E55.9 HYPOVITAMINOSIS D: ICD-10-CM

## 2019-08-30 PROCEDURE — 99214 OFFICE O/P EST MOD 30 MIN: CPT | Performed by: PHYSICIAN ASSISTANT

## 2019-08-30 RX ORDER — ACETAMINOPHEN 325 MG/1
650 TABLET ORAL EVERY 6 HOURS PRN
Qty: 100 TABLET | Refills: 11 | Status: SHIPPED | OUTPATIENT
Start: 2019-08-30 | End: 2021-05-19

## 2019-08-30 NOTE — PROGRESS NOTES
Subjective     Bethany Logan is a 79 year old female who presents to clinic today for the following health issues:    HPI   New Patient/Transfer of Care  Consult      Duration:     Description (location/character/radiation): pt needs refills of her tylenol and would like to have her hearing aids looked at as they are not working    Intensity:      Accompanying signs and symptoms:     History (similar episodes/previous evaluation): None    Precipitating or alleviating factors: None    Therapies tried and outcome: None      is present. Son also present and provides some of history  - Patient here to establish care, recently moved to [facility]. Previously seen at [facility]  - Requests refills of acetaminophen, takes prn arthritis pain  - Unable to get hearing aids to work  - Currently on high-dose vitamin D due to low levels, scheduled to finish in Oct 2019.    Patient Active Problem List   Diagnosis     OA (osteoarthritis) of knee - bilateral     Health Care Home     Constipation     History of total knee arthroplasty - right     SNHL (sensory-neural hearing loss), asymmetrical     Chronic left-sided low back pain with left-sided sciatica     Advance care planning     Osteoporosis     History of total hip arthroplasty, left     Lumbar spinal stenosis     Macular drusen, bilateral     Dermatochalasis of eyelid     Pseudophakia of both eyes     Closed nondisplaced zone I fracture of sacrum with routine healing, subsequent encounter     Other specified depressive episodes     Adjustment disorder with mixed anxiety and depressed mood     Fatty liver     Gallbladder polyps     Hypovitaminosis D     Past Surgical History:   Procedure Laterality Date     C TOTAL KNEE ARTHROPLASTY  9/25/13    Right     CATARACT IOL, RT/LT       COMBINED REPAIR PTOSIS WITH BLEPHAROPLASTY Bilateral 1/9/2017    Procedure: COMBINED REPAIR PTOSIS WITH BLEPHAROPLASTY;  Surgeon: Keven Vázquez MD;   Location: [facility] OR     SURGICAL HISTORY OF -       Left hip bone surgery       Social History     Tobacco Use     Smoking status: Never Smoker     Smokeless tobacco: Never Used   Substance Use Topics     Alcohol use: No     Family History   Problem Relation Age of Onset     C.A.D. No family hx of      Diabetes No family hx of      Hypertension No family hx of      Cerebrovascular Disease No family hx of          Current Outpatient Medications   Medication Sig Dispense Refill     acetaminophen (TYLENOL) 325 MG tablet Take 2 tablets (650 mg) by mouth every 6 hours as needed for mild pain 100 tablet 11     CALCIUM + VITAMIN D3 500-400 MG-UNIT CHEW Chew 1 tablet by mouth twice a day **MASTIQUE 1 TABLETA POR LA BOCA DOS VECES AL MIKHAIL 180 tablet 3     ketotifen (ZADITOR/REFRESH ANTI-ITCH) 0.025 % ophthalmic solution Place 1 drop into both eyes 2 times daily 10 mL 3     vitamin D2 (ERGOCALCIFEROL) 41815 units (1250 mcg) capsule Take 1 capsule (50,000 Units) by mouth twice a week 16 capsule 0       Reviewed and updated as needed this visit by Provider  Tobacco  Allergies  Meds  Problems  Med Hx  Surg Hx  Fam Hx         Review of Systems   ROS COMP: Constitutional, HEENT, cardiovascular, pulmonary, GI, , musculoskeletal, neuro, skin, endocrine and psych systems are negative, except as otherwise noted.      Objective    /74   Pulse 68   Resp 16   Wt 62.6 kg (138 lb)   LMP  (LMP Unknown)   SpO2 98%   BMI 28.84 kg/m    Body mass index is 28.84 kg/m .  Physical Exam   GENERAL:  WDWN, no acute distress  PSYCH: pleasant, cooperative  EYES: no discharge, no injection  HENT:  Normocephalic. Moist mucus membranes.  NECK:  Supple, symmetric  EXTREMITIES:  No gross deformities, moves all 4 limbs spontaneously  SKIN:  Warm and dry, no rash or suspicious lesions    NEUROLOGIC: alert, sensation grossly intact.    Diagnostic Test Results:  Reviewed in Epic        Assessment & Plan       ICD-10-CM    1. Hand arthritis  M19.049 acetaminophen (TYLENOL) 325 MG tablet   2. SNHL (sensory-neural hearing loss), asymmetrical H90.5    3. Hypovitaminosis D E55.9      - Tylenol refilled as requested  - Hearing aids checked, seemed functional, patient able to place them successfully and agrees they are now working appropriately  - Will need recheck vitamin D after completing high-dose therapy, will likely need to transition to 2000 international unit(s) daily to maintain levels, birgit through winter    30 minutes total spent during this visit, >50% spent in counseling and coordination of patient care.    Return in about 2 months (around 10/30/2019) for Medication Check.    Pham Cruz PA-C  [facility]

## 2019-09-05 ENCOUNTER — PATIENT OUTREACH (OUTPATIENT)
Dept: GERIATRIC MEDICINE | Facility: CLINIC | Age: 79
End: 2019-09-05

## 2019-09-06 ENCOUNTER — PATIENT OUTREACH (OUTPATIENT)
Dept: GERIATRIC MEDICINE | Facility: CLINIC | Age: 79
End: 2019-09-06

## 2019-09-06 NOTE — PROGRESS NOTES
[facility] Care Coordination Contact    Received after visit chart from care coordinator.  Completed following tasks: Mailed copy of care plan to client, Updated services in access and Submitted referrals/auths for ADC w/can, Chayo BURNS  Chart was returned to CC.    and Provider Signature - No POC Shared:  Member indicates that they do not want their POC shared with any [facility] providers.   Florecita Mancera  Case Management Specialist  [facility]  [phone]

## 2019-09-06 NOTE — LETTER
September 6, 2019      KARINA SAUNDERS  [address]  [facility] [address]      Dear Karina:    At [facility], we are dedicated to improving your health and well-being. Enclosed is the Comprehensive Care Plan that we developed with you on 8/15/2019. Please review the Care Plan carefully.    As a reminder, some of the things we discussed at your visit include:    Your physical and mental health    Ways to reduce falls    Health care needs you may have    Don t forget to contact your care coordinator if you:    Have been hospitalized or plan to be hospitalized     Have had a fall     Have experienced a change in physical health    Are experiencing emotional problems     If you do not agree with your Care Plan, have questions about it, or have experienced a change in your needs, please call me at [phone]. If you are hearing impaired, please call the [place] [employer] at 779 or [phone] (jhqdxg-op-lzmvnx relay service).    Sincerely,    Jennifer Mahmood RN, MA    E-mail: [email or web address]  Phone: [phone]      [facility] ([facility]) is a health plan that contracts with both Medicare and the [place] Medical Assistance (Medicaid) program to provide benefits of both programs to enrollees. Enrollment in [facility] depends on contract renewal.    [id number])     [id number] (11/18)

## 2019-11-01 ENCOUNTER — OFFICE VISIT (OUTPATIENT)
Dept: FAMILY MEDICINE | Facility: CLINIC | Age: 79
End: 2019-11-01
Payer: COMMERCIAL

## 2019-11-01 ENCOUNTER — ANCILLARY PROCEDURE (OUTPATIENT)
Dept: GENERAL RADIOLOGY | Facility: CLINIC | Age: 79
End: 2019-11-01
Attending: PHYSICIAN ASSISTANT
Payer: COMMERCIAL

## 2019-11-01 VITALS
SYSTOLIC BLOOD PRESSURE: 126 MMHG | HEART RATE: 64 BPM | WEIGHT: 136 LBS | RESPIRATION RATE: 16 BRPM | DIASTOLIC BLOOD PRESSURE: 80 MMHG | BODY MASS INDEX: 28.42 KG/M2 | TEMPERATURE: 98.4 F

## 2019-11-01 DIAGNOSIS — Z11.1 SCREENING EXAMINATION FOR PULMONARY TUBERCULOSIS: ICD-10-CM

## 2019-11-01 DIAGNOSIS — Z02.89 ENCOUNTER FOR COMPLETION OF FORM WITH PATIENT: Primary | ICD-10-CM

## 2019-11-01 PROCEDURE — 99213 OFFICE O/P EST LOW 20 MIN: CPT | Performed by: PHYSICIAN ASSISTANT

## 2019-11-01 PROCEDURE — 86580 TB INTRADERMAL TEST: CPT | Performed by: PHYSICIAN ASSISTANT

## 2019-11-01 PROCEDURE — 71046 X-RAY EXAM CHEST 2 VIEWS: CPT | Mod: FY

## 2019-11-01 ASSESSMENT — PATIENT HEALTH QUESTIONNAIRE - PHQ9
SUM OF ALL RESPONSES TO PHQ QUESTIONS 1-9: 12
10. IF YOU CHECKED OFF ANY PROBLEMS, HOW DIFFICULT HAVE THESE PROBLEMS MADE IT FOR YOU TO DO YOUR WORK, TAKE CARE OF THINGS AT HOME, OR GET ALONG WITH OTHER PEOPLE: VERY DIFFICULT
SUM OF ALL RESPONSES TO PHQ QUESTIONS 1-9: 12

## 2019-11-01 NOTE — NURSING NOTE

## 2019-11-01 NOTE — PROGRESS NOTES
Answers for HPI/ROS submitted by the patient on 11/1/2019   If you checked off any problems, how difficult have these problems made it for you to do your work, take care of things at home, or get along with other people?: Very difficult  PHQ9 TOTAL SCORE: 12  Subjective     Bethany Logan is a 79 year old female who presents to clinic today for the following health issues:    HPI   Form      Duration: today    Description (location/character/radiation): adult day care    Intensity:  na    Accompanying signs and symptoms: na    History (similar episodes/previous evaluation): None    Precipitating or alleviating factors: None    Therapies tried and outcome: None         Patient Active Problem List   Diagnosis     OA (osteoarthritis) of knee - bilateral     Health Care Home     Constipation     History of total knee arthroplasty - right     SNHL (sensory-neural hearing loss), asymmetrical     Chronic left-sided low back pain with left-sided sciatica     Advance care planning     Osteoporosis     History of total hip arthroplasty, left     Lumbar spinal stenosis     Macular drusen, bilateral     Dermatochalasis of eyelid     Pseudophakia of both eyes     Closed nondisplaced zone I fracture of sacrum with routine healing, subsequent encounter     Other specified depressive episodes     Adjustment disorder with mixed anxiety and depressed mood     Fatty liver     Gallbladder polyps     Hypovitaminosis D     Past Surgical History:   Procedure Laterality Date     C TOTAL KNEE ARTHROPLASTY  9/25/13    Right     CATARACT IOL, RT/LT       COMBINED REPAIR PTOSIS WITH BLEPHAROPLASTY Bilateral 1/9/2017    Procedure: COMBINED REPAIR PTOSIS WITH BLEPHAROPLASTY;  Surgeon: Keven Vázquez MD;  Location:  OR     SURGICAL HISTORY OF -       Left hip bone surgery       Social History     Tobacco Use     Smoking status: Never Smoker     Smokeless tobacco: Never Used   Substance Use Topics     Alcohol use: No     Family  History   Problem Relation Age of Onset     C.A.D. No family hx of      Diabetes No family hx of      Hypertension No family hx of      Cerebrovascular Disease No family hx of          Current Outpatient Medications   Medication Sig Dispense Refill     acetaminophen (TYLENOL) 325 MG tablet Take 2 tablets (650 mg) by mouth every 6 hours as needed for mild pain 100 tablet 11     CALCIUM + VITAMIN D3 500-400 MG-UNIT CHEW Chew 1 tablet by mouth twice a day **MASTIQUE 1 TABLETA POR LA BOCA DOS VECES AL MIKHAIL 180 tablet 3     ketotifen (ZADITOR/REFRESH ANTI-ITCH) 0.025 % ophthalmic solution Place 1 drop into both eyes 2 times daily (Patient not taking: Reported on 11/1/2019) 10 mL 3       Reviewed and updated as needed this visit by Provider  Tobacco  Allergies  Meds  Problems  Med Hx  Surg Hx  Fam Hx         Review of Systems   ROS COMP: Constitutional, HEENT, cardiovascular, pulmonary, GI, , musculoskeletal, neuro, skin, endocrine and psych systems are negative, except as otherwise noted.      Objective    /80 (Cuff Size: Adult Regular)   Pulse 64   Temp 98.4  F (36.9  C) (Tympanic)   Resp 16   Wt 61.7 kg (136 lb)   LMP  (LMP Unknown)   BMI 28.42 kg/m    Body mass index is 28.42 kg/m .  Physical Exam   GENERAL:  WDWN, no acute distress  PSYCH: pleasant, cooperative  EYES: no discharge, no injection  HENT:  Normocephalic. Moist mucus membranes.  NECK:  Supple, symmetric  EXTREMITIES:  No gross deformities, moves all 4 limbs spontaneously  SKIN:  Warm and dry, no rash or suspicious lesions    NEUROLOGIC: alert, sensation grossly intact.    Diagnostic Test Results:  pending        Assessment & Plan       ICD-10-CM    1. Encounter for completion of form with patient Z02.89    2. Screening examination for pulmonary tuberculosis Z11.1 XR Chest 2 Views     TB INTRADERMAL TEST     - Tb skin test and CXR results pending, unable to complete form until done. Form placed in provider's in box and will be completed  upon patient's return on Monday.    Return in about 5 months (around 4/1/2020) for Annual Wellness Exam.    Pham Cruz PA-C  [facility]

## 2019-11-01 NOTE — LETTER
November 4, 2019      Bethany Logan  [address]  [facility] [address]        Dear Bethany,    We are writing to inform you of your test results.    - Your chest XR was negative for signs of tuberculosis infection    Resulted Orders   XR Chest 2 Views    Narrative    CHEST TWO VIEWS    11/1/2019 4:18 PM     HISTORY: Screening examination for pulmonary tuberculosis.    COMPARISON: None.    FINDINGS: The heart size is normal. The thoracic aorta is calcified  and tortuous. There is mild probable scarring at both lung bases. The  lungs are otherwise clear. No pneumothorax or pleural effusion.  Degenerative disease in the thoracic spine.      Impression    IMPRESSION: No acute abnormality.    MONA MIN MD     If you have any questions or concerns, please call the clinic at the number listed above.       Sincerely,        Pham Cruz PA-C

## 2019-11-04 ENCOUNTER — ALLIED HEALTH/NURSE VISIT (OUTPATIENT)
Dept: NURSING | Facility: CLINIC | Age: 79
End: 2019-11-04
Payer: COMMERCIAL

## 2019-11-04 DIAGNOSIS — Z11.1 SCREENING EXAMINATION FOR PULMONARY TUBERCULOSIS: Primary | ICD-10-CM

## 2019-11-04 DIAGNOSIS — R76.11 POSITIVE SKIN TEST FOR TUBERCULOSIS: Primary | ICD-10-CM

## 2019-11-04 LAB
PPDINDURATION: 10 MM (ref 0–5)
PPDREDNESS: 15 MM

## 2019-11-04 PROCEDURE — 99207 ZZC NO CHARGE NURSE ONLY: CPT

## 2019-11-04 PROCEDURE — 36415 COLL VENOUS BLD VENIPUNCTURE: CPT | Performed by: PHYSICIAN ASSISTANT

## 2019-11-04 PROCEDURE — 86481 TB AG RESPONSE T-CELL SUSP: CPT | Performed by: PHYSICIAN ASSISTANT

## 2019-11-04 NOTE — PROGRESS NOTES
[facility] result:  No results found for: PPDREDNESS, PPDINDURATIO  Is induration greater than 5mm?  Yes, huddled with provider: quantefeiron lab will be ordered.

## 2019-11-04 NOTE — RESULT ENCOUNTER NOTE
Lab letter signed and printed. Message comments below:  - Your chest XR was negative for signs of tuberculosis infection

## 2019-11-06 LAB
GAMMA INTERFERON BACKGROUND BLD IA-ACNC: 0.48 IU/ML
M TB IFN-G BLD-IMP: POSITIVE
M TB IFN-G CD4+ BCKGRND COR BLD-ACNC: >10 IU/ML
MITOGEN IGNF BCKGRD COR BLD-ACNC: 0.59 IU/ML
MITOGEN IGNF BCKGRD COR BLD-ACNC: 0.62 IU/ML

## 2019-11-12 ENCOUNTER — TELEPHONE (OUTPATIENT)
Dept: FAMILY MEDICINE | Facility: CLINIC | Age: 79
End: 2019-11-12

## 2019-11-12 DIAGNOSIS — Z22.7 LTBI (LATENT TUBERCULOSIS INFECTION): Primary | ICD-10-CM

## 2019-11-12 NOTE — TELEPHONE ENCOUNTER
Pt called using an /care coordinator re TB results. It looks like she has a negative chest X-ray on 11/1/19, but then a positive Quantiferon TB Gold Plus on 11/4/19. Patient said she never has a positive result and has occasional coughs. No chills, sweats, or fever. Should the patient follow up with PCP or do we go by X-ray negative result? Care coordinator did not know which number to leave for call back and said she will call back tomorrow.

## 2019-11-13 RX ORDER — RIFAMPIN 300 MG/1
600 CAPSULE ORAL DAILY
Qty: 30 CAPSULE | Refills: 3 | Status: SHIPPED | OUTPATIENT
Start: 2019-11-13 | End: 2020-01-29

## 2019-11-13 NOTE — TELEPHONE ENCOUNTER
Please call patient back (will need ):    - I apologize as this is the first I have seen your Quantiferon result, no sure how it got overlooked. The positive skin test + positive Quantiferon + negative CXR confirms a diagnosis of latent tuberculosis. This means you were exposed to tuberculosis bacteria, but you currently carry it and it is not causing an active infection right now. This means you are not contagious and can participate in regular activities. However, latent tuberculosis can become an active infection at any time. Therefore I recommend you take a course of antibiotics to get rid of the bacteria. I have sent a medication called rifampin to your pharmacy; you will take 2 pills once a day for the next 4 months. Please come back in 1 month for a lab-only check of your liver enzymes to make sure your body is handling the medication ok.

## 2019-11-13 NOTE — TELEPHONE ENCOUNTER
Call attempted x 2. Unable to leave a message. Patient son Sudhakar was called with providers message. He was given providers message. He verbalized understating and agreed to review message with pt.

## 2019-11-14 ENCOUNTER — OFFICE VISIT (OUTPATIENT)
Dept: AUDIOLOGY | Facility: CLINIC | Age: 79
End: 2019-11-14
Payer: COMMERCIAL

## 2019-11-14 DIAGNOSIS — H90.3 SENSORINEURAL HEARING LOSS, BILATERAL: Primary | ICD-10-CM

## 2019-11-14 PROCEDURE — 99207 ZZC NO CHARGE LOS: CPT | Performed by: AUDIOLOGIST

## 2019-11-14 PROCEDURE — V5266 BATTERY FOR HEARING DEVICE: HCPCS | Performed by: AUDIOLOGIST

## 2019-11-14 PROCEDURE — 92593 HC HEARING AID CHECK, BINAURAL: CPT | Performed by: AUDIOLOGIST

## 2019-11-14 NOTE — PROGRESS NOTES
HEARING AID RECHECK    Patient Name:  Bethany Logan    Patient Age:   79 year old    :  1940    Background:   Patient is here today with her .  Patient complains that her hearing aids aren't working.    Procedures:   The batteries in the hearing aids were all dead. Patient has been taking them out of the hearing aid package and leaving them loose in the hearing aid box.  I believe the batteries are touching each other and causing them to die, even though the air tab has not been removed.  I inserted new batteries and a listening check indicated good gain and sound quality.    Plan:   I gave patient new batteries (24 cells, a 90 day supply, per patient's insurance benefit). I instructed patient to always leave the batteries in the package until she is ready to use them, then let them air out for at least 2 minutes prior to inserting in the hearing aids. Patient agreed and will visit us as needed for additional service.    CHARGES: 69029, Hearing aid check, binaural, .002, hearing aid batteries, 24 ea.    Octaviano Galloway MA, CCC-A  [place] Licensed Audiologist #[id number]  2019

## 2019-12-03 NOTE — TELEPHONE ENCOUNTER
Refilled x 30 days but recommend patient attempt to wean off this by taking every other day for few doses then every three days for a few doses then stop it.  Let me know if heartburn issues persist/return.    Ernestina Springer M.D.    none

## 2019-12-09 ENCOUNTER — OFFICE VISIT (OUTPATIENT)
Dept: FAMILY MEDICINE | Facility: CLINIC | Age: 79
End: 2019-12-09
Payer: COMMERCIAL

## 2019-12-09 VITALS
RESPIRATION RATE: 16 BRPM | BODY MASS INDEX: 28.22 KG/M2 | HEART RATE: 61 BPM | DIASTOLIC BLOOD PRESSURE: 76 MMHG | OXYGEN SATURATION: 99 % | WEIGHT: 135 LBS | SYSTOLIC BLOOD PRESSURE: 138 MMHG

## 2019-12-09 DIAGNOSIS — R03.0 ELEVATED BP WITHOUT DIAGNOSIS OF HYPERTENSION: Primary | ICD-10-CM

## 2019-12-09 DIAGNOSIS — Z22.7 LTBI (LATENT TUBERCULOSIS INFECTION): ICD-10-CM

## 2019-12-09 PROCEDURE — 36415 COLL VENOUS BLD VENIPUNCTURE: CPT | Performed by: PHYSICIAN ASSISTANT

## 2019-12-09 PROCEDURE — 80076 HEPATIC FUNCTION PANEL: CPT | Performed by: PHYSICIAN ASSISTANT

## 2019-12-09 PROCEDURE — 99214 OFFICE O/P EST MOD 30 MIN: CPT | Performed by: PHYSICIAN ASSISTANT

## 2019-12-09 NOTE — PROGRESS NOTES
Subjective     Bethany Logan is a 79 year old female who presents to clinic today for the following health issues:    HPI   Follow up      Duration:     Description (location/character/radiation): pt is here to have BP checked and labs.  Pt would also like to get advise on diet    Intensity:  moderate    Accompanying signs and symptoms: none    History (similar episodes/previous evaluation): None    Precipitating or alleviating factors: None    Therapies tried and outcome: None      is present.   - Patient attends an adult  program. States she was told her BP reading was high, it gets checked once monthly. RN doesn't speak Moldovan so patient is unclear of reading.  - Per chart review, patient has had a few elevated readings but return to wnl upon 2nd check.  - Tolerating rifampin, has been on for 1 month for latent Tb. Due to check LFTs today.    Patient Active Problem List   Diagnosis     OA (osteoarthritis) of knee - bilateral     Health Care Home     Constipation     History of total knee arthroplasty - right     SNHL (sensory-neural hearing loss), asymmetrical     Chronic left-sided low back pain with left-sided sciatica     Advance care planning     Osteoporosis     History of total hip arthroplasty, left     Lumbar spinal stenosis     Macular drusen, bilateral     Dermatochalasis of eyelid     Pseudophakia of both eyes     Closed nondisplaced zone I fracture of sacrum with routine healing, subsequent encounter     Other specified depressive episodes     Adjustment disorder with mixed anxiety and depressed mood     Fatty liver     Gallbladder polyps     Hypovitaminosis D     Past Surgical History:   Procedure Laterality Date     C TOTAL KNEE ARTHROPLASTY  9/25/13    Right     CATARACT IOL, RT/LT       COMBINED REPAIR PTOSIS WITH BLEPHAROPLASTY Bilateral 1/9/2017    Procedure: COMBINED REPAIR PTOSIS WITH BLEPHAROPLASTY;  Surgeon: Keven Vázquez MD;  Location:  OR     SURGICAL  HISTORY OF -       Left hip bone surgery       Social History     Tobacco Use     Smoking status: Never Smoker     Smokeless tobacco: Never Used   Substance Use Topics     Alcohol use: No     Family History   Problem Relation Age of Onset     C.A.D. No family hx of      Diabetes No family hx of      Hypertension No family hx of      Cerebrovascular Disease No family hx of          Current Outpatient Medications   Medication Sig Dispense Refill     acetaminophen (TYLENOL) 325 MG tablet Take 2 tablets (650 mg) by mouth every 6 hours as needed for mild pain 100 tablet 11     CALCIUM + VITAMIN D3 500-400 MG-UNIT CHEW Chew 1 tablet by mouth twice a day **MASTIQUE 1 TABLETA POR LA BOCA DOS VECES AL MIKHAIL 180 tablet 3     rifampin (RIFADIN) 300 MG capsule Take 2 capsules (600 mg) by mouth daily 30 capsule 3       Reviewed and updated as needed this visit by Provider  Tobacco  Allergies  Meds  Problems  Med Hx  Surg Hx  Fam Hx         Review of Systems   ROS COMP: Constitutional, HEENT, cardiovascular, pulmonary, GI, , musculoskeletal, neuro, skin, endocrine and psych systems are negative, except as otherwise noted.      Objective    /76   Pulse 61   Resp 16   Wt 61.2 kg (135 lb)   LMP  (LMP Unknown)   SpO2 99%   BMI 28.22 kg/m    Body mass index is 28.22 kg/m .  Physical Exam   GENERAL:  WDWN, no acute distress  PSYCH: pleasant, cooperative  EYES: no discharge, no injection  HENT:  Normocephalic. Moist mucus membranes.  NECK:  Supple, symmetric  EXTREMITIES:  No gross deformities, moves all 4 limbs spontaneously  SKIN:  Warm and dry, no rash or suspicious lesions    NEUROLOGIC: alert, sensation grossly intact.    Diagnostic Test Results:  none         Assessment & Plan       ICD-10-CM    1. Elevated BP without diagnosis of hypertension R03.0 DISCONTINUED: order for DME   2. 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 Recommend she has the center send us their results.  - I also recommend they recheck in 5 minutes if the first reading is elevated  - Given age, BP goal 140/90 or less. If consistently elevated, consider low-dose HTN medication.  - LFT results pending, complete 4-month course rifampin as prescribed.    30 minutes total spent during this visit, >50% spent in counseling and coordination of patient care.    Return in about 1 month (around 1/9/2020), or if symptoms worsen or fail to improve.    Pham Cruz PA-C  [facility]

## 2019-12-09 NOTE — LETTER
December 11, 2019      Bethany Logan  [address]  [facility] [address]        Dear Bethany,    We are writing to inform you of your test results.    - Your liver function tests are normal.    Results for orders placed or performed in visit on 12/09/19   Hepatic panel (Albumin, ALT, AST, Bili, Alk Phos, TP)     Status: Abnormal   Result Value Ref Range    Bilirubin Direct 0.2 0.0 - 0.2 mg/dL    Bilirubin Total 0.3 0.2 - 1.3 mg/dL    Albumin 3.0 (L) 3.4 - 5.0 g/dL    Protein Total 6.6 (L) 6.8 - 8.8 g/dL    Alkaline Phosphatase 122 40 - 150 U/L    ALT 23 0 - 50 U/L    AST 26 0 - 45 U/L       Thank you for choosing [facility].  We appreciate the opportunity to serve you and look forward to supporting your healthcare needs in the future.    If you have any questions or concerns, please call me or my staff at [phone].      Sincerely,        Pham Cruz PA-C

## 2019-12-10 LAB
ALBUMIN SERPL-MCNC: 3 G/DL (ref 3.4–5)
ALP SERPL-CCNC: 122 U/L (ref 40–150)
ALT SERPL W P-5'-P-CCNC: 23 U/L (ref 0–50)
AST SERPL W P-5'-P-CCNC: 26 U/L (ref 0–45)
BILIRUB DIRECT SERPL-MCNC: 0.2 MG/DL (ref 0–0.2)
BILIRUB SERPL-MCNC: 0.3 MG/DL (ref 0.2–1.3)
PROT SERPL-MCNC: 6.6 G/DL (ref 6.8–8.8)

## 2019-12-19 ENCOUNTER — TELEPHONE (OUTPATIENT)
Dept: FAMILY MEDICINE | Facility: CLINIC | Age: 79
End: 2019-12-19

## 2019-12-19 NOTE — TELEPHONE ENCOUNTER
Pt was called with normal liver function tests. She asked if she is to continue taking rifampin (RIFADIN) 300 MG capsule. Explained total treatment course = 4 months. She is to continue medication. Pt verbalized understating and agreement with plan.

## 2019-12-19 NOTE — TELEPHONE ENCOUNTER
Reason for Call:  Request for results:    Name of test or procedure: lab results    Date of test of procedure: 12/09/2019    Location of the test or procedure: [facility]    OK to leave the result message on voice mail or with a family member? YES    Phone number Patient can be reached at:  Home number on file [phone] (home)    Additional comments: Son, Shlomo, called asking for lab results.  Writer does not see him on the list of consent to communicate.  Informed him a call will be returned to his Mother, Bethany for her lab results.    Bethany requires a .    Call taken on 12/19/2019 at 2:14 PM by Janis Noel

## 2019-12-26 NOTE — MR AVS SNAPSHOT
After Visit Summary   12/18/2017    Bethany Logan    MRN: [id number]           Patient Information     Date Of Birth          1940        Visit Information        Provider Department      12/18/2017 11:00 AM Ernestina Alvarado MD; [employer] [facility]        Today's Diagnoses     Encounter for routine adult health examination without abnormal findings    -  1    Acute left-sided low back pain with left-sided sciatica        Allergic conjunctivitis, bilateral        At risk for falling        SNHL (sensory-neural hearing loss), asymmetrical          Care Instructions      Preventive Health Recommendations  Female Ages 65 +    Yearly exam:     See your health care provider every year in order to  o Review health changes.   o Discuss preventive care.    o Review your medicines if your doctor has prescribed any.      You no longer need a yearly Pap test unless you've had an abnormal Pap test in the past 10 years. If you have vaginal symptoms, such as bleeding or discharge, be sure to talk with your provider about a Pap test.      Every 1 to 2 years, have a mammogram.  If you are over 69, talk with your health care provider about whether or not you want to continue having screening mammograms.      Every 10 years, have a colonoscopy. Or, have a yearly FIT test (stool test). These exams will check for colon cancer.       Have a cholesterol test every 5 years, or more often if your doctor advises it.       Have a diabetes test (fasting glucose) every three years. If you are at risk for diabetes, you should have this test more often.       At age 65, have a bone density scan (DEXA) to check for osteoporosis (brittle bone disease).    Shots:    Get a flu shot each year.    Get a tetanus shot every 10 years.    Talk to your doctor about your pneumonia vaccines. There are now two you should receive - Pneumovax (PPSV 23) and Prevnar (PCV 13).    Talk  Tele PA to your doctor about the shingles vaccine.    Talk to your doctor about the hepatitis B vaccine.    Nutrition:     Eat at least 5 servings of fruits and vegetables each day.      Eat whole-grain bread, whole-wheat pasta and brown rice instead of white grains and rice.      Talk to your provider about Calcium and Vitamin D.     Lifestyle    Exercise at least 150 minutes a week (30 minutes a day, 5 days a week). This will help you control your weight and prevent disease.      Limit alcohol to one drink per day.      No smoking.       Wear sunscreen to prevent skin cancer.       See your dentist twice a year for an exam and cleaning.      See your eye doctor every 1 to 2 years to screen for conditions such as glaucoma, macular degeneration, cataracts, etc     At [facility], we strive to deliver an exceptional experience to you, every time we see you.  If you receive a survey in the mail, please send us back your thoughts. We really do value your feedback.    Based on your medical history, these are the current health maintenance/preventive care services that you are due for (some may have been done at this visit.)  Health Maintenance Due   Topic Date Due     DEPRESSION ACTION PLAN Q1 YR  08/18/1958     EYE EXAM Q1 YEAR  10/25/2017     FALL RISK ASSESSMENT  10/26/2017         Suggested websites for health information:  Www.[place].org : Up to date and easily searchable information on multiple topics.  Www.medlineplus.gov : medication info, interactive tutorials, watch real surgeries online  Www.familydoctor.org : good info from the Academy of Family Physicians  Www.cdc.gov : public health info, travel advisories, epidemics (H1N1)  Www.aap.org : children's health info, normal development, vaccinations  Www.health.state[place].us : [place] dept of health, public health issues in [place], N1N1    Your care team:                            Family Medicine Internal Medicine   MD Ivan Bralow MD    MD Natalia Avalos PA-C, MD Pediatrics   SAI Kruse, MD Janessa Whalen CNP, MD Deborah Mielke, MD Kim Thein, APRMAURY ROPER      Clinic hours: Monday - Thursday 7 am-7 pm; Fridays 7 am-5 pm.   Urgent care: Monday - Friday 11 am-9 pm; Saturday and Sunday 9 am-5 pm.  Pharmacy : Monday -Thursday 8 am-8 pm; Friday 8 am-6 pm; Saturday and Sunday 9 am-5 pm.     Clinic: [phone]   Pharmacy: [phone]    At your visit today, we discussed your risk for falls and preventive options.    Fall Prevention  Falls often occur due to slipping, tripping or losing your balance. Millions of people fall every year and injure themselves. Here are ways to reduce your risk of falling again.    Think about your fall, was there anything that caused your fall that can be fixed, removed, or replaced?    Make your home safe by keeping walkways clear of objects you may trip over.    Use non-slip pads under rugs. Do not use area rugs or small throw rugs.    Use non-slip mats in bathtubs and showers.    Install handrails and lights on staircases.    Do not walk in poorly lit areas.    Do not stand on chairs or wobbly ladders.    Use caution when reaching overhead or looking upward. This position can cause a loss of balance.    Be sure your shoes fit properly, have non-slip bottoms and are in good condition.     Wear shoes both inside and out. Avoid going barefoot or wearing slippers.    Be cautious when going up and down stairs, curbs, and when walking on uneven sidewalks.    If your balance is poor, consider using a cane or walker.    If your fall was related to alcohol use, stop or limit alcohol intake.     If your fall was related to use of sleeping medicines, talk to your doctor about this. You may need to reduce your dosage at bedtime if you awaken during the night to go to the bathroom.      To reduce the need for nighttime  bathroom trips:    Avoid drinking fluids for several hours before going to bed    Empty your bladder before going to bed    Men can keep a urinal at the bedside    Stay as active as you can. Balance, flexibility, strength, and endurance all come from exercise. They all play a role in preventing falls. Ask your healthcare provider which types of activity are right for you.    Get your vision checked on a regular basis.    If you have pets, know where they are before you stand up or walk so you don't trip over them.    Use night lights.  Date Last Reviewed: 11/5/2015 2000-2017 [employer]. [address] [place] [address]. All rights reserved. This information is not intended as a substitute for professional medical care. Always follow your healthcare professional's instructions.                Follow-ups after your visit        Additional Services     AUDIOLOGY ADULT REFERRAL       Your provider has referred you to: [facility]: [facility] [phone]   [email or web address]    Specialty Testing:  Hearing Aid Consultation                  Who to contact     If you have questions or need follow up information about today's clinic visit or your schedule please contact [facility] directly at [phone].  Normal or non-critical lab and imaging results will be communicated to you by MyChart, letter or phone within 4 business days after the clinic has received the results. If you do not hear from us within 7 days, please contact the clinic through [employer] or phone. If you have a critical or abnormal lab result, we will notify you by phone as soon as possible.  Submit refill requests through [employer] or call your pharmacy and they will forward the refill request to us. Please allow 3 business days for your refill to be completed.          Additional Information About Your Visit        [employer] Information     [employer] lets you  "send messages to your doctor, view your test results, renew your prescriptions, schedule appointments and more. To sign up, go to [email or web address] . Click on \"Log in\" on the left side of the screen, which will take you to the Welcome page. Then click on \"Sign up Now\" on the right side of the page.     You will be asked to enter the access code listed below, as well as some personal information. Please follow the directions to create your username and password.     Your access code is: [email or web address]  Expires: 2018  9:56 AM     Your access code will  in 90 days. If you need help or a new code, please call your [facility] clinic or [phone].        Care EveryWhere ID     This is your Care EveryWhere ID. This could be used by other organizations to access your [facility] medical records  [id number]        Your Vitals Were     Pulse Temperature Height Pulse Oximetry BMI (Body Mass Index)       76 97.3  F (36.3  C) (Oral) 4' 8\" (1.422 m) 97% 29.37 kg/m2        Blood Pressure from Last 3 Encounters:   17 136/74   10/23/17 134/82   17 116/79    Weight from Last 3 Encounters:   17 131 lb (59.4 kg)   10/23/17 127 lb (57.6 kg)   17 125 lb (56.7 kg)              We Performed the Following     AUDIOLOGY ADULT REFERRAL     DEPRESSION ACTION PLAN (DAP)          Today's Medication Changes          These changes are accurate as of: 17 11:45 AM.  If you have any questions, ask your nurse or doctor.               Start taking these medicines.        Dose/Directions    calcium carbonate-vitamin D 600-400 MG-UNIT Chew   Used for:  Acute left-sided low back pain with left-sided sciatica   Started by:  Ernestina Alvarado MD        Dose:  1 chew tab   Take 1 chew tab by mouth 2 times daily   Quantity:  180 tablet   Refills:  3            Where to get your medicines      These medications were sent to [address]  [address] " [facility] [address]     Phone:  [phone]     acetaminophen 325 MG tablet    calcium carbonate-vitamin D 600-400 MG-UNIT Chew    ketotifen 0.025 % Soln ophthalmic solution         Some of these will need a paper prescription and others can be bought over the counter.  Ask your nurse if you have questions.     Bring a paper prescription for each of these medications     traMADol 50 MG tablet                Primary Care Provider Office Phone # Fax #    Ernestina Boo Juan Argueta [phone]       [address]  [facility] [address]        Equal Access to Services     [facility]: Hadii aad ku hadasho Soomaali, waaxda luqadaha, qaybta kaalmada adeegyada, waxay idiin hayaan adeeg graeme gama . So [facility] [phone].    ATENCIÓN: Si habla español, tiene a harris disposición servicios gratuitos de asistencia lingüística. Llame al [phone].    We comply with applicable federal civil rights laws and [place] laws. We do not discriminate on the basis of race, color, national origin, age, disability, sex, sexual orientation, or gender identity.            Thank you!     Thank you for choosing [facility]  for your care. Our goal is always to provide you with excellent care. Hearing back from our patients is one way we can continue to improve our services. Please take a few minutes to complete the written survey that you may receive in the mail after your visit with us. Thank you!             Your Updated Medication List - Protect others around you: Learn how to safely use, store and throw away your medicines at www.disposemymeds.org.          This list is accurate as of: 12/18/17 11:45 AM.  Always use your most recent med list.                   Brand Name Dispense Instructions for use Diagnosis    acetaminophen 325 MG tablet    TYLENOL    100 tablet    Take 2 tablets (650 mg) by mouth every 6 hours as needed for mild pain    Acute left-sided low  back pain with left-sided sciatica       calcium carbonate-vitamin D 600-400 MG-UNIT Chew     180 tablet    Take 1 chew tab by mouth 2 times daily    Acute left-sided low back pain with left-sided sciatica       ketotifen 0.025 % Soln ophthalmic solution    ZADITOR/REFRESH ANTI-ITCH    10 mL    Place 1 drop into both eyes 2 times daily    Allergic conjunctivitis, bilateral       order for DME     1 Device    Equipment being ordered: Scooter    Spinal stenosis of lumbar region with neurogenic claudication       traMADol 50 MG tablet    ULTRAM    30 tablet    Take 1 tablet (50 mg) by mouth every 6 hours as needed for moderate pain or breakthrough pain    Acute left-sided low back pain with left-sided sciatica

## 2020-01-10 ENCOUNTER — TRANSFERRED RECORDS (OUTPATIENT)
Dept: HEALTH INFORMATION MANAGEMENT | Facility: CLINIC | Age: 80
End: 2020-01-10

## 2020-01-24 ENCOUNTER — PATIENT OUTREACH (OUTPATIENT)
Dept: GERIATRIC MEDICINE | Facility: CLINIC | Age: 80
End: 2020-01-24

## 2020-01-24 NOTE — PROGRESS NOTES
[facility] Care Coordination Contact      [facility] Six-Month Telephone Assessment    6 month telephone assessment completed on 01/24/20.    ER visits: No  Hospitalizations: No  TCU stays: No  Significant health status changes: Positive ppd test. Is taking medication for latent TB. No problems with the medications.   Falls/Injuries: No  ADL/IADL changes: No  Changes in services: No    Caregiver Assessment follow up:  n/a    Goals: See POC in chart for goal progress documentation.  Member had questions about her metromobility card. CC explained she does not get a new card every month but the money is added electronically. Son stated he understood and would explain to member.     Will see member in 6 months for an annual health risk assessment.   Encouraged member to call CC with any questions or concerns in the meantime.     Jennifer Mahmood RN  [facility] Care Coordinator  [phone]

## 2020-01-29 ENCOUNTER — OFFICE VISIT (OUTPATIENT)
Dept: FAMILY MEDICINE | Facility: CLINIC | Age: 80
End: 2020-01-29
Payer: COMMERCIAL

## 2020-01-29 VITALS
DIASTOLIC BLOOD PRESSURE: 66 MMHG | RESPIRATION RATE: 20 BRPM | HEART RATE: 62 BPM | BODY MASS INDEX: 27.71 KG/M2 | HEIGHT: 58 IN | OXYGEN SATURATION: 97 % | TEMPERATURE: 98.3 F | WEIGHT: 132 LBS | SYSTOLIC BLOOD PRESSURE: 120 MMHG

## 2020-01-29 DIAGNOSIS — Z51.81 ENCOUNTER FOR MEDICATION MONITORING: Primary | ICD-10-CM

## 2020-01-29 DIAGNOSIS — Z22.7 LTBI (LATENT TUBERCULOSIS INFECTION): ICD-10-CM

## 2020-01-29 PROCEDURE — 36415 COLL VENOUS BLD VENIPUNCTURE: CPT | Performed by: PHYSICIAN ASSISTANT

## 2020-01-29 PROCEDURE — 80076 HEPATIC FUNCTION PANEL: CPT | Performed by: PHYSICIAN ASSISTANT

## 2020-01-29 PROCEDURE — 99213 OFFICE O/P EST LOW 20 MIN: CPT | Performed by: PHYSICIAN ASSISTANT

## 2020-01-29 RX ORDER — RIFAMPIN 300 MG/1
600 CAPSULE ORAL DAILY
Qty: 60 CAPSULE | Refills: 0 | Status: SHIPPED | OUTPATIENT
Start: 2020-01-29 | End: 2020-02-24

## 2020-01-29 RX ORDER — ONDANSETRON 4 MG/1
4 TABLET, FILM COATED ORAL EVERY 6 HOURS PRN
Qty: 90 TABLET | Refills: 0 | Status: SHIPPED | OUTPATIENT
Start: 2020-01-29 | End: 2021-04-28

## 2020-01-29 ASSESSMENT — MIFFLIN-ST. JEOR: SCORE: 963.5

## 2020-01-30 LAB
ALBUMIN SERPL-MCNC: 3.1 G/DL (ref 3.4–5)
ALP SERPL-CCNC: 109 U/L (ref 40–150)
ALT SERPL W P-5'-P-CCNC: 15 U/L (ref 0–50)
AST SERPL W P-5'-P-CCNC: 17 U/L (ref 0–45)
BILIRUB DIRECT SERPL-MCNC: <0.1 MG/DL (ref 0–0.2)
BILIRUB SERPL-MCNC: 0.2 MG/DL (ref 0.2–1.3)
PROT SERPL-MCNC: 7 G/DL (ref 6.8–8.8)

## 2020-01-30 NOTE — PROGRESS NOTES
Checked with [facility] KEVON on MM tickets status.  Member's card had been put on hold back in October.  CMS requested hold be removed and load members card with 1 book rush tickets.   CC notified.  Florecita Mancera  Case Management Specialist  [facility]  [phone]

## 2020-01-31 ENCOUNTER — TELEPHONE (OUTPATIENT)
Dept: FAMILY MEDICINE | Facility: CLINIC | Age: 80
End: 2020-01-31

## 2020-01-31 ENCOUNTER — APPOINTMENT (OUTPATIENT)
Dept: INTERPRETER SERVICES | Facility: CLINIC | Age: 80
End: 2020-01-31
Payer: COMMERCIAL

## 2020-01-31 NOTE — TELEPHONE ENCOUNTER
[facility]  Services: Please see  encounter in chart for name/ID number.     Patient was called with  services.     Provider message shared with patient.     No further follow up needed.

## 2020-02-20 ENCOUNTER — ALLIED HEALTH/NURSE VISIT (OUTPATIENT)
Dept: AUDIOLOGY | Facility: CLINIC | Age: 80
End: 2020-02-20
Payer: COMMERCIAL

## 2020-02-20 DIAGNOSIS — H90.3 SENSORINEURAL HEARING LOSS, BILATERAL: Primary | ICD-10-CM

## 2020-02-20 PROCEDURE — V5266 BATTERY FOR HEARING DEVICE: HCPCS | Performed by: AUDIOLOGIST

## 2020-02-20 PROCEDURE — 99207 ZZC NO CHARGE LOS: CPT | Performed by: AUDIOLOGIST

## 2020-02-20 NOTE — PROGRESS NOTES
"HEARING AID RECHECK    Patient Name:  Bethany Logan    Patient Age:   79 year old    :  1940    Background:   Patient's Widex hearing aids were dropped off with complaint of \"batteries not working\"    Procedures:   One battery was dead, the other one was not, but the aid had a plugged wax trap.  I replaced both wax traps and completed a listening check to verify good gain and sound quality.  I called [phone], the number left on the drop off form to let them know the aids are ready for pickup at the .  I also left 24 cells, size 312 batteries; a  90 day supply with the hearing aids.    Plan:   Return as needed for service.    CHARGES;    , hearing aid batteries, $24.00, bill to patient's insurance.    Octaviano Galloway MA, CCC-A  [place] Licensed Audiologist #[id number]  2020        "

## 2020-03-03 ENCOUNTER — TELEPHONE (OUTPATIENT)
Dept: FAMILY MEDICINE | Facility: CLINIC | Age: 80
End: 2020-03-03

## 2020-03-03 DIAGNOSIS — Z22.7 LATENT TUBERCULOSIS: ICD-10-CM

## 2020-03-03 NOTE — TELEPHONE ENCOUNTER
Reason for Call:  Other call back    Detailed comments: Bethany called with a  to say she finished her TB medication on 3/2/2020.  She asks what is the next step.    Please return a call to Bethany with a .    Phone Number Patient can be reached at: Home number on file [phone] (home)    Best Time: any    Can we leave a detailed message on this number? YES    Call taken on 3/3/2020 at 10:22 AM by Janis Noel

## 2020-04-24 ENCOUNTER — TELEPHONE (OUTPATIENT)
Dept: OPTOMETRY | Facility: CLINIC | Age: 80
End: 2020-04-24

## 2020-04-24 DIAGNOSIS — H10.13 ALLERGIC CONJUNCTIVITIS, BILATERAL: ICD-10-CM

## 2020-04-24 DIAGNOSIS — H10.13 ALLERGIC CONJUNCTIVITIS, BILATERAL: Primary | ICD-10-CM

## 2020-04-24 RX ORDER — CARBOXYMETHYLCELLULOSE SODIUM 5 MG/ML
1 SOLUTION/ DROPS OPHTHALMIC 3 TIMES DAILY PRN
Qty: 15 ML | Refills: 3 | Status: SHIPPED | OUTPATIENT
Start: 2020-04-24 | End: 2021-04-28

## 2020-04-24 RX ORDER — CARBOXYMETHYLCELLULOSE SODIUM 5 MG/ML
1 SOLUTION/ DROPS OPHTHALMIC 3 TIMES DAILY PRN
Qty: 15 ML | Refills: 3 | Status: SHIPPED | OUTPATIENT
Start: 2020-04-24 | End: 2020-04-24

## 2020-04-24 NOTE — TELEPHONE ENCOUNTER
Last appointment:  1-11-19.  Appointment with Dr. Whiting on 4-20-20 canceled due to COVID-19.  Refill drops per COVID-19 protocol.

## 2020-04-24 NOTE — TELEPHONE ENCOUNTER
Called [facility] and canceled the orders for the drops.  Patient requests a prescriptions to be sent to [facility].  Done.

## 2020-04-24 NOTE — TELEPHONE ENCOUNTER
Patient was scheduled to see Dr. Whiting. The appointment was cancelled due to COVID. She needs to have her eye drop medication refilled, she is completely out.   Evidently this patient is in a care facility. They just called back and indicated they would like the prescription to be sent to [facility]. Address: [address] [place] [address], [place] [address]. If there are any questions please return phone call to: [phone].

## 2020-05-22 ENCOUNTER — PATIENT OUTREACH (OUTPATIENT)
Dept: GERIATRIC MEDICINE | Facility: CLINIC | Age: 80
End: 2020-05-22

## 2020-05-22 NOTE — PROGRESS NOTES
[facility] Care Coordination Contact    CC received a call from Ira, Director of [facility] ([phone]) stating member is now participating in alternative adult day care remotely through the telephone. She states they are providing conversation, Bible readings, exercises, emotional support, story telling and Bingo with six people at one time on the phone.  CC spoke with member today with  and she stated she is participating in this program and so far has found it helpful. She is called daily. Member states she is doing ok at this time. CC also asked member if she had received a letter from her clinic regarding it's closing 6/30/20. Member did not remember receiving a letter. CC informed her that the PCP she saw is now moving to the [place] office. Member stated she does not need anything right now and she will think about if she wants to go to the [place] [facility].   Jennifer Mahmood RN  [facility] Care Coordinator  [phone]

## 2020-05-29 NOTE — PROGRESS NOTES
[facility] Care Coordination Contact    Member Signature - POC Change:  Per CC, member has made a change to their POC.  Care Plan Change Letter mailed to member for signature with a self-addressed return envelope.   Florecita Mancera  Case Management Specialist  [facility]  [phone]

## 2020-07-07 NOTE — PROGRESS NOTES
[facility] Care Coordination Contact    2nd Attempt: Signed Letter not received from member, resent per process.   Florecita Mancera  Case Management Specialist  [facility]  [phone]

## 2020-07-14 ENCOUNTER — PATIENT OUTREACH (OUTPATIENT)
Dept: GERIATRIC MEDICINE | Facility: CLINIC | Age: 80
End: 2020-07-14

## 2020-07-14 NOTE — PROGRESS NOTES
[facility] Care Coordination Contact    Called member to schedule annual HRA telephone visit. CC asked Ira ([phone]) from [facility] to confirm the appointment with member. HRA has been scheduled for 07/17/20 at 10:00. CC will use [employer]  line.. Member also stated she plans to go back to the [facility] for her care because her current clinic has closed.   Jennifer Mahmood RN  [facility] Care Coordinator  [phone]

## 2020-07-17 ENCOUNTER — PATIENT OUTREACH (OUTPATIENT)
Dept: GERIATRIC MEDICINE | Facility: CLINIC | Age: 80
End: 2020-07-17

## 2020-07-17 ASSESSMENT — ACTIVITIES OF DAILY LIVING (ADL)
DEPENDENT_IADLS:: CLEANING;COOKING;LAUNDRY;SHOPPING;MEAL PREPARATION;MEDICATION MANAGEMENT;MONEY MANAGEMENT;TRANSPORTATION

## 2020-07-17 NOTE — PROGRESS NOTES
[facility] Care Coordination Contact    [facility] Home Visit Assessment     Telephone visit for Health Risk Assessment with Bethany Emilee Logan completed on July 17, 2020. This was not done in person due to the Covid-19 restrictions.     Type of residence:: Apartment - handicap accessible  Current living arrangement:: I live alone     Assessment completed with:: Patient, Other(Phone  Luz Maria)    Current Care Plan  Member currently receiving the following home care services:  None  Member currently receiving the following community resources: Housekeeping/Chore Agency, PCA, Transportation Services, Lifeline, DME, Day Care  The PCA and homemaking agency has not been able to provide staff so referral placed with another agency.     Medication Review  Medication reconciliation completed in Epic: If no, please explain Unable to review medications over the telephone.   Medication set-up & administration: Independent-does not set up.  Self-administers medications.  Medication Risk Assessment Medication (1 or more, place referral to MTM): N/A: No risk factors identified  MTM Referral Placed: No: Unable to assess medications.     Mental/Behavioral Health   Depression Screening:   PHQ-2 Total Score (Adult) - Positive if 3 or more points; Administer PHQ-9 if positive: 1       Mental health DX:: No        Falls Assessment:   Fallen 2 or more times in the past year?: No   Any fall with injury in the past year?: No    ADL/IADL Dependencies:   Dependent ADLs:: Ambulation-walker, Bathing, Dressing  Dependent IADLs:: Cleaning, Cooking, Laundry, Shopping, Meal Preparation, Medication Management, Money Management, Transportation    [facility] sponsored benefits: Shared information re: Silver Sneakers/gym memberships, ASA, Calcium +D.    PCA Assessment completed at visit: Yes Annual PCA assessment indicated 7 units per day of PCA. This is the same as the previous assessment.     Elderly Waiver  Eligibility: Yes-will continue on EW    Care Plan & Recommendations: Provide member with PCA and homemaking services. Member also receives ensure and incontinence supplies. She would like to attend adult day care when they reopen after the pandemic.     See [facility] for detailed assessment information.    Follow-Up Plan: Member informed of future contact, plan to f/u with member with a 6 month telephone assessment.  Contact information shared with member and family, encouraged member to call with any questions or concerns at any time.    [place] care continuum providers: Please refer to Health Care Home on the Epic Problem List to view this patient's [facility] Care Plan Summary.  Jennifer Mahmood RN  [facility] Care Coordinator  [phone]

## 2020-07-23 ENCOUNTER — PATIENT OUTREACH (OUTPATIENT)
Dept: GERIATRIC MEDICINE | Facility: CLINIC | Age: 80
End: 2020-07-23

## 2020-07-23 NOTE — LETTER
July 23, 2020      KARINA SAUNDERS  [address]  [facility] [place] [facility] [address]      Dear Karina:    At [facility], we are dedicated to improving your health and well-being. Enclosed is the Comprehensive Care Plan that we developed with you on 7/17/2020. Please review the Care Plan carefully.    As a reminder, some of the things we discussed at your visit include:    Your physical and mental health    Ways to reduce falls    Health care needs you may have    Don t forget to contact your care coordinator if you:    Have been hospitalized or plan to be hospitalized     Have had a fall     Have experienced a change in physical health    Are experiencing emotional problems     If you do not agree with your Care Plan, have questions about it, or have experienced a change in your needs, please call me at [phone]. If you are hearing impaired, please call the [place] Relay at 149 or [phone] (nmnjhg-at-bfxsvq relay service).    Sincerely,    Jennifer Mahmood RN, MA    E-mail: [email or web address]  Phone: [phone]      [facility] ([facility]) is a health plan that contracts with both Medicare and the [place] Medical Assistance (Medicaid) program to provide benefits of both programs to enrollees. Enrollment in [facility] depends on contract renewal.    MSC+[id number])     [id number] (11/18)

## 2020-07-23 NOTE — PROGRESS NOTES
[facility] Care Coordination Contact    Received after visit chart from care coordinator.  Completed following tasks: Mailed copy of care plan to client, Updated services in access, Submitted referrals/auths for PERS, ADC, HMKG and POC & PCA sig sheet w/SASE  , Provider Signature - No POC Shared:  Member indicates that they do not want their POC shared with any EW providers.    [facility]:  Emailed completed PCA assessment to [facility].  Faxed copy of PCA assessment to PCA Agency and mailed copy to member.  Faxed MD Communication to PCP.   Florecita Mancera  Case Management Specialist  [facility]  [phone]

## 2020-08-11 NOTE — PROGRESS NOTES
[facility] Care Coordination Contact    No Letter Received: 60 day tracking of letter complete, no letter received from member. Tracking discontinued.     Joan Gibson  Care Management Specialist  [facility]  [phone]

## 2020-08-26 ENCOUNTER — OFFICE VISIT (OUTPATIENT)
Dept: FAMILY MEDICINE | Facility: CLINIC | Age: 80
End: 2020-08-26
Payer: COMMERCIAL

## 2020-08-26 VITALS
HEART RATE: 71 BPM | HEIGHT: 58 IN | TEMPERATURE: 97.4 F | WEIGHT: 139.4 LBS | DIASTOLIC BLOOD PRESSURE: 80 MMHG | OXYGEN SATURATION: 98 % | SYSTOLIC BLOOD PRESSURE: 150 MMHG | BODY MASS INDEX: 29.26 KG/M2

## 2020-08-26 DIAGNOSIS — L30.9 DERMATITIS: Primary | ICD-10-CM

## 2020-08-26 PROCEDURE — 99213 OFFICE O/P EST LOW 20 MIN: CPT | Performed by: FAMILY MEDICINE

## 2020-08-26 RX ORDER — LORATADINE 10 MG/1
10 TABLET ORAL DAILY
Qty: 30 TABLET | Refills: 0 | Status: SHIPPED | OUTPATIENT
Start: 2020-08-26 | End: 2021-04-28

## 2020-08-26 RX ORDER — TRIAMCINOLONE ACETONIDE 1 MG/ML
LOTION TOPICAL 3 TIMES DAILY PRN
Qty: 120 ML | Refills: 1 | Status: SHIPPED | OUTPATIENT
Start: 2020-08-26 | End: 2021-04-28

## 2020-08-26 ASSESSMENT — PAIN SCALES - GENERAL: PAINLEVEL: NO PAIN (0)

## 2020-08-26 ASSESSMENT — MIFFLIN-ST. JEOR: SCORE: 992.06

## 2020-08-26 NOTE — PROGRESS NOTES
"Subjective     Bethany Logan is a 80 year old female who presents to clinic today for the following health issues:    HPI       Pt is having itching on leg and for past 4 months has used lotion and cream and it's not helping.      Review of Systems   Constitutional, HEENT, cardiovascular, pulmonary, gi and gu systems are negative, except as otherwise noted.      Objective    BP (!) 150/80 (BP Location: Right arm)   Pulse 71   Temp 97.4  F (36.3  C) (Tympanic)   Ht 1.473 m (4' 10\")   Wt 63.2 kg (139 lb 6.4 oz)   LMP  (LMP Unknown)   SpO2 98%   BMI 29.13 kg/m    Body mass index is 29.13 kg/m .  Physical Exam   GENERAL: healthy, alert and no distress  NECK: no adenopathy, no asymmetry, masses, or scars and thyroid normal to palpation  RESP: lungs clear to auscultation - no rales, rhonchi or wheezes  CV: regular rate and rhythm, normal S1 S2, no S3 or S4, no murmur, click or rub, no peripheral edema and peripheral pulses strong  ABDOMEN: soft, nontender, no hepatosplenomegaly, no masses and bowel sounds normal  MS: no gross musculoskeletal defects noted, no edema  SKIN: excoriations and linear hives seen    No results found for this or any previous visit (from the past 24 hour(s)).        Assessment & Plan     Dermatitis  Topical and oral treatment. Try to identify the causitive agent.  - triamcinolone (KENALOG) 0.1 % external lotion; Apply topically 3 times daily as needed for irritation  - loratadine (CLARITIN) 10 MG tablet; Take 1 tablet (10 mg) by mouth daily     BMI:   Estimated body mass index is 29.13 kg/m  as calculated from the following:    Height as of this encounter: 1.473 m (4' 10\").    Weight as of this encounter: 63.2 kg (139 lb 6.4 oz).   Weight management plan: Discussed healthy diet and exercise guidelines    The uses and side effects, including black box warnings as appropriate, were discussed in detail.  All patient questions were answered.  The patient was instructed to call " immediately if any side effects developed.     Return in about 3 days (around 8/29/2020) for Annual Exam.    Ernestina Argueta MD  [facility]

## 2020-09-01 NOTE — PROGRESS NOTES
POC & PCA sig sheets were returned signed.  Faxed copy to PCA agency, emailed copy to [facility] PCA dept.  Florecita Mancera  Case Management Specialist  [facility]  [phone]

## 2021-01-18 ENCOUNTER — TELEPHONE (OUTPATIENT)
Dept: AUDIOLOGY | Facility: CLINIC | Age: 81
End: 2021-01-18
Payer: COMMERCIAL

## 2021-01-18 DIAGNOSIS — H90.3 SENSORINEURAL HEARING LOSS, BILATERAL: Primary | ICD-10-CM

## 2021-01-18 PROCEDURE — V5266 BATTERY FOR HEARING DEVICE: HCPCS | Performed by: AUDIOLOGIST

## 2021-01-18 NOTE — TELEPHONE ENCOUNTER
Patient calls to request her 3 month supply of batteries be mailed to her home address. Mailed 24 size 312 batteries to the address on file.     CHARGES:   [id number] Batteries ($1). Total: $24 Bill to patient's insurance.    Octaviano Galloway MA, CCC-A  [place] Licensed Audiologist #[id number]  1/18/2021

## 2021-01-29 ENCOUNTER — PATIENT OUTREACH (OUTPATIENT)
Dept: GERIATRIC MEDICINE | Facility: CLINIC | Age: 81
End: 2021-01-29

## 2021-01-29 NOTE — PROGRESS NOTES
[facility] Care Coordination Contact      [facility] Six-Month Telephone Assessment    6 month telephone assessment completed on 01/29/21.    ER visits: No  Hospitalizations: No  TCU stays: No  Significant health status changes: None. States she is having more problems with her vision and needs to have an eye exam. CC set up an appointment for an eye exam 3/25/21.  Falls/Injuries: No  ADL/IADL changes: No  Changes in services: No    Caregiver Assessment follow up:  na    Goals: See POC in chart for goal progress documentation.      Will see member in 6 months for an annual health risk assessment.   Encouraged member to call CC with any questions or concerns in the meantime.     Jennifer Mahmood RN  [facility] Care Coordinator  [phone]

## 2021-03-08 ENCOUNTER — PATIENT OUTREACH (OUTPATIENT)
Dept: GERIATRIC MEDICINE | Facility: CLINIC | Age: 81
End: 2021-03-08

## 2021-03-08 NOTE — PROGRESS NOTES
[facility] Care Coordination Contact    Arranged transportation thru [facility] PAR for the below appt:  Appt Date & Time: 3/25 at 10:30am   Clinic Name & Address:  [facility]  Transportation Provider: [employer]  [phone] (member traveling with [employer]WW) Able to get in and out of vehicle, will need help getting walker in vehicle.   time:  9:30-10am    Notified CC of  time.  Florecita Mancera  Case Management Specialist  [facility]  [phone]

## 2021-03-25 ENCOUNTER — TELEPHONE (OUTPATIENT)
Dept: OPTOMETRY | Facility: CLINIC | Age: 81
End: 2021-03-25

## 2021-03-25 ENCOUNTER — OFFICE VISIT (OUTPATIENT)
Dept: OPTOMETRY | Facility: CLINIC | Age: 81
End: 2021-03-25
Payer: COMMERCIAL

## 2021-03-25 DIAGNOSIS — H10.13 ALLERGIC CONJUNCTIVITIS, BILATERAL: ICD-10-CM

## 2021-03-25 DIAGNOSIS — H35.363 MACULAR DRUSEN, BILATERAL: ICD-10-CM

## 2021-03-25 DIAGNOSIS — H52.4 PRESBYOPIA: ICD-10-CM

## 2021-03-25 DIAGNOSIS — Z96.1 PSEUDOPHAKIA OF BOTH EYES: ICD-10-CM

## 2021-03-25 DIAGNOSIS — H52.223 REGULAR ASTIGMATISM OF BOTH EYES: ICD-10-CM

## 2021-03-25 DIAGNOSIS — H35.052 CHOROIDAL NEOVASCULAR MEMBRANE OF LEFT EYE: Primary | ICD-10-CM

## 2021-03-25 DIAGNOSIS — H04.123 DRY EYE SYNDROME OF BOTH EYES: ICD-10-CM

## 2021-03-25 PROCEDURE — 92015 DETERMINE REFRACTIVE STATE: CPT | Performed by: OPTOMETRIST

## 2021-03-25 PROCEDURE — 92014 COMPRE OPH EXAM EST PT 1/>: CPT | Performed by: OPTOMETRIST

## 2021-03-25 RX ORDER — OLOPATADINE HYDROCHLORIDE 2 MG/ML
1 SOLUTION/ DROPS OPHTHALMIC DAILY
Qty: 2.5 ML | Refills: 11 | Status: CANCELLED | OUTPATIENT
Start: 2021-03-25 | End: 2022-03-25

## 2021-03-25 RX ORDER — CARBOXYMETHYLCELLULOSE SODIUM 5 MG/ML
1 SOLUTION/ DROPS OPHTHALMIC 3 TIMES DAILY PRN
Qty: 15 ML | Refills: 3 | Status: CANCELLED | OUTPATIENT
Start: 2021-03-25

## 2021-03-25 RX ORDER — CARBOXYMETHYLCELLULOSE SODIUM 5 MG/ML
1 SOLUTION/ DROPS OPHTHALMIC 4 TIMES DAILY
Qty: 1 BOTTLE | Refills: 11 | Status: CANCELLED | OUTPATIENT
Start: 2021-03-25 | End: 2022-03-25

## 2021-03-25 ASSESSMENT — VISUAL ACUITY
OD_CC: 20/40
OD_PH_SC: 20/50
CORRECTION_TYPE: GLASSES
OS_CC: HM
OD_CC: 20/200
METHOD: NUMBERS - LINEAR

## 2021-03-25 ASSESSMENT — REFRACTION_WEARINGRX
OD_CYLINDER: +1.50
OS_AXIS: 180
SPECS_TYPE: BIFOCAL
OD_AXIS: 175
OS_CYLINDER: +1.50
OS_SPHERE: PLANO
OD_SPHERE: -1.25
OD_ADD: +2.75
OS_ADD: +2.75

## 2021-03-25 ASSESSMENT — CUP TO DISC RATIO
OS_RATIO: 0.3
OD_RATIO: 0.3

## 2021-03-25 ASSESSMENT — EXTERNAL EXAM - LEFT EYE: OS_EXAM: NORMAL

## 2021-03-25 ASSESSMENT — CONF VISUAL FIELD
OS_SUPERIOR_TEMPORAL_RESTRICTION: 2
OD_INFERIOR_NASAL_RESTRICTION: 2
OS_INFERIOR_TEMPORAL_RESTRICTION: 2
OS_INFERIOR_NASAL_RESTRICTION: 2
OS_SUPERIOR_NASAL_RESTRICTION: 2

## 2021-03-25 ASSESSMENT — TONOMETRY
OD_IOP_MMHG: 14
OS_IOP_MMHG: 12
IOP_METHOD: TONOPEN

## 2021-03-25 ASSESSMENT — REFRACTION_MANIFEST
OS_AXIS: 180
OD_CYLINDER: +1.50
OD_SPHERE: -1.25
OS_CYLINDER: +1.50
OD_AXIS: 175
OS_SPHERE: PLANO

## 2021-03-25 ASSESSMENT — SLIT LAMP EXAM - LIDS
COMMENTS: NORMAL
COMMENTS: NORMAL

## 2021-03-25 ASSESSMENT — EXTERNAL EXAM - RIGHT EYE: OD_EXAM: NORMAL

## 2021-03-25 NOTE — PATIENT INSTRUCTIONS
Referral to [facility]-attempted to talk to patient's son as requested Onestimo- someone answered phone and did not speak English.    Talked with Sandra Reyes - a coordinator for the patient.  She will help the patient schedule an appointment at the [facility].  The phone number [phone] was given to call.  She was told this is an urgent referral. She will call back with pharmacy info.    OTC Pataday to be used once or twice daily for itchy eyes.  Use as needed.    Refresh tears 1 drop 2-4x daily.    No change in eyeglass prescription.    Return in 1 year for a complete eye exam or sooner if needed.    Gabriele Whiting, OD

## 2021-03-25 NOTE — LETTER
3/25/2021         RE: Bethany Logan  [address]  [place] [address]        Dear Colleague,    Thank you for referring your patient, Bethany Logan, to the [facility]. Please see a copy of my visit note below.    Chief Complaint   Patient presents with     COMPREHENSIVE EYE EXAM      Using I pad   Last Eye Exam: 1/11/19  Dilated Previously: Yes    What are you currently using to see?  glasses    Distance Vision Acuity: Noticed gradual change in both eyes. She has a black spot in her central vision of the left eye-6 months ago    Near Vision Acuity: Not satisfied     Eye Comfort: teary and burning  Do you use eye drops? : No  Occupation or Hobbies: retired    History of BULB    Gloria Heck, [facility]          Medical, surgical and family histories reviewed and updated 3/25/2021.       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 Pseudophakia of both eyes  Z96.1 EYE EXAM (SIMPLE-NONBILLABLE)   3. Allergic conjunctivitis, bilateral  H10.13 EYE EXAM (SIMPLE-NONBILLABLE)     olopatadine (PATADAY) 0.2 % ophthalmic solution   4. Macular drusen, bilateral  H35.363 EYE EXAM (SIMPLE-NONBILLABLE)   5. Presbyopia  H52.4 REFRACTION   6. Regular astigmatism of both eyes  H52.223 REFRACTION   7. Dry eye syndrome of both eyes  H04.123 carboxymethylcellulose (REFRESH PLUS) 0.5 % SOLN ophthalmic solution      PLAN:     Patient Instructions   Referral to [facility]-attempted to talk to patient's son as requested Onestimo- someone answered phone and did not speak English.    Talked with Sandra Reyes - a coordinator for the patient.  She will help the patient schedule an appointment at the [facility].  The phone number [phone] was given to call.  She was told this is an urgent referral. She will call back with pharmacy info.    OTC  Pataday to be used once or twice daily for itchy eyes.  Use as needed.    Refresh tears 1 drop 2-4x daily.    No change in eyeglass prescription.    Return in 1 year for a complete eye exam or sooner if needed.    Gabriele Whiting, ERUM       3/31/2021    Never received pharmacy info.  Will send Pataday and Refresh prescription once I know which pharmacy to send to.    Gabriele Whiting, OD        Again, thank you for allowing me to participate in the care of your patient.        Sincerely,        Gabriele Whiting, OD

## 2021-03-25 NOTE — PROGRESS NOTES
Chief Complaint   Patient presents with     COMPREHENSIVE EYE EXAM      Using I pad   Last Eye Exam: 1/11/19  Dilated Previously: Yes    What are you currently using to see?  glasses    Distance Vision Acuity: Noticed gradual change in both eyes. She has a black spot in her central vision of the left eye-6 months ago    Near Vision Acuity: Not satisfied     Eye Comfort: teary and burning  Do you use eye drops? : No  Occupation or Hobbies: retired    History of BULB    Gloria [facility]          Medical, surgical and family histories reviewed and updated 3/25/2021.       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 Pseudophakia of both eyes  Z96.1 EYE EXAM (SIMPLE-NONBILLABLE)   3. Allergic conjunctivitis, bilateral  H10.13 EYE EXAM (SIMPLE-NONBILLABLE)     olopatadine (PATADAY) 0.2 % ophthalmic solution   4. Macular drusen, bilateral  H35.363 EYE EXAM (SIMPLE-NONBILLABLE)   5. Presbyopia  H52.4 REFRACTION   6. Regular astigmatism of both eyes  H52.223 REFRACTION   7. Dry eye syndrome of both eyes  H04.123 carboxymethylcellulose (REFRESH PLUS) 0.5 % SOLN ophthalmic solution      PLAN:     Patient Instructions   Referral to [facility]-attempted to talk to patient's son as requested Onestimo- someone answered phone and did not speak English.    Talked with Sandra Reyes - a coordinator for the patient.  She will help the patient schedule an appointment at the [facility].  The phone number [phone] was given to call.  She was told this is an urgent referral. She will call back with pharmacy info.    OTC Pataday to be used once or twice daily for itchy eyes.  Use as needed.    Refresh tears 1 drop 2-4x daily.    No change in eyeglass prescription.    Return in 1 year for a complete eye exam or sooner if needed.    Gabriele Whiting, OD       3/31/2021    Never received pharmacy info.  Will  send Pataday and Refresh prescription once I know which pharmacy to send to.    Gabriele Whiting, OD

## 2021-03-25 NOTE — TELEPHONE ENCOUNTER
Sandra Reyes; who is the coordinator with the program that the patient is enrolled in for mental health; called us to speak with us about the patient. Renetta's agency stated that we called the patient to speak to the patient; but the patient is in a mental health program. Renetta who is the coordinator stated that if we need to speak to the patient; that we need to go through the agency at [phone]. Renetta would like us to call them to discuss a few things about the patient.    Thank You,    Jose Park

## 2021-03-25 NOTE — TELEPHONE ENCOUNTER
Talked with Sandra Reyes - a coordinator for the patient.  She will help the patient schedule an appointment at the [facility].  The phone number [phone] was given to call.  She was told this is an urgent referral. She will call back with pharmacy info.

## 2021-03-26 ENCOUNTER — TELEPHONE (OUTPATIENT)
Dept: OPHTHALMOLOGY | Facility: CLINIC | Age: 81
End: 2021-03-26

## 2021-03-26 NOTE — TELEPHONE ENCOUNTER
[facility]    Phone Message    May a detailed message be left on voicemail: yes     Reason for Call: Appointment Intake    Referring Provider Name: Gabriele Whiting, OD in [facility]  Diagnosis and/or Symptoms: Choroidal neovascular membrane of left eye   Send TE to clinic coordinators per protocols      Action Taken: Message routed to:  [facility] EYE    Travel Screening: Not Applicable

## 2021-04-08 ENCOUNTER — PATIENT OUTREACH (OUTPATIENT)
Dept: GERIATRIC MEDICINE | Facility: CLINIC | Age: 81
End: 2021-04-08

## 2021-04-08 NOTE — PROGRESS NOTES
TRANSITIONS OF CARE (BRIAN) LOG   BRIAN tasks should be completed by the CC within one (1) business day of notification of each transition. Follow up contact with member is required after return to their usual care setting.  Note:  If CC finds out about the transitions fifteen (15) days or more after the member has returned to their usual care setting, no BRIAN log is needed. However, the CC should check in with the member to discuss the transition process, any changes needed to the care plan and document it in a case note.    Member Name:  Bethany Logan [facility] Member ID[facility] [id number]   Product: [facility] Care Coordinator Contact:  Jennifer Mahmood RN, MA Agency/County/Care System: [facility]   Transition Communication Actions from Care Management Contact   Transition #1   Notification Date: 4/8/21 Transition Date:   4/6/21 Transition From: Home     Is this the member s usual care setting?               yes Transition To: Hospital, [facility]   Transition Type:  Unplanned  Reason for Admission/Comments:  4/8/21 Admitted to [facility] on 4/6/21 with worsening abd pain. CT scan indicated small bowel diverticulitis with contained perforation after failed outpatient abtx treatment. Exploratory surgery with small bowel resection and primary anastomosis was performed on 4/6/21. This CC is covering for member's current CC.       Emeka Corrales RN Care Coordinator  [facility]  Office: [phone]  Fax: [phone]  [email or web address]     Shared CC contact info, care plan/services with receiving setting--Date completed: 4/8/21 Meaghan Reza RN [phone]. Provided.    Notified PCP of transition--Date completed:  4/8/21     via  EM[facility]   Transition #2   Transition #3  (if applicable)   Notification Date: 4/15/2021         Transition To:  Home  Transition Date: 4/14/21     Transition Type:    Planned  Notified PCP -- Date completed: 4/15/2021              Shared CC contact info, care plan/services  with receiving setting or, if applicable, home care agency--Date completed:  4/15/2021  *Complete additional tasks below, if this transition is a return to usual care setting.      Comments:      TC to member. Spoke with her son Sudhakar. He stated that member is recovering. She is feeling better and starting to eat. They have a follow up appointment with her PCP on 4/28. Provided them with Jennifer Mahmood's cell phone and informed them that she will be back in the office next week.   Glo Bah RN, BSN, PHN  [facility] Care Coordinator  [phone]        Notification Date:          Transition To:    Transition Date:              Transition Type:      Notified PCP--Date completed:          Shared CC contact info, care plan/services with receiving setting or, if applicable, home care agency--Date completed:       *Complete additional tasks below, if this transition is a return to usual care setting.      Comments:       *Complete tasks below when the member is discharging TO their usual care setting within one (1) business day of notification.  For situations where the Care Coordinator is notified of the discharge prior to the date of discharge, the Care Coordinator must follow up with the member or designated representative to confirm that discharge actually occurred and discuss required BRIAN tasks as outlined in the BRIAN Instructions.  (This includes situations where it may be a  new  usual care setting for the member. (i.e., a community member who decides upon permanent nursing home placement following hospitalization and rehab).    Date completed: 4/15/21  Communicated with member or their designated representative about the following:  care transition process; about changes to the member s health status; plan of care updates; education about transitions and how to prevent unplanned transitions/readmissions  Four Pillars for Optimal Transition:    Check  Yes  - if the member, family member and/or  SNF/facility staff manages the following:    If  No  provide explanation in the comments section.          [x]  Yes     []  No     Does the member have a follow-up appointment scheduled with primary care or specialist? (Mental health hospitalizations--the appt. should be w/in 7 days)   [x]  Yes     []  No     Can the member manage their medications or is there a system in place to manage medications (e.g. home care set-up)?         [x]  Yes     []  No     Can the member verbalize warning signs and symptoms to watch for and how to respond?         [x]  Yes     []  No     Does the member use a Personal Health Care Record?  Check  Yes  if visit summary, discharge summary, and/or healthcare summary are being used as a PHR.                                                                                                                                                                                    [] Yes      [x] No      Have you updated the member s care plan?  If  No  provide explanation in comments.   Comments:  Home with current plan of care

## 2021-04-15 ENCOUNTER — PATIENT OUTREACH (OUTPATIENT)
Dept: GERIATRIC MEDICINE | Facility: CLINIC | Age: 81
End: 2021-04-15

## 2021-04-15 NOTE — PROGRESS NOTES
[facility] Care Coordination Contact    Dear Dr. Juan Argueta,    I am writing to inform you that Bethany discharged from [facility] yesterday, 4/14/21, to home. She had been admitted for a perforated diverticulum. I spoke with her son today who stated that she is doing better and has started eating again. She has a follow up appointment with you on April 28 at 3:20.  Thank you,  Glo Bah RN, BSN, PHN  [facility] Care Coordinator  [phone] (Cell [phone])

## 2021-04-19 DIAGNOSIS — H35.051 CHOROIDAL NEOVASCULAR MEMBRANE OF RIGHT EYE: Primary | ICD-10-CM

## 2021-04-26 ENCOUNTER — OFFICE VISIT (OUTPATIENT)
Dept: OPHTHALMOLOGY | Facility: CLINIC | Age: 81
End: 2021-04-26
Attending: OPHTHALMOLOGY
Payer: COMMERCIAL

## 2021-04-26 DIAGNOSIS — H35.352 CME (CYSTOID MACULAR EDEMA), LEFT: ICD-10-CM

## 2021-04-26 DIAGNOSIS — H35.012 RETINAL MACROANEURYSM OF LEFT EYE: ICD-10-CM

## 2021-04-26 PROCEDURE — 250N000011 HC RX IP 250 OP 636: Performed by: OPHTHALMOLOGY

## 2021-04-26 PROCEDURE — 92250 FUNDUS PHOTOGRAPHY W/I&R: CPT | Performed by: OPHTHALMOLOGY

## 2021-04-26 PROCEDURE — 92235 FLUORESCEIN ANGRPH MLTIFRAME: CPT | Performed by: OPHTHALMOLOGY

## 2021-04-26 PROCEDURE — 99207 PR DROP WITH A PROCEDURE: CPT | Performed by: OPHTHALMOLOGY

## 2021-04-26 PROCEDURE — 92134 CPTRZ OPH DX IMG PST SGM RTA: CPT | Performed by: OPHTHALMOLOGY

## 2021-04-26 PROCEDURE — 67028 INJECTION EYE DRUG: CPT | Mod: LT | Performed by: OPHTHALMOLOGY

## 2021-04-26 PROCEDURE — 99207 FUNDUS AUTOFLUORESCENCE IMAGE (FAF) OU (BOTH EYES): CPT | Performed by: OPHTHALMOLOGY

## 2021-04-26 PROCEDURE — G0463 HOSPITAL OUTPT CLINIC VISIT: HCPCS | Mod: 25

## 2021-04-26 RX ADMIN — Medication 1.25 MG: at 15:35

## 2021-04-26 ASSESSMENT — TONOMETRY
OS_IOP_MMHG: 11
IOP_METHOD: APPLANATION
OD_IOP_MMHG: 12

## 2021-04-26 ASSESSMENT — EXTERNAL EXAM - RIGHT EYE: OD_EXAM: NORMAL

## 2021-04-26 ASSESSMENT — VISUAL ACUITY
OD_SC: 20/125
OS_SC: CF @ 3'
OD_PH_SC+: -1
OD_PH_SC: 20/50
METHOD: SNELLEN - LINEAR
OD_SC+: -1

## 2021-04-26 ASSESSMENT — CONF VISUAL FIELD
OS_INFERIOR_TEMPORAL_RESTRICTION: 2
OS_SUPERIOR_TEMPORAL_RESTRICTION: 2
OS_INFERIOR_NASAL_RESTRICTION: 2
OD_INFERIOR_NASAL_RESTRICTION: 2
METHOD: COUNTING FINGERS
OS_SUPERIOR_NASAL_RESTRICTION: 2

## 2021-04-26 ASSESSMENT — CUP TO DISC RATIO
OS_RATIO: 0.35
OD_RATIO: 0.35

## 2021-04-26 ASSESSMENT — PATIENT HEALTH QUESTIONNAIRE - PHQ9: SUM OF ALL RESPONSES TO PHQ QUESTIONS 1-9: 0

## 2021-04-26 ASSESSMENT — SLIT LAMP EXAM - LIDS
COMMENTS: NORMAL
COMMENTS: NORMAL

## 2021-04-26 ASSESSMENT — EXTERNAL EXAM - LEFT EYE: OS_EXAM: NORMAL

## 2021-04-26 NOTE — PROGRESS NOTES
CC -   CHANEL OS    INTERVAL HISTORY - Initial visit    [facility] -   Bethany Logan is a  80 year old year-old patient referred by Dr Ainsley Whiting for evaluation and treat of CNVM left eye.   Seen by  3/25/21, found CNVM OS and poor vision, NICCI prior was ~ 2019, VA OS was 20/20 on 1/11/2019 visit.  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  I personally obtained the chief complaint(s) and history of present illness.  I confirmed and edited as necessary the review of systems, past medical/surgical history, family history, social history, and examination findings as documented by others; and I examined the patient myself.  I personally reviewed the relevant tests, images, and reports as documented above.  I formulated and edited as necessary the assessment and plan and discussed the findings and management plan with the patient and family    Samantha Bruce MD, PhD  , Vitreoretinal Surgery  Department of Ophthalmology  [facility]

## 2021-04-26 NOTE — NURSING NOTE
Chief Complaints and History of Present Illnesses   Patient presents with     Follow Up     Choroidal neovascular membrane of right eye     Chief Complaint(s) and History of Present Illness(es)     Follow Up     Laterality: right eye    Onset: gradual    Onset: years ago    Course: gradually worsening    Associated symptoms: dryness, tearing, itching, floaters and photophobia.  Negative for eye pain and flashes    Treatments tried: artificial tears and eye drops    Response to treatment: mild improvement    Pain scale: 0/10    Comments: Choroidal neovascular membrane of right eye              Comments     Pt states vision is worse since last vision.  LE only sees a black shadow.      BRIANNA Mathews April 26, 2021 11:19 AM

## 2021-04-28 ENCOUNTER — OFFICE VISIT (OUTPATIENT)
Dept: FAMILY MEDICINE | Facility: CLINIC | Age: 81
End: 2021-04-28
Payer: COMMERCIAL

## 2021-04-28 VITALS
TEMPERATURE: 98.7 F | DIASTOLIC BLOOD PRESSURE: 70 MMHG | HEIGHT: 58 IN | WEIGHT: 131 LBS | BODY MASS INDEX: 27.5 KG/M2 | OXYGEN SATURATION: 97 % | HEART RATE: 66 BPM | SYSTOLIC BLOOD PRESSURE: 134 MMHG

## 2021-04-28 DIAGNOSIS — H35.012 RETINAL MACROANEURYSM OF LEFT EYE: ICD-10-CM

## 2021-04-28 DIAGNOSIS — Z00.00 ENCOUNTER FOR MEDICARE ANNUAL WELLNESS EXAM: Primary | ICD-10-CM

## 2021-04-28 DIAGNOSIS — H35.352 CME (CYSTOID MACULAR EDEMA), LEFT: ICD-10-CM

## 2021-04-28 DIAGNOSIS — H10.13 ALLERGIC CONJUNCTIVITIS, BILATERAL: ICD-10-CM

## 2021-04-28 DIAGNOSIS — Z13.1 SCREENING FOR DIABETES MELLITUS: ICD-10-CM

## 2021-04-28 DIAGNOSIS — K57.80 PERFORATED DIVERTICULUM: ICD-10-CM

## 2021-04-28 DIAGNOSIS — Z13.220 LIPID SCREENING: ICD-10-CM

## 2021-04-28 LAB
CHOLEST SERPL-MCNC: 234 MG/DL
ERYTHROCYTE [DISTWIDTH] IN BLOOD BY AUTOMATED COUNT: 13.3 % (ref 10–15)
GLUCOSE SERPL-MCNC: 91 MG/DL (ref 70–99)
HCT VFR BLD AUTO: 40.8 % (ref 35–47)
HDLC SERPL-MCNC: 45 MG/DL
HGB BLD-MCNC: 12.9 G/DL (ref 11.7–15.7)
LDLC SERPL CALC-MCNC: 131 MG/DL
MCH RBC QN AUTO: 31.6 PG (ref 26.5–33)
MCHC RBC AUTO-ENTMCNC: 31.6 G/DL (ref 31.5–36.5)
MCV RBC AUTO: 100 FL (ref 78–100)
NONHDLC SERPL-MCNC: 189 MG/DL
PLATELET # BLD AUTO: 301 10E9/L (ref 150–450)
RBC # BLD AUTO: 4.08 10E12/L (ref 3.8–5.2)
TRIGL SERPL-MCNC: 289 MG/DL
WBC # BLD AUTO: 5.6 10E9/L (ref 4–11)

## 2021-04-28 PROCEDURE — 85027 COMPLETE CBC AUTOMATED: CPT | Performed by: FAMILY MEDICINE

## 2021-04-28 PROCEDURE — 80061 LIPID PANEL: CPT | Performed by: FAMILY MEDICINE

## 2021-04-28 PROCEDURE — 36415 COLL VENOUS BLD VENIPUNCTURE: CPT | Performed by: FAMILY MEDICINE

## 2021-04-28 PROCEDURE — 99397 PER PM REEVAL EST PAT 65+ YR: CPT | Performed by: FAMILY MEDICINE

## 2021-04-28 PROCEDURE — 99213 OFFICE O/P EST LOW 20 MIN: CPT | Mod: 25 | Performed by: FAMILY MEDICINE

## 2021-04-28 PROCEDURE — 82947 ASSAY GLUCOSE BLOOD QUANT: CPT | Performed by: FAMILY MEDICINE

## 2021-04-28 RX ORDER — METOCLOPRAMIDE 10 MG/1
10 TABLET ORAL
COMMUNITY
Start: 2021-04-14 | End: 2021-08-04

## 2021-04-28 RX ORDER — CARBOXYMETHYLCELLULOSE SODIUM 5 MG/ML
1 SOLUTION/ DROPS OPHTHALMIC 3 TIMES DAILY PRN
Qty: 15 ML | Refills: 3 | Status: SHIPPED | OUTPATIENT
Start: 2021-04-28 | End: 2021-08-04

## 2021-04-28 ASSESSMENT — MIFFLIN-ST. JEOR: SCORE: 953.96

## 2021-04-28 ASSESSMENT — PAIN SCALES - GENERAL: PAINLEVEL: WORST PAIN (10)

## 2021-04-28 NOTE — PATIENT INSTRUCTIONS
At [facility], we strive to deliver an exceptional experience to you, every time we see you. If you receive a survey, please complete it as we do value your feedback.  If you have MyChart, you can expect to receive results automatically within 24 hours of their completion.  Your provider will send a note interpreting your results as well.   If you do not have MyChart, you should receive your results in about a week by mail.    Your care team:                            Family Medicine Internal Medicine   MD Ivan Barlow MD Shantel Branch-Fleming, MD Srinivasa Vaka, MD Katya Belousova, PABryannaC  Pam Santiago, APRN CNP    Golden Connor, MD Pediatrics   Jj Rodgers, PACRICKET Yanes, CNP MD Mulu Ortiz APRN CNP   MD Janessa Cotton MD Deborah Mielke, MD Lashawn Son, APRN [facility]      Clinic hours: Monday - Thursday 7 am-6 pm; Fridays 7 am-5 pm.   Urgent care: Monday - Friday 10 am- 8 pm; Saturday and Sunday 9 am-5 pm.    Clinic: [phone]       [facility]: Monday - Thursday 8 am - 7 pm; Friday 8 am - 6 pm  [facility]: [phone]     Use www.oncare.org for 24/7 diagnosis and treatment of dozens of conditions.  Patient Education   Personalized Prevention Plan  You are due for the preventive services outlined below.  Your care team is available to assist you in scheduling these services.  If you have already completed any of these items, please share that information with your care team to update in your medical record.  Health Maintenance Due   Topic Date Due     ANNUAL REVIEW OF HM ORDERS  Never done     COVID-19 Vaccine (1) Never done     Zoster (Shingles) Vaccine (2 of 2) 05/27/2019     Cholesterol Lab  05/19/2020     Flu Vaccine (1) 09/01/2020

## 2021-04-28 NOTE — LETTER
April 30, 2021      Bethany Logan  [address]  [place] [address]        Ms. Emilee Logan,     All of your labs were normal for you.     Please contact the clinic if you have additional questions.  Thank you.     Sincerely,     Ernestina Argueta MD     Resulted Orders   Lipid panel reflex to direct LDL Non-fasting   Result Value Ref Range    Cholesterol 234 (H) <200 mg/dL      Comment:      Desirable:       <200 mg/dl    Triglycerides 289 (H) <150 mg/dL      Comment:      Borderline high:  150-199 mg/dl  High:             200-499 mg/dl  Very high:       >499 mg/dl  Non Fasting      HDL Cholesterol 45 (L) >49 mg/dL    LDL Cholesterol Calculated 131 (H) <100 mg/dL      Comment:      Above desirable:  100-129 mg/dl  Borderline High:  130-159 mg/dL  High:             160-189 mg/dL  Very high:       >189 mg/dl      Non HDL Cholesterol 189 (H) <130 mg/dL      Comment:      Above Desirable:  130-159 mg/dl  Borderline high:  160-189 mg/dl  High:             190-219 mg/dl  Very high:       >219 mg/dl     CBC with platelets   Result Value Ref Range    WBC 5.6 4.0 - 11.0 10e9/L    RBC Count 4.08 3.8 - 5.2 10e12/L    Hemoglobin 12.9 11.7 - 15.7 g/dL    Hematocrit 40.8 35.0 - 47.0 %     78 - 100 fl    MCH 31.6 26.5 - 33.0 pg    MCHC 31.6 31.5 - 36.5 g/dL    RDW 13.3 10.0 - 15.0 %    Platelet Count 301 150 - 450 10e9/L   Glucose   Result Value Ref Range    Glucose 91 70 - 99 mg/dL      Comment:      Non Fasting

## 2021-04-28 NOTE — PROGRESS NOTES
"  SUBJECTIVE:   Bethany Logan is a 80 year old female who presents for Preventive Visit.    Patient has been advised of split billing requirements and indicates understanding: Yes  Are you in the first 12 months of your Medicare Part B coverage?  No    Physical Health:    In general, how would you rate your overall physical health? fair    Outside of work, how many days during the week do you exercise? none    Outside of work, approximately how many minutes a day do you exercise?not applicable    If you drink alcohol do you typically have >3 drinks per day or >7 drinks per week? No    Do you usually eat at least 4 servings of fruit and vegetables a day, include whole grains & fiber and avoid regularly eating high fat or \"junk\" foods? Yes    Do you have any problems taking medications regularly?  No    Do you have any side effects from medications? none    Needs assistance for the following daily activities: telephone use, transportation, shopping, preparing meals, housework, bathing, laundry, money management and taking medicine    Which of the following safety concerns are present in your home?  none identified     Hearing impairment: Yes, Need to ask people to speak up or repeat themselves.    In the past 6 months, have you been bothered by leaking of urine? no    Mental Health:    In general, how would you rate your overall mental or emotional health? fair  PHQ-2 Score:      Do you feel safe in your environment? Yes    Have you ever done Advance Care Planning? (For example, a Health Directive, POLST, or a discussion with a medical provider or your loved ones about your wishes): No, advance care planning information given to patient to review.  Patient declined advance care planning discussion at this time.    Additional concerns to address?  No    Fall risk:  Fallen 2 or more times in the past year?: No  Any fall with injury in the past year?: No  click delete button to remove this line " now  Cognitive Screenin) Repeat 3 items (Leader, Season, Table)    2) Clock draw: NORMAL  3) 3 item recall: Recalls 3 objects  Results: 3 items recalled: COGNITIVE IMPAIRMENT LESS LIKELY    Mini-CogTM Copyright S Lex. Licensed by the author for use in [facility]; reprinted with permission (rubi@[facility]). All rights reserved.      Do you have sleep apnea, excessive snoring or daytime drowsiness?: no    Small bowel perforation with surgical repair.  Having pain with mobility at surgical site. No warmth.    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    Reviewed and updated as needed this visit by clinical staff  Tobacco  Allergies  Meds  Problems  Med Hx  Surg Hx  Fam Hx  Soc Hx          Reviewed and updated as needed this visit by Provider  Tobacco  Allergies  Meds  Problems  Med Hx  Surg Hx  Fam Hx         Social History     Tobacco Use     Smoking status: Never Smoker     Smokeless tobacco: Never Used   Substance Use Topics     Alcohol use: No                           Current providers sharing in care for this patient include:   Patient Care Team:  Ernestina Alvarado MD as PCP - General (Family Medicine)  Jennifer Mahmood, MEAGHAN as Lead Care Coordinator  Ernestina Alvarado MD as Assigned PCP  Gabriele Whiting OD as Assigned Surgical Provider    The following health maintenance items are reviewed in Epic and correct as of today:  Health Maintenance   Topic Date Due     ANNUAL REVIEW OF  ORDERS  Never done     COVID-19 Vaccine (1) Never done     ZOSTER IMMUNIZATION (2 of 2) 2019     LIPID  2020     INFLUENZA VACCINE (1) 2020     EYE EXAM  2022     FALL RISK ASSESSMENT  2022     MEDICARE ANNUAL WELLNESS VISIT  2022     DTAP/TDAP/TD IMMUNIZATION (2 - Td) 2024     ADVANCE CARE PLANNING  2026     DEXA  2030     PHQ-2  Completed     Pneumococcal Vaccine: 65+ Years  Completed     Pneumococcal Vaccine:  "Pediatrics (0 to 5 Years) and At-Risk Patients (6 to 64 Years)  Aged Out     IPV IMMUNIZATION  Aged Out     MENINGITIS IMMUNIZATION  Aged Out     HEPATITIS B IMMUNIZATION  Aged Out     Labs reviewed in [facility]      ROS:  10 point ROS of systems including Constitutional, Eyes, Respiratory, Cardiovascular, Gastroenterology, Genitourinary, Integumentary, Muscularskeletal, Psychiatric were all negative except for pertinent positives noted in my HPI.     OBJECTIVE:   BP (!) 157/76   Pulse 66   Temp 98.7  F (37.1  C) (Tympanic)   Ht 1.473 m (4' 10\")   Wt 59.4 kg (131 lb)   LMP  (LMP Unknown)   SpO2 97%   BMI 27.38 kg/m   Estimated body mass index is 27.38 kg/m  as calculated from the following:    Height as of this encounter: 1.473 m (4' 10\").    Weight as of this encounter: 59.4 kg (131 lb).  EXAM:   GENERAL: healthy, alert and no distress  NECK: no adenopathy, no asymmetry, masses, or scars and thyroid normal to palpation  RESP: lungs clear to auscultation - no rales, rhonchi or wheezes  CV: regular rate and rhythm, normal S1 S2, no S3 or S4, no murmur, click or rub, no peripheral edema and peripheral pulses strong  ABDOMEN: healing abdominal surgical site, no warmth or redness  MS: ambulating with walker and needs help getting on exam table.  PSYCH: mentation appears normal, affect normal/bright    Diagnostic Test Results:  Labs reviewed in Epic    ASSESSMENT / PLAN:   1. Encounter for Medicare annual wellness exam  Routine preventive reviewed    2. Perforated diverticulum  Continue per surgery  - CBC with platelets    3. Retinal macroaneurysm of left eye  Continue per ophth    4. CME (cystoid macular edema), left  As above    5. Allergic conjunctivitis, bilateral  Refilled  - carboxymethylcellulose (REFRESH PLUS) 0.5 % SOLN ophthalmic solution; Place 1 drop into both eyes 3 times daily as needed for dry eyes  Dispense: 15 mL; Refill: 3    6. Screening for diabetes mellitus  screening  - Glucose    7. Lipid " "screening  screening  - Lipid panel reflex to direct LDL Non-fasting    Patient has been advised of split billing requirements and indicates understanding: Yes    COUNSELING:  Reviewed preventive health counseling, as reflected in patient instructions    Estimated body mass index is 27.38 kg/m  as calculated from the following:    Height as of this encounter: 1.473 m (4' 10\").    Weight as of this encounter: 59.4 kg (131 lb).        She reports that she has never smoked. She has never used smokeless tobacco.    Appropriate preventive services were discussed with this patient, including applicable screening as appropriate for cardiovascular disease, diabetes, osteopenia/osteoporosis, and glaucoma.  As appropriate for age/gender, discussed screening for colorectal cancer, prostate cancer, breast cancer, and cervical cancer. Checklist reviewing preventive services available has been given to the patient.    Reviewed patients plan of care and provided an AVS. The Intermediate Care Plan ( asthma action plan, low back pain action plan, and migraine action plan) for Bethany meets the Care Plan requirement. This Care Plan has been established and reviewed with the Patient and other:daughter in law.    Counseling Resources:  ATP IV Guidelines  Pooled Cohorts Equation Calculator  Breast Cancer Risk Calculator  BRCA-Related Cancer Risk Assessment: FHS-7 Tool  FRAX Risk Assessment  ICSI Preventive Guidelines  Dietary Guidelines for Americans, 2010  USDA's MyPlate  ASA Prophylaxis  Lung CA Screening    Ernestina Argueta MD  [facility]  "

## 2021-04-29 ENCOUNTER — PATIENT OUTREACH (OUTPATIENT)
Dept: GERIATRIC MEDICINE | Facility: CLINIC | Age: 81
End: 2021-04-29

## 2021-04-29 NOTE — RESULT ENCOUNTER NOTE
Ms. Emilee Logan,    All of your labs were normal for you.    Please contact the clinic if you have additional questions.  Thank you.    Sincerely,    Ernestina Argueta MD

## 2021-04-29 NOTE — PROGRESS NOTES
[facility] Care Coordination Contact    CC received a call from Mayte at [facility], member's PCA agency. She states member and PCA are requesting more time to help with ADL's and IADL's since member had her surgery 4/6. CC called member with  and asked her what she is needing help with. She stated she is still feeling weak and is unable to do her walking to gain strength without assistance. She states she is able to get off the chair and toilet but moves slowly. According to surgeon and PCP's notes, member's incision is healing well and staples were removed. She is taking acetaminophen for pain. CC will request a 45 temporary increase in PCA time from 1.75hrs/day to 3 hrs/day and will reassess again for any change in her needs. PCA agency notified. PCA communication form faxed to [facility].   Jennifer Mahmood RN  [facility] Care Coordinator  [phone]

## 2021-05-10 NOTE — PROGRESS NOTES
Per SCOOTER, CC notified.  Bethany Chirinos 1940 HH shower w/suction (no install) 4/26/2021 DELIVERED     Florecita Mancera  Case Management Specialist  [facility]  [phone]

## 2021-05-18 NOTE — TELEPHONE ENCOUNTER
Routing refill request to provider for review/approval because:  Request requires provider review, patient has diagnosis of osteoporosis    Haley Steel RN  [facility]           You were seen in the ER today for pain in your abdomen.  Your labs are reassuring.  Your EKG is normal.  Your chest x-ray is normal.  We do not see any evidence of immediately life-threatening condition at this time.  Spoke with your oncologist who agreed there was likely limited utility in repeating a CT scan given you have had recent imaging of your abdomen.  Based on your exam and work-up today, we believe it is safe for you to be discharged home.  Please follow-up outpatient with your oncologist as well as your gastroenterologist to discuss your ongoing symptoms.  Please return to the ER if you have any chest pain, shortness of breath, high fever, severe abdominal pain, or other concerning symptoms.

## 2021-05-19 ENCOUNTER — TELEPHONE (OUTPATIENT)
Dept: FAMILY MEDICINE | Facility: CLINIC | Age: 81
End: 2021-05-19

## 2021-05-19 ENCOUNTER — APPOINTMENT (OUTPATIENT)
Dept: INTERPRETER SERVICES | Facility: CLINIC | Age: 81
End: 2021-05-19
Payer: COMMERCIAL

## 2021-05-19 DIAGNOSIS — M19.049 HAND ARTHRITIS: ICD-10-CM

## 2021-05-19 RX ORDER — ACETAMINOPHEN 325 MG/1
650 TABLET ORAL EVERY 6 HOURS PRN
Qty: 100 TABLET | Refills: 11 | Status: SHIPPED | OUTPATIENT
Start: 2021-05-19 | End: 2022-02-02

## 2021-05-19 NOTE — TELEPHONE ENCOUNTER
Patient is calling for a refill on her,Sm pain releiver extra strength 500 mg, generic acetaminophen. Spoke with patient though an . I said that this was not presribed through [facility], it was given after her surgery. I told her that she will need to call the provide that gave it to her, but she insisited that Dr Juan Argueta should refill it.Annita Jarquin

## 2021-05-19 NOTE — TELEPHONE ENCOUNTER
Routing refill request to provider for review/approval because:  Drug not active on patient's medication list  Rahel BAKERN, RN

## 2021-05-20 NOTE — TELEPHONE ENCOUNTER
Received written Rx for the Tylenol and faxed to [facility], [phone], right fax confirmed at 8:43 am today. Placed in 's copy basket.  Deyanira Cheng [facility]  2nd Floor  Primary Care

## 2021-05-24 DIAGNOSIS — E55.9 HYPOVITAMINOSIS D: ICD-10-CM

## 2021-05-24 DIAGNOSIS — M81.0 AGE-RELATED OSTEOPOROSIS WITHOUT CURRENT PATHOLOGICAL FRACTURE: ICD-10-CM

## 2021-05-25 RX ORDER — ERGOCALCIFEROL 1.25 MG/1
50000 CAPSULE, LIQUID FILLED ORAL WEEKLY
Qty: 8 CAPSULE | Refills: 0 | OUTPATIENT
Start: 2021-05-25

## 2021-05-25 NOTE — TELEPHONE ENCOUNTER
Denial sent, Calcium duplicate request.   Calcium discontinued 4/28/21  Vitamin D discontinued, 11/1/19.   Tatum Mendoza RN

## 2021-05-28 ENCOUNTER — OFFICE VISIT (OUTPATIENT)
Dept: OPHTHALMOLOGY | Facility: CLINIC | Age: 81
End: 2021-05-28
Attending: OPHTHALMOLOGY
Payer: COMMERCIAL

## 2021-05-28 DIAGNOSIS — H35.012 RETINAL MACROANEURYSM OF LEFT EYE: ICD-10-CM

## 2021-05-28 DIAGNOSIS — H10.13 ALLERGIC CONJUNCTIVITIS, BILATERAL: ICD-10-CM

## 2021-05-28 PROCEDURE — T1013 SIGN LANG/ORAL INTERPRETER: HCPCS | Mod: U3

## 2021-05-28 PROCEDURE — 99213 OFFICE O/P EST LOW 20 MIN: CPT | Mod: 25 | Performed by: OPHTHALMOLOGY

## 2021-05-28 PROCEDURE — 250N000011 HC RX IP 250 OP 636: Performed by: OPHTHALMOLOGY

## 2021-05-28 PROCEDURE — 92134 CPTRZ OPH DX IMG PST SGM RTA: CPT | Performed by: OPHTHALMOLOGY

## 2021-05-28 PROCEDURE — G0463 HOSPITAL OUTPT CLINIC VISIT: HCPCS

## 2021-05-28 PROCEDURE — 67028 INJECTION EYE DRUG: CPT | Mod: LT | Performed by: OPHTHALMOLOGY

## 2021-05-28 RX ADMIN — Medication 1.25 MG: at 11:17

## 2021-05-28 ASSESSMENT — TONOMETRY
OS_IOP_MMHG: 11
IOP_METHOD: ICARE
OD_IOP_MMHG: 09

## 2021-05-28 ASSESSMENT — VISUAL ACUITY
OD_SC: 20/70+2
OD_PH_SC: 20/30-
METHOD: SNELLEN - SINGLE
OS_SC: CF 1'

## 2021-05-28 ASSESSMENT — EXTERNAL EXAM - LEFT EYE: OS_EXAM: NORMAL

## 2021-05-28 ASSESSMENT — CUP TO DISC RATIO
OS_RATIO: 0.35
OD_RATIO: 0.35

## 2021-05-28 ASSESSMENT — SLIT LAMP EXAM - LIDS
COMMENTS: NORMAL
COMMENTS: NORMAL

## 2021-05-28 ASSESSMENT — EXTERNAL EXAM - RIGHT EYE: OD_EXAM: NORMAL

## 2021-05-28 NOTE — PROGRESS NOTES
CC -   CHANEL OS    INTERVAL HISTORY - vision stable in left eye.   Having allergic symptoms OU.    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   Seen by  3/25/21, found CNVM OS and poor vision, NICCI prior was ~ 2019, VA OS was 20/20 on 1/11/2019 visit.  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   - likely cause of HEx OS and vision loss     - guarded potential for vision recovery   - Avastin trial started 4/26/21     - last injection Avastin (#1) 4/26/2001 (4 weeks)   - OCT unclear if changed   - VA unchanged   - inject AVastin   - RTC 1 month        # CME OS   - from CHANEL      # PVD OU      # allergic conjunctivitis    - patient wishes refill of zaditor      # ONEYDA   - ATs      return to clinic: 1 month, OCT OU    Stanton Glaser MD  Vitreoretinal Surgery Fellow  [facility]     ATTESTATION     Attending Attestation:     Complete documentation of historical and exam elements from today's encounter can be found in the full encounter summary report (not reduplicated in this progress note).  I personally obtained the chief complaint(s) and history of present illness.  I confirmed and edited as necessary the review of systems, past medical/surgical history, family history, social history, and examination findings as documented by others; and I examined the patient myself.  I personally reviewed the relevant tests, images, and reports as documented above.  I personally reviewed the ophthalmic test(s) associated with this encounter, agree with the interpretation(s) as documented by the  resident/fellow, and have edited the corresponding report(s) as necessary.   I formulated and edited as necessary the assessment and plan and discussed the findings and management plan with the patient and family and I was present for the entire procedure performed by the resident/fellow.    Samantha Bruce MD, PhD  , Vitreoretinal Surgery  Department of Ophthalmology  [facility]

## 2021-06-03 ENCOUNTER — PATIENT OUTREACH (OUTPATIENT)
Dept: GERIATRIC MEDICINE | Facility: CLINIC | Age: 81
End: 2021-06-03

## 2021-06-03 NOTE — PROGRESS NOTES
[facility] Care Coordination Contact    Called member to schedule annual HRA telephone visit due to Covid-19 restrictions. CC called CLI to schedule an  for the telephone assessment.   Jennifer Mahmood RN  [facility] Care Coordinator  [phone]

## 2021-06-04 ENCOUNTER — PATIENT OUTREACH (OUTPATIENT)
Dept: GERIATRIC MEDICINE | Facility: CLINIC | Age: 81
End: 2021-06-04

## 2021-06-04 NOTE — PROGRESS NOTES
[facility] Care Coordination Contact    [facility] Home Visit Assessment     Telephone visit for Health Risk Assessment with Bethany Hebert Doreen completed on June 4, 2021 due to Covid-19 restrictions.     Type of residence:: Apartment - handicap accessible  Current living arrangement:: I live alone     Assessment completed with:: Patient, Family, Other()    Current Care Plan  Member currently receiving the following home care services:  None  Member currently receiving the following community resources: DME, Housekeeping/Chore Agency, PCA, Day Care      Medication Review  Medication reconciliation completed in Epic: Yes  Medication set-up & administration: Family/informal caregiver sets up weekly.  Self-administers medications.  Medication Risk Assessment Medication (1 or more, place referral to MTM): N/A: No risk factors identified  MTM Referral Placed: No: No risk factors idenified    Mental/Behavioral Health   Depression Screening:   PHQ-2 Total Score (Adult) - Positive if 3 or more points; Administer PHQ-9 if positive: 1       Falls Assessment:   Fallen 2 or more times in the past year?: No   Any fall with injury in the past year?: No    ADL/IADL Dependencies:   Dependent ADLs:: Ambulation-walker, Bathing, Dressing, Toileting  Dependent IADLs:: Cleaning, Cooking, Laundry, Shopping, Meal Preparation, Medication Management, Money Management, Transportation    [facility] sponsored benefits: Shared information re: Silver Sneakers/gym memberships, ASA, Calcium +D.    PCA Assessment completed at visit: Yes Annual PCA assessment indicated 14 units per day of PCA. This is an increase from the previous assessment.     Elderly Waiver Eligibility: Yes-will continue on EW    Care Plan & Recommendations: Continue PCA, homemaking, lifeline, ensure and pull ups. Member would like to return to [facility] when she is feeling better.     See LTCC for detailed assessment  information.    Follow-Up Plan: Member informed of future contact, plan to f/u with member with a 6 month telephone assessment.  Contact information shared with member and family, encouraged member to call with any questions or concerns at any time.    [place] care continuum providers: Please refer to Health Care Home on the Epic Problem List to view this patient's [facility] Care Plan Summary.  Jennifer Mahmood RN  [facility] Care Coordinator  [phone]

## 2021-06-10 ENCOUNTER — PATIENT OUTREACH (OUTPATIENT)
Dept: GERIATRIC MEDICINE | Facility: CLINIC | Age: 81
End: 2021-06-10

## 2021-06-10 NOTE — PROGRESS NOTES
[facility] Care Coordination Contact    Received after visit chart from care coordinator.  Completed following tasks: Mailed copy of care plan to client, Updated services in access, Submitted referrals/auths for hmkg, ADC w/can, KATY, DME and mailed POC & PCA sig sheetes w/SASE  , Provider Signature - No POC Shared:  Member indicates that they do not want their POC shared with any EW providers.    [facility]:  Emailed completed PCA assessment to [facility].  Faxed copy of PCA assessment to PCA Agency and mailed copy to member.  Faxed MD Communication to PCP.     Florecita Mancera  Case Management Specialist  [facility]  [phone]

## 2021-06-10 NOTE — LETTER
Kavitha 10, 2021      BETHANY SAUNDERS  [address]  [place] [address]      Dear Bethany:    At [facility], we are dedicated to improving your health and well-being. Enclosed is the Comprehensive Care Plan that we developed with you on 6/4/2021. Please review the Care Plan carefully.    As a reminder, some of the things we discussed at your visit include:    Your physical and mental health    Ways to reduce falls    Health care needs you may have    Don t forget to contact your care coordinator if you:    Have been hospitalized or plan to be hospitalized     Have had a fall     Have experienced a change in physical health    Are experiencing emotional problems     If you do not agree with your Care Plan, have questions about it, or have experienced a change in your needs, please call me at [phone]. If you are hearing impaired, please call the [place] Relay at 487 or [phone] (bgesjr-eg-lnxurg relay service).    Sincerely,    Jennifer Mahmood RN, MA    E-mail: [email or web address]  Phone: [phone]      [facility] is a health plan that contracts with both Medicare and the [place] Medical Assistance (Medicaid) program to provide benefits of both programs to enrollees. Enrollment in [facility] depends on contract renewal.    [id number])     [id number] (11/18)

## 2021-06-14 PROBLEM — R03.0 ELEVATED BLOOD PRESSURE READING WITHOUT DIAGNOSIS OF HYPERTENSION: Status: ACTIVE | Noted: 2021-06-14

## 2021-06-15 NOTE — PROGRESS NOTES
[facility] Care Coordination Contact    Per [facility] PCA dept, edits were needed on PCA assessment.  CC notified, changes made and resent to [facility] PCA dept to process.  Florecita Mancera  Case Management Specialist  [facility]  [phone]

## 2021-06-21 DIAGNOSIS — H35.352 CME (CYSTOID MACULAR EDEMA), LEFT: Primary | ICD-10-CM

## 2021-06-24 NOTE — PROGRESS NOTES
Rec'd PCA & POC sig pages back signed.  Secure emailed copy of PCA sig sheet to PCA Dept at [facility] and faxed copy to PCA agency.  Florecita Mancera  Case Management Specialist  [facility]  [phone]

## 2021-06-28 ENCOUNTER — OFFICE VISIT (OUTPATIENT)
Dept: OPHTHALMOLOGY | Facility: CLINIC | Age: 81
End: 2021-06-28
Attending: OPHTHALMOLOGY
Payer: COMMERCIAL

## 2021-06-28 DIAGNOSIS — H04.123 DRY EYE SYNDROME OF BOTH EYES: Primary | ICD-10-CM

## 2021-06-28 DIAGNOSIS — H35.352 CME (CYSTOID MACULAR EDEMA), LEFT: ICD-10-CM

## 2021-06-28 DIAGNOSIS — H35.012 RETINAL MACROANEURYSM OF LEFT EYE: ICD-10-CM

## 2021-06-28 PROCEDURE — 67028 INJECTION EYE DRUG: CPT | Mod: LT | Performed by: OPHTHALMOLOGY

## 2021-06-28 PROCEDURE — 92134 CPTRZ OPH DX IMG PST SGM RTA: CPT | Performed by: OPHTHALMOLOGY

## 2021-06-28 PROCEDURE — G0463 HOSPITAL OUTPT CLINIC VISIT: HCPCS

## 2021-06-28 PROCEDURE — 250N000011 HC RX IP 250 OP 636: Performed by: OPHTHALMOLOGY

## 2021-06-28 RX ORDER — CARBOXYMETHYLCELLULOSE SODIUM 5 MG/ML
1 SOLUTION/ DROPS OPHTHALMIC 4 TIMES DAILY
Qty: 70 EACH | Refills: 11 | Status: SHIPPED | OUTPATIENT
Start: 2021-06-28 | End: 2022-02-02

## 2021-06-28 RX ADMIN — Medication 1.25 MG: at 11:50

## 2021-06-28 ASSESSMENT — EXTERNAL EXAM - LEFT EYE: OS_EXAM: NORMAL

## 2021-06-28 ASSESSMENT — VISUAL ACUITY
METHOD: SNELLEN - LINEAR
OS_CC: CF @ 3'
OD_CC: 20/50
OD_CC+: -1

## 2021-06-28 ASSESSMENT — SLIT LAMP EXAM - LIDS
COMMENTS: NORMAL
COMMENTS: NORMAL

## 2021-06-28 ASSESSMENT — EXTERNAL EXAM - RIGHT EYE: OD_EXAM: NORMAL

## 2021-06-28 ASSESSMENT — TONOMETRY
OS_IOP_MMHG: 10
IOP_METHOD: TONOPEN
OD_IOP_MMHG: 11

## 2021-06-28 ASSESSMENT — CONF VISUAL FIELD
OS_NORMAL: 1
OD_NORMAL: 1

## 2021-06-28 ASSESSMENT — CUP TO DISC RATIO
OD_RATIO: 0.35
OS_RATIO: 0.35

## 2021-06-28 NOTE — PROGRESS NOTES
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   Seen by  3/25/21, found CNVM OS and poor vision, NICCI prior was ~ 2019, VA OS was 20/20 on 1/11/2019 visit.  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   - likely cause of HEx OS and vision loss     - guarded potential for vision recovery   - Avastin trial started 4/26/21     - last injection Avastin (#2) 5/28/2001 (4 weeks)   - OCT unclear if changed   - extensive HEx in macula causing vision loss   - VA unchanged still CF   - inject AVastin   - RTC 1 month     - if no improvement then observe        # CME OS   - from CHANEL   - now mostly mild diffuse CME   - extensive HEx in macula causing vision loss      # PVD OU      # allergic conjunctivitis    - patient wishes refill of zaditor      # ONEYDA   - ATs      return to clinic: 1 month, OCT OU, DFE OS        ATTESTATION     Attending Attestation:     Complete documentation of historical and exam elements from today's encounter can be found in the full encounter summary report (not reduplicated in this progress note).  I personally obtained the chief complaint(s) and history of present illness.  I confirmed and edited as necessary the review of systems, past medical/surgical history, family history, social history, and examination findings as documented by others; and I examined the patient myself.  I personally reviewed the relevant tests, images, and reports as documented above.  I formulated and edited as necessary the assessment and  plan and discussed the findings and management plan with the patient and family    Samantha Bruce MD, PhD  , Vitreoretinal Surgery  Department of Ophthalmology  [facility]

## 2021-06-28 NOTE — NURSING NOTE
Follow up CME - feels vision is a bit better than last visit. Denies pain in either eye. Requesting samples of PF AFT.     Naila Dey  11:03 AM

## 2021-07-19 NOTE — PROGRESS NOTES
Per SCOOTER, CC notified  Bethany Chirinos  1940 shower chair & BP monitor  6/10/2021 DELIVERED     Florecita Mancera  Case Management Specialist  [facility]  [phone]

## 2021-07-26 ENCOUNTER — OFFICE VISIT (OUTPATIENT)
Dept: OPHTHALMOLOGY | Facility: CLINIC | Age: 81
End: 2021-07-26
Attending: OPHTHALMOLOGY
Payer: COMMERCIAL

## 2021-07-26 DIAGNOSIS — H35.352 CME (CYSTOID MACULAR EDEMA), LEFT: ICD-10-CM

## 2021-07-26 PROCEDURE — 92134 CPTRZ OPH DX IMG PST SGM RTA: CPT | Performed by: OPHTHALMOLOGY

## 2021-07-26 PROCEDURE — G0463 HOSPITAL OUTPT CLINIC VISIT: HCPCS

## 2021-07-26 PROCEDURE — 99213 OFFICE O/P EST LOW 20 MIN: CPT | Performed by: OPHTHALMOLOGY

## 2021-07-26 ASSESSMENT — CUP TO DISC RATIO
OS_RATIO: 0.35
OD_RATIO: 0.35

## 2021-07-26 ASSESSMENT — SLIT LAMP EXAM - LIDS
COMMENTS: NORMAL
COMMENTS: NORMAL

## 2021-07-26 ASSESSMENT — VISUAL ACUITY
OS_CC: 2'200E
OD_CC+: -1
METHOD: SNELLEN - LINEAR
OD_CC: 20/50

## 2021-07-26 ASSESSMENT — TONOMETRY
OD_IOP_MMHG: 12
IOP_METHOD: TONOPEN
OS_IOP_MMHG: 13

## 2021-07-26 ASSESSMENT — CONF VISUAL FIELD
METHOD: COUNTING FINGERS
OS_NORMAL: 1
OD_NORMAL: 1

## 2021-07-26 ASSESSMENT — EXTERNAL EXAM - LEFT EYE: OS_EXAM: NORMAL

## 2021-07-26 ASSESSMENT — EXTERNAL EXAM - RIGHT EYE: OD_EXAM: NORMAL

## 2021-07-26 NOTE — NURSING NOTE
"Chief Complaints and History of Present Illnesses   Patient presents with     Follow Up     Chief Complaint(s) and History of Present Illness(es)     Follow Up     Course: stable    Associated symptoms: floaters and redness.  Negative for flashes and eye pain    Treatments tried: artificial tears    Pain scale: 0/10              Comments     Pt states no change in VA since last visit  St states floater left eye same as last visit  Using PFAT\"s DAREN Hernandez COT 10:41 AM July 26, 2021                   "

## 2021-07-26 NOTE — PROGRESS NOTES
CC -   CHANEL OS    INTERVAL HISTORY - vision stablle    Last full DFE 4/2021    [facility] -   Bethany Logan is a  80 year old year-old patient referred by Dr Ainsley Whiting for evaluation and treat of CHANEL OS  Seen by  3/25/21, found CNVM OS and poor vision HM, NICCI prior was ~ 2019, VA OS was 20/20 on 1/11/2019 visit.  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   - likely cause of HEx OS and vision loss     - guarded potential for vision recovery   - Avastin trial started 4/26/21 with vision HM     - last injection Avastin (#3) 5/28/2001 (4 weeks)   - OCT unclear if changed   - extensive HEx in macula causing vision loss   - VA unchanged 2/200-CF   - observe today   - RTC 1 month          # CME OS   - from CHANEL   - now mostly mild diffuse CME   - extensive HEx in macula causing vision loss      # PVD OU   - advised S/Sx RD 7/2021      # allergic conjunctivitis    - patient wishes refill of zaditor      # ONEYDA   - ATs      return to clinic: 1 month, DFE + OCT OU        ATTESTATION     Attending Attestation:     Complete documentation of historical and exam elements from today's encounter can be found in the full encounter summary report (not reduplicated in this progress note).  I personally obtained the chief complaint(s) and history of present illness.  I confirmed and edited as necessary the review of systems, past medical/surgical history, family history, social history, and examination findings as documented by others; and I examined the patient myself.  I personally reviewed the relevant tests, images, and reports as documented above.  I formulated and edited as necessary the assessment and  plan and discussed the findings and management plan with the patient and family    Samantha Bruce MD, PhD  , Vitreoretinal Surgery  Department of Ophthalmology  [facility]

## 2021-08-04 ENCOUNTER — OFFICE VISIT (OUTPATIENT)
Dept: FAMILY MEDICINE | Facility: CLINIC | Age: 81
End: 2021-08-04
Payer: COMMERCIAL

## 2021-08-04 VITALS
DIASTOLIC BLOOD PRESSURE: 78 MMHG | HEART RATE: 64 BPM | SYSTOLIC BLOOD PRESSURE: 136 MMHG | OXYGEN SATURATION: 97 % | WEIGHT: 133.4 LBS | HEIGHT: 58 IN | BODY MASS INDEX: 28 KG/M2 | TEMPERATURE: 98.3 F

## 2021-08-04 DIAGNOSIS — M54.9 BACK PAIN, UNSPECIFIED BACK LOCATION, UNSPECIFIED BACK PAIN LATERALITY, UNSPECIFIED CHRONICITY: ICD-10-CM

## 2021-08-04 DIAGNOSIS — R05.9 COUGH: Primary | ICD-10-CM

## 2021-08-04 DIAGNOSIS — E55.9 HYPOVITAMINOSIS D: ICD-10-CM

## 2021-08-04 PROCEDURE — U0003 INFECTIOUS AGENT DETECTION BY NUCLEIC ACID (DNA OR RNA); SEVERE ACUTE RESPIRATORY SYNDROME CORONAVIRUS 2 (SARS-COV-2) (CORONAVIRUS DISEASE [COVID-19]), AMPLIFIED PROBE TECHNIQUE, MAKING USE OF HIGH THROUGHPUT TECHNOLOGIES AS DESCRIBED BY CMS-2020-01-R: HCPCS | Performed by: PHYSICIAN ASSISTANT

## 2021-08-04 PROCEDURE — 99214 OFFICE O/P EST MOD 30 MIN: CPT | Performed by: PHYSICIAN ASSISTANT

## 2021-08-04 PROCEDURE — U0005 INFEC AGEN DETEC AMPLI PROBE: HCPCS | Performed by: PHYSICIAN ASSISTANT

## 2021-08-04 RX ORDER — ACETAMINOPHEN 500 MG
1000 TABLET ORAL EVERY 6 HOURS PRN
Qty: 90 TABLET | Refills: 1 | Status: SHIPPED | OUTPATIENT
Start: 2021-08-04 | End: 2022-02-02

## 2021-08-04 RX ORDER — BENZONATATE 200 MG/1
200 CAPSULE ORAL 3 TIMES DAILY PRN
Qty: 20 CAPSULE | Refills: 0 | Status: SHIPPED | OUTPATIENT
Start: 2021-08-04 | End: 2022-04-13

## 2021-08-04 ASSESSMENT — PAIN SCALES - GENERAL: PAINLEVEL: MODERATE PAIN (5)

## 2021-08-04 ASSESSMENT — MIFFLIN-ST. JEOR: SCORE: 964.85

## 2021-08-04 NOTE — PROGRESS NOTES
Assessment & Plan benign exam,   Problem List Items Addressed This Visit     Hypovitaminosis D    Relevant Medications    Calcium-Cholecalciferol (QC CALCIUM 500MG-D3) 500-200 MG-UNIT TABS      Other Visit Diagnoses     Cough    -  Primary    Relevant Medications    benzonatate (TESSALON) 200 MG capsule    Other Relevant Orders    Symptomatic COVID-19 Virus (Coronavirus) by PCR Nasopharyngeal    Back pain, unspecified back location, unspecified back pain laterality, unspecified chronicity        Relevant Medications    acetaminophen (TYLENOL) 500 MG tablet           Review of prior external note(s) from - CareEverywhere information from recent gen surgery visit reviewed     I was in the room for about 6 minutes with mask and face shield before patient disclosed she had a cough for two day, I then left and put on PAPR and gown.      30 minutes spent on the date of the encounter doing chart review, history and exam, documentation and further activities per the note      {   Return in about 1 week (around 8/11/2021), or if symptoms worsen or fail to improve.    FABI Yuen  [facility] MISAEL Sims is a 80 year old who presents for the following health issues    HPI      She saw gen surgery 2 weeks ago and essentially cleared from their service.        Started with cough , mostly atnight , for two days.  Throat gets dry and start coughing. She is covid vaccinated.  Since the cough started she had been having lower back pain/    No sick contacts.     service used over the phone.      Review of Systems   RESP cough as above   General: negative for fever  Resp: negative for chest pain   CV: negative for chest pain  : negative for dysuria , incontinence, frequency  Musculoskeletal: as above  Neurologic: negative for ataxia, saddle anesthesia, fecal incontinence, one sided weakness,  paresthesias             Objective    /78 (BP Location: Left arm,  "Patient Position: Sitting, Cuff Size: Adult Regular)   Pulse 64   Temp 98.3  F (36.8  C) (Tympanic)   Ht 1.473 m (4' 10\")   Wt 60.5 kg (133 lb 6.4 oz)   LMP  (LMP Unknown)   SpO2 97%   BMI 27.88 kg/m    Body mass index is 27.88 kg/m .  Physical Exam   NEURO: Patellar reflexes intact and equal b/l  BACK:  Straight leg raise intact, no paraspinal muscle TTP, strength intact and equal b/l lower extremities  GAIT: intact  GENERAL APPEARANCE: healthy, alert and no distress  EYES: conjunctiva clear  EARS: hearing aids in .    NOSE/MOUTH: Nose and mouth without ulcers, erythema or lesions  THROAT: no erythema w/ no tonsillar enlargement . no exudates  NECK: supple, nontender, no lymphadenopathy  RESP: lungs clear to auscultation - no rales, rhonchi or wheezes  CV: regular rates and rhythm, normal S1 S2, no murmur noted  NEURO: awake, alert           Results for orders placed or performed in visit on 08/04/21 (from the past 24 hour(s))   Symptomatic COVID-19 Virus (Coronavirus) by PCR Nasopharyngeal    Specimen: Nasopharyngeal; Swab    Narrative    The following orders were created for panel order Symptomatic COVID-19 Virus (Coronavirus) by PCR.  Procedure                               Abnormality         Status                     ---------                               -----------         ------                     SARS-COV2 (COVID-19) Vir...[[id number]]                                                   Please view results for these tests on the individual orders.              "

## 2021-08-04 NOTE — PATIENT INSTRUCTIONS
At [facility], we strive to deliver an exceptional experience to you, every time we see you. If you receive a survey, please complete it as we do value your feedback.  If you have MyChart, you can expect to receive results automatically within 24 hours of their completion.  Your provider will send a note interpreting your results as well.   If you do not have MyChart, you should receive your results in about a week by mail.    Your care team:                            Family Medicine Internal Medicine   MD Ivan Barlow MD Shantel Branch-Fleming, MD Srinivasa Vaka, MD Katya Belousova, PABryannaC  Pam Santiago, APRN CNP    Golden Connor, MD Pediatrics   Jj Rodgers, PABryannaC  Ada Yanes, CNP MD Mulu Ortiz APRN CNP   MD Janesas Cotton MD Deborah Mielke, MD Lashawn Son, APRN [facility]      Clinic hours: Monday - Thursday 7 am-6 pm; Fridays 7 am-5 pm.   Urgent care: Monday - Friday 10 am- 8 pm; Saturday and Sunday 9 am-5 pm.    Clinic: [phone]       [facility]: Monday - Thursday 8 am - 7 pm; Friday 8 am - 6 pm  [facility]: [phone]     Use www.oncare.org for 24/7 diagnosis and treatment of dozens of conditions.    Patient Education     Enfermedad viral de las vías respiratorias superiores (adulto)  Usted tiene alexy enfermedad respiratoria de las vías superiores provocada por un virus, lo que es otro término para definir el resfrío común. Esta enfermedad es contagiosa jaciel los primeros días. Se propaga por el aire al toser y estornudar. También puede transmitirse por contacto directo (si dany toca a la persona enferma y después se toca los ojos, la nariz o la boca). Lavarse las bryson con frecuencia reducirá el riesgo de contagio. La mayoría de las enfermedades virales mejoran en 7 a 10 días con reposo y simples cherelle caseros. En ocasiones, la enfermedad puede durar  varias semanas. Los antibióticos son ineficaces contra los virus, por lo que generalmente no se recetan para esta enfermedad.    Cuidados en el hogar    Si los síntomas son severos, descanse en harris hogar jaciel los primeros 2 a 3 días. Cuando reanude remedios actividades, evite cansarse demasiado.    Evite exponerse al humo de cigarrillo (suyo o de otras personas).    Puede hanny paracetamol o ibuprofeno para controlar el dolor y la fiebre, a menos que le hayan recetado otro medicamento. Si tiene alexy enfermedad hepática o renal crónica, o ha tenido alguna vez alexy úlcera estomacal o un sangrado gastrointestinal, o sae si está tomando medicamentos anticoagulantes, consulte con harris proveedor de atención médica antes de hanny estos medicamentos. La aspirina no debe administrarse nunca a menores de 18 años que tengan alexy infección viral o fiebre. Puede causar daños graves al hígado o al cerebro.    Es posible que tenga poco apetito, por lo que alexy dieta ligera es adecuada. Para evitar la deshidratación, kunal entre 6 y 8 vasos de líquido cada día (agua, refrescos, jugos de fruta, té, sopa, etc.). La abundancia de líquido ayuda a desprender las secreciones de la nariz y los pulmones.    Los medicamentos sin receta para el resfrío no acortarán la duración de la enfermedad, pooja pueden ser útiles para aliviar los siguientes síntomas: tos, dolor de garganta, congestión nasal y de los senos paranasales. (Nota: No use descongestivos si tiene presión arterial austin).  Atención de seguimiento  Asista a las citas de seguimiento con harris proveedor de atención médica, o según le hayan aconsejado.  Cuándo buscar atención médica  Llame a harris proveedor de atención médica de inmediato si ocurre algo de lo siguiente:    Tos con mucho esputo de color (mucosidad)    Dolor de octavio kim, dolor en la stas, el kings o los oídos    Incapacidad de tragar a causa del dolor de garganta    Fiebre de 100,4  F (38  C) o más austin, o según lo que le haya  indicado harris proveedor de atención médica  Cuándo llamar al 911  Llame al 911 si ocurre algo de lo siguiente:    Dolor en el pecho, falta de aire, sibilancias o dificultad al respirar    Tos con yassine    Incapacidad de tragar a causa del dolor de garganta    [id number] [employer] Todos los derechos reservados. Esta información no pretende sustituir la atención médica profesional. Sólo harris médico puede diagnosticar y tratar un problema de deo.

## 2021-08-05 LAB — SARS-COV-2 RNA RESP QL NAA+PROBE: NEGATIVE

## 2021-08-12 DIAGNOSIS — H35.352 CME (CYSTOID MACULAR EDEMA), LEFT: Primary | ICD-10-CM

## 2021-08-17 ENCOUNTER — OFFICE VISIT (OUTPATIENT)
Dept: OPHTHALMOLOGY | Facility: CLINIC | Age: 81
End: 2021-08-17
Attending: OPHTHALMOLOGY
Payer: COMMERCIAL

## 2021-08-17 DIAGNOSIS — H31.001 CHORIORETINAL SCAR, RIGHT: ICD-10-CM

## 2021-08-17 DIAGNOSIS — H35.352 CME (CYSTOID MACULAR EDEMA), LEFT: Primary | ICD-10-CM

## 2021-08-17 PROCEDURE — 99213 OFFICE O/P EST LOW 20 MIN: CPT | Mod: GC | Performed by: OPHTHALMOLOGY

## 2021-08-17 PROCEDURE — G0463 HOSPITAL OUTPT CLINIC VISIT: HCPCS

## 2021-08-17 PROCEDURE — 92134 CPTRZ OPH DX IMG PST SGM RTA: CPT | Performed by: OPHTHALMOLOGY

## 2021-08-17 ASSESSMENT — EXTERNAL EXAM - RIGHT EYE: OD_EXAM: NORMAL

## 2021-08-17 ASSESSMENT — SLIT LAMP EXAM - LIDS
COMMENTS: NORMAL
COMMENTS: NORMAL

## 2021-08-17 ASSESSMENT — CUP TO DISC RATIO
OD_RATIO: 0.35
OS_RATIO: 0.35

## 2021-08-17 ASSESSMENT — EXTERNAL EXAM - LEFT EYE: OS_EXAM: NORMAL

## 2021-08-17 ASSESSMENT — TONOMETRY
IOP_METHOD: TONOPEN
OD_IOP_MMHG: 10
OS_IOP_MMHG: 11

## 2021-08-17 ASSESSMENT — VISUAL ACUITY
OD_PH_SC: 20/30
OD_SC: 20/100
METHOD: SNELLEN - LINEAR
OD_PH_SC+: -/+2
OS_SC: 3'200E

## 2021-08-17 ASSESSMENT — CONF VISUAL FIELD
METHOD: COUNTING FINGERS
OS_NORMAL: 1
OD_NORMAL: 1

## 2021-08-17 NOTE — NURSING NOTE
Chief Complaints and History of Present Illnesses   Patient presents with     Follow Up     CME (cystoid macular edema), left     Chief Complaint(s) and History of Present Illness(es)     Follow Up     Laterality: left eye    Associated symptoms: dryness.  Negative for eye pain, redness and tearing    Pain scale: 0/10    Comments: CME (cystoid macular edema), left              Comments     Patient report that since last exam on 7/26/21 vision each eye has remained stable. Some dryness and burning with each eye at times this is new from last visit. Lubricants with each eye PRN does relieve this.  Zaditor PRN each eye - last use this am   No new floaters with each eye. No flashes each eye.     Bing Cheek, COT COT 3:02 PM August 17, 2021

## 2021-08-17 NOTE — PROGRESS NOTES
CC -   CHANEL OS    INTERVAL HISTORY - vision stablle    Last full DFE 8/2021    PM -   Bethany Logan is a  80 year old year-old patient referred by Dr Ainsley Whiting for evaluation and treat of CHANEL OS  Seen by  3/25/21, found CNVM OS and poor vision HM NICCI prior was ~ 2019, VA OS was 20/20 on 1/11/2019 visit.  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   - likely cause of HEx OS and vision loss     - guarded potential for vision recovery   - Avastin trial started 4/26/21 with vision HM     - last injection Avastin (#4) 6/28/2021 (7 weeks)   - OCT largely unchanged   - extensive HEx in macula causing vision loss   - VA unchanged 2/200-CF elected to stop treatment     - Pt planning trip abroad ([place] in Sept (1mo)   - observe today   - RTC 1 month after trip      # CME OS   - from CHANEL   - now mostly mild diffuse CME   - extensive HEx in macula causing vision loss      #Dry AMD OD intermediate   - recommend AREDS2 and Amsler    # Chorioretinal scar OD    - ?etiology, ?d/t toxoplasmosis   - inactive, likely longstanding      # PVD OU   - advised S/Sx RD 7/2021      # allergic conjunctivitis    - patient wishes refill of zaditor      # ONEYDA   - ATs    RTC 2 months, OCT + DFE + Optos OU          ATTESTATION     Attending Attestation:     Complete documentation of historical and exam elements from today's encounter can be found in the full encounter summary report (not reduplicated in this progress note).  I personally obtained the chief complaint(s) and history of present illness.  I confirmed and edited as necessary the review of systems, past medical/surgical  history, family history, social history, and examination findings as documented by others; and I examined the patient myself.  I personally reviewed the relevant tests, images, and reports as documented above.  I personally reviewed the ophthalmic test(s) associated with this encounter, agree with the interpretation(s) as documented by the resident/fellow, and have edited the corresponding report(s) as necessary.   I formulated and edited as necessary the assessment and plan and discussed the findings and management plan with the patient and family    Samantha Bruce MD, PhD  , Vitreoretinal Surgery  Department of Ophthalmology  [facility]

## 2021-10-12 NOTE — PROGRESS NOTES
Continue speech therapy   Subjective     Bethany Logan is a 79 year old female who presents to clinic today for the following health issues:    HPI   Medication Followup of rifampin    Taking Medication as prescribed: yes    Side Effects:  Loss of appetite, felt nauseous after eating    Medication Helping Symptoms:  Yes    Pt reports that she has been recording her bps at home and they are normal (she forgot to bring the sheet today). She would like to check on the rifampin/TB      is present.   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  - Started on daily rifampin. Was tolerating well at 1 month check, LFTs wnl.  - States she began to develop anorexia and nausea. Has been out of medication for 2 weeks, completed 3 months of treatment.    Patient Active Problem List   Diagnosis     OA (osteoarthritis) of knee - bilateral     Health Care Home     Constipation     History of total knee arthroplasty - right     SNHL (sensory-neural hearing loss), asymmetrical     Chronic left-sided low back pain with left-sided sciatica     Advance care planning     Osteoporosis     History of total hip arthroplasty, left     Lumbar spinal stenosis     Macular drusen, bilateral     Dermatochalasis of eyelid     Pseudophakia of both eyes     Closed nondisplaced zone I fracture of sacrum with routine healing, subsequent encounter     Other specified depressive episodes     Adjustment disorder with mixed anxiety and depressed mood     Fatty liver     Gallbladder polyps     Hypovitaminosis D     Past Surgical History:   Procedure Laterality Date     C TOTAL KNEE ARTHROPLASTY  9/25/13    Right     CATARACT IOL, RT/LT       COMBINED REPAIR PTOSIS WITH BLEPHAROPLASTY Bilateral 1/9/2017    Procedure: COMBINED REPAIR PTOSIS WITH BLEPHAROPLASTY;  Surgeon: Keven Vázquez MD;  Location:  OR     SURGICAL HISTORY OF -       Left hip bone surgery       Social History     Tobacco Use     Smoking status:  "Never Smoker     Smokeless tobacco: Never Used   Substance Use Topics     Alcohol use: No     Family History   Problem Relation Age of Onset     C.A.D. No family hx of      Diabetes No family hx of      Hypertension No family hx of      Cerebrovascular Disease No family hx of          Current Outpatient Medications   Medication Sig Dispense Refill     acetaminophen (TYLENOL) 325 MG tablet Take 2 tablets (650 mg) by mouth every 6 hours as needed for mild pain 100 tablet 11     CALCIUM + VITAMIN D3 500-400 MG-UNIT CHEW Chew 1 tablet by mouth twice a day **MASTIQUE 1 TABLETA POR LA BOCA DOS VECES AL MIKHAIL 180 tablet 3     ondansetron (ZOFRAN) 4 MG tablet Take 1 tablet (4 mg) by mouth every 6 hours as needed for nausea 90 tablet 0     rifampin (RIFADIN) 300 MG capsule Take 2 capsules (600 mg) by mouth daily 60 capsule 0       Reviewed and updated as needed this visit by Provider  Tobacco  Allergies  Meds  Problems  Med Hx  Surg Hx  Fam Hx         Review of Systems   ROS COMP: Constitutional, HEENT, cardiovascular, pulmonary, GI, , musculoskeletal, neuro, skin, endocrine and psych systems are negative, except as otherwise noted.      Objective    /66 (Patient Position: Sitting, Cuff Size: Adult Regular)   Pulse 62   Temp 98.3  F (36.8  C) (Tympanic)   Resp 20   Ht 1.473 m (4' 10\")   Wt 59.9 kg (132 lb)   LMP  (LMP Unknown)   SpO2 97%   BMI 27.59 kg/m    Body mass index is 27.59 kg/m .  Physical Exam   GENERAL:  WDWN, no acute distress  PSYCH: pleasant, cooperative  EYES: no discharge, no injection  HENT:  Normocephalic. Moist mucus membranes.  NECK:  Supple, symmetric  EXTREMITIES:  No gross deformities, moves all 4 limbs spontaneously  SKIN:  Warm and dry, no rash or suspicious lesions    NEUROLOGIC: alert, sensation grossly intact.    Diagnostic Test Results:  none         Assessment & Plan       ICD-10-CM    1. Encounter for medication monitoring Z51.81 Hepatic panel   2. LTBI (latent tuberculosis " infection) Z22.7 ondansetron (ZOFRAN) 4 MG tablet     rifampin (RIFADIN) 300 MG capsule     - Will recheck LFTs to ensure patient was not developing medication-induced hepatitis, resulting in her abd symptoms.  - Recommend she complete the remaining month of treatment. Take with last meal of the evening, so hopefully she sleeps through worst of side effects. Take on full stomach. Take Zofran prior to eating to help prevent nausea; given additional prn Zofran to use throughout the day.    Return in about 2 weeks (around 2/12/2020), or if symptoms worsen or fail to improve.    Pham Cruz PA-C  [facility]

## 2021-10-14 ENCOUNTER — PATIENT OUTREACH (OUTPATIENT)
Dept: GERIATRIC MEDICINE | Facility: CLINIC | Age: 81
End: 2021-10-14

## 2021-10-14 NOTE — PROGRESS NOTES
[facility] Care Coordination Contact    CC received a call from member's son Sudhakar stating member received a letter stating her food support renewal was due and has not received an application to fill out. CC called [facility] and was told they would mail out a new application. Member can also renew over the telephone. CC called Sudhakar back and gave him this information.  Jennifer Mahmood RN  [facility] Care Coordinator  [phone]

## 2021-10-22 ENCOUNTER — OFFICE VISIT (OUTPATIENT)
Dept: OPHTHALMOLOGY | Facility: CLINIC | Age: 81
End: 2021-10-22
Attending: OPHTHALMOLOGY
Payer: COMMERCIAL

## 2021-10-22 DIAGNOSIS — H35.352 CME (CYSTOID MACULAR EDEMA), LEFT: ICD-10-CM

## 2021-10-22 PROCEDURE — 92250 FUNDUS PHOTOGRAPHY W/I&R: CPT | Performed by: OPHTHALMOLOGY

## 2021-10-22 PROCEDURE — 92134 CPTRZ OPH DX IMG PST SGM RTA: CPT | Performed by: OPHTHALMOLOGY

## 2021-10-22 PROCEDURE — 99213 OFFICE O/P EST LOW 20 MIN: CPT | Performed by: OPHTHALMOLOGY

## 2021-10-22 PROCEDURE — 99207 FUNDUS PHOTOS OU (BOTH EYES): CPT | Performed by: OPHTHALMOLOGY

## 2021-10-22 PROCEDURE — G0463 HOSPITAL OUTPT CLINIC VISIT: HCPCS | Mod: 25

## 2021-10-22 ASSESSMENT — REFRACTION_WEARINGRX
OS_ADD: +3.00
OD_SPHERE: -1.25
OS_CYLINDER: +1.50
OD_CYLINDER: +1.50
SPECS_TYPE: BIFOCAL
OD_ADD: +3.00
OD_AXIS: 175
OS_SPHERE: +0.25
OS_AXIS: 179

## 2021-10-22 ASSESSMENT — SLIT LAMP EXAM - LIDS
COMMENTS: NORMAL
COMMENTS: NORMAL

## 2021-10-22 ASSESSMENT — TONOMETRY
IOP_METHOD: ICARE
OS_IOP_MMHG: 10
OD_IOP_MMHG: 09

## 2021-10-22 ASSESSMENT — CUP TO DISC RATIO
OS_RATIO: 0.35
OD_RATIO: 0.35

## 2021-10-22 ASSESSMENT — EXTERNAL EXAM - RIGHT EYE: OD_EXAM: NORMAL

## 2021-10-22 ASSESSMENT — VISUAL ACUITY
METHOD: SNELLEN - LINEAR
OS_CC: CF
OD_CC: 20/40
CORRECTION_TYPE: GLASSES

## 2021-10-22 ASSESSMENT — CONF VISUAL FIELD
OS_NORMAL: 1
METHOD: COUNTING FINGERS

## 2021-10-22 ASSESSMENT — EXTERNAL EXAM - LEFT EYE: OS_EXAM: NORMAL

## 2021-10-22 NOTE — PROGRESS NOTES
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   - likely cause of HEx OS and vision loss     - guarded potential for vision recovery   - Avastin trial started 4/26/21 with vision HM     - last injection Avastin (#4) 6/28/2021 (4 months)   - OCT largely unchanged   - extensive HEx in macula causing vision loss   - VA unchanged 2/200-CF elected to stop treatment at 8/2021 visit     - stable today   - observe      # CME OS   - from CHANEL   - now mostly mild diffuse CME   - extensive HEx in macula causing vision loss      #Dry AMD OD intermediate   - recommend AREDS2 and Amsler    # Chorioretinal scar OD    - ?etiology, ?d/t toxoplasmosis   - inactive, likely longstanding      # PVD OU   - advised S/Sx RD 7/2021      # allergic conjunctivitis    - patient wishes refill of zaditor      # ONEYDA   - ATs    RTC 3-4   months, OCT + DFE OU          ATTESTATION     Attending Attestation:     Complete documentation of historical and exam elements from today's encounter can be found in the full encounter summary report (not reduplicated in this progress note).  I personally obtained the chief complaint(s) and history of present illness.  I confirmed and edited as necessary the review of systems, past medical/surgical history, family history, social history, and examination  findings as documented by others; and I examined the patient myself.  I personally reviewed the relevant tests, images, and reports as documented above.  I formulated and edited as necessary the assessment and plan and discussed the findings and management plan with the patient and family    Samantha Bruce MD, PhD  , Vitreoretinal Surgery  Department of Ophthalmology  [facility]

## 2021-11-08 ENCOUNTER — MEDICAL CORRESPONDENCE (OUTPATIENT)
Dept: HEALTH INFORMATION MANAGEMENT | Facility: CLINIC | Age: 81
End: 2021-11-08
Payer: COMMERCIAL

## 2021-12-16 ENCOUNTER — PATIENT OUTREACH (OUTPATIENT)
Dept: GERIATRIC MEDICINE | Facility: CLINIC | Age: 81
End: 2021-12-16
Payer: COMMERCIAL

## 2021-12-16 NOTE — PROGRESS NOTES
[facility] Care Coordination Contact      [facility] Six-Month Telephone Assessment    6 month telephone assessment completed on 12/15/21 with .    ER visits: No  Hospitalizations: No  TCU stays: No  Significant health status changes: None  Falls/Injuries: No  ADL/IADL changes: No  Changes in services: Yes: Member states her lifeline pendant is not working. CC will have lifeline contact member for a new pendant. She is also requesting a reacher to help her  items. Member also stated she was not receiving her food support. CC called [facility] and was told they had not processed the renewal forms. This was done while CC was on the phone and member was told she could start using her EBT card in 24 hours.     Caregiver Assessment follow up:  na    Goals: See POC in chart for goal progress documentation.      Will see member in 6 months for an annual health risk assessment.   Encouraged member to call CC with any questions or concerns in the meantime.   Jennifer Mahmood RN  [facility] Care Coordinator  [phone]

## 2022-01-01 NOTE — PATIENT INSTRUCTIONS
At [facility], we strive to deliver an exceptional experience to you, every time we see you.  If you receive a survey in the mail, please send us back your thoughts. We really do value your feedback.    Based on your medical history, these are the current health maintenance/preventive care services that you are due for (some may have been done at this visit.)  Health Maintenance Due   Topic Date Due     PHQ-9 Q6 MONTHS  05/15/2018       Suggested websites for health information:  Www.[employer].org : Up to date and easily searchable information on multiple topics.  Www.medlineplus.gov : medication info, interactive tutorials, watch real surgeries online  Www.familydoctor.org : good info from the Academy of Family Physicians  Www.cdc.gov : public health info, travel advisories, epidemics (H1N1)  Www.aap.org : children's health info, normal development, vaccinations  Www.health.Replaced by [facility].[place].us : [place] dept of health, public health issues in [place], N1N1    Your care team:                            Family Medicine Internal Medicine   MD Ivan Barlow MD Shantel Branch-Fleming, MD Katya Georgiev PA-C Megan Hill, APRN CNP    Golden Connor MD Pediatrics   Jj Rodgers, PABryannaC  Ada Yanes, CNP MD Mulu Ortiz APRN CNP   MD Janessa Cotton MD Deborah Mielke, MD Kim Thein, APRN CNP      Clinic hours: Monday - Thursday 7 am-7 pm; Fridays 7 am-5 pm.   Urgent care: Monday - Friday 11 am-9 pm; Saturday and Sunday 9 am-5 pm.  Pharmacy : Monday -Thursday 8 am-8 pm; Friday 8 am-6 pm; Saturday and Sunday 9 am-5 pm.     Clinic: [phone]   Pharmacy: [phone]       on the discharge service for the patient. I have reviewed and made amendments to the documentation where necessary.

## 2022-01-05 NOTE — PROGRESS NOTES
Per APA, reacher delivered 12/23/21. CC notified.    Renee Land  Care Management Specialist Manager  [facility]  [phone]

## 2022-02-02 ENCOUNTER — TELEPHONE (OUTPATIENT)
Dept: FAMILY MEDICINE | Facility: CLINIC | Age: 82
End: 2022-02-02

## 2022-02-02 ENCOUNTER — OFFICE VISIT (OUTPATIENT)
Dept: FAMILY MEDICINE | Facility: CLINIC | Age: 82
End: 2022-02-02
Payer: COMMERCIAL

## 2022-02-02 VITALS
HEIGHT: 58 IN | TEMPERATURE: 97.8 F | OXYGEN SATURATION: 99 % | DIASTOLIC BLOOD PRESSURE: 72 MMHG | BODY MASS INDEX: 27.71 KG/M2 | SYSTOLIC BLOOD PRESSURE: 160 MMHG | WEIGHT: 132 LBS | HEART RATE: 70 BPM

## 2022-02-02 DIAGNOSIS — I10 HYPERTENSION GOAL BP (BLOOD PRESSURE) < 140/90: Primary | ICD-10-CM

## 2022-02-02 DIAGNOSIS — R35.89 POLYURIA: ICD-10-CM

## 2022-02-02 DIAGNOSIS — H04.123 DRY EYE SYNDROME OF BOTH EYES: ICD-10-CM

## 2022-02-02 DIAGNOSIS — E55.9 HYPOVITAMINOSIS D: ICD-10-CM

## 2022-02-02 DIAGNOSIS — R73.01 IMPAIRED FASTING GLUCOSE: Primary | ICD-10-CM

## 2022-02-02 DIAGNOSIS — E78.5 HYPERLIPIDEMIA LDL GOAL <100: ICD-10-CM

## 2022-02-02 DIAGNOSIS — M19.049 HAND ARTHRITIS: ICD-10-CM

## 2022-02-02 DIAGNOSIS — R53.83 FATIGUE, UNSPECIFIED TYPE: ICD-10-CM

## 2022-02-02 DIAGNOSIS — H10.13 ALLERGIC CONJUNCTIVITIS, BILATERAL: ICD-10-CM

## 2022-02-02 LAB
ALBUMIN SERPL-MCNC: 3.3 G/DL (ref 3.4–5)
ALBUMIN UR-MCNC: NEGATIVE MG/DL
ALP SERPL-CCNC: 110 U/L (ref 40–150)
ALT SERPL W P-5'-P-CCNC: 24 U/L (ref 0–50)
ANION GAP SERPL CALCULATED.3IONS-SCNC: 7 MMOL/L (ref 3–14)
APPEARANCE UR: CLEAR
AST SERPL W P-5'-P-CCNC: 31 U/L (ref 0–45)
BACTERIA #/AREA URNS HPF: NORMAL /HPF
BASOPHILS # BLD AUTO: 0 10E3/UL (ref 0–0.2)
BASOPHILS NFR BLD AUTO: 0 %
BILIRUB SERPL-MCNC: 0.2 MG/DL (ref 0.2–1.3)
BILIRUB UR QL STRIP: NEGATIVE
BUN SERPL-MCNC: 13 MG/DL (ref 7–30)
CALCIUM SERPL-MCNC: 9.3 MG/DL (ref 8.5–10.1)
CHLORIDE BLD-SCNC: 108 MMOL/L (ref 94–109)
CHOLEST SERPL-MCNC: 189 MG/DL
CO2 SERPL-SCNC: 26 MMOL/L (ref 20–32)
COLOR UR AUTO: YELLOW
CREAT SERPL-MCNC: 0.61 MG/DL (ref 0.52–1.04)
EOSINOPHIL # BLD AUTO: 0.2 10E3/UL (ref 0–0.7)
EOSINOPHIL NFR BLD AUTO: 3 %
ERYTHROCYTE [DISTWIDTH] IN BLOOD BY AUTOMATED COUNT: 14 % (ref 10–15)
FASTING STATUS PATIENT QL REPORTED: ABNORMAL
GFR SERPL CREATININE-BSD FRML MDRD: 89 ML/MIN/1.73M2
GLUCOSE BLD-MCNC: 99 MG/DL (ref 70–99)
GLUCOSE UR STRIP-MCNC: NEGATIVE MG/DL
HBA1C MFR BLD: 5.6 % (ref 0–5.6)
HCT VFR BLD AUTO: 39.3 % (ref 35–47)
HDLC SERPL-MCNC: 48 MG/DL
HGB BLD-MCNC: 12.6 G/DL (ref 11.7–15.7)
HGB UR QL STRIP: NEGATIVE
KETONES UR STRIP-MCNC: NEGATIVE MG/DL
LDLC SERPL CALC-MCNC: 77 MG/DL
LEUKOCYTE ESTERASE UR QL STRIP: NEGATIVE
LYMPHOCYTES # BLD AUTO: 2.5 10E3/UL (ref 0.8–5.3)
LYMPHOCYTES NFR BLD AUTO: 38 %
MCH RBC QN AUTO: 31.3 PG (ref 26.5–33)
MCHC RBC AUTO-ENTMCNC: 32.1 G/DL (ref 31.5–36.5)
MCV RBC AUTO: 98 FL (ref 78–100)
MONOCYTES # BLD AUTO: 0.6 10E3/UL (ref 0–1.3)
MONOCYTES NFR BLD AUTO: 9 %
NEUTROPHILS # BLD AUTO: 3.2 10E3/UL (ref 1.6–8.3)
NEUTROPHILS NFR BLD AUTO: 50 %
NITRATE UR QL: NEGATIVE
NONHDLC SERPL-MCNC: 141 MG/DL
PH UR STRIP: 6.5 [PH] (ref 5–7)
PLATELET # BLD AUTO: 318 10E3/UL (ref 150–450)
POTASSIUM BLD-SCNC: 4.7 MMOL/L (ref 3.4–5.3)
PROT SERPL-MCNC: 7.1 G/DL (ref 6.8–8.8)
RBC # BLD AUTO: 4.02 10E6/UL (ref 3.8–5.2)
RBC #/AREA URNS AUTO: NORMAL /HPF
SODIUM SERPL-SCNC: 141 MMOL/L (ref 133–144)
SP GR UR STRIP: 1.01 (ref 1–1.03)
TRIGL SERPL-MCNC: 319 MG/DL
TSH SERPL DL<=0.005 MIU/L-ACNC: 1.37 MU/L (ref 0.4–4)
UROBILINOGEN UR STRIP-ACNC: 0.2 E.U./DL
WBC # BLD AUTO: 6.4 10E3/UL (ref 4–11)
WBC #/AREA URNS AUTO: NORMAL /HPF

## 2022-02-02 PROCEDURE — 81001 URINALYSIS AUTO W/SCOPE: CPT | Performed by: FAMILY MEDICINE

## 2022-02-02 PROCEDURE — T1013 SIGN LANG/ORAL INTERPRETER: HCPCS | Mod: U4 | Performed by: FAMILY MEDICINE

## 2022-02-02 PROCEDURE — 80050 GENERAL HEALTH PANEL: CPT | Performed by: FAMILY MEDICINE

## 2022-02-02 PROCEDURE — 99214 OFFICE O/P EST MOD 30 MIN: CPT | Performed by: FAMILY MEDICINE

## 2022-02-02 PROCEDURE — 83036 HEMOGLOBIN GLYCOSYLATED A1C: CPT | Performed by: FAMILY MEDICINE

## 2022-02-02 PROCEDURE — 36415 COLL VENOUS BLD VENIPUNCTURE: CPT | Performed by: FAMILY MEDICINE

## 2022-02-02 PROCEDURE — 80061 LIPID PANEL: CPT | Performed by: FAMILY MEDICINE

## 2022-02-02 RX ORDER — CARBOXYMETHYLCELLULOSE SODIUM 5 MG/ML
1 SOLUTION/ DROPS OPHTHALMIC 4 TIMES DAILY
Qty: 70 EACH | Refills: 11 | Status: SHIPPED | OUTPATIENT
Start: 2022-02-02 | End: 2023-03-29

## 2022-02-02 RX ORDER — ACETAMINOPHEN 325 MG/1
650 TABLET ORAL EVERY 6 HOURS PRN
Qty: 100 TABLET | Refills: 1 | Status: SHIPPED | OUTPATIENT
Start: 2022-02-02 | End: 2022-04-04

## 2022-02-02 ASSESSMENT — MIFFLIN-ST. JEOR: SCORE: 953.5

## 2022-02-02 NOTE — PROGRESS NOTES
"       Subjective   Bethany is a 81 year old who presents for the following health issues     HPI     Urinating a lot and wants to know if she has diabetes       Review of Systems    no diabetes meds   No  Blood pressure meds    Tylenol  For pain    Urinating  Many times  Large amount of urine    Even some urine  In  Bed    No pain on urination    drinking a lot  Of water    No diabetes in family      Lives alone    Son is     Patient clarified  It is okay to talk to son and  Daughter in law for anything    No chest pain      Objective    BP (!) 173/77 (BP Location: Right arm, Patient Position: Chair, Cuff Size: Adult Regular)   Pulse 70   Temp 97.8  F (36.6  C) (Temporal)   Ht 1.473 m (4' 10\")   Wt 59.9 kg (132 lb)   LMP  (LMP Unknown)   SpO2 99%   Breastfeeding No   BMI 27.59 kg/m    Body mass index is 27.59 kg/m .  Physical Exam  Constitutional:       Appearance: She is well-developed.   HENT:      Head: Normocephalic and atraumatic.   Eyes:      Conjunctiva/sclera: Conjunctivae normal.   Neck:      Vascular: No carotid bruit.   Cardiovascular:      Rate and Rhythm: Normal rate and regular rhythm.      Heart sounds: Normal heart sounds.   Pulmonary:      Effort: Pulmonary effort is normal. No respiratory distress.      Breath sounds: Normal breath sounds.   Neurological:      Mental Status: She is alert and oriented to person, place, and time.      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  I tried to call radha De La Fuente with info.  He was not available.  Other labs pending but ua okay also.     (R53.83) Fatigue, unspecified type  Comment: check labs   Plan: CBC with Platelets & Differential, TSH with "         free T4 reflex             (E78.5) Hyperlipidemia LDL goal <100  Comment: check   Plan: Comprehensive metabolic panel, Lipid panel         reflex to direct LDL Non-fasting             (R35.89) Polyuria  Comment: ua okay   Plan: UA with Microscopic reflex to Culture - lab         collect, Urine Microscopic              (H04.123) Dry eye syndrome of both eyes  Comment: needs refill of this drop   Plan: carboxymethylcellulose PF         (CARBOXYMETHYLCELLULOSE SODIUM) 0.5 %         ophthalmic solution             (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    Job Lima MD

## 2022-02-02 NOTE — LETTER
February 3, 2022      Bethany Logan  [address]  [place] [address]        Dear Ms.Valencia Logan,    We are writing to inform you of your test results.    You do not have diabetes ( hemoglobin a1c and glucose are normal).     The triglycerides are high, but this would not explain your symptoms.     No urinary infection present.     Other labs are okay/ stable.     I will try to call your son or daughter in law to discuss.        Resulted Orders   Hemoglobin A1c   Result Value Ref Range    Hemoglobin A1C 5.6 0.0 - 5.6 %      Comment:      Normal <5.7%   Prediabetes 5.7-6.4%    Diabetes 6.5% or higher     Note: Adopted from ADA consensus guidelines.   Comprehensive metabolic panel   Result Value Ref Range    Sodium 141 133 - 144 mmol/L    Potassium 4.7 3.4 - 5.3 mmol/L    Chloride 108 94 - 109 mmol/L    Carbon Dioxide (CO2) 26 20 - 32 mmol/L    Anion Gap 7 3 - 14 mmol/L    Urea Nitrogen 13 7 - 30 mg/dL    Creatinine 0.61 0.52 - 1.04 mg/dL    Calcium 9.3 8.5 - 10.1 mg/dL    Glucose 99 70 - 99 mg/dL    Alkaline Phosphatase 110 40 - 150 U/L    AST 31 0 - 45 U/L    ALT 24 0 - 50 U/L    Protein Total 7.1 6.8 - 8.8 g/dL    Albumin 3.3 (L) 3.4 - 5.0 g/dL    Bilirubin Total 0.2 0.2 - 1.3 mg/dL    GFR Estimate 89 >60 mL/min/1.73m2      Comment:      Effective December 21, 2021 eGFRcr in adults is calculated using the 2021 CKD-EPI creatinine equation which includes age and gender (Minh et al., NEJ, DOI: 10.1056/PUBChy9372346)   Lipid panel reflex to direct LDL Non-fasting   Result Value Ref Range    Cholesterol 189 <200 mg/dL    Triglycerides 319 (H) <150 mg/dL    Direct Measure HDL 48 (L) >=50 mg/dL    LDL Cholesterol Calculated 77 <=100 mg/dL    Non HDL Cholesterol 141 (H) <130 mg/dL    Patient Fasting > 8hrs? Unknown     Narrative    Cholesterol  Desirable:  <200 mg/dL    Triglycerides  Normal:  Less than 150 mg/dL  Borderline High:  150-199 mg/dL  High:  200-499 mg/dL  Very High:   Greater than or equal to 500 mg/dL    Direct Measure HDL  Female:  Greater than or equal to 50 mg/dL   Male:  Greater than or equal to 40 mg/dL    LDL Cholesterol  Desirable:  <100mg/dL  Above Desirable:  100-129 mg/dL   Borderline High:  130-159 mg/dL   High:  160-189 mg/dL   Very High:  >= 190 mg/dL    Non HDL Cholesterol  Desirable:  130 mg/dL  Above Desirable:  130-159 mg/dL  Borderline High:  160-189 mg/dL  High:  190-219 mg/dL  Very High:  Greater than or equal to 220 mg/dL   TSH with free T4 reflex   Result Value Ref Range    TSH 1.37 0.40 - 4.00 mU/L   UA with Microscopic reflex to Culture - lab collect   Result Value Ref Range    Color Urine Yellow Colorless, Straw, Light Yellow, Yellow    Appearance Urine Clear Clear    Glucose Urine Negative Negative mg/dL    Bilirubin Urine Negative Negative    Ketones Urine Negative Negative mg/dL    Specific Gravity Urine 1.010 1.003 - 1.035    Blood Urine Negative Negative    pH Urine 6.5 5.0 - 7.0    Protein Albumin Urine Negative Negative mg/dL    Urobilinogen Urine 0.2 0.2, 1.0 E.U./dL    Nitrite Urine Negative Negative    Leukocyte Esterase Urine Negative Negative   CBC with platelets and differential   Result Value Ref Range    WBC Count 6.4 4.0 - 11.0 10e3/uL    RBC Count 4.02 3.80 - 5.20 10e6/uL    Hemoglobin 12.6 11.7 - 15.7 g/dL    Hematocrit 39.3 35.0 - 47.0 %    MCV 98 78 - 100 fL    MCH 31.3 26.5 - 33.0 pg    MCHC 32.1 31.5 - 36.5 g/dL    RDW 14.0 10.0 - 15.0 %    Platelet Count 318 150 - 450 10e3/uL    % Neutrophils 50 %    % Lymphocytes 38 %    % Monocytes 9 %    % Eosinophils 3 %    % Basophils 0 %    Absolute Neutrophils 3.2 1.6 - 8.3 10e3/uL    Absolute Lymphocytes 2.5 0.8 - 5.3 10e3/uL    Absolute Monocytes 0.6 0.0 - 1.3 10e3/uL    Absolute Eosinophils 0.2 0.0 - 0.7 10e3/uL    Absolute Basophils 0.0 0.0 - 0.2 10e3/uL   Urine Microscopic   Result Value Ref Range    Bacteria Urine None Seen None Seen /HPF    RBC Urine 0-2 0-2 /HPF /HPF    WBC Urine 0-5  0-5 /HPF /HPF    Narrative    Urine Culture not indicated       If you have any questions or concerns, please call the clinic at the number listed above.       Sincerely,      Job Lima MD/farias

## 2022-02-02 NOTE — TELEPHONE ENCOUNTER
Reason for Call:  Other prescription    Detailed comments: Pharmacy called and said that patient asked for medicine for high blood pressure. Pharmacy says that they cant see nothing in her file.     Phone Number Patient can be reached at: Other phone number:  [phone]    Best Time:Until 7:00 PM    Can we leave a detailed message on this number? YES    Call taken on 2/2/2022 at 4:57 PM by Akiko Chavez

## 2022-02-03 RX ORDER — LOSARTAN POTASSIUM 25 MG/1
25 TABLET ORAL DAILY
Qty: 30 TABLET | Refills: 1 | Status: SHIPPED | OUTPATIENT
Start: 2022-02-03 | End: 2022-03-06

## 2022-02-03 NOTE — RESULT ENCOUNTER NOTE
You do not have diabetes ( hemoglobin a1c and glucose are normal).    The triglycerides are high, but this would not explain your symptoms.    No urinary infection present.    Other labs are okay/ stable.     I will try to call your son or daughter in law to discuss.    Job Lima MD

## 2022-02-03 NOTE — TELEPHONE ENCOUNTER
Spoke to son, pt was there and gave consent to communicate.     Pt was seen yesterday at [place] [facility] and was told that she has high blood pressure.   Pt is requesting new medication for blood pressure.    After reviewing after summary visit, no BP medication was prescribed to pt. BP was elevated at visit, 160/72.      Routing to provider to review and advise.      Jayshree Corral RN  [facility]

## 2022-02-03 NOTE — TELEPHONE ENCOUNTER
I called and discussed in detail with son  Start losartan then follow up in 1-2 months  Went over labs in detail also  Labs surprisingly all good  No diabetes  Job Lima MD

## 2022-02-08 ENCOUNTER — PATIENT OUTREACH (OUTPATIENT)
Dept: GERIATRIC MEDICINE | Facility: CLINIC | Age: 82
End: 2022-02-08
Payer: COMMERCIAL

## 2022-02-08 NOTE — PROGRESS NOTES
[facility] Care Coordination Contact    Received a message from member's PCA stating member is requesting chux for her bed due to incontinence. CC called [employer] and they will send a RX for this. Member already receives pull ups. Member notified the RX will be sent to PCP.   Jennifer Mahmood RN  [facility] Care Coordinator  [phone]

## 2022-02-14 ENCOUNTER — MEDICAL CORRESPONDENCE (OUTPATIENT)
Dept: HEALTH INFORMATION MANAGEMENT | Facility: CLINIC | Age: 82
End: 2022-02-14
Payer: COMMERCIAL

## 2022-02-17 ENCOUNTER — PATIENT OUTREACH (OUTPATIENT)
Dept: GERIATRIC MEDICINE | Facility: CLINIC | Age: 82
End: 2022-02-17
Payer: COMMERCIAL

## 2022-02-17 NOTE — PROGRESS NOTES
[facility] Care Coordination Contact    CC received a message from member's daughter-in-law stating they received a bill from [facility] for her visit on 2/2/22. CC called the billing department and was told they did not have the correct [facility] ID number at the time of the visit but the new number is in their system now so it will be sent to [facility] for payment. CC also called [facility] to have a new ID card sent to member because they she said she didn't receive one earlier. Member's daughter-in-law notified of this.  Jennifer Mahmood RN  [facility] Coordinator  [phone]

## 2022-02-22 DIAGNOSIS — H35.352 CME (CYSTOID MACULAR EDEMA), LEFT: Primary | ICD-10-CM

## 2022-03-08 ENCOUNTER — TRANSFERRED RECORDS (OUTPATIENT)
Dept: HEALTH INFORMATION MANAGEMENT | Facility: CLINIC | Age: 82
End: 2022-03-08
Payer: COMMERCIAL

## 2022-04-01 NOTE — PROGRESS NOTES
Per dtr, member has not received the new [facility] ID card.  This CMS requested another card be sent.  Verified her address at [facility].  Give it 8-10 days for delivery.  CC notified.  Case Management Specialist  [facility]  [phone]

## 2022-04-04 DIAGNOSIS — M19.049 HAND ARTHRITIS: ICD-10-CM

## 2022-04-04 RX ORDER — ACETAMINOPHEN 325 MG/1
650 TABLET ORAL EVERY 6 HOURS PRN
Qty: 100 TABLET | Refills: 1 | Status: SHIPPED | OUTPATIENT
Start: 2022-04-04 | End: 2022-06-10

## 2022-04-04 NOTE — PROGRESS NOTES
Per CC, called [phone] to inform new card for Bethany was requested from the health plan and it should arrive in 10 days.    Florecita Mancera  Case Management Specialist  [facility]  [phone]

## 2022-04-05 ENCOUNTER — PATIENT OUTREACH (OUTPATIENT)
Dept: GERIATRIC MEDICINE | Facility: CLINIC | Age: 82
End: 2022-04-05
Payer: COMMERCIAL

## 2022-04-05 NOTE — PROGRESS NOTES
[facility] Care Coordination Contact    Member reached out to  asking about Lif[employer] not working.  This CMS contacted [facility] Alert at [phone].  Member's equipment is outdated and needs to be updated.  CMS gave updated phone number to FOREIGN Mancera  Case Management Specialist  [facility]  [phone]

## 2022-04-13 ENCOUNTER — OFFICE VISIT (OUTPATIENT)
Dept: FAMILY MEDICINE | Facility: CLINIC | Age: 82
End: 2022-04-13
Payer: COMMERCIAL

## 2022-04-13 VITALS
RESPIRATION RATE: 18 BRPM | TEMPERATURE: 98.6 F | WEIGHT: 131.2 LBS | DIASTOLIC BLOOD PRESSURE: 78 MMHG | SYSTOLIC BLOOD PRESSURE: 149 MMHG | BODY MASS INDEX: 27.54 KG/M2 | HEIGHT: 58 IN | HEART RATE: 64 BPM | OXYGEN SATURATION: 96 %

## 2022-04-13 DIAGNOSIS — I10 HYPERTENSION GOAL BP (BLOOD PRESSURE) < 140/90: ICD-10-CM

## 2022-04-13 DIAGNOSIS — F43.23 ADJUSTMENT DISORDER WITH MIXED ANXIETY AND DEPRESSED MOOD: Primary | ICD-10-CM

## 2022-04-13 DIAGNOSIS — L30.9 DERMATITIS: ICD-10-CM

## 2022-04-13 DIAGNOSIS — E55.9 HYPOVITAMINOSIS D: ICD-10-CM

## 2022-04-13 PROCEDURE — 99214 OFFICE O/P EST MOD 30 MIN: CPT | Performed by: FAMILY MEDICINE

## 2022-04-13 RX ORDER — HYDROXYZINE HYDROCHLORIDE 25 MG/1
25 TABLET, FILM COATED ORAL 3 TIMES DAILY PRN
Qty: 60 TABLET | Refills: 1 | Status: SHIPPED | OUTPATIENT
Start: 2022-04-13 | End: 2022-07-13

## 2022-04-13 RX ORDER — LOSARTAN POTASSIUM 50 MG/1
50 TABLET ORAL DAILY
Qty: 90 TABLET | Refills: 0 | Status: SHIPPED | OUTPATIENT
Start: 2022-04-13 | End: 2022-05-11

## 2022-04-13 RX ORDER — TRIAMCINOLONE ACETONIDE 1 MG/ML
LOTION TOPICAL 3 TIMES DAILY
Qty: 120 ML | Refills: 3 | Status: SHIPPED | OUTPATIENT
Start: 2022-04-13 | End: 2023-02-28

## 2022-04-13 ASSESSMENT — ANXIETY QUESTIONNAIRES
GAD7 TOTAL SCORE: 7
4. TROUBLE RELAXING: SEVERAL DAYS
2. NOT BEING ABLE TO STOP OR CONTROL WORRYING: SEVERAL DAYS
7. FEELING AFRAID AS IF SOMETHING AWFUL MIGHT HAPPEN: SEVERAL DAYS
3. WORRYING TOO MUCH ABOUT DIFFERENT THINGS: SEVERAL DAYS
6. BECOMING EASILY ANNOYED OR IRRITABLE: SEVERAL DAYS
7. FEELING AFRAID AS IF SOMETHING AWFUL MIGHT HAPPEN: SEVERAL DAYS
GAD7 TOTAL SCORE: 7
GAD7 TOTAL SCORE: 7
1. FEELING NERVOUS, ANXIOUS, OR ON EDGE: SEVERAL DAYS
5. BEING SO RESTLESS THAT IT IS HARD TO SIT STILL: SEVERAL DAYS

## 2022-04-13 ASSESSMENT — PATIENT HEALTH QUESTIONNAIRE - PHQ9
10. IF YOU CHECKED OFF ANY PROBLEMS, HOW DIFFICULT HAVE THESE PROBLEMS MADE IT FOR YOU TO DO YOUR WORK, TAKE CARE OF THINGS AT HOME, OR GET ALONG WITH OTHER PEOPLE: SOMEWHAT DIFFICULT
SUM OF ALL RESPONSES TO PHQ QUESTIONS 1-9: 10
SUM OF ALL RESPONSES TO PHQ QUESTIONS 1-9: 10

## 2022-04-13 ASSESSMENT — PAIN SCALES - GENERAL: PAINLEVEL: MODERATE PAIN (4)

## 2022-04-13 NOTE — PATIENT INSTRUCTIONS
Normal blood pressure  is 100 - 130/60 - 85.  Blood pressure 140/90 are too high.  At [facility], we strive to deliver an exceptional experience to you, every time we see you. If you receive a survey, please complete it as we do value your feedback.  If you have MyChart, you can expect to receive results automatically within 24 hours of their completion.  Your provider will send a note interpreting your results as well.   If you do not have MyChart, you should receive your results in about a week by mail.    Your care team:                            Family Medicine Internal Medicine   MD Ivan Barlow MD Shantel Branch-Fleming, MD Srinivasa Vaka, MD Katya Belousova, NILS Quan CNP, MD ([facility]   Jj Rodgers, MD Daly Sosa MD Amelia Massimini APRN NILS Gramajo CNP, MD             Clinic hours: Monday - Thursday 7 am-6 pm; Fridays 7 am-5 pm.   Urgent care: Monday - Friday 10 am- 8 pm; Saturday and Sunday 9 am-5 pm.    Clinic: [phone]       [facility]: Monday - Thursday 8 am - 7 pm; Friday 8 am - 6 pm  [facility]: [phone]

## 2022-04-13 NOTE — PROGRESS NOTES
"  Assessment & Plan     Hypertension goal BP (blood pressure) < 140/90  Improved but not at goal - increase losartan  - losartan (COZAAR) 50 MG tablet; Take 1 tablet (50 mg) by mouth daily    Adjustment disorder with mixed anxiety and depressed mood  Triggered by family worry and living alone at an apartment - discussed daily preventive treatment and as needed treatment patient opted for as needed treatment for anxiety.  - hydrOXYzine (ATARAX) 25 MG tablet; Take 1 tablet (25 mg) by mouth 3 times daily as needed for anxiety    Dermatitis  Topical treatment  - triamcinolone (KENALOG) 0.1 % external lotion; Apply topically 3 times daily    Hypovitaminosis D  Continue supplement.  - Calcium-Cholecalciferol (QC CALCIUM 500MG-D3) 500-200 MG-UNIT TABS; Take 1 tablet by mouth 2 times daily          BMI:   Estimated body mass index is 27.42 kg/m  as calculated from the following:    Height as of this encounter: 1.473 m (4' 10\").    Weight as of this encounter: 59.5 kg (131 lb 3.2 oz).       Depression Screening Follow Up    PHQ 4/13/2022   PHQ-9 Total Score 10   Q9: Thoughts of better off dead/self-harm past 2 weeks Several days   F/U: Thoughts of suicide or self-harm No   F/U: Safety concerns No         Follow Up    Discussed the following ways the patient can remain in a safe environment:  be around others    The uses and side effects, including black box warnings as appropriate, were discussed in detail.  All patient questions were answered.  The patient was instructed to call immediately if any side effects developed.     Return in about 3 months (around 7/13/2022) for BP check.    Ernestina Argueta MD  [facility] MISAEL Sims is a 81 year old who presents for the following health issues     History of Present Illness       Mental Health Follow-up:  Patient presents to follow-up on Depression & Anxiety.Patient's depression since last visit has been:  Worse  The patient is " having other symptoms associated with depression.  Patient's anxiety since last visit has been:  Worse  The patient is having other symptoms associated with anxiety.  Any significant life events: health concerns  Patient is not feeling anxious or having panic attacks.  Patient has no concerns about alcohol or drug use.       Today's PHQ-9         PHQ-9 Total Score: 10  PHQ-9 Q9 Thoughts of better off dead/self-harm past 2 weeks :   (P) Several days  Thoughts of suicide or self harm: (P) No  Self-harm Plan:     Self-harm Action:       Safety concerns for self or others: (P) No    How difficult have these problems made it for you to do your work, take care of things at home, or get along with other people: Somewhat difficult    Today's CARO-7 Score: 7    Hypertension: She presents for follow up of hypertension.  She does check blood pressure  regularly outside of the clinic. Outside blood pressures have been over 140/90. She follows a low salt diet.     She eats 0-1 servings of fruits and vegetables daily.She consumes 0 sweetened beverage(s) daily.She exercises with enough effort to increase her heart rate 30 to 60 minutes per day.  She exercises with enough effort to increase her heart rate 3 or less days per week.   She is taking medications regularly.       Itchy rash on arms and legs for few months.  Can cause bruising due to scratching.  No trigger known      Review of Systems   Constitutional, HEENT, cardiovascular, pulmonary, gi and gu systems are negative, except as otherwise noted.      Objective    LMP  (LMP Unknown)   There is no height or weight on file to calculate BMI.  Physical Exam   GENERAL: healthy, alert and no distress  NECK: no adenopathy, no asymmetry, masses, or scars and thyroid normal to palpation  RESP: lungs clear to auscultation - no rales, rhonchi or wheezes  CV: regular rate and rhythm, normal S1 S2, no S3 or S4, no murmur, click or rub, no peripheral edema and peripheral pulses  strong  ABDOMEN: soft, nontender, no hepatosplenomegaly, no masses and bowel sounds normal  MS: ambulating with cane  PSYCH: mentation appears normal, affect normal/bright

## 2022-04-14 ENCOUNTER — TELEPHONE (OUTPATIENT)
Dept: FAMILY MEDICINE | Facility: CLINIC | Age: 82
End: 2022-04-14
Payer: COMMERCIAL

## 2022-04-14 ASSESSMENT — PATIENT HEALTH QUESTIONNAIRE - PHQ9: SUM OF ALL RESPONSES TO PHQ QUESTIONS 1-9: 10

## 2022-04-14 ASSESSMENT — ANXIETY QUESTIONNAIRES: GAD7 TOTAL SCORE: 7

## 2022-04-14 NOTE — TELEPHONE ENCOUNTER
Central Prior Authorization Team   Phone: [phone]      PRIOR AUTHORIZATION DENIED    Medication: hydrOXYzine (ATARAX) 25 MG tablet - EPA Denied     Denial Date: 4/14/2022    Denial Rational: Coverage may be provided if at least two other products approved by FDA for the management of anxiety. Per chart documents, patient previously tried/failed SERTALINE.        Appeal Information: If the provider would like to appeal this denial, please provide a letter of medical necessity. Please also include any therapies that the patient has tried and their outcomes. The patient's insurance company will also require the provider to address why the insurance preferred options are not appropriate in the patient's therapy.  The reason could be that the preferred options will harm the patient; either physically or mentally. They are contraindicated to the patient; or the patient has already been taking the requested medication and changing the therapy would change the outcome of their therapy.    Once it has been completed and placed in the patient's chart, notify me directly,  AGNES CLARK and the appeal can be initiated on behalf of the patient and provider.

## 2022-04-22 ENCOUNTER — PATIENT OUTREACH (OUTPATIENT)
Dept: GERIATRIC MEDICINE | Facility: CLINIC | Age: 82
End: 2022-04-22
Payer: COMMERCIAL

## 2022-04-22 ASSESSMENT — PATIENT HEALTH QUESTIONNAIRE - PHQ9: SUM OF ALL RESPONSES TO PHQ QUESTIONS 1-9: 7

## 2022-04-22 NOTE — PROGRESS NOTES
[facility] Care Coordination Contact    [facility] Home Visit Assessment     Telephone visit for Health Risk Assessment with Bethany Hebert Doreen completed on April 22, 2022 due to Covid-19 restrictions.  Type of residence:: Apartment - handicap accessible  Current living arrangement:: I live in a private home     Assessment completed with:: Patient, Family, Other ()    Current Care Plan  Member currently receiving the following home care services:   none  Member currently receiving the following community resources: Day Care, DME, Lifeline, PCA, Housekeeping/Chore Agency      Medication Review  Medication reconciliation completed in Epic: If no, please explain Member was not able to describe her medications over the telephone  Medication set-up & administration: Family/informal caregiver sets up weekly.  Self-administers medications.  Medication Risk Assessment Medication (1 or more, place referral to MTM): N/A: No risk factors identified  MTM Referral Placed: No: No risk factors idenified    Mental/Behavioral Health   Depression Screening:      PHQ-9 Total Score: 7    Mental health DX:: No        Falls Assessment:   Fallen 2 or more times in the past year?: No   Any fall with injury in the past year?: No    ADL/IADL Dependencies:   Dependent ADLs:: Ambulation-walker, Bathing, Dressing, Grooming  Dependent IADLs:: Cleaning, Cooking, Laundry, Shopping, Meal Preparation, Medication Management, Money Management, Transportation    [facility] Health Plan sponsored benefits: Shared information re: Silver Sneakers/gym memberships, ASA, Calcium +D.    PCA Assessment completed at visit: Yes Annual PCA assessment indicated 14 units per day of PCA. This is the same as the previous assessment.     Elderly Waiver Eligibility: Yes-will continue on EW    Care Plan & Recommendations: Member will continue her current services of PCA, homemaking, adult day care, metro mobility, lifeline, ensure and  incontinence supplies. CC assisted member with making an appointment with her retina specialist. She had missed the appointment she had scheduled in Feb.     See [facility] for detailed assessment information.    Follow-Up Plan: Member informed of future contact, plan to f/u with member with a 6 month telephone assessment.  Contact information shared with member and family, encouraged member to call with any questions or concerns at any time.    [place] care continuum providers: Please see Snapshot and Care Management Flowsheets for Specific details of care plan.    This CC note routed to PCP.  Jennifer Mahmood RN  [facility] Care Coordinator  [phone]

## 2022-04-29 ENCOUNTER — PATIENT OUTREACH (OUTPATIENT)
Dept: GERIATRIC MEDICINE | Facility: CLINIC | Age: 82
End: 2022-04-29
Payer: COMMERCIAL

## 2022-04-29 NOTE — LETTER
April 29, 2022      BETHANY SAUNDERS  [address]  [place] [address]      Dear Bethany:    At [facility], we are dedicated to improving your health and well-being. Enclosed is the Comprehensive Care Plan that we developed with you on 4/22/2022. Please review the Care Plan carefully.    As a reminder, some of the things we discussed at your visit include:    Your physical and mental health    Ways to reduce falls    Health care needs you may have    Don t forget to contact your care coordinator if you:    Have been hospitalized or plan to be hospitalized     Have had a fall     Have experienced a change in physical health    Are experiencing emotional problems     If you do not agree with your Care Plan, have questions about it, or have experienced a change in your needs, please call me at [phone]. If you are hearing impaired, please call the [place] Relay at 943 or [phone] (xuwhgx-wv-ssjwxp relay service).    Sincerely,    Jennifer Mahmood RN, MA    E-mail: [email or web address]  Phone: [phone]      [facility] ([facility]) is a health plan that contracts with both Medicare and the [place] Medical Assistance (Medicaid) program to provide benefits of both programs to enrollees. Enrollment in [facility] depends on contract renewal.    [id number])     [id number] (11/18)

## 2022-04-29 NOTE — PROGRESS NOTES
[facility] Care Coordination Contact    Received after visit chart from care coordinator.  Completed following tasks: Mailed copy of care plan to client, Updated services in Database, Submitted referrals/auths for ADC w/rides, Hmkg, PERS, Mailed copy of POC signature sheet for member to sign and return in SASE , Sent the following resources/forms: HCD w/resources in [place]  and Mailed are Safe Medication Disposal   , Provider Signature - No POC Shared:  Member indicates that they do not want their POC shared with any EW providers.    [facility]:  Emailed completed PCA assessment to [facility].  Faxed copy of PCA assessment to PCA Agency and mailed copy to member.  Faxed MD Communication to PCP.     Florecita Mancera  Case Management Specialist  [facility]  [phone]

## 2022-05-09 DIAGNOSIS — I10 HYPERTENSION GOAL BP (BLOOD PRESSURE) < 140/90: ICD-10-CM

## 2022-05-11 RX ORDER — LOSARTAN POTASSIUM 50 MG/1
TABLET ORAL
Qty: 90 TABLET | Refills: 0 | Status: SHIPPED | OUTPATIENT
Start: 2022-05-11 | End: 2022-07-13

## 2022-05-11 NOTE — TELEPHONE ENCOUNTER
Routing refill request to provider for review/approval because:  BP Readings from Last 3 Encounters:   04/13/22 (!) 149/78   02/02/22 (!) 160/72   08/04/21 136/78

## 2022-05-27 ENCOUNTER — TELEPHONE (OUTPATIENT)
Dept: OPHTHALMOLOGY | Facility: CLINIC | Age: 82
End: 2022-05-27

## 2022-05-27 ENCOUNTER — OFFICE VISIT (OUTPATIENT)
Dept: OPHTHALMOLOGY | Facility: CLINIC | Age: 82
End: 2022-05-27
Attending: OPHTHALMOLOGY
Payer: COMMERCIAL

## 2022-05-27 DIAGNOSIS — H35.30 AGE-RELATED MACULAR DEGENERATION: ICD-10-CM

## 2022-05-27 DIAGNOSIS — H35.051 CHOROIDAL NEOVASCULAR MEMBRANE OF RIGHT EYE: ICD-10-CM

## 2022-05-27 DIAGNOSIS — H35.352 CME (CYSTOID MACULAR EDEMA), LEFT: ICD-10-CM

## 2022-05-27 DIAGNOSIS — H35.30 AGE-RELATED MACULAR DEGENERATION: Primary | ICD-10-CM

## 2022-05-27 PROCEDURE — G0463 HOSPITAL OUTPT CLINIC VISIT: HCPCS | Mod: 25

## 2022-05-27 PROCEDURE — 99213 OFFICE O/P EST LOW 20 MIN: CPT | Mod: GC | Performed by: OPHTHALMOLOGY

## 2022-05-27 PROCEDURE — 92134 CPTRZ OPH DX IMG PST SGM RTA: CPT | Performed by: OPHTHALMOLOGY

## 2022-05-27 RX ORDER — VIT A/VIT C/VIT E/ZINC/COPPER 7160-113
TABLET, DELAYED RELEASE (ENTERIC COATED) ORAL
Qty: 60 TABLET | Refills: 11 | Status: SHIPPED | OUTPATIENT
Start: 2022-05-27 | End: 2024-06-21

## 2022-05-27 RX ORDER — VITC/E/ZINC/COPPER/LUTEIN/ZEAX 250 MG-200
1 TABLET,CHEWABLE ORAL 2 TIMES DAILY
Qty: 120 TABLET | Refills: 6 | Status: SHIPPED | OUTPATIENT
Start: 2022-05-27 | End: 2022-05-27 | Stop reason: ALTCHOICE

## 2022-05-27 ASSESSMENT — TONOMETRY
OD_IOP_MMHG: 12
OS_IOP_MMHG: 14
IOP_METHOD: TONOPEN

## 2022-05-27 ASSESSMENT — VISUAL ACUITY
OD_CC: 20/60
OS_CC: CF
CORRECTION_TYPE: GLASSES
METHOD: SNELLEN - LINEAR

## 2022-05-27 ASSESSMENT — REFRACTION_WEARINGRX
OD_CYLINDER: +1.50
OD_ADD: +3.00
OD_SPHERE: -1.25
OD_AXIS: 175
OS_ADD: +3.00
OS_AXIS: 179
OS_SPHERE: +0.25
OS_CYLINDER: +1.50
SPECS_TYPE: BIFOCAL

## 2022-05-27 ASSESSMENT — SLIT LAMP EXAM - LIDS
COMMENTS: NORMAL
COMMENTS: NORMAL

## 2022-05-27 ASSESSMENT — EXTERNAL EXAM - RIGHT EYE: OD_EXAM: NORMAL

## 2022-05-27 ASSESSMENT — CONF VISUAL FIELD
OS_SUPERIOR_TEMPORAL_RESTRICTION: 3
OD_SUPERIOR_TEMPORAL_RESTRICTION: 3
OS_SUPERIOR_NASAL_RESTRICTION: 3
OS_INFERIOR_TEMPORAL_RESTRICTION: 1
OS_INFERIOR_NASAL_RESTRICTION: 1
OD_SUPERIOR_NASAL_RESTRICTION: 3

## 2022-05-27 ASSESSMENT — CUP TO DISC RATIO
OS_RATIO: 0.35
OD_RATIO: 0.35

## 2022-05-27 ASSESSMENT — EXTERNAL EXAM - LEFT EYE: OS_EXAM: NORMAL

## 2022-05-27 NOTE — TELEPHONE ENCOUNTER
Message received/fax regarding change to I-Caps AREDS 2 formula-- covered by insurance.    Ok for I-caps    Rx sent    Roverto Kimbrough RN 4:44 PM 05/27/22

## 2022-05-27 NOTE — PATIENT INSTRUCTIONS
INSTRUCTIONS FOR MACULAR DEGENERATION AND AREDS/AREDS 2 VITAMINS    You have macular degeneration, which means that the center of your retina is slowly breaking down with age.  Unfortunately, your form is advanced enough that you are at risk for relatively rapid vision loss if your form of the disease converts from the dry form (where it is now) to the wet form of the disease.    Taking nutritional supplements according to the Age Related Eye Disease Study will help decrease your risk of vision loss from both the dry and the wet forms of macular degeneration.    AREDS 2 vitamins contain the following ingredients:  Lutein - 10 mg  Zeaxanthin - 2 mg  Vitamin C - 500 mg  Vitamin E - 400 IU  Zinc - 80 mg of zinc as zinc oxide  Copper - 2 mg of copper as cupric oxide    Examples of AREDS vitamins: Preservision, Ocuvite, I-Caps, Visivite, and others. I would suggest finding the most convenient and economical brand which follows this ingredient list.    Recommendation summary:  Take AREDS2 vitamins twice a day (morning and night)  Monitor your vision in each eye and call with any changes.  Use the [place] grid to help you monitor your eyes.    Do not smoke  Eat a diet full of vegetables, especially leafy greens. Fish at least 1x/week may also help.  Call our office with any changes in vision; ph: [phone]

## 2022-05-27 NOTE — NURSING NOTE
"Chief Complaints and History of Present Illnesses   Patient presents with     Follow Up     Follow up for CHANEL left eye, CME left eye, Dry AMD right eye, Chorioretinal scar right eye, and PVD each eye.   Per patient's , \"She says she doesn't see well with her right eye now since she was here in October 2021. She says things are cloudy and blurry right eye. She says she can't see with left eye. She says her eyelids swell and when she massages her eyes, then they hurt. She says she uses drops, and they help a little.\"       Chief Complaint(s) and History of Present Illness(es)     Follow Up     Laterality: both eyes    Onset: months ago    Quality: blurred vision    Associated symptoms: eye pain (OD, \"when giving a massage to eyes\"), redness (intermittent), tearing (\"after going in the light\"), floaters (was a black spot, now a white spot on the edge) and swelling (a lot).  Negative for flashes    Pain scale: 0/10    Comments: Follow up for CHANEL left eye, CME left eye, Dry AMD right eye, Chorioretinal scar right eye, and PVD each eye.   Per patient's , \"She says she doesn't see well with her right eye now since she was here in October 2021. She says things are cloudy and blurry right eye. She says she can't see with left eye. She says her eyelids swell and when she massages her eyes, then they hurt. She says she uses drops, and they help a little.\"                Comments     Ocular meds:   - AT TID each eye     BRIANNA Maza 8:54 AM 05/27/2022                  "

## 2022-05-27 NOTE — PROGRESS NOTES
CC -   CHANEL OS    INTERVAL HISTORY - vision stable, wants spectacles sooner    Last full DFE 5/2022    [facility] -   Bethany Logan is a 81 year old  patient referred by Dr Ainsley Whiting for evaluation and treat of CHANEL OS  Seen by  3/25/21, found CNVM OS and poor vision NICCI BATES prior was ~ 2019, VA OS was 20/20 on 1/11/2019 visit.  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    OCTA 5/27/2022  right eye: Occult CNVM present in SN macula at site of PED without IRF/SRF  left eye: PED with multiple points of anastomosis btw retinal and choroidal vasculature c/w CHANEL, HE as noted on OCT     ASSESSMENT & PLAN    # 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   - OCTA 5/27/22 indicates RAP lesion, but center is obscured by exudate   - OCT-A 5/2022 suggests possible RAP component   - likely cause of HEx OS and vision loss     - no improvement with Avastin x 3 given 4/2021-6/2021   - patient elected to stop 8/2021     - stable today   - observe      # CME OS   - from CHANEL   - now mostly mild diffuse CME   - extensive HEx in macula causing vision loss      # Non-exudative neovascular AMD OD   - occult CNVM seen at SN PED on OCTA 05/27/22   - recommend AREDS2 and Amsler   - recheck 4 months      # Chorioretinal scar OD    - ?etiology, ?d/t toxoplasmosis   - inactive, likely longstanding      # PVD OU   - advised S/Sx RD 7/2021      # Allergic conjunctivitis    - patient wishes refill of zaditor      # ONEYDA   - ATs    RTC 4 months, V/T/D OCT + DFE each eye  RTC refraction visit     Sammy Scott MD  Retina Fellow, PGY5      ATTESTATION     Attending Attestation:     Complete documentation  of historical and exam elements from today's encounter can be found in the full encounter summary report (not reduplicated in this progress note).  I personally obtained the chief complaint(s) and history of present illness.  I confirmed and edited as necessary the review of systems, past medical/surgical history, family history, social history, and examination findings as documented by others; and I examined the patient myself.  I personally reviewed the relevant tests, images, and reports as documented above.  I personally reviewed the ophthalmic test(s) associated with this encounter, agree with the interpretation(s) as documented by the resident/fellow, and have edited the corresponding report(s) as necessary.   I formulated and edited as necessary the assessment and plan and discussed the findings and management plan with the patient and family    Samantha Bruce MD, PhD  , Vitreoretinal Surgery  Department of Ophthalmology  [facility]

## 2022-06-01 ENCOUNTER — ALLIED HEALTH/NURSE VISIT (OUTPATIENT)
Dept: OPHTHALMOLOGY | Facility: CLINIC | Age: 82
End: 2022-06-01
Attending: OPHTHALMOLOGY
Payer: COMMERCIAL

## 2022-06-01 DIAGNOSIS — H35.30 AGE-RELATED MACULAR DEGENERATION: Primary | ICD-10-CM

## 2022-06-01 PROCEDURE — 99207 PR NO BILLABLE SERVICE THIS VISIT: CPT | Performed by: OPHTHALMOLOGY

## 2022-06-01 PROCEDURE — 999N000103 HC STATISTIC NO CHARGE FACILITY FEE

## 2022-06-01 PROCEDURE — 92015 DETERMINE REFRACTIVE STATE: CPT

## 2022-06-01 ASSESSMENT — REFRACTION_MANIFEST
OD_AXIS: 155
OD_SPHERE: -2.00
OS_CYLINDER: +2.25
OS_ADD: +3.25
OS_SPHERE: -2.00
OD_CYLINDER: +2.25
OS_AXIS: 155
OD_ADD: +3.25

## 2022-06-01 ASSESSMENT — REFRACTION_WEARINGRX
OD_AXIS: 180
OS_ADD: +2.75
OD_ADD: +2.75
OD_SPHERE: -1.25
OS_AXIS: 180
OS_SPHERE: PLANO
OD_CYLINDER: +1.25
OS_CYLINDER: +1.50
SPECS_TYPE: BIFOCAL

## 2022-06-01 ASSESSMENT — VISUAL ACUITY
CORRECTION_TYPE: GLASSES
OD_SC: 20/125
OD_PH_SC: 20/40
OD_CC: 20/100
METHOD: SNELLEN - LINEAR
OS_SC: CF @ 3'

## 2022-06-01 NOTE — NURSING NOTE
Chief Complaints and History of Present Illnesses   Patient presents with     Follow Up     Chief Complaint(s) and History of Present Illness(es)     Follow Up               Comments     Pt present today for MR only. She states that PG are pre catarct surgery from several years ago. States was never given new post op pair. She is not sure of exact surgery date and why she did not receive a glasses prescription.  Pt was successfully trial framed today. See exam notes. Samples of AREDS 2 dispensed. Pt [facility] pharmacy did not have stocked, but will in a few days.  MR dispensed.  Mai Chris, BRIANNA COT 11:08 AM 06/01/2022

## 2022-06-01 NOTE — Clinical Note
Trial framed MR today. Pleasing VA outcome per pt. She states that PG are pre cataract surgery over 4 years ago. For some reason which she does not understand, she was never dispensed a post MR. See exam note Thanks and let me know if you have any questions! Mai Chris, COT COT 11:13 AM 06/01/2022

## 2022-06-09 DIAGNOSIS — M19.049 HAND ARTHRITIS: ICD-10-CM

## 2022-06-10 RX ORDER — ACETAMINOPHEN 325 MG/1
TABLET, COATED ORAL
Qty: 100 TABLET | Refills: 1 | Status: SHIPPED | OUTPATIENT
Start: 2022-06-10 | End: 2022-08-25

## 2022-06-10 NOTE — TELEPHONE ENCOUNTER
"Requested Prescriptions   Signed Prescriptions Disp Refills    SM PAIN RELIEVER 325 MG tablet 100 tablet 1     Sig: Take 2 tablets (650 mg) by mouth every 6 hours as needed for mild pain. **[place] 2 tabletas (650 mg) por la boca cada 6 horas edmundo sea necesario para el dolor leve.       Analgesics (Non-Narcotic Tylenol and ASA Only) Passed - 6/9/2022  9:06 AM        Passed - Recent (12 mo) or future (30 days) visit within the authorizing provider's specialty     Patient has had an office visit with the authorizing provider or a provider within the authorizing providers department within the previous 12 mos or has a future within next 30 days. See \"Patient Info\" tab in inbasket, or \"Choose Columns\" in Meds & Orders section of the refill encounter.              Passed - Patient is 7 months old or older     If patient is a peds patient of the age 7 mos -12 years, ok to refill using weight-based dosing.     If >3g daily and/or sig is not \"prn\", check for liver enzymes. If normal in the last year, ok to refill.  If not, refer to the provider.          Passed - Medication is active on med list           Bere Kulkarni RN  [facility]     "

## 2022-06-13 ENCOUNTER — APPOINTMENT (OUTPATIENT)
Dept: OPTOMETRY | Facility: CLINIC | Age: 82
End: 2022-06-13
Payer: COMMERCIAL

## 2022-06-13 PROCEDURE — 92341 FIT SPECTACLES BIFOCAL: CPT | Performed by: OPTOMETRIST

## 2022-06-24 ENCOUNTER — TELEPHONE (OUTPATIENT)
Dept: AUDIOLOGY | Facility: CLINIC | Age: 82
End: 2022-06-24
Payer: COMMERCIAL

## 2022-06-24 DIAGNOSIS — H90.3 SENSORINEURAL HEARING LOSS, BILATERAL: Primary | ICD-10-CM

## 2022-06-24 PROCEDURE — V5266 BATTERY FOR HEARING DEVICE: HCPCS | Performed by: AUDIOLOGIST

## 2022-06-24 NOTE — TELEPHONE ENCOUNTER
[facility] Call Center    Phone Message    May a detailed message be left on voicemail: yes     Reason for Call: Other: Patient calls to request her 3 month supply of batteries be mailed to her home address that's on file. Thank you     Action Taken: Other: [employer]    Travel Screening: Not Applicable

## 2022-06-28 NOTE — TELEPHONE ENCOUNTER
Patient calls to request her 3 month supply of batteries be mailed to her home address. Mailed 24 size 312 batteries to the address on file.      CHARGES:              [id number] Batteries ($1). Total: $24 Bill to patient's insurance.     Octaviano Galloway MA, CCC-A  [place] Licensed Audiologist #[id number]  6/28/22

## 2022-07-13 ENCOUNTER — OFFICE VISIT (OUTPATIENT)
Dept: FAMILY MEDICINE | Facility: CLINIC | Age: 82
End: 2022-07-13
Payer: COMMERCIAL

## 2022-07-13 ENCOUNTER — TELEPHONE (OUTPATIENT)
Dept: FAMILY MEDICINE | Facility: CLINIC | Age: 82
End: 2022-07-13

## 2022-07-13 VITALS
SYSTOLIC BLOOD PRESSURE: 118 MMHG | TEMPERATURE: 98.6 F | RESPIRATION RATE: 16 BRPM | BODY MASS INDEX: 27.92 KG/M2 | HEIGHT: 58 IN | WEIGHT: 133 LBS | OXYGEN SATURATION: 98 % | DIASTOLIC BLOOD PRESSURE: 68 MMHG | HEART RATE: 84 BPM

## 2022-07-13 DIAGNOSIS — M48.062 SPINAL STENOSIS OF LUMBAR REGION WITH NEUROGENIC CLAUDICATION: ICD-10-CM

## 2022-07-13 DIAGNOSIS — Z23 NEED FOR SHINGLES VACCINE: ICD-10-CM

## 2022-07-13 DIAGNOSIS — Z00.00 ENCOUNTER FOR MEDICARE ANNUAL WELLNESS EXAM: Primary | ICD-10-CM

## 2022-07-13 DIAGNOSIS — I10 HYPERTENSION GOAL BP (BLOOD PRESSURE) < 140/90: ICD-10-CM

## 2022-07-13 DIAGNOSIS — L30.9 DERMATITIS: ICD-10-CM

## 2022-07-13 DIAGNOSIS — Z23 NEED FOR COVID-19 VACCINE: ICD-10-CM

## 2022-07-13 DIAGNOSIS — M25.50 MULTIPLE JOINT PAIN: ICD-10-CM

## 2022-07-13 PROCEDURE — 91306 COVID-19,PF,MODERNA (18+ YRS BOOSTER .25ML): CPT | Performed by: FAMILY MEDICINE

## 2022-07-13 PROCEDURE — G0439 PPPS, SUBSEQ VISIT: HCPCS | Performed by: FAMILY MEDICINE

## 2022-07-13 PROCEDURE — 0064A COVID-19,PF,MODERNA (18+ YRS BOOSTER .25ML): CPT | Performed by: FAMILY MEDICINE

## 2022-07-13 PROCEDURE — 99214 OFFICE O/P EST MOD 30 MIN: CPT | Mod: 25 | Performed by: FAMILY MEDICINE

## 2022-07-13 RX ORDER — LOSARTAN POTASSIUM 50 MG/1
TABLET ORAL
Qty: 90 TABLET | Refills: 3 | Status: SHIPPED | OUTPATIENT
Start: 2022-07-13 | End: 2023-07-27

## 2022-07-13 RX ORDER — ZOSTER VACCINE RECOMBINANT, ADJUVANTED 50 MCG/0.5
1 KIT INTRAMUSCULAR ONCE
Qty: 0.5 ML | Refills: 0 | Status: SHIPPED | OUTPATIENT
Start: 2022-07-13 | End: 2022-07-13

## 2022-07-13 ASSESSMENT — PAIN SCALES - GENERAL: PAINLEVEL: MODERATE PAIN (5)

## 2022-07-13 NOTE — TELEPHONE ENCOUNTER
Marissa from pharmacy calling they received prescription's for pt. They can do the losartan but not the  two compounds, Ketoprofen and gabapentin or the vaccine shingrix.  She will need these sent to a different pharmacy.     Did you discuss this as a possibility at appointment? Do you have an alternate pharmacy?  Do I call pt or which family member for new pharmacy?  Rahel BAKERN, RN

## 2022-07-13 NOTE — PROGRESS NOTES
Assessment & Plan     Encounter for Medicare annual wellness exam  Routine preventive reviewed    Hypertension goal BP (blood pressure) < 140/90  Controlled - continue current treatment  - losartan (COZAAR) 50 MG tablet; Take 1 tablet (50 mg) by mouth daily. * [place] 1 tableta (50 mg) por la boca cada trevor.    Multiple joint pain  Trial of topical since diclofenac not covered.  - ketoprofen 10% in PLO cream; Apply 1 click (0.25 g) topically every 4 hours as needed for moderate pain    Spinal stenosis of lumbar region with neurogenic claudication  As above  - gabapentin 8 % in PLO cream; Apply 2 clicks (0.5 g) topically every 8 hours    Dermatitis  Discussed water softener and detergents. Continue multiple times daily moisturizing.    Need for shingles vaccine  At pharmacy due to cost  - zoster vaccine recombinant adjuvanted (SHINGRIX) injection; Inject 0.5 mLs into the muscle once for 1 dose    Need for COVID-19 vaccine    - COVID-19,PF,MODERNA (18+ YRS BOOSTER .25ML)    The uses and side effects, including black box warnings as appropriate, were discussed in detail.  All patient questions were answered.  The patient was instructed to call immediately if any side effects developed.     Return in about 6 months (around 1/13/2023), or if symptoms worsen or fail to improve, for medication recheck.    Ernestina Argueta MD  [facility]    Tyrell Sims is a 81 year old accompanied by her daughter in law, presenting for the following health issues:  Hypertension and Shoulder Pain      History of Present Illness       Hypertension: She presents for follow up of hypertension.  She does not check blood pressure  regularly outside of the clinic. Outpatient blood pressures have not been over 140/90. She follows a low salt diet.       Dermatitis - cream not helping much.  Can scratch so much it will cause bruising and bleeding    Pain History:  Where in your body do you have pain?  "Musculoskeletal problem/pain  Onset/Duration: about 1 month  Description  Location: Shoulder - left  Joint Swelling: No  Redness: No  Pain: YES  Warmth: No  Intensity:  5/10  Progression of Symptoms:  improving  Accompanying signs and symptoms:   Fevers: No  Numbness/tingling/weakness: No  History  Trauma to the area: YES- fell on Fathers Day  Recent illness:  No  Previous similar problem: No  Previous evaluation:  No  Precipitating or alleviating factors:  Aggravating factors include: overuse  Therapies tried and outcome: acetaminophen - not helping much      Annual Wellness Visit    Patient has been advised of split billing requirements and indicates understanding: Yes     Are you in the first 12 months of your Medicare Part B coverage?  No    Physical Health:    In general, how would you rate your overall physical health? good    Outside of work, how many days during the week do you exercise?1 day/week    Outside of work, approximately how many minutes a day do you exercise?30-45 minutes    If you drink alcohol do you typically have >3 drinks per day or >7 drinks per week? No    Do you usually eat at least 4 servings of fruit and vegetables a day, include whole grains & fiber and avoid regularly eating high fat or \"junk\" foods? Yes    Do you have any problems taking medications regularly? No    Do you have any side effects from medications? none    Needs assistance for the following daily activities: transportation, shopping, preparing meals, housework, bathing, laundry, money management and taking medicine    Which of the following safety concerns are present in your home?  none identified     Hearing impairment: has hearing aid    In the past 6 months, have you been bothered by leaking of urine? Yes    Mental Health:    In general, how would you rate your overall mental or emotional health? good  PHQ-2 Score:      Do you feel safe in your environment? YES    Have you ever done Advance Care Planning? (For " "example, a Health Directive, POLST, or a discussion with a medical provider or your loved ones about your wishes)? No, advance care planning information given to patient to review.  Patient plans to discuss their wishes with loved ones or provider.      Fall risk:  Fallen 2 or more times in the past year?: No  Any fall with injury in the past year?: Yes    Cognitive Screenin) Repeat 3 items (Leader, Season, Table)  YES  2) Clock draw: ABNORMAL .  3) 3 item recall: Recalls 1 object   Results: ABNORMAL clock, 1-2 items recalled: PROBABLE COGNITIVE IMPAIRMENT, **INFORM PROVIDER**    Mini-CogTM Copyright S Lex. Licensed by the author for use in [facility]; reprinted with permission (rubi@[facility]). All rights reserved.      Do you have sleep apnea, excessive snoring or daytime drowsiness?: yes snore    Current providers sharing in care for this patient include:   Patient Care Team:  Ernestina Alvarado MD as PCP - General (Family Medicine)  Jennifer Mahmood RN as Lead Care Coordinator  Ernestina Alvarado MD as Assigned PCP  Samantha Bruce MD as Assigned Surgical Provider    Patient has been advised of split billing requirements and indicates understanding: Yes    Review of Systems   Constitutional, HEENT, cardiovascular, pulmonary, gi and gu systems are negative, except as otherwise noted.      Objective    /68 (BP Location: Left arm, Patient Position: Chair, Cuff Size: Adult Large)   Pulse 84   Temp 98.6  F (37  C) (Tympanic)   Resp 16   Ht 1.473 m (4' 10\")   Wt 60.3 kg (133 lb)   LMP  (LMP Unknown)   SpO2 98%   BMI 27.80 kg/m    Body mass index is 27.8 kg/m .  Physical Exam   GENERAL: healthy, alert and no distress  NECK: no adenopathy, no asymmetry, masses, or scars and thyroid normal to palpation  RESP: lungs clear to auscultation - no rales, rhonchi or wheezes  CV: regular rate and rhythm, normal S1 S2, no S3 or S4, no murmur, click or rub, no " peripheral edema and peripheral pulses strong  ABDOMEN: soft, nontender, no hepatosplenomegaly, no masses and bowel sounds normal  MS: tenderness lateral left thigh, good ROM of hips and knees.  Moderate left knee effusion  PSYCH: mentation appears normal, affect normal/bright              .  ..    She is at risk for falling and has been provided with information to reduce the risk of falling at home.

## 2022-07-13 NOTE — TELEPHONE ENCOUNTER
Patient can get the vaccine at any pharmacy that does this.  Is there a compounding pharmacy close to where the patient lives?    Ernestina Springer M.D.

## 2022-07-13 NOTE — PATIENT INSTRUCTIONS
At [facility], we strive to deliver an exceptional experience to you, every time we see you. If you receive a survey, please complete it as we do value your feedback.  If you have MyChart, you can expect to receive results automatically within 24 hours of their completion.  Your provider will send a note interpreting your results as well.   If you do not have MyChart, you should receive your results in about a week by mail.    Your care team:                            Family Medicine Internal Medicine   MD Ivan Barlow MD Shantel Branch-Fleming, MD Srinivasa Vaka, MD Katya Belousova, NILS Quan CNP, MD ([facility]   Jj Rodgers, MD Daly Sosa MD Amelia Massimini APRN CNP   Lashawn Son APRN JACQUELIN Rowell MD             Clinic hours: Monday - Thursday 7 am-6 pm; Fridays 7 am-5 pm.   Urgent care: Monday - Friday 10 am- 8 pm; Saturday and Sunday 9 am-5 pm.    Clinic: [phone]       [facility]: Monday - Thursday 8 am - 7 pm; Friday 8 am - 6 pm  [facility]: [phone]        Patient Education   Personalized Prevention Plan  You are due for the preventive services outlined below.  Your care team is available to assist you in scheduling these services.  If you have already completed any of these items, please share that information with your care team to update in your medical record.  Health Maintenance Due   Topic Date Due    Zoster (Shingles) Vaccine (2 of 2) 05/27/2019    COVID-19 Vaccine (4 - Booster for Moderna series) 03/30/2022     Preventive Health Recommendations    See your health care provider every year to  Review health changes.   Discuss preventive care.    Review your medicines if your doctor has prescribed any.  You no longer need a yearly Pap test unless you've had an abnormal Pap test in the past 10 years. If you  have vaginal symptoms, such as bleeding or discharge, be sure to talk with your provider about a Pap test.  Every 1 to 2 years, have a mammogram.  If you are over 69, talk with your health care provider about whether or not you want to continue having screening mammograms.  Every 10 years, have a colonoscopy. Or, have a yearly FIT test (stool test). These exams will check for colon cancer.   Have a cholesterol test every 5 years, or more often if your doctor advises it.   Have a diabetes test (fasting glucose) every three years. If you are at risk for diabetes, you should have this test more often.   At age 65, have a bone density scan (DEXA) to check for osteoporosis (brittle bone disease).    Shots:  Get a flu shot each year.  Get a tetanus shot every 10 years.  Talk to your doctor about your pneumonia vaccines. There are now two you should receive - Pneumovax (PPSV 23) and Prevnar (PCV 13).  Talk to your pharmacist about the shingles vaccine.  Talk to your doctor about the hepatitis B vaccine.    Nutrition:   Eat at least 5 servings of fruits and vegetables each day.  Eat whole-grain bread, whole-wheat pasta and brown rice instead of white grains and rice.  Get adequate Calcium and Vitamin D.     Lifestyle  Exercise at least 150 minutes a week (30 minutes a day, 5 days a week). This will help you control your weight and prevent disease.  Limit alcohol to one drink per day.  No smoking.   Wear sunscreen to prevent skin cancer.   See your dentist twice a year for an exam and cleaning.  See your eye doctor every 1 to 2 years to screen for conditions such as glaucoma, macular degeneration and cataracts.    Personalized Prevention Plan  You are due for the preventive services outlined below.  Your care team is available to assist you in scheduling these services.  If you have already completed any of these items, please share that information with your care team to update in your medical record.  Health Maintenance    Topic Date Due    ZOSTER IMMUNIZATION (2 of 2) 05/27/2019    COVID-19 Vaccine (4 - Booster for Moderna series) 03/30/2022    INFLUENZA VACCINE (1) 09/01/2022    BMP  02/02/2023    EYE EXAM  05/27/2023    MEDICARE ANNUAL WELLNESS VISIT  07/13/2023    ANNUAL REVIEW OF HM ORDERS  07/13/2023    FALL RISK ASSESSMENT  07/13/2023    DTAP/TDAP/TD IMMUNIZATION (2 - Td or Tdap) 07/25/2024    ADVANCE CARE PLANNING  07/13/2027    DEXA  05/19/2030    PHQ-2 (once per calendar year)  Completed    Pneumococcal Vaccine: 65+ Years  Completed    IPV IMMUNIZATION  Aged Out    MENINGITIS IMMUNIZATION  Aged Out    HEPATITIS B IMMUNIZATION  Aged Out       Preventing Falls at Home  A person can fall for many reasons. Older adults may fall because reaction time slows down as we age. Your muscles and joints may get stiff, weak, or less flexible because of illness, medicines, or a physical condition.   Other health problems that make falls more likely include:   Arthritis  Dizziness or lightheadedness when you stand up (orthostatic hypotension)  History of a stroke  Dizziness  Anemia  Certain medicines taken for mental illness or to control blood pressure.  Problems with balance or gait  Bladder or urinary problems  History of falling  Changes in vision (vision impairment)  Changes in thinking skills and memory (cognitive impairment)  Injuries from a fall can include serious injuries such as broken bones, dislocated joints, internal bleeding and cuts. Injuries like these can limit your independence.   Prevention tips  To help prevent falls and fall-related injuries, follow the tips below.    Floors  To make floors safer:   Put nonskid pads under area rugs.  Remove small rugs.  Replace worn floor coverings.  Tack carpets firmly to each step on carpeted stairs. Put nonskid strips on the edges of uncarpeted stairs.  Keep floors and stairs free of clutter and cords.  Arrange furniture so there are clear pathways.  Clean up any spills right  away.  Bathrooms    To make bathrooms safer:   Install grab bars in the tub or shower.  Apply nonskid strips or put a nonskid rubber mat in the tub or shower.  Sit on a bath chair to bathe.  Use bathmats with nonskid backing.  Lighting  To improve visibility in your home:    Keep a flashlight in each room. Or put a lamp next to the bed within easy reach.  Put nightlights in the bedrooms, hallways, kitchen, and bathrooms.  Make sure all stairways have good lighting.  Take your time when going up and down stairs.  Put handrails on both sides of stairs and in walkways for more support. To prevent injury to your wrist or arm, don t use handrails to pull yourself up.  Install grab bars to pull yourself up.  Move or rearrange items that you use often. This will make them easier to find or reach.  Look at your home to find any safety hazards. Especially look at doorways, walkways, and the driveway. Remove or repair any safety problems that you find.  Other changes to make  Look around to find any safety hazards. Look closely at doorways, walkways, and the driveway. Remove or repair any safety problems that you find.  Wear shoes that fit well.  Take your time when going up and down stairs.  Put handrails on both sides of stairs and in walkways for more support. To prevent injury to your wrist or arm, don t use handrails to pull yourself up.  Install grab bars wherever needed to pull yourself up.  Arrange items that you use often. This will make them easier to find or reach.    [employer] last reviewed this educational content on 3/1/2020    7197-8814 [employer]. All rights reserved. This information is not intended as a substitute for professional medical care. Always follow your healthcare professional's instructions.

## 2022-07-14 NOTE — TELEPHONE ENCOUNTER
Writer called to [facility] pharmacy on [address].  Verified that they do have the gabapentin 8 % in PLO cream and ketoprofen 10% in PLO cream on hand and can create these creams for patient.  Provider would just need to send prescriptions to [facility] pharmacy and put in pharmacy notes that patient requires a  and ask pharmacy to outreach to patient to set up profile and to arrange delivery.  [facility] pharmacy ONLY does mail order delivery, patient can not . Work along with  mail order/specialty pharmacy.    Writer called to patient's son, primary contact # in chart. Discussed prescriptions and informed usual pharmacy can not make up these creams and informed of option through  pharmacy. Son agreed to plan.  Writer also updated son that patient can receive shingles vaccine at any pharmacy where this is offered, for example [facility] or [facility]. Son agreed, will look in to finding a pharmacy location for patient to get vaccine.    Routing to provider to review and advise on plan for pain creams.        Haley Steel RN  [facility]

## 2022-07-25 ENCOUNTER — PATIENT OUTREACH (OUTPATIENT)
Dept: GERIATRIC MEDICINE | Facility: CLINIC | Age: 82
End: 2022-07-25

## 2022-07-25 NOTE — PROGRESS NOTES
[facility] Care Coordination Contact    Internal CC change effective 8/1/22.  Mailed member CC Change letter.  Additional tasks to be completed by CMS include: update database & EPIC, enter CC Change in MMIS, and move member files on Q drive.    Florecita Mancera  Case Management Specialist  [facility]  [phone]

## 2022-08-01 ENCOUNTER — PATIENT OUTREACH (OUTPATIENT)
Dept: GERIATRIC MEDICINE | Facility: CLINIC | Age: 82
End: 2022-08-01

## 2022-08-01 NOTE — PROGRESS NOTES
[facility] Care Coordination Contact    Lani from [facility] called to ask for new auth for their services. She checked [facility] site and auth  22, so she needs a new one dated from .    Tasked [employer], in Florecita's absence, to send new auth. Dates are on budget worksheet from when Jennifer did assessment in 2022.    Left voicemail for Lani, letting her know this has been approved and a new auth will be sent.  Lizzie Wilson RN  [facility]   [phone]

## 2022-08-05 ENCOUNTER — TELEPHONE (OUTPATIENT)
Dept: FAMILY MEDICINE | Facility: CLINIC | Age: 82
End: 2022-08-05

## 2022-08-05 NOTE — TELEPHONE ENCOUNTER
What type of form? medical  What day did you drop off your forms? 8/05/2022  Is there a due date? ASAP (7-10 business day to compete forms)   How would you like to receive these forms? Patient will  at the clinic when completed  Which clinic was the form dropped off at? [facility]    What is the best number to contact you? Cell [phone]  What time works best to contact you with in 4 hrs? ANY  Is it okay to leave a message? Yes     Form in fang [place]

## 2022-08-08 NOTE — TELEPHONE ENCOUNTER
Received form. Bringing to the  by 6:30. Copy to TC and abstracting. Called and spoke to the daughter and she will pick this up at the .  Deyanira Cheng [facility]  2nd Floor  Primary Care

## 2022-08-15 NOTE — TELEPHONE ENCOUNTER
RN received call from Kusum, patient's daughter regarding a form that was requested. RN notes there is not consent to communicate on file for Kusum. Patient gave verbal consent to speak with daughter.     RN notes the form is ready for them at the . Kusum states she will come by the clinic with her mother today, 8/15/22.     Akiko Bolton RN  [facility]

## 2022-08-17 ENCOUNTER — PATIENT OUTREACH (OUTPATIENT)
Dept: GERIATRIC MEDICINE | Facility: CLINIC | Age: 82
End: 2022-08-17

## 2022-08-17 NOTE — PROGRESS NOTES
CC updated program tasks and targets for [employer] Kristi launch.  Lizzie Wilson RN  [facility]   [phone]

## 2022-09-28 DIAGNOSIS — H35.30 AGE-RELATED MACULAR DEGENERATION: Primary | ICD-10-CM

## 2022-10-24 ENCOUNTER — PATIENT OUTREACH (OUTPATIENT)
Dept: GERIATRIC MEDICINE | Facility: CLINIC | Age: 82
End: 2022-10-24

## 2022-10-24 NOTE — PROGRESS NOTES
[facility] Care Coordination Contact      [facility] Six-Month Telephone Assessment    6 month telephone assessment completed on 10/24/22.    ER visits: No  Hospitalizations: No  TCU stays: No  Significant health status changes: No  Falls/Injuries: No  ADL/IADL changes: No  Changes in services: No    Caregiver Assessment follow up:  NA    Goals: See POC in chart for goal progress documentation.      Will see member in 6 months for an annual health risk assessment.   Encouraged member to call CC with any questions or concerns in the meantime.   Lizzie Wilson RN  [facility]   [phone]

## 2022-10-24 NOTE — PROGRESS NOTES
"Sudhakar, member's son, called with questions related to member's [facility] financial assistance. They received a letter that the member's recertification paperwork has not been received by the [facility] yet and if she doesn't have a phone interview by 10/31/22 her \"benefits will end\".    Writer called Sudhakar back and informed him there is a phone number on the letter that they can call to discuss with [facility]. He said his mom does not speak any English and has difficulty hearing on the phone. I also suggested calling the Office of Multicultural Services ([phone]), who have [place] speaking staff who can assist members with renewing their benefits. Also gave him the address - [address] [place] [address].    Sudhakar voiced understanding and agreed to call this care coordinator back if they still have questions or concerns after calling the above resources.  Lizzie Wilson RN  [facility]   [phone]      "

## 2022-11-18 ENCOUNTER — OFFICE VISIT (OUTPATIENT)
Dept: OPHTHALMOLOGY | Facility: CLINIC | Age: 82
End: 2022-11-18
Attending: OPHTHALMOLOGY
Payer: COMMERCIAL

## 2022-11-18 DIAGNOSIS — H35.30 AGE-RELATED MACULAR DEGENERATION: ICD-10-CM

## 2022-11-18 DIAGNOSIS — H35.352 CME (CYSTOID MACULAR EDEMA), LEFT: Primary | ICD-10-CM

## 2022-11-18 PROCEDURE — 99214 OFFICE O/P EST MOD 30 MIN: CPT | Performed by: OPHTHALMOLOGY

## 2022-11-18 PROCEDURE — 99207 FUNDUS PHOTOS OU (BOTH EYES): CPT | Mod: 26 | Performed by: OPHTHALMOLOGY

## 2022-11-18 PROCEDURE — 92134 CPTRZ OPH DX IMG PST SGM RTA: CPT | Performed by: OPHTHALMOLOGY

## 2022-11-18 PROCEDURE — G0463 HOSPITAL OUTPT CLINIC VISIT: HCPCS | Mod: 25

## 2022-11-18 PROCEDURE — 92250 FUNDUS PHOTOGRAPHY W/I&R: CPT | Performed by: OPHTHALMOLOGY

## 2022-11-18 RX ORDER — CARBOXYMETHYLCELLULOSE SODIUM 5 MG/ML
1 SOLUTION/ DROPS OPHTHALMIC 4 TIMES DAILY PRN
Qty: 30 EACH | Refills: 11 | Status: SHIPPED | OUTPATIENT
Start: 2022-11-18

## 2022-11-18 ASSESSMENT — REFRACTION_WEARINGRX
OS_CYLINDER: +2.25
OS_ADD: +3.25
OD_ADD: +3.25
OD_SPHERE: -2.00
OS_AXIS: 155
OD_CYLINDER: +2.25
OD_AXIS: 155
OS_SPHERE: -2.00

## 2022-11-18 ASSESSMENT — TONOMETRY
OS_IOP_MMHG: 17
OD_IOP_MMHG: 18
IOP_METHOD: TONOPEN

## 2022-11-18 ASSESSMENT — EXTERNAL EXAM - RIGHT EYE: OD_EXAM: NORMAL

## 2022-11-18 ASSESSMENT — VISUAL ACUITY
OD_PH_CC+: -2
OD_PH_CC: 20/25
OD_CC: 20/30
OD_CC+: -2
METHOD: SNELLEN - LINEAR
CORRECTION_TYPE: GLASSES
OS_CC: CF @ 3FT

## 2022-11-18 ASSESSMENT — CONF VISUAL FIELD
OD_INFERIOR_TEMPORAL_RESTRICTION: 0
OD_SUPERIOR_NASAL_RESTRICTION: 0
OS_SUPERIOR_NASAL_RESTRICTION: 0
OS_INFERIOR_TEMPORAL_RESTRICTION: 0
OD_NORMAL: 1
OD_INFERIOR_NASAL_RESTRICTION: 0
OS_INFERIOR_NASAL_RESTRICTION: 0
METHOD: COUNTING FINGERS
OD_SUPERIOR_TEMPORAL_RESTRICTION: 0
OS_NORMAL: 1
OS_SUPERIOR_TEMPORAL_RESTRICTION: 0

## 2022-11-18 ASSESSMENT — SLIT LAMP EXAM - LIDS
COMMENTS: NORMAL
COMMENTS: NORMAL

## 2022-11-18 ASSESSMENT — CUP TO DISC RATIO
OD_RATIO: 0.35
OS_RATIO: 0.35

## 2022-11-18 ASSESSMENT — EXTERNAL EXAM - LEFT EYE: OS_EXAM: NORMAL

## 2022-11-18 NOTE — NURSING NOTE
Chief Complaints and History of Present Illnesses   Patient presents with     Follow Up     Chief Complaint(s) and History of Present Illness(es)     Follow Up           Comments    :[id number]    Patient states that her vision is the same. She states that her eyes get swollen, was given vitamins. But patient does not feel it has helped. She states that her eyes burn. Patient states that she does notice flashes of lights and floaters in her LE.     Ocular Meds:artifical tears as needed    Oc REED, November 18, 2022 8:49 AM                   '

## 2022-11-18 NOTE — PROGRESS NOTES
CC -   CHANEL OS    INTERVAL HISTORY - vision stable    Last full DFE 11/2022    [facility] -   Bethany Logan is a 82 year old  patient referred by Dr Ainsley Whiting for evaluation and treat of CHANEL OS  Seen by  3/25/21, found CNVM OS and poor vision NICCI BATES prior was ~ 2019, VA OS was 20/20 on 1/11/2019 visit.  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   - OCTA 5/27/22 indicates RAP lesion, but center is obscured by exudate   - OCT-A 5/2022 suggests possible RAP component   - likely cause of HEx OS and vision loss     - no improvement with Avastin x 3 given 4/2021-6/2021   - patient elected to stop 8/2021     - stable today, lesion involuted   - observe      # CME OS   - from CHANEL   - now mostly tr diffuse CME   - extensive HEx in macula causing vision loss      # Non-exudative neovascular AMD OD   - occult CNVM seen at SN PED on OCTA 05/27/22   - recommend AREDS2 and Amsler   - recheck 6-12 months      # Chorioretinal scar OD    - ?etiology, ?d/t toxoplasmosis   - inactive, likely longstanding      # PVD OU   - advised S/Sx RD 11/2022      # Allergic conjunctivitis    - uses Zaditor    # ONEYDA   - ATs    RTC 6-12 months, V/T/D OCT + DFE each eye        ATTESTATION     Attending Attestation:     Complete documentation of historical and exam elements from today's encounter can be found in the full encounter summary report (not reduplicated in this progress note).  I personally obtained the chief  complaint(s) and history of present illness.  I confirmed and edited as necessary the review of systems, past medical/surgical history, family history, social history, and examination findings as documented by others; and I examined the patient myself.  I personally reviewed the relevant tests, images, and reports as documented above.  I formulated and edited as necessary the assessment and plan and discussed the findings and management plan with the patient and family    Samantha Bruce MD, PhD  , Vitreoretinal Surgery  Department of Ophthalmology  [facility]

## 2022-11-28 ENCOUNTER — TELEPHONE (OUTPATIENT)
Dept: AUDIOLOGY | Facility: CLINIC | Age: 82
End: 2022-11-28
Payer: COMMERCIAL

## 2022-11-28 DIAGNOSIS — H90.3 SENSORINEURAL HEARING LOSS, BILATERAL: Primary | ICD-10-CM

## 2022-11-28 PROCEDURE — V5266 BATTERY FOR HEARING DEVICE: HCPCS | Performed by: AUDIOLOGIST

## 2022-11-28 NOTE — TELEPHONE ENCOUNTER
[facility] Call Center    Phone Message    May a detailed message be left on voicemail: yes     Reason for Call: Other: Pt needs new batteries for both of her hearing aids, she would like these mailed out to her house like usual, hearing aid brand is Widex, please call with questions, thanks # on device is  [id number]    Action Taken: Other: MATTY ENT    Travel Screening: Not Applicable

## 2022-11-30 NOTE — TELEPHONE ENCOUNTER
Patient calls to request her 3 month supply of batteries be mailed to her home address. Mailed 24 size 312 batteries to the address on file.      CHARGES:              [id number] Batteries ($1). Total: $24 Bill to patient's insurance.     Octaviano Galloway MA, CCC-A  [place] Licensed Audiologist #[id number]  11/30/22

## 2023-02-10 DIAGNOSIS — M19.049 HAND ARTHRITIS: ICD-10-CM

## 2023-02-10 RX ORDER — ACETAMINOPHEN 325 MG/1
TABLET ORAL
Qty: 100 TABLET | Refills: 1 | Status: SHIPPED | OUTPATIENT
Start: 2023-02-10 | End: 2023-07-27

## 2023-02-17 ENCOUNTER — PATIENT OUTREACH (OUTPATIENT)
Dept: GERIATRIC MEDICINE | Facility: CLINIC | Age: 83
End: 2023-02-17
Payer: COMMERCIAL

## 2023-02-17 NOTE — PROGRESS NOTES
Encounter opened due to Regulatory Compass Kristi Update to close FVP Program.    Florecita Mancera  Case Management Specialist  [facility]  [phone]

## 2023-02-17 NOTE — PROGRESS NOTES
Encounter opened due to Regulatory Compass Kristi Update to open FVP Program.    Florecita Mancera  Case Management Specialist  [facility]  [phone]

## 2023-02-27 DIAGNOSIS — L30.9 DERMATITIS: ICD-10-CM

## 2023-02-28 RX ORDER — TRIAMCINOLONE ACETONIDE 1 MG/ML
LOTION TOPICAL 3 TIMES DAILY
Qty: 120 ML | Refills: 3 | Status: SHIPPED | OUTPATIENT
Start: 2023-02-28 | End: 2024-03-07

## 2023-03-10 ENCOUNTER — PATIENT OUTREACH (OUTPATIENT)
Dept: GERIATRIC MEDICINE | Facility: CLINIC | Age: 83
End: 2023-03-10
Payer: COMMERCIAL

## 2023-03-10 NOTE — PROGRESS NOTES
[facility] Care Coordination Contact    Called , with a , to schedule annual HRA home visit. Member had a lot of difficulty hearing the  and asked that we call her daughter-in-law, Kusum. Called Kusum, with a , and HRA has been scheduled for Fri, 10/17/23, at 10:00 AM.     Informed them about audiology and hearing aids being a benefit of Bethany's insurance. Kusum said she has hearing aids, but does not always wear them. I asked her to wear them for our assessment and Kusum will remind the member.  Lizzie Wilson RN  [facility]   [phone]

## 2023-03-10 NOTE — PROGRESS NOTES
[facility] Care Coordination Contact    Securely emailed Lashawn Pickard to request a  for assessment on Fri, 3/17/23, from 10:00 AM - 12:00 PM.  Lizzie Wilson RN  [facility]   [phone]

## 2023-03-15 NOTE — PROGRESS NOTES
[facility] Care Coordination Contact    Email from Lashawn Pickard, confirming  for Fri, 3/17/23, from 10- 12:00 PM  Kathleen Fink - [phone]      Lizzie Wilson RN  [facility]   [phone]

## 2023-03-16 NOTE — PROGRESS NOTES
[facility] Care Coordination Contact    Spoke with member and her daughter-in-law, Kusum, via .  Rescheduled assessment from 3/17 to 3/23/23 at 2:00 PM.     Emailed Lashawn Pickard to cancel  for tomorrow and schedule one for next Thurs.  Lizzie Wilson RN  [facility]   [phone]

## 2023-03-23 ENCOUNTER — PATIENT OUTREACH (OUTPATIENT)
Dept: GERIATRIC MEDICINE | Facility: CLINIC | Age: 83
End: 2023-03-23
Payer: COMMERCIAL

## 2023-03-23 NOTE — PROGRESS NOTES
[facility] Care Coordination Contact    Email from Lashawn Pickard confirming  for Mon, 3/27/23, from 1:30-2:45 PM: Kathleen Fink - [phone].  Lizzie Wilson RN  [facility]   [phone]

## 2023-03-27 ENCOUNTER — PATIENT OUTREACH (OUTPATIENT)
Dept: GERIATRIC MEDICINE | Facility: CLINIC | Age: 83
End: 2023-03-27
Payer: COMMERCIAL

## 2023-03-27 ENCOUNTER — TELEPHONE (OUTPATIENT)
Dept: FAMILY MEDICINE | Facility: CLINIC | Age: 83
End: 2023-03-27
Payer: COMMERCIAL

## 2023-03-27 NOTE — Clinical Note
Hi Dr. Juan Argueta, I did Bethany's annual health risk assessment for Nicko on 3/27. She denies any significant changes since last year's assessment and she receives wonderful care from her daughter-in-law and one of her sons. She loves attending her adult day program as everyone there speaks Romanian and she enjoys socializing. Bethany also takes [employer] to [employer] every week.   I will follow up with Bethany this week and see if she is noticing any decrease in her leg pain/spasms since starting magnesium.   Thank you for signing the DME orders for the items that she needs. Please let me know if there is anything I can do to help you with this patient in the future! Thanks, Lizzie Wilson RN [facility]  [phone]

## 2023-03-27 NOTE — TELEPHONE ENCOUNTER
Reason for Call:  Appointment Request    Patient requesting this type of appt: Chronic Diease Management/Medication/Follow-Up    Requested provider: Ernestina Alvarado    Reason patient unable to be scheduled: Not within requested timeframe asap     When does patient want to be seen/preferred time: 3-7 days    Comments: patients son and daughter in law calling to get patient scheduled for an appointment for left leg pains/ cramps that has been getting worse over the last 2-3 weeks     Okay to leave a detailed message?: Yes at Cell number on file:    Telephone Information:   Mobile [phone]    or Other phone number:  [phone]  Call taken on 3/27/2023 at 3:30 PM by Annette Serrano

## 2023-03-28 NOTE — TELEPHONE ENCOUNTER
Called and spoke to patient's son and scheduled an appt, ok per SBF, for 3/29/2023.  Deyanira Cheng MA  [facility]  2nd Floor  Primary Care

## 2023-03-29 ENCOUNTER — OFFICE VISIT (OUTPATIENT)
Dept: FAMILY MEDICINE | Facility: CLINIC | Age: 83
End: 2023-03-29
Payer: COMMERCIAL

## 2023-03-29 VITALS
TEMPERATURE: 97 F | DIASTOLIC BLOOD PRESSURE: 73 MMHG | SYSTOLIC BLOOD PRESSURE: 132 MMHG | HEIGHT: 57 IN | BODY MASS INDEX: 28.61 KG/M2 | OXYGEN SATURATION: 97 % | WEIGHT: 132.6 LBS | HEART RATE: 68 BPM | RESPIRATION RATE: 16 BRPM

## 2023-03-29 DIAGNOSIS — I10 HYPERTENSION GOAL BP (BLOOD PRESSURE) < 140/90: ICD-10-CM

## 2023-03-29 DIAGNOSIS — R25.2 LEG CRAMPING: Primary | ICD-10-CM

## 2023-03-29 LAB
BASOPHILS # BLD AUTO: 0 10E3/UL (ref 0–0.2)
BASOPHILS NFR BLD AUTO: 1 %
EOSINOPHIL # BLD AUTO: 0.2 10E3/UL (ref 0–0.7)
EOSINOPHIL NFR BLD AUTO: 3 %
ERYTHROCYTE [DISTWIDTH] IN BLOOD BY AUTOMATED COUNT: 14.7 % (ref 10–15)
HCT VFR BLD AUTO: 36.2 % (ref 35–47)
HGB BLD-MCNC: 11.5 G/DL (ref 11.7–15.7)
IMM GRANULOCYTES # BLD: 0 10E3/UL
IMM GRANULOCYTES NFR BLD: 0 %
LYMPHOCYTES # BLD AUTO: 2.4 10E3/UL (ref 0.8–5.3)
LYMPHOCYTES NFR BLD AUTO: 37 %
MCH RBC QN AUTO: 29.9 PG (ref 26.5–33)
MCHC RBC AUTO-ENTMCNC: 31.8 G/DL (ref 31.5–36.5)
MCV RBC AUTO: 94 FL (ref 78–100)
MONOCYTES # BLD AUTO: 0.5 10E3/UL (ref 0–1.3)
MONOCYTES NFR BLD AUTO: 7 %
NEUTROPHILS # BLD AUTO: 3.3 10E3/UL (ref 1.6–8.3)
NEUTROPHILS NFR BLD AUTO: 52 %
PLATELET # BLD AUTO: 311 10E3/UL (ref 150–450)
RBC # BLD AUTO: 3.84 10E6/UL (ref 3.8–5.2)
WBC # BLD AUTO: 6.3 10E3/UL (ref 4–11)

## 2023-03-29 PROCEDURE — 82550 ASSAY OF CK (CPK): CPT | Performed by: FAMILY MEDICINE

## 2023-03-29 PROCEDURE — 80053 COMPREHEN METABOLIC PANEL: CPT | Performed by: FAMILY MEDICINE

## 2023-03-29 PROCEDURE — 85025 COMPLETE CBC W/AUTO DIFF WBC: CPT | Performed by: FAMILY MEDICINE

## 2023-03-29 PROCEDURE — 99214 OFFICE O/P EST MOD 30 MIN: CPT | Performed by: FAMILY MEDICINE

## 2023-03-29 PROCEDURE — 36415 COLL VENOUS BLD VENIPUNCTURE: CPT | Performed by: FAMILY MEDICINE

## 2023-03-29 RX ORDER — UREA 10 %
500 LOTION (ML) TOPICAL AT BEDTIME
Qty: 90 TABLET | Refills: 4 | Status: SHIPPED | OUTPATIENT
Start: 2023-03-29 | End: 2024-04-18

## 2023-03-29 NOTE — LETTER
March 30, 2023      Bethany Logan  [address]  [place] [address]      Ms. Emilee Logan,     Your labs were normal for you. No signs of muscle issues.  Please let me know if the magnesium doesn't help.     Please contact the clinic if you have additional questions.  Thank you.     Sincerely,     Ernestina Argueta MD      Resulted Orders   CK total   Result Value Ref Range     30 - 225 U/L   Comprehensive metabolic panel (BMP + Alb, Alk Phos, ALT, AST, Total. Bili, TP)   Result Value Ref Range    Sodium 143 133 - 144 mmol/L    Potassium 4.2 3.4 - 5.3 mmol/L    Chloride 113 (H) 94 - 109 mmol/L    Carbon Dioxide (CO2) 27 20 - 32 mmol/L    Anion Gap 3 3 - 14 mmol/L    Urea Nitrogen 16 7 - 30 mg/dL    Creatinine 0.69 0.52 - 1.04 mg/dL    Calcium 9.0 8.5 - 10.1 mg/dL    Glucose 116 (H) 70 - 99 mg/dL    Alkaline Phosphatase 114 40 - 150 U/L    AST 24 0 - 45 U/L    ALT 19 0 - 50 U/L    Protein Total 6.3 (L) 6.8 - 8.8 g/dL    Albumin 2.9 (L) 3.4 - 5.0 g/dL    Bilirubin Total 0.3 0.2 - 1.3 mg/dL    GFR Estimate 86 >60 mL/min/1.73m2      Comment:      eGFR calculated using 2021 CKD-EPI equation.   CBC with platelets and differential   Result Value Ref Range    WBC Count 6.3 4.0 - 11.0 10e3/uL    RBC Count 3.84 3.80 - 5.20 10e6/uL    Hemoglobin 11.5 (L) 11.7 - 15.7 g/dL    Hematocrit 36.2 35.0 - 47.0 %    MCV 94 78 - 100 fL    MCH 29.9 26.5 - 33.0 pg    MCHC 31.8 31.5 - 36.5 g/dL    RDW 14.7 10.0 - 15.0 %    Platelet Count 311 150 - 450 10e3/uL    % Neutrophils 52 %    % Lymphocytes 37 %    % Monocytes 7 %    % Eosinophils 3 %    % Basophils 1 %    % Immature Granulocytes 0 %    Absolute Neutrophils 3.3 1.6 - 8.3 10e3/uL    Absolute Lymphocytes 2.4 0.8 - 5.3 10e3/uL    Absolute Monocytes 0.5 0.0 - 1.3 10e3/uL    Absolute Eosinophils 0.2 0.0 - 0.7 10e3/uL    Absolute Basophils 0.0 0.0 - 0.2 10e3/uL    Absolute Immature Granulocytes 0.0 <=0.4 10e3/uL

## 2023-03-29 NOTE — PROGRESS NOTES
"  Assessment & Plan     Leg cramping  Possible etiologies discussed - check labs and trial of magnesium.  If not improved, consider xray and referral.  - CK total; Future  - Comprehensive metabolic panel (BMP + Alb, Alk Phos, ALT, AST, Total. Bili, TP); Future  - CBC with platelets and differential; Future  - magnesium gluconate (MAGONATE) 500 (27 Mg) MG tablet; Take 1 tablet (500 mg) by mouth At Bedtime  - CK total  - Comprehensive metabolic panel (BMP + Alb, Alk Phos, ALT, AST, Total. Bili, TP)  - CBC with platelets and differential    Hypertension goal BP (blood pressure) < 140/90  Controlled - continue current treatment.         BMI:   Estimated body mass index is 28.69 kg/m  as calculated from the following:    Height as of this encounter: 1.448 m (4' 9\").    Weight as of this encounter: 60.1 kg (132 lb 9.6 oz).       Ernestina Argueta MD  [facility]    Tyrell Sims is a 82 year old, presenting for the following health issues:  leg cramps     Additional Questions 3/29/2023   Roomed by ciera   Accompanied by Kusum- Daughter in Law     Patient Reported Additional Medications 3/29/2023   Patient reports taking the following new medications none     History of Present Illness       Reason for visit:  Leg issue  Symptom onset:  More than a month  Symptoms include:  Legs cramp and pain  Symptom intensity:  Severe  Symptom progression:  Staying the same  Had these symptoms before:  No  What makes it worse:  None  What makes it better:  Standing up, massaging and resting    She eats 2-3 servings of fruits and vegetables daily.She consumes 1 sweetened beverage(s) daily.She exercises with enough effort to increase her heart rate 9 or less minutes per day.  She exercises with enough effort to increase her heart rate 3 or less days per week. She is missing 2 dose(s) of medications per week.  She is not taking prescribed medications regularly due to remembering to take.       Pain " "History:  When did you first notice your pain? 1 month ago   Have you seen anyone else for your pain? No  How has your pain affected your ability to work? Not applicable  Where in your body do you have pain? Legs pain and cramps L > R          Review of Systems   Constitutional, HEENT, cardiovascular, pulmonary, gi and gu systems are negative, except as otherwise noted.      Objective    /73 (BP Location: Left arm, Patient Position: Sitting, Cuff Size: Adult Large)   Pulse 68   Temp 97  F (36.1  C) (Tympanic)   Resp 16   Ht 1.448 m (4' 9\")   Wt 60.1 kg (132 lb 9.6 oz)   LMP  (LMP Unknown)   SpO2 97%   BMI 28.69 kg/m    Body mass index is 28.69 kg/m .  Physical Exam   GENERAL: healthy, alert and no distress  NECK: no adenopathy, no asymmetry, masses, or scars and thyroid normal to palpation  RESP: lungs clear to auscultation - no rales, rhonchi or wheezes  CV: regular rate and rhythm, normal S1 S2, no S3 or S4, no murmur, click or rub, no peripheral edema and peripheral pulses strong  ABDOMEN: soft, nontender, no hepatosplenomegaly, no masses and bowel sounds normal  MS: no gross musculoskeletal defects noted, no edema  NEURO: Normal strength and tone, mentation intact and speech normal  PSYCH: mentation appears normal, affect normal/bright              "

## 2023-03-30 ENCOUNTER — APPOINTMENT (OUTPATIENT)
Dept: OPTOMETRY | Facility: CLINIC | Age: 83
End: 2023-03-30
Payer: COMMERCIAL

## 2023-03-30 LAB
ALBUMIN SERPL-MCNC: 2.9 G/DL (ref 3.4–5)
ALP SERPL-CCNC: 114 U/L (ref 40–150)
ALT SERPL W P-5'-P-CCNC: 19 U/L (ref 0–50)
ANION GAP SERPL CALCULATED.3IONS-SCNC: 3 MMOL/L (ref 3–14)
AST SERPL W P-5'-P-CCNC: 24 U/L (ref 0–45)
BILIRUB SERPL-MCNC: 0.3 MG/DL (ref 0.2–1.3)
BUN SERPL-MCNC: 16 MG/DL (ref 7–30)
CALCIUM SERPL-MCNC: 9 MG/DL (ref 8.5–10.1)
CHLORIDE BLD-SCNC: 113 MMOL/L (ref 94–109)
CK SERPL-CCNC: 102 U/L (ref 30–225)
CO2 SERPL-SCNC: 27 MMOL/L (ref 20–32)
CREAT SERPL-MCNC: 0.69 MG/DL (ref 0.52–1.04)
GFR SERPL CREATININE-BSD FRML MDRD: 86 ML/MIN/1.73M2
GLUCOSE BLD-MCNC: 116 MG/DL (ref 70–99)
POTASSIUM BLD-SCNC: 4.2 MMOL/L (ref 3.4–5.3)
PROT SERPL-MCNC: 6.3 G/DL (ref 6.8–8.8)
SODIUM SERPL-SCNC: 143 MMOL/L (ref 133–144)

## 2023-03-30 PROCEDURE — 92341 FIT SPECTACLES BIFOCAL: CPT | Performed by: OPTOMETRIST

## 2023-03-30 NOTE — RESULT ENCOUNTER NOTE
Ms. Emilee Logan,    Your labs were normal for you. No signs of muscle issues.  Please let me know if the magnesium doesn't help.    Please contact the clinic if you have additional questions.  Thank you.    Sincerely,    Ernestina Argueta MD

## 2023-04-05 ENCOUNTER — TELEPHONE (OUTPATIENT)
Dept: GERIATRIC MEDICINE | Facility: CLINIC | Age: 83
End: 2023-04-05
Payer: COMMERCIAL

## 2023-04-05 ENCOUNTER — PATIENT OUTREACH (OUTPATIENT)
Dept: GERIATRIC MEDICINE | Facility: CLINIC | Age: 83
End: 2023-04-05
Payer: COMMERCIAL

## 2023-04-05 DIAGNOSIS — M17.0 PRIMARY OSTEOARTHRITIS OF BOTH KNEES: ICD-10-CM

## 2023-04-05 DIAGNOSIS — M54.42 CHRONIC LEFT-SIDED LOW BACK PAIN WITH LEFT-SIDED SCIATICA: Primary | ICD-10-CM

## 2023-04-05 DIAGNOSIS — G89.29 CHRONIC LEFT-SIDED LOW BACK PAIN WITH LEFT-SIDED SCIATICA: Primary | ICD-10-CM

## 2023-04-05 NOTE — TELEPHONE ENCOUNTER
Care coordinator pended orders below for DME:  1. Heating pad   2. Shower chair with arms and back  3. 4-wheel walker, petite  4. Cane     Dr. Juan Argueta, please review and sign orders if neil and I will send to a medical supply store that accepts her insurance.     The patient also told me she never got the ketoprofen or gabapentin creams because they are not covered by her insurance. Is there an alternative that you would recommend? Either OTC or an order? Thanks.     Lizzie Wilson RN Care Coordinator  [facility]   [phone]

## 2023-04-05 NOTE — PROGRESS NOTES
[facility] Care Coordination Contact    [facility] Home Visit Assessment     Home visit for Health Risk Assessment with Bethany Logan completed on March 27, 2023    Type of residence:: Apartment - handicap accessible  Current living arrangement:: I live in a private home, I live alone ([place] apartment in [place])     Assessment completed with:: Patient, Family, Other ( & daughter-in-law)  We did not complete the SDOH due to time constraint.     Current Care Plan  Member currently receiving the following home care services: None    Member currently receiving the following community resources: Adult Day Care, DME, Lifeline, PCA, Housekeeping/Chore Agency, County Worker, Transportation Services    Medication Review  Medication reconciliation completed in Epic: Yes  Medication set-up & administration: Family/informal caregiver sets up weekly.  Self-administers medications and Family caregiver administers medications.  Medication Risk Assessment Medication (1 or more, place referral to MTM): N/A: No risk factors identified  MTM Referral Placed: No: No risk factors idenified     Bethany has had some issues with insurance not covering several creams that Dr. Juan Argueta have ordered for her pain. I spoke with a pharmacist at [facility] and she said member can still buy the creams if she wants to pay out of pocket for them.    Writer gave the member a new, large pill box to set up her medications in, as her old one was in bad shape.    Mental/Behavioral Health   Depression Screening:   PHQ-2 Total Score (Adult) - Positive if 3 or more points; Administer PHQ-9 if positive: 0     Mental health DX:: No        Falls Assessment:   Fallen 2 or more times in the past year?: No   Any fall with injury in the past year?: Yes (Denies serious injury, but reports having a lot of bruises.)    ADL/IADL Dependencies:   Dependent ADLs:: Ambulation-walker, Bathing, Dressing,  Grooming  Dependent IADLs:: Cleaning, Cooking, Laundry, Shopping, Meal Preparation, Medication Management, Money Management, Transportation    [facility] sponsored benefits: Shared information re: Silver Sneakers/gym memberships, ASA, Calcium +D.    PCA Assessment completed at visit: Yes Annual PCA assessment indicated 14 (3.5 hours) units per day of PCA. This is the same as the previous assessment.     Elderly Waiver Eligibility: Yes-will continue on EW    Care Plan & Recommendations: Writer recommends continuing with the same services, as noted above. Care coordinator will also renew [employer] passes, which Bethany uses to go to [employer] every week and order a petite 4-wheel walker, shower chair with arms & back, electric heating pad and a new quad cane for the member.   See [facility] for detailed assessment information.    Follow-Up Plan: Member informed of future contact, plan to f/u with member with a 6 month telephone assessment.  Contact information shared with member and family, encouraged member to call with any questions or concerns at any time.    [facility] care continuum providers: Please see Snapshot and Care Management Flowsheets for Specific details of care plan.    This CC note routed to PCP.  Lizzie Wilson RN  [facility]   [phone]

## 2023-04-05 NOTE — PROGRESS NOTES
[facility] Care Coordination Contact    Writer called [facility] and they have the triamcinolone cream ready for member to  with no copay. Informed Kusum, member's daughter-in-law, and she will pick it up.    Kusum said that Bethany is doing fine and had a good doctor visit with PCP last week.    Care coordinator also contacted the [facility] and Fermin said the gabapentin and ketoprofen, ordered in 7/2022, could still be filled. Fermin said they will call Kusum to set up an account and discuss the delivery process, etc.    Care coordinator informed Kusum of above and she said they called and the creams are not covered by member's insurance and they cost $47.48 & $54.14. Routed encounter to Dr. Juan Argueta and asked if there is an alternative she would recommend - either OTC or Rx.    Also pended DME orders in telephone encounter and routed to PCP to review and sign.    [facility] a petite walker. Member would like cane and walker to be purple if she can choose the color.  Lizzie Wilson RN  [facility]   [phone]

## 2023-04-05 NOTE — TELEPHONE ENCOUNTER
Orders signed.    No alternative because diclofenac wasn't covered either.  She can purchase diclofenac over the counter though if she likes and can afford it.    Ernestina Springer M.D.

## 2023-04-06 NOTE — PROGRESS NOTES
Tasked RAPHAEL Hook, to process PCA assessment, as it was done on 3/27 and needs to be processed within 10 business days.   I will task Florecita to finish the EOV next week.  Lizzie Wilson RN  [facility]   [phone]

## 2023-04-07 ENCOUNTER — PATIENT OUTREACH (OUTPATIENT)
Dept: GERIATRIC MEDICINE | Facility: CLINIC | Age: 83
End: 2023-04-07
Payer: COMMERCIAL

## 2023-04-07 NOTE — PROGRESS NOTES
[facility] Care Coordination Contact    [facility]:  Emailed completed PCA assessment to [facility].  Faxed copy of PCA assessment to [facility] Agency and mailed copy to member.  Faxed MD Communication to PCP.     Monster Carrasquillo  Care Management Specialist  [facility]  [phone]

## 2023-04-07 NOTE — LETTER
[facility]  [address], [address]  [place] [address]  Phone:  [phone]  Fax:  [phone]      April 7, 2023    KARINA SAUNDERS  [address]  [place] [address]    Dear Karina    Enclosed is a copy of your completed PCA Assessment and Service Plan.  This is for your records and no action is required by you.  If you have additional questions regarding your assessment please contact me at [phone]. If you feel that your needs are not being met, please contact the Clinical Supervisor at [phone].    Sincerely,    Lizzie Wilson RN  [phone]  [email or web address]            Enclosure:  Completed PCA assessment

## 2023-04-12 NOTE — TELEPHONE ENCOUNTER
"Emailed [employer] again to find out if any of the DME ordered will need to be paid for by waiver or if MA will cover all the items.  Also waiting to hear back about the type of cane that might work for Bethany.  Specified that she needs a petite walker, as she is 4' 9\" tall.  Lizzie Wilson RN  [facility]   [phone]    "

## 2023-04-13 ENCOUNTER — PATIENT OUTREACH (OUTPATIENT)
Dept: GERIATRIC MEDICINE | Facility: CLINIC | Age: 83
End: 2023-04-13
Payer: COMMERCIAL

## 2023-04-13 ENCOUNTER — MEDICAL CORRESPONDENCE (OUTPATIENT)
Dept: HEALTH INFORMATION MANAGEMENT | Facility: CLINIC | Age: 83
End: 2023-04-13
Payer: COMMERCIAL

## 2023-04-13 NOTE — TELEPHONE ENCOUNTER
Dr. Juan Argueta, the patient asked me to tell you the magnesium seems to be helping with her leg cramps. She is having fewer cramps since starting the mag and she is pleased with this.  Lizzie Wilson RN  [facility]   [phone]

## 2023-04-13 NOTE — PROGRESS NOTES
[facility] Care Coordination Contact    Received quote from Elizabeth at [facility] for walker bag ($65). The other items are covered by MA including, the walker, cane, heating pad and bath seat with arms and back.  Spoke with member and Kusum and confirmed what we were going to order.    Sent orders to [facility] and specified that member wants the purple Hurry Cane, a standard sized walker, which she has been using, an electric heating pad and the bath seat with both arms and back.  Asked that they contact Kusum DIL, with a , to schedule delivery and to let me know if there is any trouble reaching member.  Asked member and Kusum if they were waiting to follow up on anything else or if they had any questions and they did not.  They did ask me to tell PCP that the magnesium seems to be helping with the leg cramps. She is having fewer cramps and is pleased with this. Sent a staff message to Dr. Springer.    Updated plan of care and tasked Florecita CMS, to track in ANNA and  auth for the walker bag.  Lizzie Wilson RN  [facility]   [phone]

## 2023-04-14 ENCOUNTER — PATIENT OUTREACH (OUTPATIENT)
Dept: GERIATRIC MEDICINE | Facility: CLINIC | Age: 83
End: 2023-04-14
Payer: COMMERCIAL

## 2023-04-14 ENCOUNTER — MEDICAL CORRESPONDENCE (OUTPATIENT)
Dept: HEALTH INFORMATION MANAGEMENT | Facility: CLINIC | Age: 83
End: 2023-04-14
Payer: COMMERCIAL

## 2023-04-14 NOTE — PROGRESS NOTES
[facility] Care Coordination Contact    Received after visit chart from care coordinator.  Completed following tasks: Mailed copy of care plan to client, Updated services in Database, Submitted referrals/auths for Chayo, EZIO w/pam, Sent the following resources/forms: ACP Ukrainian & Know your Rights form in Ukrainian  and Mailed Safe Medication Disposal    and Provider Signature - No POC Shared:  Member indicates that they do not want their POC shared with any EW providers.     Florecita Mancera  Case Management Specialist  [facility]  [phone]

## 2023-04-14 NOTE — LETTER
April 14, 2023      KARINA SAUNDERS  [address]  [place] [address]      Dear Karina:    At [facility], we re dedicated to improving your health and wellness. Enclosed is the Care Plan developed with you on 3/27/2023. Please review the Care Plan carefully.    As a reminder, during your visit we talked about:  Ways to manage your physical and mental health  Using health care to maintain and improve your health   Your preventive care needs     Remember to contact your care coordinator if you:  Are hospitalized, or plan to be hospitalized   Have a fall    Have a change in your physical or mental health  Need help finding support or services    If you have questions, or don t agree with your Care Plan, call me at [phone]. You can also call me if your needs change. TTY users, call the [place] Relay at (406) or 1-592.243.9451 (ksosjm-hz-kzwuls relay service).    Sincerely,    Lizzie Wilson RN  [phone]  [email or web address] ([facility]) is a health plan that contracts with both Medicare and the [place] Medical Assistance (Medicaid) program to provide benefits of both programs to enrollees. Enrollment in [facility] depends on contract renewal.    [id number] accepted    [id number] (07/2022)

## 2023-04-14 NOTE — PROGRESS NOTES
[facility] Care Coordination Contact    See encounter dated 4/13/23 for updated info regarding DME.  Closing encounter.  Lizzie Wilson RN  [facility]   [phone]

## 2023-04-20 NOTE — PROGRESS NOTES
[facility] Care Coordination Contact    Per [facility]: Member qualifies for 14 units per day of PCA services    5/1/2023 - 9/30/23 2142 units     10/1/2023 - 3/31/24 2562 units      Plan of care updated.  Lizzie Wilson RN  [facility]   [phone]

## 2023-05-03 NOTE — TELEPHONE ENCOUNTER
Completed and in TC basket.  
Faxed signed/completed form, med list, diag list, and office visit note from 6/6/18 to [facility] [place], [phone], right fax confirmed at 4:12 pm today. Copy of form to TC and abstracting.  Deyanira Cheng MA/  For Teams Spirit and Yasmeen    
Received Adult  Physician health review form via fax. Printed and attached Medication list, Diagnosis list and last office visit notes from 6/6/18. Placed in provider's basket.  Deyanira Cheng MA/  For Teams Alicia    
right

## 2023-05-09 ENCOUNTER — PATIENT OUTREACH (OUTPATIENT)
Dept: GERIATRIC MEDICINE | Facility: CLINIC | Age: 83
End: 2023-05-09
Payer: COMMERCIAL

## 2023-05-09 NOTE — PROGRESS NOTES
[facility] Care Coordination Contact  Received text message from Kusum, member's daughter-in-law, that [facility] does not have an auth for homemeking.    Called [facility] & spoke with Renetta, who stated they have the PCA auth, but not the new homemaking one (effective 4/1/23).  I informed Renetta that the auths were submitted to [facility] at the same time and advised they should contact [facility] Provider Line. Renetta voiced understanding and agreed with the plan.    Tasked Florecita CMS, to please follow up on this with [facility] & [facility].    Informed Kusum that this is being worked on and that it should not affect the member's services. She voiced understanding and agreed with the plan.    Lizzie Wilson RN  [facility]   [phone]

## 2023-05-11 NOTE — PROGRESS NOTES
"Per APA, CC notified.  Bath seat and heating were also delivered on 5/1/23.  Waiting on Cane \"auth needed\"  Bethany Chirinos 1940 Cane, bath seat, heating pad 4/14/2023 raudel STARR  AUTH PENDING    Bethany Chirinos 1940 walker bag & walker 4/14/2023 Lizzie STARR  DELIVERED 5/1/2023     Florecita Mancera  Case Management Specialist  [facility]  [phone]    "

## 2023-05-17 ENCOUNTER — PATIENT OUTREACH (OUTPATIENT)
Dept: GERIATRIC MEDICINE | Facility: CLINIC | Age: 83
End: 2023-05-17
Payer: COMMERCIAL

## 2023-05-17 NOTE — PROGRESS NOTES
[facility] Care Coordination Contact    Email from CMS that the following DME has been delivered:    Bethany Chirinos 1940 Cane, bath seat, heating pad 4/14/2023 [facility] DELIVERED 5/1/2023   Bethany Chirinos 1940 walker bag & walker 4/14/2023 [facility] DELIVERED 5/1/2023     Lizzie Wilson RN  [facility]   [phone]

## 2023-05-17 NOTE — PROGRESS NOTES
Per APA, CC notified.  Btehany Chirinos 1940 Cane, bath seat, heating pad 4/14/2023 raudel STARR [facility] DELIVERED 5/1/2023   Bethany Chirinos 1940 walker bag & walker 4/14/2023 Lizzie STARR [facility] DELIVERED 5/1/2023       Florecita Mancera  Case Management Specialist  [facility]  [phone]

## 2023-06-13 ENCOUNTER — PATIENT OUTREACH (OUTPATIENT)
Dept: CARE COORDINATION | Facility: CLINIC | Age: 83
End: 2023-06-13
Payer: COMMERCIAL

## 2023-06-27 ENCOUNTER — PATIENT OUTREACH (OUTPATIENT)
Dept: CARE COORDINATION | Facility: CLINIC | Age: 83
End: 2023-06-27
Payer: COMMERCIAL

## 2023-07-06 ENCOUNTER — PATIENT OUTREACH (OUTPATIENT)
Dept: GERIATRIC MEDICINE | Facility: CLINIC | Age: 83
End: 2023-07-06
Payer: COMMERCIAL

## 2023-07-06 NOTE — PROGRESS NOTES
[facility] Care Coordination Contact    Received message from Kusum, member's daughter-in-law, stating they never received the Hurry Cane that was ordered in April, with other DME items.  Yasmine CMS, also received a voicemail from [facility] that they never got an auth for the cane.    Asked Florecita CMS, to submit auth for the cane and updated Kusum.  Lizzie Wilson RN  [facility]   [phone]

## 2023-07-12 ENCOUNTER — TELEPHONE (OUTPATIENT)
Dept: FAMILY MEDICINE | Facility: CLINIC | Age: 83
End: 2023-07-12
Payer: COMMERCIAL

## 2023-07-12 NOTE — TELEPHONE ENCOUNTER
Patient Quality Outreach    Patient is due for the following:   Physical Annual Wellness Visit      Topic Date Due     COVID-19 Vaccine (5 - Moderna series) 09/07/2022       Next Steps:   Schedule a Annual Wellness Visit    Type of outreach:    Sent letter.      Questions for provider review:    None           Jocelyn Bartholomew MA

## 2023-07-12 NOTE — LETTER
July 12, 2023    Bethany Logan  [address]  [place] [address]    Dear Bethany,    At [facility] we care about your health and are committed to providing quality patient care.     Here is a list of Health Maintenance topics that are due now or due soon:  Health Maintenance Due   Topic Date Due    COVID-19 Vaccine (5 - Moderna series) 09/07/2022    MEDICARE ANNUAL WELLNESS VISIT  07/13/2023    ANNUAL REVIEW OF HM ORDERS  07/13/2023        We are recommending that you:  Schedule a WELLNESS (Preventative/Physical) APPOINTMENT with your primary care provider. If you go elsewhere for your wellness appointments then please disregard this reminder        Schedule a Nurse-Only appointment to update your immunizations: Your records indicate that you are not up to date with your immunizations, please schedule a nurse-only appointment to get these updated or update them at your next office visit. If this is incorrect, please disregard.    To schedule an appointment or discuss this further, you may contact us by phone at the [facility] at [phone] or online through the patient portal/[facility] [email or web address] @ [email or web address]    Thank you for trusting [facility] and we appreciate the opportunity to serve you.  We look forward to supporting your healthcare needs in the future.    Your partners in health,      Quality Committee at [facility]

## 2023-07-27 DIAGNOSIS — E55.9 HYPOVITAMINOSIS D: ICD-10-CM

## 2023-07-27 DIAGNOSIS — I10 HYPERTENSION GOAL BP (BLOOD PRESSURE) < 140/90: ICD-10-CM

## 2023-07-27 DIAGNOSIS — M19.049 HAND ARTHRITIS: ICD-10-CM

## 2023-07-27 RX ORDER — LOSARTAN POTASSIUM 50 MG/1
TABLET ORAL
Qty: 90 TABLET | Refills: 1 | Status: SHIPPED | OUTPATIENT
Start: 2023-07-27 | End: 2024-08-05

## 2023-07-27 RX ORDER — ACETAMINOPHEN 325 MG/1
TABLET ORAL
Qty: 100 TABLET | Refills: 1 | Status: SHIPPED | OUTPATIENT
Start: 2023-07-27

## 2023-07-27 NOTE — TELEPHONE ENCOUNTER
Pending Prescriptions:                       Disp   Refills    calcium carbonate-vitamin D (OSCAL) 500-5 *180 ta*0        Sig: Take 1 tablet by mouth 2 times daily *[place] 1 tableta           por la boca 2 veces cada trevor    losartan (COZAAR) 50 MG tablet [Pharmacy M*90 tab*3        Sig: Take 1 tablet (50 mg) by mouth daily. * [place] 1           tableta (50 mg) por la boca cada trevor.        Routing refill request to provider for review/approval because:  Drug not active on patient's medication list  Patient is due for visit.

## 2023-08-01 ENCOUNTER — PATIENT OUTREACH (OUTPATIENT)
Dept: GERIATRIC MEDICINE | Facility: CLINIC | Age: 83
End: 2023-08-01
Payer: COMMERCIAL

## 2023-08-01 NOTE — PROGRESS NOTES
"[facility] Care Coordination Contact      [facility] Six-Month Telephone Assessment    6 month telephone assessment completed on 8/1/23.    ER visits: No  Hospitalizations: No  TCU stays: No  Significant health status changes: No  Falls/Injuries: No  ADL/IADL changes: No  Changes in services: No    Caregiver Assessment follow up:  NA    Goals: See POC in chart for goal progress documentation.      Will see member in 6 months for an annual health risk assessment.   Encouraged member to call CC with any questions or concerns in the meantime.     Member's daughter-in-law, Kusum, reported the member's walker has a \"broken\" tire. She sent me a photo and it looks flat. I emailed [facility] and asked them to schedule maintenance ASAP. Informed Kusum of this and advised the repair dept will contact her regarding scheduling and she voiced understanding.  Lizzie Wilson RN  [facility]   [phone]    "

## 2023-08-01 NOTE — LETTER
July 25, 2022      KARINA SAUNDERS  [address]  [place] [address]      Dear Karina:    At [facility], we are dedicated to improving your health and well-being. Enclosed is the Comprehensive Care Plan that we developed with you on 8/1/22. Please review the Care Plan carefully.    As a reminder, some of the things we discussed at your visit include:    Your physical and mental health    Ways to reduce falls    Health care needs you may have    Don t forget to contact your care coordinator if you:    Have been hospitalized or plan to be hospitalized     Have had a fall     Have experienced a change in physical health    Are experiencing emotional problems     If you do not agree with your Care Plan, have questions about it, or have experienced a change in your needs, please call me at [phone]. If you are hearing impaired, please call the [place] Relay at 141 or [phone] (yssvzo-cr-aqrmjn relay service).    Sincerely,    Lizzie Wilson RN  [phone]  @[email or web address] ([facility]) is a health plan that contracts with both Medicare and the [place] Medical Assistance (Medicaid) program to provide benefits of both programs to enrollees. Enrollment in [facility] depends on contract renewal.    [id number])     [id number] (11/18)                         none

## 2023-08-02 NOTE — PROGRESS NOTES
Per SCOOTER, CC notified.  Bethany Chirinos 1940 jose 7/6/2023 Lizzie STARR [facility]  DELIVERED 7/27/2023   Florecita Mancera  Case Management Specialist  [facility]  [phone]

## 2023-08-24 NOTE — PROGRESS NOTES
[facility] Care Coordination Contact    Spoke with Kingston, from [facility] ([phone]), who said he has had trouble communicating with member's family regarding repairing her walker.  Per Kingston, Kusum, daughter-in-law and PCA, hung up on him and member's son, Sudhakar, texted and spoke with him, but never scheduled a time for the repair.    Kingston said he sees other clients in the member's apartment building as well and has tried knocking on her door when he was there, but did not get an answer.    Writer texted Kusum about the above. I know that Bethany was going to visit family in [place] - leaving today, so I asked if anyone would be in [place] to get the walker fixed or if it needs to wait until member returns from her trip. She said the member borrowed another family member's walker for her trip and her son, Sudhakar, will arrange a time for the repair with Kingston.  I informed Kingston about the member's trip as well and that I would follow up with the family about scheduling repair - possibly in a few weeks. He said it is fine to call or text him at the number above.  Lizzie Wilson RN  [facility]   [phone]

## 2023-10-05 ENCOUNTER — PATIENT OUTREACH (OUTPATIENT)
Dept: GERIATRIC MEDICINE | Facility: CLINIC | Age: 83
End: 2023-10-05
Payer: COMMERCIAL

## 2023-11-03 ENCOUNTER — PATIENT OUTREACH (OUTPATIENT)
Dept: GERIATRIC MEDICINE | Facility: CLINIC | Age: 83
End: 2023-11-03
Payer: COMMERCIAL

## 2023-11-03 NOTE — Clinical Note
Dr. Juan Argueta,  RORY Sims's daughter-in-law informed me that she broke her femur while in [place] last month. She had surgery and the wound became infected, so she had a second surgery. She is unable to travel back to [place] at this time and family hopes she can return in Dec.  Unfortunately since she has been out of [place] over 30 days, her MA will be terminated, but can restart when she gets back. I am still her care coordinator for at least the next 90 days and longer if she does come back to [place].  Thanks, Lizzie Wilson RN [facility]  [phone]

## 2023-11-03 NOTE — PROGRESS NOTES
[facility] Care Coordination Contact    Received text message from Kusum, daughter-in-law, stating the member fell in [place] and fractured her femur. She has had two surgeries and an infection in the wound and is not able to travel back to [place] at this time. Called member' son, Sudhakar, per Kusum, and asked him if he knows when she might come back. He thinks she is going to be cleared to travel in December.      I advised Sudhakar that they need to inform [facility] when member is out of the [place] over [place] days. He voiced understanding and asked if this care coordinator could notify them.  Writer started a 5181 to send to [facility], but need to discuss plan with my supervisor next week.    Asked family to let me know when member returns.    Lizzie Wilson RN  [facility]   [phone]    Tell [facility] that she is out of the country

## 2023-11-09 DIAGNOSIS — H35.30 AGE-RELATED MACULAR DEGENERATION: Primary | ICD-10-CM

## 2023-11-09 NOTE — PROGRESS NOTES
[facility] Care Coordination Contact    Kusum texted a photo of a letter from [facility] advising them to return their MA renewal packet. They have a bank statement with proof of sliding S.S.I. and I advised them to call [facility] customer service at [phone] - instead of the Keep Your Coverage number that is on the letter. They need a  and that prompt is available on the customer service number.    I informed Sudhakar, member's son,that the member's MA won't be renewed, as she is out of [place] over 30 days. He said she is coming back in December and I told him she can get MA back once she returns. Advised them to contact me when they know return date.    Sudhakar texted that he understands and agrees with the plan.   Lizzie Wilson RN  [facility]   [phone]

## 2023-11-09 NOTE — PROGRESS NOTES
[facility] Care Coordination Contact    Right Faxed [facility] the 5181 form (confirmed 12:25 on 11/9) with notification that member's Elderly Waiver was closed effective 9/25/23, due to medical emergency in [place] and member is not able to travel back to [place] yet.  Lizzie Wilson RN  [facility]   [phone]

## 2023-11-09 NOTE — PROGRESS NOTES
[facility] Care Coordination Contact    Sent DTR email to Brandie MASCORRO, utilization , for EW, PCA, homemaking, PERS and ADC.   Lizzie Wilson RN  [facility]   [phone]

## 2023-11-09 NOTE — PROGRESS NOTES
[facility] Care Coordination Contact    Called [facility] and spoke with Curtis. Informed him that the member will not be coming back until at least Dec and that I will need to assess her to reauthorize services when she does return.  Curtis voiced understanding.    Called [facility] and informed Kasandra that the member left 9/24/23 and will need to be re-assessed before resuming services. She will make note so no billing can be done for PCA or homemaking after 9/24/23.    Emailed Dorita at [facility] to cancel services.    Spoke with Madison at [facility] to cancel  delivery of incontinence supplies: Pull ups, 3 packs of chux (10/pack) and Ensure. Madison asked that I call back when member returns and insurance is active again, then they can restart.    Lizzie Wilson RN  [facility]   [phone]

## 2023-11-09 NOTE — PROGRESS NOTES
[facility] Care Coordination Contact    Closed EW in MMIS 11/9/23.  Lizzie Wilson RN  [facility]   [phone]

## 2023-11-15 ENCOUNTER — PATIENT OUTREACH (OUTPATIENT)
Dept: GERIATRIC MEDICINE | Facility: CLINIC | Age: 83
End: 2023-11-15
Payer: COMMERCIAL

## 2023-11-15 NOTE — PROGRESS NOTES
[facility] Care Coordination Contact    Received text message from Kusum that they are going to bring member back to [place] from [place] on Mon, 11/20/23.    I called [facility] and spoke with financial worker, Marla, who said MA is active and was automatically renewed for Nov. She said they did receive the 5181 from me about member being out of the country, but she made a note and MA will stay active, since member is coming back next week. She said to send a new 5181 once member is back in [place] and I can do reassessment and re-open EW when she is back.    I informed Kusum that I need to assess member ASAP to get services going again. She voiced understanding and said they will let me know when they are available once she is home.  Lizzie Wilson RN  [facility]   [phone]

## 2023-11-15 NOTE — PROGRESS NOTES
[facility] Care Coordination Contact    Received a request to submit a DTR for the terminated of [employer] Homem[employer] EW transportation and EW. Documentation completed and faxed to the health plan. Care Coordinator aware.    Brandie Gordon RN  Utilization   [facility]  [phone]

## 2023-11-17 NOTE — PROGRESS NOTES
[facility] Care Coordination Contact    Kusum, daughter-in-law, texted that the member is returning to [place] on Mon, 11/20/23. She agreed to call this care coordinator to schedule an assessment as soon as they are ready - so we can re-open EW and get services back in place for Bethany.    Informed Brandie VARGAS of the above and asked her to cancel the DTRs she sent to [facility] yesterday. She said she emailed [facility] today and we are awaiting their response.    Also spoke with Marla at [facility] and she said the member's MA auto renewed, effective Nov, and since she is aware Bethany is returning to [place] this month, she said her MA will remain open. She said we just need to be sure to notify [facility] when she gets here so they can keep her active.  Lizzie Wilson RN  [facility]   [phone]

## 2023-11-21 NOTE — PROGRESS NOTES
[facility] Care Coordination Contact    Received texts from [facility], confirming DTRs for EW, ADC & ADC transportation, Homemaking and PERS.    Will need to assess member to reopen her to waiver and services. Kusum said she would call me when they are back in [place] and ready.  Lizzie Wilson RN  [facility]   [phone]

## 2023-11-21 NOTE — PROGRESS NOTES
[facility] Care Coordination Contact    Received faxes from [facility], confirming DTRs for EW, ADC, ADC transportation, Homemaking and PERS.  Lizzie Wilson RN  [facility]   [phone]

## 2023-11-30 ENCOUNTER — PATIENT OUTREACH (OUTPATIENT)
Dept: GERIATRIC MEDICINE | Facility: CLINIC | Age: 83
End: 2023-11-30
Payer: COMMERCIAL

## 2023-11-30 ENCOUNTER — PATIENT OUTREACH (OUTPATIENT)
Dept: GERIATRIC MEDICINE | Facility: CLINIC | Age: 83
End: 2023-11-30

## 2023-11-30 NOTE — PROGRESS NOTES
[facility] Care Coordination Contact      TRANSITIONS OF CARE (BRIAN) LOG    BRIAN tasks should be completed by the CC within one (1) business day of notification of each transition. Follow up contact with member is required after return to their usual care setting.  Note:  If CC finds out about the transitions fifteen (15) days or more after the member has returned to their usual care setting, no BRIAN log is needed. However, the CC should check in with the member to discuss the transition process, any changes needed to the care plan and document it in a case note.     Member Name:  Bethany Logan O Name:  Cl[place] O/Health Plan Member ID#: [id number]   Product: [facility] Care Coordinator Contact:   Lizzie Wilson RN Agency/County/Care System: [facility]   Transition Communication Actions from Care Management Contact   Transition #1   Notification Date: 11/30/23 Transition Date:   11/20/23 Transition From: Home     Is this the member s usual care setting?               yes Transition To: Hospital, [facility]   Transition Type:  Unplanned  Received notification of admission TCU for post hospital stay.  OBRA 1 right faxed to intermediate admissions office.   Documentation from conversation with the member/responsible party, provider, discharging and receiving facility:   Date: 11/30/23: Received notification of admission to hospital with dx of infection of right knee, infection due to ESBL-producing E coli, fever, failure to thrive in adult.  CC contacted Hospital /discharge planner:  NA - member had already discharged when care coordinator received notification of admission.  CC reached out to family Kusum, daughter-in-law,  regarding transition and left a message requesting a return call.  Reviewed and updated care plan as needed.  Notified community service providers and placed services on hold as needed - NA - EW was closed and services DTRed in the past 2 weeks, due  to [place].  Hospital notes stated the family is not able to take care of the patient in her current condition, so she needs to go to rehab/TCU.  Transition log initiated.   PCP, Ernestina Alvarado, notified of hospitalization via EMR.  Lizzie Wilson RN  [facility]   [phone]     Transition #2   Notification Date: 11/30/23 Transition Date:   11/28/23 Transition From: [facility]     Is this the member s usual care setting?               no Transition To: Rehabiliation Facility, [facility] - followed by [facility] on-site team   Transition Type:  Planned    Documentation from conversation with the member/responsible party, provider, discharging and receiving facility:   Date: 11/30/23: Received notification of admission TCU for  post hospital stay.  OBRA 1 right faxed to residential admissions office.   CC [place] [facility] (P) [phone], (F) [phone], Nursing office [phone]  Spoke with Linden BARRY, who transferred me to Susy Benjamin, Director of Soc Services & LTC.  left a message with this CC contact information, reviewed community POC as well requested to be notified of concerns, care conferences and discharge planning. Also told them I need to verify if the member is in the TCU or LTC.  CC reached out to family Kusum  regarding transition and offered support as needed.  Advised Kusum that I need to know when/if the member will be discharging, as I will need to do her assessment to re-open EW and put services back in place. She voiced understanding and agreed with the plan.  Transition log updated.   PCP, Ernestina Alvarado, notified of transition to TCU via EMR.  Lizzie Wilson RN  [facility]   [phone]  12/8/23: spoke with Heriberto in TCU who confirmed that the member is in rehab at their facility.  He transferred me to ADAM Nevarez who is  covering TCU. Left voicemail for her with my contact info and asked again that I am included with any care conferences, discharge plans, etc. Also asked if they have an estimated date of discharge, so I can plan for assessing the member before she returns home.  Lizzie Wilson RN  [facility]   [phone]       Transition #3   Notification Date: 12/22/23 Transition Date:   12/18/23 Transition From: Rehabiliation Facility, [facility]     Is this the member s usual care setting?               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   CC contacted Hospital /discharge planner, NA  - member was discharged back to TCU on 12/21/23 and writer was not notified of admission until 12/22/23.  CC reached out to family Kusum, daughter-in-law  regarding transition and offered support as needed.  Reviewed and update care plan as needed.  Notified community service providers and placed services None on hold as needed. EW is closed and no services are in place, due to member being out of the country for over 30 days and then being in a facility since she returned to [place] on 11/20/23. Transition log initiated.   PCP, Ernestina Alvarado, notified of hospitalization via EMR.  Lizzie Wilson RN  [facility]   [phone]     Transition #4   Notification Date: 12/22/23 Transition Date:   12/21/23 Transition From: [facility]      Is this the member s usual care setting?               no Transition To: Rehabiliation Facility, [facility]   Transition Type:  Planned    Documentation from conversation with the member/responsible party, provider,  "discharging and receiving facility:   Date: 12/22/23: Received notification of admission TCU for post hospital stay.  OBRA 1 right faxed to [facility] admissions office. NA - already faxed on 11/30 when notified of admission to TCU.    CC contacted Nursing Home LSW (Susy ADAM, [phone] & [phone]) and left a message with this CC contact information, reviewed community POC as well requested to be notified of concerns, care conferences and discharge planning.  CC reached out to family Kusum, daughter-in-law and caregiver  regarding transition and offered support as needed.    Transition log updated.   PCP, Ernestina Alvarado, notified of transition to TCU via EMR.  MEAGHAN [facility] [phone]  12/26/23: Left message for Kusum, daughter-in-law, asking how member is doing and if there are any plans for discharge. Also left another message for Susy,  asking for return call to discuss the member and asked if there are any plans for discharge. Need to know if they are discharging her home so writer can do assessment to re-open to EW, etc.  According to Nursing Home visit note dated 12/22/23: \"Continues on IV abx w/weekly labs per ID--plan at this time is for end date of 1/29/24 though may change per ID/clinical course. Plan: appreciate RN offering prn oxycodone when she can have it, otherwise continue current pain regimen. Ice to right knee QID+prn. WB/ROM/knee immobilizer per ortho. IV abx/weekly labs per ID. Therapies to eval/treat. F/U ortho and ID in 2 weeks as directed. SW for safe dispo planning.\"  She is being followed by an [facility] NP at the TCU.   MEAGHAN [facility] [phone]  12/27/23: ADAM Jain from [facility], called back and left voicemail that the member just returned to TCU after surgery and there are no discharge plans yet. She said she would keep me informed as things progress. MEAGHAN [facility] [phone]    1/10/24: Left voicemail for Susy, Director of Soc Services at [facility]. Asked for return call to discuss discharge plans and advised that I would like to be included in any care conferences. Also informed her I need to know before the member discharges because an assessment would need to be done before she went home.   Also spoke with the member and Kusum, daughter-in-law, who asked about some MA renewal paperwork, with a postmark from November 2023. Writer called [facility] today and member's MA is active, per the automated line. I also spoke with the county in December, when Bethany returned to [place], and they confirmed that her MA auto-renewed in November. Informed Kusum of this. Kusum said that Bethany is doing well, but they do not know when she will be able to go home. Asked that they keep me posted if there is any discussion of this, as I will need to assess her before she leaves. Lizzie Wilson RN [facility] [phone]    1/10/24: DARYL Jain, emailed writer that they plan to discharge the member home with family and home care on Thurs, 1/18/24. I responded to ask if there is a care conference before discharge and stated that I need to be there - or at least need to speak with OT, PT and RN as part of my assessment.  Also sent message to Kusum informing her of this. Susy said they spoke with Bethany's granddaughter who said they were able to care for her at home and would be ready to take her home on 1/18. Advised if this is the case, I will need to see the member on 1/17/24.   Per Kusum, the member has an Infectious Disease appt in [place] on 1/17 at 11:00 AM, so we agreed on an assessment at 2:30 PM that day.     Kusum and her daughter (Bethany's granddaughter) called writer and they asked about getting a new walker for Bethany. Reviewed chart and the member got a petite 4-wheel walker delivered on 5/1/23 (by [facility]). In August, ASHLEY told writer  "there was a problem with one of the front wheels. I spoke with SCOOTER and Sudhakar, member's son, was supposed to schedule the repair with [place] from [facility] while member was in [place]. This did not happen.  I advised family needs to speak with facility ADAM, PT, OT or RN about what equipment the member will need at home, as TCU providers will need to order DME before discharge.  Writer also emailed ADAM Jain, again and informed her of the issue with the walker and asked what they can do for member.  Writer also emailed [employer] today and asked if they could repair the walker within one week. Also asked if it would be faster if the family brought the walker to the store. Will await response from SCOOTER and Susy. Lizzie Wilson RN [facility] [phone]    1/12/24: received email from Jacqueline at [facility] that they put in request for walker repair. I asked again if this can be done in the next week and if it would be quicker if they brought the walker to [facility] instead of waiting for a home visit. Texted Kusum about this and informed her I made it clear to Susy that the member needs a walker before she goes home.   Writer read Glo Mason's, CNP, TCU visit note from today and she wrote: \"plan at this time is for end date of 1/29/2024\". She is seeing ID on 1/17 and they will see if there are any further orders from that doc. Waiting to hear back from ADAM Jain at [place], to confirm if their discharge date is 1/17 or 1/29.  If it is 1/29, writer will reschedule the assessment closer to that day. Lizzie Wilson RN [facility]     1/17/24: Change in condition assessment was done today at [facility]. See encounter dated 1/17/24 for assessment documentation. The plan, per Susy Allred, , is that the member is discharging home tomorrow, 1/18/24, with home care through [facility], with daily IV antibiotics and weekly lab draws that are to be followed by Dr. Chery, ID provider at [facility] & PICC line dressing changes.   This care coordinator expressed concern to the member, her daughter-in-law and ADAM Jain, because I have not found any home care agency that can provide daily visits for IV antibiotics.  I asked Susy to follow-up with the agency to confirm, as I don't want member being discharged without a safe plan in place. Lizzie Wilson RN [facility] [phone]  1/18/24: Called [facility] and spoke with Kathleen in Intake ([phone]) to ask if they can provide the services above (IV antibiotics). Per Kathleen they were not aware this was part of the order and they cannot accept this patient. Their agency has not managed patients on IV antibiotics in the home for over 3 years. Called ADAM Jain, around 12:00 to tell her this because the patient was supposed to discharge home at 1:00 today. I also sent an email to the nursing director, Christopher Zarate RN, and asked him to discuss with Susy. Writer is concerned about the patient going home and made this clear to Susy and Christopher.  1/23/24: Spoke with Angela from [facility] and she said they have orders to teach family how to administer IV antibiotics, but she has concerns because Sudhakar seemed surprised that they were going to be responsible for this. Angela said they would see if the family could get together on 1/25 to have a teaching session and then home infusion would determine if they could accept the member as a patient. Dr. Juan Argueta said she was willing to follow the patient, as long as Dr. Chery, ID provider, was following the IV antibiotics and lab results.   Lizzie Wilson RN [facility] [phone]  1/24/24: Received email from Susy Benjamin and voicemail from Angela at [facility] and they decided they do not have a safe discharge plan at this time. The member will stay at the TCU, where they will administer her IV antibiotics, etc and when  "she is closer to finishing the antibiotic course, they will re-evaluate when she can return home. Writer agrees with this plan. Lizzie Wilson RN [facility] [phone]  2/1/24: Received text message from Kusum, daughter-in-law, and spoke with her and the member. They informed this care coordinator that the member tried to take a shower by herself, in the TCU, and she fell on 1/30/24. The member reported pain in her right leg, right side, and \"whole spine\". Per member and Kusum, she had a doctor appointment, which was previously scheduled, and they x-rayed her surgical leg. They were reportedly told there was no acute injury or concern after her fall. I asked if they had told a nurse at the TCU about her pain today and they had not.  Advised them to ask for a nurse to come evaluate the member and I also emailed the Director of Nursing and the Nurse Manager to inform them of her complaint and to make sure someone assesses Bethany promptly.  Received text message a bit later and Kusum said the nurse saw Bethany and she is getting oxycodone for the pain - every 6 hours. They put a pain patch (lidocaine?) on her leg to help get her through until 2:00 PM, when she could have her next dose of pain medication.   Writer also asked the nurses for an update about discharge plans. Would like to know if they think she is staying for her full course of antibiotics or if they are going to try to discharge her before 3/10/24. Writer is concerned that the member fell on Tuesday. She fell when taking a shower on her own in [place] a few months ago, which is why she is in the TCU.  Advised Bethany and Kusum that she needs to wait for help when she takes a shower. It is not safe for her to do alone. The member voiced understanding and agreed with the plan. Lizzie Wilson RN [facility] [phone]  2/8/24: Emailed Artemio, the [facility] manager and Director of Nursing at [place], to ask again if there is any " tentative discharge date or plan for Bethany.   Tasked Florecita CMS, to complete EOV for 1/17/24 assessment. Taksed Nanda CMS, to resend the PCA assessment to Tori at [facility] PCA Dept, as she had questions about the complex health need, which was based on Bethany having a PICC line and being on IV antibiotics. Removed that from PCA assessment, as she will likely be discharged after completing the course of abx. I can do a 45-day temp increase at discharge, if Bethany goes home with her PICC line, etc.  2/15/24: Emailed Artemio again, asking for an update on member's condition and whether there is a tentative discharge plan.  Lizzie Wilson RN [facility] [phone]  2/21/24: No response to my 3 previous emails to the nursing director and nurse manager at [facility]. I need an update on discharge plan. Emailed DARYL Jain, , to ask for update from her.  2/29/24: Received call from ADAM Jain, and a text from Kusum, daughter-in-law, that member needs to discharge home no later than Friday, 3/1/24, otherwise she will lose her apartment due to being away for 6 months.  Writer helped Susy make discharge plan - see patient outreach encounter dated 2/29/24 for details. Will complete transition when back in the office on Tues, 3/5/24. Lizzie Wilson RN  [facility]   [phone]       Transition #5   Notification Date: 3/6/24 Transition Date: 3/1/24 Transition From: Rehabiliation Facility, [facility]     Is this the member s usual care setting?               no Transition To: Home   Transition Type:  Planned    Documentation from conversation with the member/responsible party, provider, discharging and receiving facility:   Date: 3/6/24: Received notification of transition to home, via email from Susy Allred, Dir of  at [facility].  CC contacted member, family ADAM Dimas, Director of Soc Services at [facility], and Kusum, daughter-in-law   and left a message requesting a return call. Communicated with Susy, who said member went home on 3/1/24 with [facility]. Kusum texted writer and stated Bethany is doing very well and that she has been administering the IV antibiotic for her mother-in-law daily.  Member has a follow-up appointment with PCP in 7 days: No: Offered Assistance with setting up a follow up appointment (scheduled with Florecita Mcgraw CNP, on 3/15/24 via DANETTE Witt).    Member has had a change in condition: Yes - she had her annual LTCC assessment on 1/17/24 and she qualifies for 0.5 hours per day more of  PCA services. Of note, Case Mix stayed the same (E).  Home visit needed: No  Care plan reviewed and updated.  The following home based services PCA were resumed. Need to wait until EW is confirmed to be re-opened by [facility]. Called them today and they said they received the 3543 & 5181,  but not the 1503 from the TCU. They transferred writer to the long term care dept and they could not accept my call due to volume of calls x2. Will try again tomorrow.  New referrals placed: No  Transition log completed.   PCP, Ernestina Alvarado, notified of transition back to home via EMR.       *RETURN TO USUAL CARE SETTING: *Complete tasks below when the member is discharging TO their usual care setting within one (1) business day of notification..      For situations where the Care Coordinator is notified of the discharge prior to the date of discharge, the Care Coordinator must follow up with the member or designated representative to confirm that discharge actually occurred and discuss required BRIAN tasks as outlined in the BRIAN Instructions.  (This includes situations where it may be a  new  usual care setting for the member. (i.e., a community member who decides upon permanent nursing home placement following hospitalization and rehab).    Discuss with Member/Responsible Party:    Check  Yes  - if the member,  family member and/or SNF/facility staff manages the following:    If  No  provide explanation in the comments section.          Date completed: 3/6/24 Communicated with member or their designated representative about the following:  care transition process; about changes to the member s health status; plan of care updates; education about transitions and how to prevent unplanned transitions/readmissions    Four Pillars for Optimal Transition:    Check  Yes  - if the member, family member and/or SNF/facility staff manages the following:    If  No  provide explanation in the comments section.          []  Yes     [x]  No Does the member have a follow-up appointment scheduled with primary care or specialist? (Mental health hospitalizations--the appt. should be w/in 7 days). Asked DANETTE Witt, to schedule Hospital/TCU follow-up with PCP, or another provider at her clinic, as daughter-in-law Kusum asked for help. Appt scheduled for 3/15/24 (first available).    [x]  Yes     []  No     Has a medication review been completed with member? If no, refer to PCP, home care nurse, MTM, pharmacist - yes, by her home care nurse from [facility]. Kusum reported the TCU discharged Bethany with rivaroxaban. She asked if member should still be taking this medication. Reviewed medical record and ortho provider, Dr. Diaz at [facility], prescribed it for 6 weeks, starting 12/21/23 - through 2/2/24.   It is not on the member's discharge orders dated 2/29/24, but she brought it home from the TCU. Writer contacted Dr. Diaz's office and MEAGHAN Buchanan, said they did not make any changes and as far as they knew, Bethany had completed the Xarelto. She was able to speak with Dr. Diaz, who said Bethany should be done with the med. Also spoke with MEAGHAN Lester at . He said Bethany took rivaroxaban until 2/12/24. Informed Kusum of this and she understands.  [x]  Yes     []  No     Can the member manage their  medications or is there a system in place to manage medications (e.g. home care set-up)?         [x]  Yes     []  No     Can the member verbalize warning signs and symptoms to watch for and how to respond?  [x]  Yes     []  No     Does the member have a copy of and understand their discharge instructions?  If no, assist to obtain copy of discharge instructions, review discharge instructions, and assist to contact PCP to discuss questions about their recent hospitalization.  [x]  Yes     []  No     Does the member have adequate food, housing and transportation?  If no, add goal and discuss additional supports available to the member                                                                                                                                                                                 [x]  Yes     []  No     Is the member safe in their home?  If no, document needs and support provided                                                                                                                                                                          []  Yes     [x]  No     Are there any concerns of vulnerability, abuse, or neglect?  If yes, document concerns and actions taken by Care Coordinator as a mandated                                                                                                                                                                              [x]  Yes     []  No     Does the member use a Personal Health Care Record?  Check  Yes  if visit summary, discharge summary, and/or healthcare summary are being used as a PHR.                                                                                                                                                                                  []  Yes     [x]  No     Have you reviewed the discharge summary with the member? If  No  provide explanation in comments.   Requested a copy of discharge summary  "from [facility].  [x]  Yes     []  No     Have you updated the member s care plan/support plan? Add new diagnosis, medications, treatments, goals & interventions, as applicable. If No, provide explanation in comments.    Comments:  Spoke with Kusum, daughter-in-law, who said Bethany had another fall a few weeks ago. She had x-rays on 3/5/24 and per Kusum, she did not break anything. Son, Sudhakar, said the fall was onto her buttocks and still she has some pain in her back. Member's other son is taking her to a doctor appointment on Fri, 3/8, at [address] [place] (radiology) to follow up on this. She is scheduled for a CT scan.     Member had an RN visit from [facility] on Monday, 3/4/24 and they \"changed her dressing and the tube\".  Per Jessica, the Resource Nurse at [facility], a med rec was completed at member's first visit. Kusum asked if the member is supposed to continue all meds once she finishes IV ertapenem on 3/10/24. Writer left voicemail for MILTON Jain, asking her to send the discharge summary or at least the med list to this writer to confirm with member and family. Informed Kusum that I will check into this and let them know and she voiced understanding and agreed with the plan.  Notes from conversation with the member/responsible party, provider, discharging and receiving facility (as applicable): See above  Lizzie Wilson RN  [facility]   [phone]            "

## 2023-12-01 NOTE — PROGRESS NOTES
[facility] Care Coordination Contact    Notified, via hospital discharge report, that member returned to [place], went to the ED at [facility] on 11/20/23 and was admitted. She discharged to TCU on 11/28/23.     See Transition log started 11/30/23.    Faxed [phone] to [facility] ([facility]) to inform them the member is back in [place], as of 11/20/23.    Lizzie Wilson RN  [facility]   [phone]

## 2024-01-17 ENCOUNTER — PATIENT OUTREACH (OUTPATIENT)
Dept: GERIATRIC MEDICINE | Facility: CLINIC | Age: 84
End: 2024-01-17
Payer: COMMERCIAL

## 2024-01-17 PROBLEM — T84.7XXA: Status: ACTIVE | Noted: 2023-12-01

## 2024-01-17 PROBLEM — S72.451A: Status: ACTIVE | Noted: 2023-12-01

## 2024-01-17 NOTE — Clinical Note
Hello,  Just an FYI that I did Bethany's assessment to re-open Elderly Waiver to pay for services (PCA, homemaking, adult day care, etc), with the plan that she was to discharge home from TCU on 1/18/24.  I found out that day that she could not go home because SW did not have a safe discharge plan in place.    Bethany is on IV antibiotics through at least 3/10/24, so they will keep her in the TCU to continue treatment and therapies.  I will inform you when Bethany discharges and she will schedule a follow-up with you at that time.  Thanks, Lizzie Wilson, RN  care coordinator  [facility]  [phone]

## 2024-01-23 ENCOUNTER — PATIENT OUTREACH (OUTPATIENT)
Dept: GERIATRIC MEDICINE | Facility: CLINIC | Age: 84
End: 2024-01-23
Payer: COMMERCIAL

## 2024-01-23 NOTE — PROGRESS NOTES
[facility] Care Coordination Contact    Angela from [facility] called. She asked if Dr. Juan Argueta would be willing to sign home care plan of care and ongoing home care orders.  It is complicated because the patient is going to be on IV antibiotics for at least the next 6 weeks, which are prescribed and monitored by Martine Chery MD, at [facility].   Dr. Chery ordered the ertapenem and will be monitoring weekly lab results, while pt on the IV antibiotics, but they also need PCP or a community provider to sign orders for the general plan of care and other home care orders.    If you let me know I can inform Angela at [facility].    The patient is still in the TCU at [facility] and her discharge depends upon whether her family can be taught to administer the IV abx and whether they are able to administer the meds every day at the same time. Angela questions whether this can be done. Angela spoke with member's son, Sudhakar, today and thought he sounded surprised that the family would be responsible for administering the meds. She told him they need to get back to her with a time that all the people who would be doing the med administration could get together on Thursday, 1/25, for teaching with an RN from [facility]. Angela said they could meet at the TCU, [facility] or at [facility] and she left the ball in their court.  Lizzie Wilson RN  [facility]   [phone]

## 2024-01-26 NOTE — PROGRESS NOTES
[facility] Care Coordination Contact    The home infusion company declined to accept the patient because they did not feel it was a safe discharge plan. They were unsure if the family could manage the IV antibiotic administration, etc.  So, Bethany will be staying at the TCU ([facility]) for a while longer.     Her current end date for IV antibiotics is 3/10/24, per Martine Chery MD - infectious Disease at [facility]. This is her second 6-week course.  Routing to PCP for her information.  Lizzie Wilson RN  [facility]   [phone]

## 2024-01-26 NOTE — PROGRESS NOTES
[facility] Care Coordination Contact    [facility] Early Reassessment     Home visit for Change in Condition Health Risk Assessment with Bethany Logan completed on January 17, 2024    Reason for Early reassessment: Request for Elderly Waiver services Yes, if yes explain member had a fall and fractured her right femur a few months ago, while traveling in [place]. She had to stay in [place] longer than planned, so her EW was closed. 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 Member was hospitalized and had revision of TKA and has been in TCU 11/28, except for a few days when she had her TKA revision surgery.  The member was tentatively going to discharge home on 1/18/24, with the TCU SW's plan for home care and home infusion for IV antibiotics, but the agency did not feel it was a safe discharge plan. TCU said she will stay there a while longer and they hope to find a home infusion agency that will accept her when she is closer to finishing antibiotic course (which goes through 3/10/24).      Current living arrangement:: I live in a private home, I live alone and am currently in TCU at [facility].    Assessment completed with:: Patient, Family, Other (phone  and ADAM Dimas, ; at [facility])    Current Care Plan  Member currently receiving the following home care services: None. She is in TCU and home care will be ordered when she discharges home.    Member currently receiving the following community resources: Currently in TCU/Acute Rehab and has County Programs & a County Worker. When she discharges home from TCU she will have: PCA, Homemaking, Adult day care, DME and [facility])      Medication Review  Medication reconciliation completed in Epic: No and If no, please explain Member is going to stay in TCU for up to 6 weeks, so  will defer med rec until she discharges home.    Medication set-up & administration: when member discharges from TCU then: Family/informal caregiver sets up weekly. Meds currently managed by TCU providers and staff.  Self-administers medications, Family caregiver administers medications, Nursing Home staff administers medications, and when member is home, she self-administers with reminders from her family and PCA.  Medication Risk Assessment Medication (1 or more, place referral to MTM): N/A: No risk factors identified currently in TCU, so no concern at this time.   MTM Referral Placed: No: due to being in TCU and having meds managed there. Will reassess when she discharges.    Mental/Behavioral Health   Depression Screening: denies depression and deferred PHQ today.      Mental health DX:: F32.89 - depression    Falls Assessment:   Fallen 2 or more times in the past year?: No   Any fall with injury in the past year?: Yes (Member fell and had right femur fracture - in the fall of 2023)    ADL/IADL Dependencies:   Dependent ADLs:: Ambulation-walker, Bathing, Dressing, Grooming  Dependent IADLs:: Cleaning, Cooking, Laundry, Shopping, Meal Preparation, Medication Management, Money Management, Transportation    [facility]: Shared information regarding [employer]. Reviewed preventative health screening and health plan supplemental benefits/incentives. Reviewed medication disposal form.    PCA Assessment completed at visit: Yes Annual PCA assessment indicated 4.0 hours per day of PCA. This is an increase from the previous assessment.      Elderly Waiver Eligibility: Yes-will open to EW. Waiver will be re-opened effective the date she discharges from TCU, which is still TBD.    Care Plan & Recommendations: recommend the member be re-opened to EW and resume previous services she had before her fall and fracture a few months ago. She will need a bit more help than previously and  qualified for 1 hour more per day of PCA services. Her family understands that they need to provide extra supports for the member.    See [facility] for detailed assessment information.    Follow-Up Plan: Member informed of future contact, plan to f/u with member with a 6 month telephone assessment.  Contact information shared with member and family, encouraged member to call with any questions or concerns at any time.    [facility] care continuum providers: Please see Snapshot and Care Management Flowsheets for Specific details of care plan.    This CC note routed to PCP, Juan Argueta, Ernestina Boo.   Lizzie Wilson RN  [facility]   [phone]

## 2024-01-26 NOTE — PROGRESS NOTES
Care Management Enc was done at face to face annual assessment on 1/17/24. Could not enter during assessment. Late entry.

## 2024-01-30 ENCOUNTER — PATIENT OUTREACH (OUTPATIENT)
Dept: GERIATRIC MEDICINE | Facility: CLINIC | Age: 84
End: 2024-01-30
Payer: COMMERCIAL

## 2024-01-30 NOTE — PROGRESS NOTES
[facility] Care Coordination Contact    Called Senior Linkage Line to ask if a PAS was done when the member admitted to [facility] on 11/28/23.  Chiquis at [facility] said they did a PAS but it did not have member's last name, Emilee, due to her duplicate PMI number being used. Chiquis requested the PAS be removed and she sent info and message to [facility] Care [facility] today, so they can do the PAS entry in MMIS.    Writer will touch base with [facility] liaison in the next day or 2 and make sure they received it and can do the MMIS entry.  Lizzie Wilson RN  [facility]   [phone]

## 2024-02-01 NOTE — PROGRESS NOTES
"[facility] Care Coordination Contact    Spoke with member and daughter-in-law, Kusum, via .   The member had a fall in the shower on 1/30/24 and now she has pain in, \"My whole spine and right side. Also the leg I had surgery in\"   Per the member and Kusum, she had a doctor appointment on 1/30/24 (previously scheduled), after the fall. Kusum and the member report she had an x-ray of her leg only and was told there was no acute injury at that time.    The patient stated she was not feeling this pain yesterday, but she has not told anyone at the TCU her symptoms yet today.    Advised Kusum and Bethany to ask a nurse to come assess her and discuss her symptoms and they voiced understanding and agreed with the plan.    Also did conference call to [facility] to discuss bill the member received. Spoke with Lexy, a  who spoke Czech, and she explained that they will send the bill ($59) to [facility].  She advised member she can disregard the bill. Member voiced understanding and agreed with the plan.  Lizzie Wilson RN  [facility]   [phone]            "

## 2024-02-06 NOTE — PROGRESS NOTES
Discharge Summary    Patient ID:  Marly Rojas  [id number]  51 year old  1969    Admit date: 4/22/2021    Discharge date:   04/28/2021    Discharge Disposition: Home    Admitting Physician: Edward Denny MD     Discharge Physician: Robbie Vallecillo MD      Primary Diagnoses:   Acute on chronic heart failure with reduced ejection fraction diuresed well before discharge.    Secondary Diagnoses:  Past Medical History:   Diagnosis Date   • Cardiomyopathy (CMS/[facility]) 10/2015    LVEF 26%, global hypokinesis   • Diabetes mellitus (CMS/[facility]) 2007   • Dyslipidemia 2007   • Essential (primary) hypertension 2007   • Hammertoe    • Heart failure (CMS/[facility]) 2011   • Onychomycosis    • Sleep apnea     c-pap           Hospital Course By Problem List (see H&P for details of admission):    1.  Acute on chronic heart failure with reduced ejection fraction- 51-year-old female comes in the emergency room with 2-3 weeks history of progressively worsening exertional dyspnea and orthopnea as well as having persistent leg swelling.  Patient describes being diagnosed with cardiomyopathy several years ago.  The patient herself reports that she believes this was related to her drug use, she did receive to a implantable defibrillator in the year 2016 for diagnosis of nonischemic cardiomyopathy.  No other follow-up visit notes within the [facility] or any other health systems in [place] since 2016.  Last EF was 25%.  Status post AICD.  Left and right heart failure.  Lasix drip at 5 mg an hour with good urine output..   negative five to 6 L at the time of discharge.  Cardiology saw patient.  Ejection Fraction   Date Value Ref Range Status   04/23/2021 18 % Final   Normal coronaries on coronary angiogram in 2015. Nonischemic cardiomyopathy.  Close to euvolemic at the time of discharge.  Continue with Coreg, digoxin, Aldactone.  Also has a new LV apical thrombus.  Will go home on Xarelto.  2.  Essential hypertension-  [facility] Care Coordination Contact    Nicko entered MMIS with PAS information from November on 1/31/24. Then this care coordinator was able to enter my MMIS from her 1/17/24 assessment, with the help of Alyse Bravo, my supervisor.    When Bethany's discharge date from TCU is determined, if it is within 60 days of assessment, this care coordinator will fax [phone] to [facility] - to reopen Bethany to .  Of note, 60 days will be 3/17/24.      Lizzie Wilson RN  [facility]   [phone]           continue with Coreg b.i.d. at this time.  Also has hyperlipidemia and will continue statin.          Consults   IP Consult Orders (From admission, onward)     Start     Ordered    04/26/21 1231  Inpatient consult to Electrophysiology  ONE TIME     Provider:  Katia Yadav MD    04/26/21 1231    04/24/21 0652  Inpatient consult to Endocrinology  ONE TIME     Provider:  Mehrdad Rivas MD    04/24/21 0651    04/23/21 0712  Inpatient consult to Cardiology  ONE TIME     Provider:  Zaki Garrison MD    04/23/21 0711                Procedures performed XR Chest AP or PA    Result Date: 4/22/2021  XR CHEST AP OR PA HISTORY:  sob COMPARISON: 4/16/2021 TECHNIQUE:  Single, AP 75 degree semiupright view of the chest. FINDINGS: Left chest AICD. The cardiac mediastinal silhouette is moderately enlarged, similar to prior exam. The pulmonary vasculature is mildly prominent, similar to prior exam. No overt pulmonary edema Mild right basilar strandy and hazy opacification, similar to prior exam and likely atelectasis. No focal consolidation. No pneumothorax. No pleural effusions.     IMPRESSION: Moderate cardiomegaly and mild pulmonary vascular congestion, unchanged from prior exam. I, Attending Radiologist Fritz Arcos MD, have reviewed the images and report and concur with these findings interpreted by Resident Radiologist, Parish Avelar MD.       Discharge Exam    Blood pressure 115/74, pulse 83, temperature 98.1 °F (36.7 °C), temperature source Oral, resp. rate 18, height 5' 3\" (1.6 m), weight 105.6 kg, last menstrual period 10/30/2020, SpO2 97 %.    General: A&O x3, in NAD   Lungs: Clear to auscultation bilaterally  Heart: S1 and S2 heard with regular rate and rhythm    Activity: As tolerated  Diet: Cardiac    Code Status: Prior    Goals of Care/Advance Directive:  Goals of Care:    Patient is decisional: Yes  Patient has POA documents in the chart: Yes  Code Status: Full Code        Follow-up with:    Thanh  MD Duran  [address]    [facility] [address]  [phone]    Schedule an appointment as soon as possible for a visit  For sleep apnea/CPAP    Marleen Alejo MD  [address]  [facility] [address]  [phone]    In 2 weeks  you may seen any available provider at this location for your diabetes. Bring your glucose meter with you.     Zaki Garrison MD  [address] [facility] [address]  [phone]    On 5/7/2021  1:30 PM       Time dedicated to medication reconciliation, education and other facets of discharge planning : 40 minutes     Signed:    Robbie Vallecillo MD, MPH    4:49 PM    LACE(10 or more):High            4 LACE Length of Stay    3 LACE Acute Admission    3 LACE Charlson Comorbidity Index Evalution    1 LACE Visits to ED Previous 6 Months

## 2024-02-08 ENCOUNTER — PATIENT OUTREACH (OUTPATIENT)
Dept: GERIATRIC MEDICINE | Facility: CLINIC | Age: 84
End: 2024-02-08
Payer: COMMERCIAL

## 2024-02-08 NOTE — PROGRESS NOTES
[facility] Care Coordination Contact    This care coordinator tasked RAPHAEL Vee, the member's PCA assessment to process on 1/30/24. Nanda sent it to [facility] on 1/31/24.  Nanda received an email from Tori at [facility] on 2/2/24, asking for the assessment to be reviewed and updated because the Home Care Rating did not correlate with the PCA units, due to the member having a PICC line and being on IV antibiotics at the time of the assessment.     The Director of Soc Services at [facility], DARYL Dimas, informed this writer that the member would discharge home with home care and home infusion on 1/18/24. Due to this plan, writer did LTCC & PCA assessments on 1/17, in order to re-open EW and get PCA and other services restarted for the member. Susy said the home care agency would be administering and managing the member's IV antibiotics, which this care coordinator questioned. Writer called [facility] who said they would not be able to provide the services Bethany needed. Because of this, the plan changed and Bethany stayed at [facility].     Bethany's TCU discharge date is not known at this time. When she is ready to discharge home, Bethany may be finished with the antibiotic course and have her PICC line removed, which would not match her needs on the PCA assessment. So, per the direction from Tori at [facility], this writer addended the PCA assessment to remove the complex health need (IV antibiotics & PICC dressing changes).  If  ends up going home in a couple more weeks and still needs the PICC line and antibiotics, Tori said we could do a 45-day temp increase at that time.    Tasked RAPHAEL Vee, to re-send the updated PCA assessment to [facility].  Finished the rest of the documentation from the 1/17/24 assessment and tasked CMS the end of visit today.     Of note, once I know the discharge date, this writer will fax a [phone] to [facility], to re-open EW and restart her services.  I emailed  the nurse manager and director of nursing on 2/1 & again today, asking for any plans they currently have related to Bethany. Will await their response.    Lizzie Wilson RN  [facility]   [phone]

## 2024-02-12 ENCOUNTER — PATIENT OUTREACH (OUTPATIENT)
Dept: GERIATRIC MEDICINE | Facility: CLINIC | Age: 84
End: 2024-02-12
Payer: COMMERCIAL

## 2024-02-12 NOTE — LETTER
February 12, 2024      KARINA SAUNDERS  [address]  [place] [address]      Dear Karina:    At [facility], we re dedicated to improving your health and wellness. Enclosed is the Care Plan developed with you on 1/17/2024. Please review the Care Plan carefully.    As a reminder, during your visit we talked about:  Ways to manage your physical and mental health  Using health care to maintain and improve your health   Your preventive care needs     Remember to contact your care coordinator if you:  Are hospitalized, or plan to be hospitalized   Have a fall    Have a change in your physical or mental health  Need help finding support or services    If you have questions, or don t agree with your Care Plan, call me at [phone]. You can also call me if your needs change. TTY users, call the [place] [employer] at (250) or 1-402.365.8204 (dxzppw-bi-rbagmo relay service).    Sincerely,    Lizzie Wilson RN  [phone]  @[email or web address]. ([facility]) is a health plan that contracts with both Medicare and the [place] Medical Assistance (Medicaid) program to provide benefits of both programs to enrollees. Enrollment in [facility] depends on contract renewal.    [id number] accepted    [id number] (07/2022)

## 2024-02-29 ENCOUNTER — HOME INFUSION (PRE-WILLOW HOME INFUSION) (OUTPATIENT)
Dept: PHARMACY | Facility: CLINIC | Age: 84
End: 2024-02-29
Payer: COMMERCIAL

## 2024-02-29 ENCOUNTER — PATIENT OUTREACH (OUTPATIENT)
Dept: GERIATRIC MEDICINE | Facility: CLINIC | Age: 84
End: 2024-02-29
Payer: COMMERCIAL

## 2024-02-29 NOTE — PROGRESS NOTES
[facility] Care Coordination Contact    Faxed [phone] (confirmed) to [facility] to re-open EW, effective 3/1/24.  Care coordinator will be out of the office on 3/1 through 3/4/24, returning 3/5/24. Will complete transition at that time.  Lizzie Wilson RN  [facility]   [phone]

## 2024-02-29 NOTE — PROGRESS NOTES
Therapy: IV ABX  Insurance: [facility]     The patient has coverage for IV ABX. The patient should be covered at 100%.  However, a prescription copay may apply.    In reference to referral from Annita Allred at [facility].  Please contact Intake with any questions, [phone] or In Basket pool, [facility] ([phone]).

## 2024-02-29 NOTE — PROGRESS NOTES
"[facility] Care Coordination Contact    Received message today from Kusum, member's daughter-in-law. She texted that Bethany needs to be home to her apartment tomorrow, otherwise she will have to pay $1,400 or move out.    Received voicemail from DARYL Dimas from [facility] (3:441 PM on 2/28) stating the same thing as the above and asking if this writer had any ideas of how to help Bethany go home and still get her IV antibiotic infusions every day.  Suggested an infusion center and the family would need to commit to bringing her there through 3/10, when the course is supposed to be complete.     Writer called [facility] and spoke with Lesa Bernal ([phone]). She said they could accept the patient and start services tomorrow if they receive STAT orders from the nursing facility. Lesa Bernal said the facility needs to send med orders and write, \"STAT\" & \"Patient ready to discharge from TCU\" on them. They also need progress notes explaining why  is on the IV med, as well as a complete med list and patient's demographic information.     Called Susy back at [facility] and informed her of the above. Gave her all the info from [facility] as well as the fax number: [phone]. She said she will get the orders from the TCU provider, etc, and contact the family to finalize plans for picking her up tomorrow, etc.    I texted with Kusum (electronic communication consent form in place) and told her about the plan for the infusion center and that the family would be responsible for driving Bethany to there every day for the infusion until 3/10/24. Kusum voiced understanding and said they can do this. Also informed Kusum that the member's PCA auth is in place, so she is able to resume 3.5 hours daily once she gets home tomorrow. I also spoke with Vasile at [facility] to inform them the member will be resuming services after 6 months of being away. He said he will have " their  outreach person contact the member and family. Advised I will not be in the office until Tuesday, 3/5 and I will touch base with them when I am back in the office, to see how things are going and talk about her other services and supplies.    Lizzie Wilson RN  [facility]   [phone]

## 2024-03-04 ENCOUNTER — LAB REQUISITION (OUTPATIENT)
Dept: LAB | Facility: CLINIC | Age: 84
End: 2024-03-04
Payer: COMMERCIAL

## 2024-03-04 DIAGNOSIS — T84.53XA INFECTION AND INFLAMMATORY REACTION DUE TO INTERNAL RIGHT KNEE PROSTHESIS, INITIAL ENCOUNTER (H): ICD-10-CM

## 2024-03-04 LAB
ALP SERPL-CCNC: 150 U/L (ref 40–150)
ALT SERPL W P-5'-P-CCNC: 10 U/L (ref 0–50)
AST SERPL W P-5'-P-CCNC: 28 U/L (ref 0–45)
BASOPHILS # BLD AUTO: 0 10E3/UL (ref 0–0.2)
BASOPHILS NFR BLD AUTO: 1 %
BILIRUB DIRECT SERPL-MCNC: <0.2 MG/DL (ref 0–0.3)
BILIRUB SERPL-MCNC: 0.2 MG/DL
BUN SERPL-MCNC: 13.5 MG/DL (ref 8–23)
CREAT SERPL-MCNC: 0.61 MG/DL (ref 0.51–0.95)
CRP SERPL-MCNC: 9.48 MG/L
EGFRCR SERPLBLD CKD-EPI 2021: 88 ML/MIN/1.73M2
EOSINOPHIL # BLD AUTO: 0.1 10E3/UL (ref 0–0.7)
EOSINOPHIL NFR BLD AUTO: 2 %
ERYTHROCYTE [DISTWIDTH] IN BLOOD BY AUTOMATED COUNT: 15.9 % (ref 10–15)
HCT VFR BLD AUTO: 30.5 % (ref 35–47)
HGB BLD-MCNC: 9.6 G/DL (ref 11.7–15.7)
IMM GRANULOCYTES # BLD: 0 10E3/UL
IMM GRANULOCYTES NFR BLD: 0 %
LYMPHOCYTES # BLD AUTO: 1.3 10E3/UL (ref 0.8–5.3)
LYMPHOCYTES NFR BLD AUTO: 28 %
MCH RBC QN AUTO: 30.8 PG (ref 26.5–33)
MCHC RBC AUTO-ENTMCNC: 31.5 G/DL (ref 31.5–36.5)
MCV RBC AUTO: 98 FL (ref 78–100)
MONOCYTES # BLD AUTO: 0.4 10E3/UL (ref 0–1.3)
MONOCYTES NFR BLD AUTO: 8 %
NEUTROPHILS # BLD AUTO: 2.8 10E3/UL (ref 1.6–8.3)
NEUTROPHILS NFR BLD AUTO: 61 %
NRBC # BLD AUTO: 0 10E3/UL
NRBC BLD AUTO-RTO: 0 /100
PLATELET # BLD AUTO: 326 10E3/UL (ref 150–450)
RBC # BLD AUTO: 3.12 10E6/UL (ref 3.8–5.2)
WBC # BLD AUTO: 4.6 10E3/UL (ref 4–11)

## 2024-03-04 PROCEDURE — 85004 AUTOMATED DIFF WBC COUNT: CPT | Performed by: INTERNAL MEDICINE

## 2024-03-04 PROCEDURE — 84520 ASSAY OF UREA NITROGEN: CPT | Performed by: INTERNAL MEDICINE

## 2024-03-04 PROCEDURE — 84460 ALANINE AMINO (ALT) (SGPT): CPT | Performed by: INTERNAL MEDICINE

## 2024-03-04 PROCEDURE — 82248 BILIRUBIN DIRECT: CPT | Performed by: INTERNAL MEDICINE

## 2024-03-04 PROCEDURE — 82565 ASSAY OF CREATININE: CPT | Performed by: INTERNAL MEDICINE

## 2024-03-04 PROCEDURE — 82247 BILIRUBIN TOTAL: CPT | Performed by: INTERNAL MEDICINE

## 2024-03-04 PROCEDURE — 86140 C-REACTIVE PROTEIN: CPT | Performed by: INTERNAL MEDICINE

## 2024-03-04 PROCEDURE — 84450 TRANSFERASE (AST) (SGOT): CPT | Performed by: INTERNAL MEDICINE

## 2024-03-04 PROCEDURE — 84075 ASSAY ALKALINE PHOSPHATASE: CPT | Performed by: INTERNAL MEDICINE

## 2024-03-06 DIAGNOSIS — L30.9 DERMATITIS: ICD-10-CM

## 2024-03-07 ENCOUNTER — PATIENT OUTREACH (OUTPATIENT)
Dept: GERIATRIC MEDICINE | Facility: CLINIC | Age: 84
End: 2024-03-07
Payer: COMMERCIAL

## 2024-03-07 RX ORDER — TRIAMCINOLONE ACETONIDE 1 MG/ML
LOTION TOPICAL 3 TIMES DAILY
Qty: 120 ML | Refills: 0 | Status: SHIPPED | OUTPATIENT
Start: 2024-03-07 | End: 2024-04-18

## 2024-03-07 NOTE — PROGRESS NOTES
[facility] Care Coordination Contact    Emailed [facility] Alert and asked them to resume services. Member reportedly has her equipment at home, including the pendant.  Informed Kusum that I will tell her when they restart the service.  Lizzie Wilson RN  [facility]   [phone]

## 2024-03-07 NOTE — PROGRESS NOTES
[facility] Care Coordination Contact      CHW received message from care coordinator Lizzie.  Contacted Mbr's daughter (Kusum) to schedule follow-up appointment with PCP and wellness appt and I gave Mbr's daughter the following information:        Follow-up appointment with PCP    [facility]  Address: [address] [place] [address]  Phone: [phone]    Date: Friday, March 15/2024  Hour :  9:15 AM         Wellness appointment with PCP    [facility]  Address: [address] [place] [address]  Phone: [phone]    Date: Friday, July 26/2024  Hour :  8:00 AM    ADNETTE Balderas  [facility]  [phone]

## 2024-03-07 NOTE — PROGRESS NOTES
[facility] Care Coordination Contact    Spoke with Harjeet at [facility] who said they received the 3543 and 5181, but not the 1503 from the TCU yet.   Harjeet also informed this care coordinator that the member's case was transferred to [facility], due to the TCU being in [place].  Harjeet gave me the financial worker's contact info: Evon Copeland at [facility] - [phone].  Left voicemail for Evon, asking for return call to discuss member. Need to know if she will open member to  or if we have to transfer back to [facility] first.       Spoke with Meaghan Hayes in the business office at [facility] ([phone]), who reportedly faxed the 1503 to [facility] on 3/6 in the AM. Informed her of member being transferred to [facility] and advised the form needs to be sent there now. Meaghan voiced understanding, then hung up on writer.      Called Renetta Sheppard at [facility] (PCA agency), per Kusum's request, and left a voicemail that there is a current auth/SA in place with [facility] for 3.5 hours/day of PCA through 3/31/24. There is also an auth in place starting 4/1/24 for 4 hours per day. Member can use PCA flex hours to make up for extra time needed. Also emailed Tori Grady at [facility] to ask if we can change the auth dates to be effective 3/1/24, date of discharge from TCU, so she can have the increased time due to change in condition.    Informed Kusum and Sudhakar of the above and they voiced understanding that we are waiting on the counties, etc.  Lizzie Wilson RN  [facility]   [phone]

## 2024-03-07 NOTE — PROGRESS NOTES
"[facility] Care Coordination Contact    Kusum said Bethany will need refills of a few meds soon. Advised to contact pharmacy to request refills. If there are not refills left on the prescriptions, informed them the pharmacy will request new ones from her doctor.    Kusum asked if the member should continue or stop taking a blood thinner that she has been on while taking IV ertapenem. Writer requested discharge summary and/or med list from DARYL Jain at [facility], so I can see if any meds were discontinued and what the recommendations were at discharge.    Member had Nursing Home visit with Susy Saleh NP, on 2/22/24. Her note reads, \"Per ortho Dr. 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 Continues on IV abx w/ weekly labs per ID--1/17 appointment, antibiotic regimen (Ertapenem) was extended out until 3/10/24\".    \"Rivaroxaban (Xarelto) 10 Mg tablet, Take 1 Tablet by mouth daily with meal X 6 weeks. Do not start before December 21, 2023, Starting Thu 12/21/2023, End Date: Fri 2/2/2024, For 43 days\".    Emailed Trevon, Nursing Director, and Christopher Zarate, Director of Nursing, at [facility], and asked for the discharge summary and/or med list OR a call to discuss any med changes, orders or recommendations at discharge on 3/1. Asked if member was still taking Xarelto and if they have new orders from provider to extend the time she takes it.     Will await response from anyone at [facility] and the discharge summary and med list from [place], then will inform member and her family of any orders, changes, etc.     Lizzie Wilson RN  [facility]   [phone]        "

## 2024-03-08 ENCOUNTER — PATIENT OUTREACH (OUTPATIENT)
Dept: GERIATRIC MEDICINE | Facility: CLINIC | Age: 84
End: 2024-03-08
Payer: COMMERCIAL

## 2024-03-08 NOTE — PROGRESS NOTES
[facility] Care Coordination Contact    Left second message for Evon Copeland at [facility], telling her that day 60 related to opening to EW is next week and I need this to be expedited. Need to know if she has to transfer back to [place] first or if they can process EW first, then transfer.  Lizzie Wilson RN  [facility]   [phone]

## 2024-03-08 NOTE — PROGRESS NOTES
[facility] Care Coordination Contact    Kusum reported the TCU discharged Bethany with rivaroxaban and she asked if member should still be taking this medication. Reviewed Epic medical record and notes from member's ortho provider, Dr. Diaz at [facility], show it was prescribed it for 6 weeks, starting 12/21/23. The tentative end date was 2/2/24.    Rivaroxaban is not on the member's TCU discharge orders, dated 2/29/24, but she reports being given the med when she discharged home from on 3/1/24.     Writer contacted Dr. Diaz's office and spoke with MEAGHAN Buchanan.  Chanel Valladares PA-C's 1/18/24 note read that the patient should take Xarelto for 2 more weeks, so she would be done 2/2/24.    MEAGHAN Buchanan, spoke with Dr. Diaz, who confirmed Bethany should be done with Xarelto.     Also spoke with MEAGHAN Lester at [facility]. He said Bethany took rivaroxaban until 2/12/24 and that it was not renewed.     Informed Kusum of all of the above and she understands. Bethany will be at Dr. Diaz's office this afternoon for an appointment.  Lizzie Wilson RN  [facility]   [phone]

## 2024-03-11 ENCOUNTER — LAB REQUISITION (OUTPATIENT)
Dept: LAB | Facility: CLINIC | Age: 84
End: 2024-03-11
Payer: COMMERCIAL

## 2024-03-11 DIAGNOSIS — T84.53XA INFECTION AND INFLAMMATORY REACTION DUE TO INTERNAL RIGHT KNEE PROSTHESIS, INITIAL ENCOUNTER (H): ICD-10-CM

## 2024-03-11 LAB
ALP SERPL-CCNC: 161 U/L (ref 40–150)
ALT SERPL W P-5'-P-CCNC: 11 U/L (ref 0–50)
AST SERPL W P-5'-P-CCNC: 27 U/L (ref 0–45)
BASOPHILS # BLD AUTO: 0 10E3/UL (ref 0–0.2)
BASOPHILS NFR BLD AUTO: 1 %
BILIRUB DIRECT SERPL-MCNC: <0.2 MG/DL (ref 0–0.3)
BILIRUB SERPL-MCNC: <0.2 MG/DL
BUN SERPL-MCNC: 11.6 MG/DL (ref 8–23)
CREAT SERPL-MCNC: 0.48 MG/DL (ref 0.51–0.95)
CRP SERPL-MCNC: 12.32 MG/L
EGFRCR SERPLBLD CKD-EPI 2021: >90 ML/MIN/1.73M2
EOSINOPHIL # BLD AUTO: 0.1 10E3/UL (ref 0–0.7)
EOSINOPHIL NFR BLD AUTO: 2 %
ERYTHROCYTE [DISTWIDTH] IN BLOOD BY AUTOMATED COUNT: 15.5 % (ref 10–15)
HCT VFR BLD AUTO: 28.9 % (ref 35–47)
HGB BLD-MCNC: 9.1 G/DL (ref 11.7–15.7)
IMM GRANULOCYTES # BLD: 0 10E3/UL
IMM GRANULOCYTES NFR BLD: 0 %
LYMPHOCYTES # BLD AUTO: 1.3 10E3/UL (ref 0.8–5.3)
LYMPHOCYTES NFR BLD AUTO: 27 %
MCH RBC QN AUTO: 30.6 PG (ref 26.5–33)
MCHC RBC AUTO-ENTMCNC: 31.5 G/DL (ref 31.5–36.5)
MCV RBC AUTO: 97 FL (ref 78–100)
MONOCYTES # BLD AUTO: 0.3 10E3/UL (ref 0–1.3)
MONOCYTES NFR BLD AUTO: 6 %
NEUTROPHILS # BLD AUTO: 3.2 10E3/UL (ref 1.6–8.3)
NEUTROPHILS NFR BLD AUTO: 64 %
NRBC # BLD AUTO: 0 10E3/UL
NRBC BLD AUTO-RTO: 0 /100
PLATELET # BLD AUTO: 368 10E3/UL (ref 150–450)
RBC # BLD AUTO: 2.97 10E6/UL (ref 3.8–5.2)
WBC # BLD AUTO: 5 10E3/UL (ref 4–11)

## 2024-03-11 PROCEDURE — 84075 ASSAY ALKALINE PHOSPHATASE: CPT | Performed by: INTERNAL MEDICINE

## 2024-03-11 PROCEDURE — 84520 ASSAY OF UREA NITROGEN: CPT | Performed by: INTERNAL MEDICINE

## 2024-03-11 PROCEDURE — 86140 C-REACTIVE PROTEIN: CPT | Performed by: INTERNAL MEDICINE

## 2024-03-11 PROCEDURE — 82565 ASSAY OF CREATININE: CPT | Performed by: INTERNAL MEDICINE

## 2024-03-11 PROCEDURE — 84450 TRANSFERASE (AST) (SGOT): CPT | Performed by: INTERNAL MEDICINE

## 2024-03-11 PROCEDURE — 82248 BILIRUBIN DIRECT: CPT | Performed by: INTERNAL MEDICINE

## 2024-03-11 PROCEDURE — 84460 ALANINE AMINO (ALT) (SGPT): CPT | Performed by: INTERNAL MEDICINE

## 2024-03-11 PROCEDURE — 82247 BILIRUBIN TOTAL: CPT | Performed by: INTERNAL MEDICINE

## 2024-03-11 PROCEDURE — 85048 AUTOMATED LEUKOCYTE COUNT: CPT | Performed by: INTERNAL MEDICINE

## 2024-03-12 NOTE — PROGRESS NOTES
"[facility] Care Coordination Contact    Member's daughter-in-law, Kusum, texted a photo of the med she was referring to and called a \"blood thinner\" and it was ferrous sulfate. I advised that Bethany does need to continue that medication, which is on her discharge summary from [facility].    Writer called MEAGHAN De Leon from [facility], to ask if member was taking Xarelto when she saw her yesterday. Haley said she does not remember a blood thinner but Kusum did ask her if Bethany should continue to take ferrous sulfate and her other vitamins. Haley told her to continue meds she has been taking since discharge and discuss with provider at appt on Fri, 3/15/24.    Care coordinator understands that Kusum was calling ferrous sulfate a blood thinner. She was mistaken - member did not go home with Xarelto. Kusum meant to ask if the member should continue the iron supplement. Confirmed with Kusum that Bethany should continue the meds she was told to take at discharge from TCU and discuss any med changes at her 3/15/24 appt. Kusum confirmed that she understands.  Lizzie Wilson RN  [facility]   [phone]          "

## 2024-03-12 NOTE — PROGRESS NOTES
[facility] Care Coordination Contact    Evon, financial worker from [facility], left voicemail on 3/11/24, stating she would re-open member to  and the member's case was transferred back to [facility]. Evon asked writer to call her and confirm the member's address of where she discharged to and if it is a home or facility.     Called Evon back and left voicemail with Bethany's apartment address and asked if she could be sure the re-open date is 3/1/24; date of discharge from TCU.  Lizzie Wilson RN  [facility]   [phone]

## 2024-03-14 ENCOUNTER — PATIENT OUTREACH (OUTPATIENT)
Dept: GERIATRIC MEDICINE | Facility: CLINIC | Age: 84
End: 2024-03-14
Payer: COMMERCIAL

## 2024-03-14 NOTE — PROGRESS NOTES
[facility] Care Coordination Contact     Spoke with Claus, financial worker at [facility]. She said the member's case has not been transferred back to [facility] yet. She said the member's address is still listed as [address] in [place], which is the [facility] address. Claus cannot make any changes to the case until the address is correct and it is sent back to [place].      Left voicemail for Evon, worker at [facility], with the above info. Asked for a return call to discuss. I need to confirm that the EW will be re-opened effective 3/1/24 and of note, I was able to do MMIS entry to re-open EW on 3/13/24.  Lizzie Wilson RN  [facility]   [phone]

## 2024-03-14 NOTE — PROGRESS NOTES
[facility] Care Coordination Contact    Per [facility] Secure Site the member is authorized for an increase in PCA hours. Starting 4/1/24 she will have 4 hours per day, an increase from 3.5 hours per day.  Per [facility], she is to use flex hours for the month of March and the new auth, with the increased hours, starts 4/1/24. Waiver span is through 12/31/24.    4/1/2024 12/31/2024 Increase in Services 4400 units     Lizzie Wilson RN  [facility]   [phone]

## 2024-03-14 NOTE — PROGRESS NOTES
[facility] Care Coordination Contact    Advised Kusum, member's daughter-in-law, when they are ready to re-start delivery of incontinence supplies, they need to call: Sheri at:[phone].  To speak with someone in Chilean, they will choose option 8.  Lizzie Wilson RN  [facility]   [phone]

## 2024-03-14 NOTE — PROGRESS NOTES
[facility] Care Coordination Contact    Called [facility] Alert and spoke with Dorita. She said everything has been turned back on for the PERS service. Dorita left a message for the member, advising her to test the button by pressing it once. That is the final piece that needs to happen to reactivate the service.    Writer texted Kusum and informed her of the above and asked her to inform Bethany and have her do this asap.    Also spoke with Curtis from [facility] (Syriac program) and he said member can return to the program 3 days per week. He said he would call the member/family to discuss details and we agreed the start date on the auth will be Mon, 3/18/24. Informed Kusum & Bethany of this.  Lizzie Wilson RN  [facility]   [phone]

## 2024-03-14 NOTE — PROGRESS NOTES
[facility] Care Coordination Contact    Spoke with Claus, financial worker at [facility]. She said the member's case has not been transferred back to [facility] yet. She said the member's address is still listed as [address] in [place], which is the [facility] address.  Claus cannot make any changes to the case until the address is correct and it is sent back to [place].     Left voicemail for Evon, worker at [facility], with the above info. Asked for a return call to discuss. I need to confirm that the EW will be re-opened effective 3/1/24.    See encounter dated 3/14/24 for further details. Closing this encounter.    Lizzie Wilson RN  [facility]   [phone]

## 2024-03-14 NOTE — PROGRESS NOTES
[facility] Care Coordination Contact    Emailed Jacquelin at [facility], informing her that [facility] still has not transferred member back to [facility]. [facility] has been waiting to schedule repair of Bethany's walker until insurance shows she is back home in her apartment in [place].    Per Evon, financial worker in [facility], she is getting it all taken care of this week.  Lizzie Wilson RN  [facility]   [phone]

## 2024-03-14 NOTE — PROGRESS NOTES
[facility] Care Coordination Contact    Tasked RAPHAEL Bernabe, to submit auths for member's services. She processed the EOV in February and we were waiting until member discharged from TCU to re-open EW and write the auths.  Lizzie Wilson RN  [facility]   [phone]

## 2024-03-15 ENCOUNTER — OFFICE VISIT (OUTPATIENT)
Dept: FAMILY MEDICINE | Facility: CLINIC | Age: 84
End: 2024-03-15
Payer: COMMERCIAL

## 2024-03-15 VITALS
RESPIRATION RATE: 18 BRPM | OXYGEN SATURATION: 98 % | DIASTOLIC BLOOD PRESSURE: 68 MMHG | WEIGHT: 121.4 LBS | HEIGHT: 57 IN | BODY MASS INDEX: 26.19 KG/M2 | TEMPERATURE: 97.1 F | HEART RATE: 74 BPM | SYSTOLIC BLOOD PRESSURE: 134 MMHG

## 2024-03-15 DIAGNOSIS — M00.9 POSTOPERATIVE INFECTION OF KNEE (H): Primary | ICD-10-CM

## 2024-03-15 DIAGNOSIS — T81.49XA POSTOPERATIVE INFECTION OF KNEE (H): Primary | ICD-10-CM

## 2024-03-15 DIAGNOSIS — I10 HYPERTENSION GOAL BP (BLOOD PRESSURE) < 140/90: ICD-10-CM

## 2024-03-15 PROCEDURE — 99214 OFFICE O/P EST MOD 30 MIN: CPT

## 2024-03-15 RX ORDER — FERROUS SULFATE 325(65) MG
325 TABLET ORAL
COMMUNITY
End: 2024-06-21

## 2024-03-15 RX ORDER — RESPIRATORY SYNCYTIAL VIRUS VACCINE 120MCG/0.5
0.5 KIT INTRAMUSCULAR ONCE
Qty: 1 EACH | Refills: 0 | Status: CANCELLED | OUTPATIENT
Start: 2024-03-15 | End: 2024-03-15

## 2024-03-15 RX ORDER — METHOCARBAMOL 500 MG/1
500 TABLET, FILM COATED ORAL EVERY 6 HOURS PRN
Qty: 30 TABLET | Refills: 0 | Status: SHIPPED | OUTPATIENT
Start: 2024-03-15 | End: 2024-06-21

## 2024-03-15 RX ORDER — MULTIVIT-MIN/IRON/FOLIC ACID/K 18-600-40
1 CAPSULE ORAL DAILY
COMMUNITY

## 2024-03-15 RX ORDER — LIDOCAINE 4 G/G
1 PATCH TOPICAL EVERY 24 HOURS
Qty: 30 PATCH | Refills: 1 | Status: SHIPPED | OUTPATIENT
Start: 2024-03-15 | End: 2024-03-22

## 2024-03-15 ASSESSMENT — PAIN SCALES - GENERAL: PAINLEVEL: NO PAIN (0)

## 2024-03-15 ASSESSMENT — PATIENT HEALTH QUESTIONNAIRE - PHQ9
SUM OF ALL RESPONSES TO PHQ QUESTIONS 1-9: 13
SUM OF ALL RESPONSES TO PHQ QUESTIONS 1-9: 13
10. IF YOU CHECKED OFF ANY PROBLEMS, HOW DIFFICULT HAVE THESE PROBLEMS MADE IT FOR YOU TO DO YOUR WORK, TAKE CARE OF THINGS AT HOME, OR GET ALONG WITH OTHER PEOPLE: NOT DIFFICULT AT ALL

## 2024-03-15 NOTE — LETTER
March 29, 2024      Bethany Logan  [address]  [place] [address]        Dear Ms.Valencia Logan,    We are writing to inform you of your test results.    I have reviewed your results. There is a slight increase in your inflammatory markers. Please monitor for worsening redness, pain, or swelling, or let me know if you develop a fever. I recommend rechecking this in one month, and have placed an order.       Resulted Orders   CRP, inflammation   Result Value Ref Range    CRP Inflammation 17.00 (H) <5.00 mg/L       If you have any questions or concerns, please call the clinic at the number listed above.       Sincerely,      NILS Mazariegos CNP

## 2024-03-15 NOTE — NURSING NOTE
Patient Quality Outreach    Patient is due for the following:   Physical Annual Wellness Visit    Next Steps:   Patient has upcoming appointment, these items will be addressed at that time.    Type of outreach:    Chart review performed, no outreach needed.      Questions for provider review:    None           Christine Jane MA

## 2024-03-15 NOTE — PROGRESS NOTES
"  Assessment & Plan     Postoperative infection of knee (H)  - Notes reviewed from hospitalization, orthopedic and infectious disease follow up. Patient has completed IV antibiotics and PICC has been removed. She is doing well- ambulating and pain is well-controlled.   - Lidocaine (LIDOCARE) 4 % Patch  Dispense: 30 patch; Refill: 1  - methocarbamol (ROBAXIN) 500 MG tablet  Dispense: 30 tablet; Refill: 0  - Hepatic panel (Albumin, ALT, AST, Bili, Alk Phos, TP)  - CRP, inflammation    Hypertension goal BP (blood pressure) < 140/90  - Well controlled on current medications. Due for labs.  - Adult Eye  Referral  - BASIC METABOLIC PANEL    MED REC REQUIRED  Post Medication Reconciliation Status:  Discharge medications reconciled, continue medications without change  BMI  Estimated body mass index is 26.27 kg/m  as calculated from the following:    Height as of this encounter: 1.448 m (4' 9\").    Weight as of this encounter: 55.1 kg (121 lb 6.4 oz).   Weight management plan: Discussed healthy diet and exercise guidelines    Tyrell Sims is a 83 year old, presenting for the following health issues:  Hospital F/U        3/15/2024     9:15 AM   Additional Questions   Roomed by zay         3/15/2024     9:15 AM   Patient Reported Additional Medications   Patient reports taking the following new medications none     HPI   Hospital Follow-up Visit:    Hospital/Nursing Home/IP Rehab Facility:  [facility]  Date of Admission: 12/18/23  Date of Discharge: 12/21/23  Reason(s) for Admission: Infection of orthopedic implant     Was your hospitalization related to COVID-19? No   Problems taking medications regularly:  None  Medication changes since discharge: None  Problems adhering to non-medication therapy:  None    Summary of hospitalization:  CareEverywhere information obtained and reviewed  Diagnostic Tests/Treatments reviewed.  Follow up needed: none  Other Healthcare Providers Involved in Patient s Care:         " Specialist appointment - Orthopedics- follow up in 6 months  Update since discharge: improved.    Patient recently admitted to [facility] 12/18/23-12/21/23, seen by ID while admitted.   From hospitalization summary:   84 yo with h/o 2013 prior TKA, had traumatic fall in [place] s/p ORIF periprosthetic distal femur fracture in 9/2023 while visiting family in [place]. Surgical site/hardware was subsequently infected while in [place] (hopsitalization unclear however received debridement and abx), returned to [place] for surgical treatment (s/p I&D 11/27, followed by revision TKA Dr. Dickens 12/18/23), cultures with ESBL E. Coli and she is currently on IV ertapenem.     She has complicated orthopedic infection involving MDR organism. It seems that the primary site of infection was the ORIF hardware which all was removed. Received clarification from Orthopedics that she did have single stage revision of the TKA (prior documention in ID notes suggests DAIR). Thus all previous hardware including the knee was removed, debridement performed and implantation of new implants coated with antibiotic impregnated cement was performed.     Outpatient ID follow up and IV ertapenem for 12 weeks total as there was no oral option for her resistant Gram-negative infection. Antibiotics through 3/10. Recently discharged from TCU. Had follow up with ortho 3/5/24- further follow up in six months.     Today she is feeling well. She says that the swelling and pain in her knee has improved. Incision healed well. No erythema or drainage, no edema. PICC was removed, no drainage or bruising at the site. Ambulating well. Pain is controlled.    Plan of care communicated with patient     Review of Systems  Constitutional, HEENT, cardiovascular, pulmonary, gi and gu systems are negative, except as otherwise noted.      Objective    /68 (BP Location: Right arm, Patient Position: Sitting, Cuff Size: Adult Regular)   Pulse 74   Temp 97.1  F (36.2  C)  "(Tympanic)   Resp 18   Ht 1.448 m (4' 9\")   Wt 55.1 kg (121 lb 6.4 oz)   LMP  (LMP Unknown)   SpO2 98%   BMI 26.27 kg/m    Body mass index is 26.27 kg/m .    Physical Exam   GENERAL: alert and no distress  EYES: Eyes grossly normal to inspection, PERRL and conjunctivae and sclerae normal  NECK: no adenopathy, no asymmetry, masses, or scars  RESP: lungs clear to auscultation - no rales, rhonchi or wheezes  CV: regular rate and rhythm, normal S1 S2, no S3 or S4, no murmur, click or rub, no peripheral edema  ABDOMEN: soft, nontender, no hepatosplenomegaly, no masses and bowel sounds normal  MS: no gross musculoskeletal defects noted, no edema  SKIN: no suspicious lesions or rashes. Knee incision healed. No redness, edema, or drainage.  NEURO: Normal strength and tone, mentation intact and speech normal  PSYCH: mentation appears normal, affect normal/bright    Signed Electronically by: NILS Mazariegos CNP  Reviewed and agree with plan. Ernestina Argueta MD     "

## 2024-03-20 ENCOUNTER — PATIENT OUTREACH (OUTPATIENT)
Dept: GERIATRIC MEDICINE | Facility: CLINIC | Age: 84
End: 2024-03-20
Payer: COMMERCIAL

## 2024-03-20 NOTE — PROGRESS NOTES
[facility] Care Coordination Contact    Received fax from Evon ELMORE from [facility] [place], dated 3/14/24, stating member's case was transferred back to [facility].  Lizzie Wilson RN  [facility]   [phone]

## 2024-03-22 ENCOUNTER — LAB (OUTPATIENT)
Dept: LAB | Facility: CLINIC | Age: 84
End: 2024-03-22
Payer: COMMERCIAL

## 2024-03-22 ENCOUNTER — OFFICE VISIT (OUTPATIENT)
Dept: URGENT CARE | Facility: URGENT CARE | Age: 84
End: 2024-03-22
Payer: COMMERCIAL

## 2024-03-22 VITALS
TEMPERATURE: 98.4 F | SYSTOLIC BLOOD PRESSURE: 130 MMHG | OXYGEN SATURATION: 98 % | HEART RATE: 82 BPM | DIASTOLIC BLOOD PRESSURE: 70 MMHG | RESPIRATION RATE: 14 BRPM

## 2024-03-22 DIAGNOSIS — M00.9 POSTOPERATIVE INFECTION OF KNEE (H): ICD-10-CM

## 2024-03-22 DIAGNOSIS — S29.9XXA CHEST WALL INJURY, INITIAL ENCOUNTER: ICD-10-CM

## 2024-03-22 DIAGNOSIS — S79.919A HIP INJURY, UNSPECIFIED LATERALITY, INITIAL ENCOUNTER: ICD-10-CM

## 2024-03-22 DIAGNOSIS — T81.49XA POSTOPERATIVE INFECTION OF KNEE (H): ICD-10-CM

## 2024-03-22 DIAGNOSIS — R29.6 MULTIPLE FALLS: ICD-10-CM

## 2024-03-22 DIAGNOSIS — S19.9XXA NECK INJURY, INITIAL ENCOUNTER: Primary | ICD-10-CM

## 2024-03-22 DIAGNOSIS — I10 HYPERTENSION GOAL BP (BLOOD PRESSURE) < 140/90: ICD-10-CM

## 2024-03-22 LAB
ALBUMIN SERPL BCG-MCNC: 4 G/DL (ref 3.5–5.2)
ALP SERPL-CCNC: 162 U/L (ref 40–150)
ALT SERPL W P-5'-P-CCNC: 12 U/L (ref 0–50)
ANION GAP SERPL CALCULATED.3IONS-SCNC: 12 MMOL/L (ref 7–15)
AST SERPL W P-5'-P-CCNC: 24 U/L (ref 0–45)
BILIRUB DIRECT SERPL-MCNC: <0.2 MG/DL (ref 0–0.3)
BILIRUB SERPL-MCNC: <0.2 MG/DL
BUN SERPL-MCNC: 15.2 MG/DL (ref 8–23)
CALCIUM SERPL-MCNC: 9.5 MG/DL (ref 8.8–10.2)
CHLORIDE SERPL-SCNC: 106 MMOL/L (ref 98–107)
CREAT SERPL-MCNC: 0.59 MG/DL (ref 0.51–0.95)
CRP SERPL-MCNC: 17 MG/L
DEPRECATED HCO3 PLAS-SCNC: 22 MMOL/L (ref 22–29)
EGFRCR SERPLBLD CKD-EPI 2021: 89 ML/MIN/1.73M2
GLUCOSE SERPL-MCNC: 113 MG/DL (ref 70–99)
POTASSIUM SERPL-SCNC: 4.1 MMOL/L (ref 3.4–5.3)
PROT SERPL-MCNC: 7 G/DL (ref 6.4–8.3)
SODIUM SERPL-SCNC: 140 MMOL/L (ref 135–145)

## 2024-03-22 PROCEDURE — 82248 BILIRUBIN DIRECT: CPT

## 2024-03-22 PROCEDURE — 86140 C-REACTIVE PROTEIN: CPT

## 2024-03-22 PROCEDURE — 80053 COMPREHEN METABOLIC PANEL: CPT

## 2024-03-22 PROCEDURE — 99214 OFFICE O/P EST MOD 30 MIN: CPT | Performed by: PHYSICIAN ASSISTANT

## 2024-03-22 PROCEDURE — 36415 COLL VENOUS BLD VENIPUNCTURE: CPT

## 2024-03-22 RX ORDER — LIDOCAINE 4 G/G
1 PATCH TOPICAL EVERY 24 HOURS
Qty: 30 PATCH | Refills: 1 | Status: SHIPPED | OUTPATIENT
Start: 2024-03-22 | End: 2024-07-26

## 2024-03-22 ASSESSMENT — ENCOUNTER SYMPTOMS
FEVER: 0
COUGH: 0
NECK STIFFNESS: 0
ARTHRALGIAS: 1
SHORTNESS OF BREATH: 0
COLOR CHANGE: 0
WOUND: 0
BACK PAIN: 1
MYALGIAS: 1
WHEEZING: 0
NECK PAIN: 0
NUMBNESS: 0
PALPITATIONS: 0
CHILLS: 0
FATIGUE: 0
JOINT SWELLING: 1

## 2024-03-22 NOTE — PROGRESS NOTES
"  Tyrell Sims is a 83 year old, presenting for the following health issues with daughter in law and :  Urgent Care, Rib Pain (Using ipad for -(ID# \"\"), I hurt my ribs (right side the front and the back), hips (both sides), and my neck, it hurts to walk, been 3 times that I hurt myself, now my bones hurts ), Hip Pain, and Neck Pain    HPI   Musculoskeletal problem/pain  Onset/Duration: 6days ago.  Reports hx of 3 falls within the past 2months.  No head injuries or LOC.  Description  Location: Now has L sided neck pain, low back pain, R rib pain, bilateral hip pain.  Joint Swelling: no  Redness: no  Pain: YES  Warmth: no  Intensity:  moderate  Progression of Symptoms:  same  Accompanying signs and symptoms:   Fevers: no  Numbness/tingling/weakness: no  History  Trauma to the area: Yes, sustained multiple injuries after falling at home and at rehab within the past 2months.  Most recent was 6days ago.  No shortness of breath or hemoptysis.   Recent illness:  no  Previous similar problem: no  Previous evaluation:  no  Precipitating or alleviating factors:  Aggravating factors include: breathing, movement, palpation  Therapies tried and outcome: rest/inactivity, heat, ice, immobilization, with minimal relief    Patient Active Problem List   Diagnosis    OA (osteoarthritis) of knee - bilateral    Constipation    History of total knee arthroplasty - right    SNHL (sensory-neural hearing loss), asymmetrical    Chronic left-sided low back pain with left-sided sciatica    Advance care planning    Osteoporosis    History of total hip arthroplasty, left    Lumbar spinal stenosis    Macular drusen, bilateral    Dermatochalasis of eyelid    Pseudophakia of both eyes    Closed nondisplaced zone I fracture of sacrum with routine healing, subsequent encounter    Other specified depressive episodes    Adjustment disorder with mixed anxiety and depressed mood    Fatty liver    Gallbladder polyps    " Hypovitaminosis D    Latent tuberculosis    Elevated blood pressure reading without diagnosis of hypertension    Closed displaced supracondylar fracture without intracondylar extension of lower end of right femur (H)    Infection of orthopedic implant (H24)     Current Outpatient Medications   Medication    acetaminophen (TYLENOL) 325 MG tablet    calcium carbonate-vitamin D (OSCAL) 500-5 MG-MCG tablet    ferrous sulfate (FEROSUL) 325 (65 Fe) MG tablet    Lidocaine (LIDOCARE) 4 % Patch    losartan (COZAAR) 50 MG tablet    magnesium gluconate (MAGONATE) 500 (27 Mg) MG tablet    methocarbamol (ROBAXIN) 500 MG tablet    Ascorbic Acid (VITAMIN C) 500 MG CAPS    carboxymethylcellulose PF (CARBOXYMETHYLCELLULOSE SODIUM) 0.5 % ophthalmic solution    ketotifen (ZADITOR) 0.025 % ophthalmic solution    Multiple Vitamins-Minerals (ICAPS AREDS FORMULA) TABS    triamcinolone (KENALOG) 0.1 % external lotion     No current facility-administered medications for this visit.        Allergies   Allergen Reactions    Aspirin Hives       Review of Systems   Constitutional:  Negative for chills, fatigue and fever.   Respiratory:  Negative for cough, shortness of breath and wheezing.    Cardiovascular:  Positive for chest pain. Negative for palpitations and leg swelling.   Musculoskeletal:  Positive for arthralgias, back pain, gait problem, joint swelling and myalgias. Negative for neck pain and neck stiffness.   Skin:  Negative for color change, pallor, rash and wound.   Neurological:  Negative for numbness.   All other systems reviewed and are negative.          Objective    /70 (BP Location: Left arm, Patient Position: Sitting, Cuff Size: Adult Regular)   Pulse 82   Temp 98.4  F (36.9  C) (Tympanic)   Resp 14   LMP  (LMP Unknown)   SpO2 98%   There is no height or weight on file to calculate BMI.  Physical Exam  Vitals and nursing note reviewed.   Constitutional:       General: She is not in acute distress.     Appearance:  Normal appearance. She is normal weight. She is not ill-appearing.   Cardiovascular:      Rate and Rhythm: Normal rate and regular rhythm.      Pulses: Normal pulses.      Heart sounds: Normal heart sounds, S1 normal and S2 normal.   Pulmonary:      Effort: Pulmonary effort is normal. No accessory muscle usage or respiratory distress.      Breath sounds: Normal breath sounds and air entry. No decreased breath sounds, wheezing, rhonchi or rales.   Chest:      Chest wall: Tenderness (R sided ribs) present. No mass, lacerations, deformity, swelling, crepitus or edema. There is no dullness to percussion.   Musculoskeletal:      Cervical back: Spasms (L side) and tenderness present. No swelling, erythema, signs of trauma, lacerations, bony tenderness or crepitus. Pain with movement and muscular tenderness present. No spinous process tenderness. Normal range of motion.      Lumbar back: Spasms, tenderness and bony tenderness present. No swelling or deformity. Decreased range of motion. Negative right straight leg raise test and negative left straight leg raise test.      Right hip: Tenderness and bony tenderness present. Decreased range of motion.      Left hip: Tenderness and bony tenderness present. Decreased range of motion.   Skin:     General: Skin is warm.      Capillary Refill: Capillary refill takes less than 2 seconds.   Neurological:      Mental Status: She is alert and oriented to person, place, and time.      Sensory: Sensation is intact.      Motor: Motor function is intact.      Gait: Gait abnormal.      Deep Tendon Reflexes: Reflexes are normal and symmetric.   Psychiatric:         Mood and Affect: Mood normal.         Behavior: Behavior normal.         Thought Content: Thought content normal.         Judgment: Judgment normal.              Assessment/Plan:  Neck injury, initial encounter:  Along with chest wall pain, hip and LBP.  H&P is concerning for neck fx vs rib fx vs hip fx.  Recommend further  evaluation and management in the ER.  Will most likely need further workup with labs and/or imaging.  Patient has declined transportation via ambulance and will have family drive her/him.  Understands risks and benefits of ambulance transfer and s/he has declined.  Call 911 if worsening symptoms.  S/he plans to go to [place] ER.  S/he left in stable condition with AVS in hand.  F/u with PCP after ER visit.   -     Lidocaine (LIDOCARE) 4 % Patch; Place 1 patch onto the skin every 24 hours To prevent lidocaine toxicity, patient should be patch free for 12 hrs daily.    Chest wall injury, initial encounter:  Will refill her lidocaine patches per patient request.  -     Lidocaine (LIDOCARE) 4 % Patch; Place 1 patch onto the skin every 24 hours To prevent lidocaine toxicity, patient should be patch free for 12 hrs daily.    Hip injury, unspecified laterality, initial encounter  -     Lidocaine (LIDOCARE) 4 % Patch; Place 1 patch onto the skin every 24 hours To prevent lidocaine toxicity, patient should be patch free for 12 hrs daily.    Multiple falls        Lesa See SAI Aguayo

## 2024-04-04 ENCOUNTER — PATIENT OUTREACH (OUTPATIENT)
Dept: GERIATRIC MEDICINE | Facility: CLINIC | Age: 84
End: 2024-04-04
Payer: COMMERCIAL

## 2024-04-04 NOTE — PROGRESS NOTES
[facility] Care Coordination Contact    CHW received a call from member's daughter in law (Kusum) and she said that member had an eye exam appointment for tomorrow (04/05/24) at [facility] in [place] and the clinic called member to cancel the appt because member needs a specialist in retina.   Member's daughter in law requested reschedule eye exam appt with a specialist.  CHW gave member's daughter in law the following information:     [address]  [place], [place], [address]  Phone: [phone]       Date: Monday, April 29, 2024  Time: 7:40 AM      DANETTE Balderas  [facility]  [phone]

## 2024-04-05 DIAGNOSIS — E55.9 HYPOVITAMINOSIS D: ICD-10-CM

## 2024-04-09 ENCOUNTER — PATIENT OUTREACH (OUTPATIENT)
Dept: GERIATRIC MEDICINE | Facility: CLINIC | Age: 84
End: 2024-04-09
Payer: COMMERCIAL

## 2024-04-09 NOTE — PROGRESS NOTES
[facility] Care Coordination Contact    Received email from Pili at [facility]. They have tried to contact Sudhakar, member's son, about scheduling the repair of her walker, but have not reached anyone.   Writer texted Kusum, member's daughter-in-law, and asked her to have Sudhakar return the call to [phone] and she agreed.  Lizzie Wilson RN  [facility]   [phone]

## 2024-04-10 ENCOUNTER — PATIENT OUTREACH (OUTPATIENT)
Dept: GERIATRIC MEDICINE | Facility: CLINIC | Age: 84
End: 2024-04-10
Payer: COMMERCIAL

## 2024-04-10 NOTE — PROGRESS NOTES
[facility] Care Coordination Contact    Received message from Gurpreet Vasquez Outreach from [facility] (PCA agency). She asked us to send the member's most recent PCA assessment to them at: @[employer]  Tasked RAPHAEL Vee, to do this and texted Camila to let her know I received the request.  Lizzie Wilson RN  [facility]   [phone]

## 2024-04-11 ENCOUNTER — PATIENT OUTREACH (OUTPATIENT)
Dept: GERIATRIC MEDICINE | Facility: CLINIC | Age: 84
End: 2024-04-11
Payer: COMMERCIAL

## 2024-04-11 NOTE — PROGRESS NOTES
"[facility] Care Coordination Contact    Member's daughter-in-law, Kusum, texted this care coordinator that the member has been in a lot of pain and not going to adult day care because she does not feel well. Writer reviewed chart and Bethany went to urgent care on 3/22/24 and Lesa Aguayo PA-C, told her to go to the ED for further evaluation. See part of note: \"H&P is concerning for neck fx vs rib fx vs hip fx.  Recommend further evaluation and management in the ER.  Will most likely need further workup with labs and/or imaging.  Patient has declined transportation via ambulance and will have family drive her/him.  Understands risks and benefits of ambulance transfer and s/he has declined.  Call 911 if worsening symptoms.  S/he plans to go to [place] ER\".    Per Kusum, they did not go to the ED yet. I advised she should go to ED or at the very least, they need to call the clinic and talk with a triage RN and get advise. She said the member has a spine appt on 4/16. I advised she should call the clinic this evening and talk to a triage nurse and get advise.  Lizzie Wilson RN  [facility]   [phone]         "

## 2024-04-18 DIAGNOSIS — R25.2 LEG CRAMPING: ICD-10-CM

## 2024-04-18 DIAGNOSIS — L30.9 DERMATITIS: ICD-10-CM

## 2024-04-18 DIAGNOSIS — H35.30 AGE-RELATED MACULAR DEGENERATION: ICD-10-CM

## 2024-04-18 NOTE — TELEPHONE ENCOUNTER
Medication Question or Refill    Contacts         Type Contact Phone/Fax    04/18/2024 04:41 PM CDT In Person (Incoming) Kusum Osuna (Emergency Contact)             What medication are you calling about (include dose and sig)?: see below but add Vitamin C 500 MG caps    Preferred Pharmacy:      [facility] [address]  [address] [place] [address]  Phone: [phone] Fax: [phone]      Controlled Substance Agreement on file:   CSA -- Patient Level:    CSA: None found at the patient level.       Who prescribed the medication?: N/A    Do you need a refill? Yes    When did you use the medication last? today    Patient offered an appointment? No    Do you have any questions or concerns?  No      Okay to leave a detailed message?: Yes at Other phone number:  [phone]

## 2024-04-19 ENCOUNTER — TELEPHONE (OUTPATIENT)
Dept: FAMILY MEDICINE | Facility: CLINIC | Age: 84
End: 2024-04-19
Payer: COMMERCIAL

## 2024-04-19 RX ORDER — UREA 10 %
500 LOTION (ML) TOPICAL AT BEDTIME
Qty: 90 TABLET | Refills: 4 | Status: SHIPPED | OUTPATIENT
Start: 2024-04-19

## 2024-04-19 RX ORDER — FERROUS SULFATE 325(65) MG
325 TABLET ORAL
Qty: 90 TABLET | Refills: 1 | OUTPATIENT
Start: 2024-04-19

## 2024-04-19 RX ORDER — TRIAMCINOLONE ACETONIDE 1 MG/ML
LOTION TOPICAL 3 TIMES DAILY
Qty: 120 ML | Refills: 1 | Status: SHIPPED | OUTPATIENT
Start: 2024-04-19

## 2024-04-19 NOTE — TELEPHONE ENCOUNTER
Medication Question or Refill    Contacts         Type Contact Phone/Fax    04/19/2024 10:56 AM CDT Phone (Incoming) Kusum Osuna (Emergency Contact) [phone] (M)            What medication are you calling about (include dose and sig)?: vitamin c    Preferred Pharmacy:       [facility] - [place], [place] - [address] [place] [address]  Phone: [phone] Fax: [phone]        Controlled Substance Agreement on file:   CSA -- Patient Level:    CSA: None found at the patient level.       Who prescribed the medication?: pcp    Do you need a refill? Yes    When did you use the medication last? daily    Patient offered an appointment? No    Do you have any questions or concerns?  Yes: pt would like this also sent over to pharmacy for refill      Okay to leave a detailed message?: Yes at Other phone number:  [phone]

## 2024-04-24 ENCOUNTER — PATIENT OUTREACH (OUTPATIENT)
Dept: GERIATRIC MEDICINE | Facility: CLINIC | Age: 84
End: 2024-04-24
Payer: COMMERCIAL

## 2024-04-24 NOTE — PROGRESS NOTES
[facility] Care Coordination Contact    Received voicemail from Cesia at [facility] that they have not heard back from the member's family to schedule a time to fix her walker.  Writer texted Kusum, member's daughter-in-law, and asked her who [facility] should contact to schedule this.  Kusum said they should call her at [phone].     Writer called Cesia back and informed her of this and she will have repair dept reach out to Kusum to schedule. I advised Kusum to expect a call and to be sure to listen to her messages if she can't answer their call.  Lizzie Wilson RN  [facility]   [phone]

## 2024-04-29 ENCOUNTER — VIRTUAL VISIT (OUTPATIENT)
Dept: INTERPRETER SERVICES | Facility: CLINIC | Age: 84
End: 2024-04-29

## 2024-04-29 ENCOUNTER — OFFICE VISIT (OUTPATIENT)
Dept: OPHTHALMOLOGY | Facility: CLINIC | Age: 84
End: 2024-04-29
Attending: OPHTHALMOLOGY
Payer: COMMERCIAL

## 2024-04-29 DIAGNOSIS — H10.13 ALLERGIC CONJUNCTIVITIS OF BOTH EYES: ICD-10-CM

## 2024-04-29 DIAGNOSIS — H35.3112 INTERMEDIATE STAGE NONEXUDATIVE AGE-RELATED MACULAR DEGENERATION OF RIGHT EYE: ICD-10-CM

## 2024-04-29 DIAGNOSIS — H35.3222 EXUDATIVE AGE-RELATED MACULAR DEGENERATION OF LEFT EYE WITH INACTIVE CHOROIDAL NEOVASCULARIZATION (H): Primary | ICD-10-CM

## 2024-04-29 DIAGNOSIS — H43.813 PVD (POSTERIOR VITREOUS DETACHMENT), BOTH EYES: ICD-10-CM

## 2024-04-29 DIAGNOSIS — H04.123 DRY EYES: ICD-10-CM

## 2024-04-29 PROCEDURE — G0463 HOSPITAL OUTPT CLINIC VISIT: HCPCS | Performed by: OPHTHALMOLOGY

## 2024-04-29 PROCEDURE — T1013 SIGN LANG/ORAL INTERPRETER: HCPCS | Mod: U4,TEL

## 2024-04-29 PROCEDURE — 99214 OFFICE O/P EST MOD 30 MIN: CPT | Performed by: OPHTHALMOLOGY

## 2024-04-29 PROCEDURE — 92250 FUNDUS PHOTOGRAPHY W/I&R: CPT | Performed by: OPHTHALMOLOGY

## 2024-04-29 PROCEDURE — 99207 FUNDUS PHOTOS OU (BOTH EYES): CPT | Mod: 26 | Performed by: OPHTHALMOLOGY

## 2024-04-29 PROCEDURE — 92134 CPTRZ OPH DX IMG PST SGM RTA: CPT | Performed by: OPHTHALMOLOGY

## 2024-04-29 RX ORDER — OLOPATADINE HYDROCHLORIDE 1 MG/ML
SOLUTION/ DROPS OPHTHALMIC
Qty: 5 ML | Refills: 11 | Status: SHIPPED | OUTPATIENT
Start: 2024-04-29 | End: 2024-06-21

## 2024-04-29 RX ORDER — ANTIOX #8/OM3/DHA/EPA/LUT/ZEAX 250-2.5 MG
1 CAPSULE ORAL 2 TIMES DAILY
Qty: 60 CAPSULE | Refills: 11 | Status: SHIPPED | OUTPATIENT
Start: 2024-04-29 | End: 2024-06-21

## 2024-04-29 ASSESSMENT — SLIT LAMP EXAM - LIDS
COMMENTS: NORMAL
COMMENTS: NORMAL

## 2024-04-29 ASSESSMENT — REFRACTION_WEARINGRX
OD_CYLINDER: +2.25
OS_AXIS: 155
OS_ADD: +3.25
OS_SPHERE: -2.00
OD_AXIS: 155
OD_SPHERE: -2.00
OD_ADD: +3.25
OS_CYLINDER: +2.25

## 2024-04-29 ASSESSMENT — TONOMETRY
OD_IOP_MMHG: 16
OS_IOP_MMHG: 14
IOP_METHOD: TONOPEN

## 2024-04-29 ASSESSMENT — CONF VISUAL FIELD
OS_INFERIOR_TEMPORAL_RESTRICTION: 3
OS_SUPERIOR_TEMPORAL_RESTRICTION: 2
OD_INFERIOR_NASAL_RESTRICTION: 3
OS_INFERIOR_NASAL_RESTRICTION: 3

## 2024-04-29 ASSESSMENT — EXTERNAL EXAM - LEFT EYE: OS_EXAM: NORMAL

## 2024-04-29 ASSESSMENT — EXTERNAL EXAM - RIGHT EYE: OD_EXAM: NORMAL

## 2024-04-29 ASSESSMENT — VISUAL ACUITY
METHOD: SNELLEN - LINEAR
OD_CC: 20/50
OS_CC: 5/200 E
OD_PH_CC: 20/40

## 2024-04-29 ASSESSMENT — CUP TO DISC RATIO
OD_RATIO: 0.35
OS_RATIO: 0.35

## 2024-04-29 NOTE — PATIENT INSTRUCTIONS
-----------------------------------------  DRY EYE INSTRUCTIONS    You have dry eyes.   Artificial tears may be helpful.  Please see the list of brands below.  You may use preserved artifical tears (in a muti-use bottle) 2-4 times per day.  Do not use preserved tears more than 4 times per day.  If you wish to use artifical tears more than 4 times per day, you should use preservative-free artifical tears.  These come in single-use vials.  You can open a new vial each day to use throughout the day, but it should be discarded at the end of the day.  Thicker tears, gels, and ointments are more effective, but they may blur your vision.  Some patients prefer to use those only at bedtime.    You may also benefit from washing your eyelashes (not your eye lids) gently using your finger once or twice per day with tap water.    You may also benefit from warm compresses once or twice per day to your eyelids.  Use a clean washcloth soaked in warm water, and place it over your eyes for 5 minutes.    Avoid medicated drops for red eyes which contain vasoconstrictors.  These should not be used for more than a few days in a row, as the medication can cause serious problems if used for too many days in a row.      Example Recommended Artificial Tear Brands:    Dry Eye Drops    Optive  Systane Ultra  TheraTears  Genteal  Refresh  Blink Tears  Soothe      Gels    Refresh Liquigel  Genteal Gel  TheraTears Gel  Celluvisc  Blink Gel  Systane Gel      Ointments    Refresh PM  Lacrilube      Contact Lens Compatible    Blink Contacts  Blink Tears  Refresh Contacts  Systane Ultra  Complete Lubricating and Rewetting  Complete Blink and Clean Lens Drops      INSTRUCTIONS FOR MACULAR DEGENERATION AND AREDS/AREDS 2 VITAMINS    You have macular degeneration and are at risk for vision loss from the progression of dry macular degeneration or the development of exudative changes, the wet form of the disease.    Taking nutritional supplements according  to the Age Related Eye Disease Study (AREDS or AREDS 2) will help decrease your risk of vision loss from both the dry and the wet forms of macular degeneration.    AREDS vitamins contain the following ingredients:  Vitamin A - 15 mg of beta-carotene (equivalent to 25,000 IU of vitamin A)  Vitamin C - 500 mg  Vitamin E - 400 IU  Zinc - 80 mg of zinc as zinc oxide  Copper - 2 mg of copper as cupric oxide    AREDS 2 vitamins contain the following ingredients:  Lutein - 10 mg  Zeaxanthin - 2 mg  Vitamin C - 500 mg  Vitamin E - 400 IU  Zinc - 80 mg of zinc as zinc oxide  Copper - 2 mg of copper as cupric oxide      Examples of AREDS vitamins: Preservision, Ocuvite, I-Caps, Visivite, and others. I would suggest finding the most convenient and economical brand which follows this ingredient list.    If you are a smoker or were a smoker in the past 10-15  years, you should take AREDS 2 vitamins, and SHOULD NOT take AREDS vitamins with beta-carotene (vitamin A).  AREDS 2 vitamins do not contain beta-carotene (vitamin A), which can increase the risk of lung cancer in individuals who are actively smoking or who smoked in the past 10-15 years.    Maintain a healthy diet  Do not smoke  Monitor your vision in each eye using the Amsler grid and call the office with any changes in either eye.      [phone]

## 2024-04-29 NOTE — PROGRESS NOTES
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   - OCTA 5/27/22 indicates RAP lesion, but center is obscured by exudate   - OCT-A 5/2022 suggests possible RAP component   - likely cause of HEx OS and vision loss     - no improvement with Avastin x 3 given 4/2021-6/2021   - patient elected to stop 8/2021 d/t end stage vision     - stable today, lesion involuted/atrophic   - observe      # h/o CME OS   - from CHANEL   - prior extensive HEx in macula causing vision loss   - now mostly tr diffuse CME        # Non-exudative neovascular AMD OD   - occult CNVM seen at SN PED on OCTA 05/27/22   - no fluid, stable   - recommend AREDS2 and Amsler   - recheck 6-12 months      # Chorioretinal scar OD    - nasal post pole   - ?etiology, ?d/t toxoplasmosis vs involuted CHRPE   - inactive, likely longstanding      # PVD OU   - advised S/Sx RD 4/2024      # Allergic conjunctivitis    - uses Zaditor OTC    # ONEYDA   - ATs d/w patient 4/2024    Return in about 1 year (around 4/29/2025) for DFE OU, OCT OU, Optos Photo. "         ATTESTATION     Attending Attestation:     Complete documentation of historical and exam elements from today's encounter can be found in the full encounter summary report (not reduplicated in this progress note).  I personally obtained the chief complaint(s) and history of present illness.  I confirmed and edited as necessary the review of systems, past medical/surgical history, family history, social history, and examination findings as documented by others; and I examined the patient myself.  I personally reviewed the relevant tests, images, and reports as documented above.  I formulated and edited as necessary the assessment and plan and discussed the findings and management plan with the patient and family    Samantha Bruce MD, PhD  , Vitreoretinal Surgery  Department of Ophthalmology  [facility]

## 2024-04-29 NOTE — NURSING NOTE
"Chief Complaints and History of Present Illnesses   Patient presents with    Cystoid Macular Edema Follow Up     1.5 year follow up both eyes     Chief Complaint(s) and History of Present Illness(es)       Cystoid Macular Edema Follow Up              Comments: 1.5 year follow up both eyes              Comments    Pt here with  over the phone today.  Pt states vision is not as clear as it was 1 year ago. Pt states vision in LE is still \"black\".  No eye pain today. No DM.    SAMIR Bradford April 29, 2024 7:34 AM                         "

## 2024-06-17 ENCOUNTER — APPOINTMENT (OUTPATIENT)
Dept: INTERPRETER SERVICES | Facility: CLINIC | Age: 84
End: 2024-06-17
Payer: COMMERCIAL

## 2024-06-21 ENCOUNTER — OFFICE VISIT (OUTPATIENT)
Dept: FAMILY MEDICINE | Facility: CLINIC | Age: 84
End: 2024-06-21
Payer: COMMERCIAL

## 2024-06-21 ENCOUNTER — TELEPHONE (OUTPATIENT)
Dept: FAMILY MEDICINE | Facility: CLINIC | Age: 84
End: 2024-06-21

## 2024-06-21 VITALS
WEIGHT: 126 LBS | SYSTOLIC BLOOD PRESSURE: 136 MMHG | BODY MASS INDEX: 26.45 KG/M2 | DIASTOLIC BLOOD PRESSURE: 74 MMHG | OXYGEN SATURATION: 99 % | HEIGHT: 58 IN | RESPIRATION RATE: 14 BRPM | HEART RATE: 79 BPM | TEMPERATURE: 97.5 F

## 2024-06-21 DIAGNOSIS — F43.23 ADJUSTMENT DISORDER WITH MIXED ANXIETY AND DEPRESSED MOOD: ICD-10-CM

## 2024-06-21 DIAGNOSIS — H90.3 SNHL (SENSORY-NEURAL HEARING LOSS), ASYMMETRICAL: ICD-10-CM

## 2024-06-21 DIAGNOSIS — T81.49XA POSTOPERATIVE INFECTION OF KNEE (H): ICD-10-CM

## 2024-06-21 DIAGNOSIS — D50.9 IRON DEFICIENCY ANEMIA, UNSPECIFIED IRON DEFICIENCY ANEMIA TYPE: ICD-10-CM

## 2024-06-21 DIAGNOSIS — M48.02 SPINAL STENOSIS IN CERVICAL REGION: ICD-10-CM

## 2024-06-21 DIAGNOSIS — R60.0 BILATERAL LOWER EXTREMITY EDEMA: ICD-10-CM

## 2024-06-21 DIAGNOSIS — R06.9 UNSPECIFIED ABNORMALITIES OF BREATHING: ICD-10-CM

## 2024-06-21 DIAGNOSIS — Z01.818 PREOP GENERAL PHYSICAL EXAM: Primary | ICD-10-CM

## 2024-06-21 DIAGNOSIS — G95.89: ICD-10-CM

## 2024-06-21 DIAGNOSIS — R74.8 ELEVATED ALKALINE PHOSPHATASE LEVEL: ICD-10-CM

## 2024-06-21 DIAGNOSIS — I10 ESSENTIAL HYPERTENSION: ICD-10-CM

## 2024-06-21 DIAGNOSIS — M00.9 POSTOPERATIVE INFECTION OF KNEE (H): ICD-10-CM

## 2024-06-21 DIAGNOSIS — G95.89 MYELOMALACIA (H): ICD-10-CM

## 2024-06-21 DIAGNOSIS — R14.2 BELCHING: ICD-10-CM

## 2024-06-21 DIAGNOSIS — Z99.89 WALKER AS AMBULATION AID: ICD-10-CM

## 2024-06-21 DIAGNOSIS — M50.00 INTERVERTEBRAL DISC DISORDER OF CERVICAL REGION WITH MYELOPATHY: ICD-10-CM

## 2024-06-21 PROBLEM — Z99.3 WHEELCHAIR DEPENDENCE: Status: ACTIVE | Noted: 2024-03-12

## 2024-06-21 PROBLEM — Z96.659 PERI-PROSTHETIC SUPRACONDYLAR FRACTURE OF FEMUR: Status: RESOLVED | Noted: 2023-12-24 | Resolved: 2024-06-21

## 2024-06-21 PROBLEM — S72.451A: Status: RESOLVED | Noted: 2023-12-01 | Resolved: 2024-06-21

## 2024-06-21 PROBLEM — Z16.12 INFECTION DUE TO ESBL-PRODUCING ESCHERICHIA COLI: Status: RESOLVED | Noted: 2023-12-19 | Resolved: 2024-06-21

## 2024-06-21 PROBLEM — M97.8XXA PERI-PROSTHETIC SUPRACONDYLAR FRACTURE OF FEMUR: Status: RESOLVED | Noted: 2023-12-24 | Resolved: 2024-06-21

## 2024-06-21 PROBLEM — S22.080A COMPRESSION FRACTURE OF T12 VERTEBRA, INITIAL ENCOUNTER (H): Status: ACTIVE | Noted: 2024-03-22

## 2024-06-21 PROBLEM — A49.8 INFECTION DUE TO ESBL-PRODUCING ESCHERICHIA COLI: Status: RESOLVED | Noted: 2023-12-19 | Resolved: 2024-06-21

## 2024-06-21 PROBLEM — T84.53XA INFECTION OF PROSTHETIC RIGHT KNEE JOINT (H): Status: RESOLVED | Noted: 2023-12-01 | Resolved: 2024-06-21

## 2024-06-21 LAB
ALBUMIN SERPL BCG-MCNC: 4 G/DL (ref 3.5–5.2)
ALP SERPL-CCNC: 188 U/L (ref 40–150)
ALT SERPL W P-5'-P-CCNC: 9 U/L (ref 0–50)
ANION GAP SERPL CALCULATED.3IONS-SCNC: 8 MMOL/L (ref 7–15)
AST SERPL W P-5'-P-CCNC: 26 U/L (ref 0–45)
BILIRUB SERPL-MCNC: <0.2 MG/DL
BUN SERPL-MCNC: 12.3 MG/DL (ref 8–23)
CALCIUM SERPL-MCNC: 9.6 MG/DL (ref 8.8–10.2)
CHLORIDE SERPL-SCNC: 109 MMOL/L (ref 98–107)
CREAT SERPL-MCNC: 0.59 MG/DL (ref 0.51–0.95)
DEPRECATED HCO3 PLAS-SCNC: 23 MMOL/L (ref 22–29)
EGFRCR SERPLBLD CKD-EPI 2021: 89 ML/MIN/1.73M2
ERYTHROCYTE [DISTWIDTH] IN BLOOD BY AUTOMATED COUNT: 12.9 % (ref 10–15)
GLUCOSE SERPL-MCNC: 99 MG/DL (ref 70–99)
HCT VFR BLD AUTO: 36.5 % (ref 35–47)
HGB BLD-MCNC: 11.1 G/DL (ref 11.7–15.7)
MCH RBC QN AUTO: 29.4 PG (ref 26.5–33)
MCHC RBC AUTO-ENTMCNC: 30.4 G/DL (ref 31.5–36.5)
MCV RBC AUTO: 97 FL (ref 78–100)
NT-PROBNP SERPL-MCNC: 136 PG/ML (ref 0–1800)
PLATELET # BLD AUTO: 367 10E3/UL (ref 150–450)
POTASSIUM SERPL-SCNC: 4.9 MMOL/L (ref 3.4–5.3)
PROT SERPL-MCNC: 7.3 G/DL (ref 6.4–8.3)
RBC # BLD AUTO: 3.78 10E6/UL (ref 3.8–5.2)
SODIUM SERPL-SCNC: 140 MMOL/L (ref 135–145)
WBC # BLD AUTO: 6.6 10E3/UL (ref 4–11)

## 2024-06-21 PROCEDURE — 93000 ELECTROCARDIOGRAM COMPLETE: CPT

## 2024-06-21 PROCEDURE — 83880 ASSAY OF NATRIURETIC PEPTIDE: CPT

## 2024-06-21 PROCEDURE — 36415 COLL VENOUS BLD VENIPUNCTURE: CPT

## 2024-06-21 PROCEDURE — 99214 OFFICE O/P EST MOD 30 MIN: CPT

## 2024-06-21 PROCEDURE — 85027 COMPLETE CBC AUTOMATED: CPT

## 2024-06-21 PROCEDURE — 80053 COMPREHEN METABOLIC PANEL: CPT

## 2024-06-21 RX ORDER — RESPIRATORY SYNCYTIAL VIRUS VACCINE 120MCG/0.5
0.5 KIT INTRAMUSCULAR ONCE
Qty: 1 EACH | Refills: 0 | Status: CANCELLED | OUTPATIENT
Start: 2024-06-21 | End: 2024-06-21

## 2024-06-21 RX ORDER — CALCIUM CARBONATE 500 MG/1
1 TABLET, CHEWABLE ORAL DAILY
COMMUNITY
Start: 2024-06-18 | End: 2024-06-21

## 2024-06-21 RX ORDER — ERGOCALCIFEROL 1.25 MG/1
50000 CAPSULE, LIQUID FILLED ORAL WEEKLY
COMMUNITY
Start: 2024-06-18 | End: 2024-06-21

## 2024-06-21 RX ORDER — SIMETHICONE 125 MG
125 TABLET,CHEWABLE ORAL 2 TIMES DAILY
Qty: 60 TABLET | Refills: 0 | Status: SHIPPED | OUTPATIENT
Start: 2024-06-21 | End: 2024-07-26

## 2024-06-21 NOTE — PROGRESS NOTES
Preoperative Evaluation  [address] [address]  Phone: [phone]  Primary Provider: Ernestina Argueta MD  Pre-op Performing Provider: NILS Amos CNP  Jun 21, 2024 6/21/2024   Surgical Information   What procedure is being done? Neck surgery   Facility or Hospital where procedure/surgery will be performed: [facility]   Who is doing the procedure / surgery? Dr. Aleshia Jackson   Date of surgery / procedure: 6/25/24   Time of surgery / procedure: 5:45pm   Where do you plan to recover after surgery? at home with family      Fax number for surgical facility: [phone]    Assessment & Plan     The proposed surgical procedure is considered HIGH risk.    Preop general physical exam  - EKG 12-lead complete w/read - Clinics  - CBC with platelets  - Comprehensive metabolic panel (BMP + Alb, Alk Phos, ALT, AST, Total. 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 Bili, TP)    Bilateral lower extremity edema  - approx 1mo of +1 pitting edema of the shins and ankles bilaterally, new per patient report though significant varicose veins on assessment, Wells 0, PERC 1 (age)  - consulted with Dr. Mosher, will check:  - BNP-N terminal pro  - Echocardiogram Complete  - discussed w/ pt sxs likely related to decreased mobility as of late, reviewed supportive measures for management  - Compression Sleeve/Stocking Order for DME - ONLY FOR DME  - reviewed worrisome sxs to watch for including sxs of DVT/VTE, PE and when to present to ER    Unspecified abnormalities of breathing  - pt declined any dyspnea, dyspnea on exertion, orthopnea however has limited mobility due  to generalized weakness  - BNP-N terminal pro    Belching  - worsening belching x8mo, will trial lifestyle modifications and simethicone:  - simethicone (MYLICON) 125 MG chewable tablet  Dispense: 60 tablet; Refill: 0  - The uses and side effects, including black box warnings as appropriate, were discussed in detail.  All patient questions were answered.  The patient was instructed to call immediately if any side effects developed.    Postoperative infection of knee (H)  - per review of patient's chart, when in [place] fall of 2023 pt had a fall where she suffered a distal femoral fracture that was treated with open reduction internal fixation in which resulted in a post-operative infection  - was seen in the ER 11/2023 and found to have right knee infection w/ ESBL E coli  - in December 2023 she underwent revision right total knee arthroplasty and was admitted 12/18/23-12/21/23 w/ complicated orthopedic infection and was followed by outpatient ID, getting IV ertapenem for 12 weeks, antibiotics were stopped 3/10/24    Essential hypertension  - well controlled today  - continue losartan 50mg every day   - Comprehensive metabolic panel (BMP + Alb, Alk Phos, ALT, AST, Total. Bili, TP)    Iron deficiency anemia, unspecified iron deficiency anemia type  - last hgb 10.2, not currently on iron supplement  - CBC with platelets    Adjustment disorder with mixed anxiety and depressed mood  - not currently on medications  - stable per patient    SNHL (sensory-neural hearing loss), asymmetrical  - stable    Walker as ambulation aid  - currently using walker for ambulation, denies using wheelchair    Elevated alkaline phosphatase level  - slightly more increased from check last month, likely related to recent msk fractures, infections, etc w/ normal AST, ALT and no abdominal sxs  - recommend to continue to monitor / trend prn     Risks and Recommendations  The patient has the following additional risks and recommendations for  perioperative complications:  Infection:  - history of significant infection s/p knee / femur surgery in [place]    Preoperative Medication Instructions  Antiplatelet or Anticoagulation Medication Instructions   - Patient is on no antiplatelet or anticoagulation medications.    Additional Medication Instructions  Take all scheduled medications on the day of surgery EXCEPT for modifications listed below:   - ACE/ARB: DO NOT TAKE on day of surgery (minimum 11 hours for general anesthesia).   - Herbal medications and vitamins: DO NOT TAKE 14 days prior to surgery.   - Topicals: DO NOT TAKE day of surgery.    Recommendation  Approval given to proceed with proposed procedure after review of diagnostic evaluation.  Labs show normal BNP, unremarkable CMP except for persistently elevated alk phose (see above), and stable hemoglobin at 11.1.       Tyrell Sims is a 83 year old, presenting for the following:  Pre-Op Exam    HPI related to upcoming procedure: 83 year old female here for a preoperative assessment prior to C3-C6 decompression and fusion with neurosurgery.     Patient still expresses some concerns about the surgery. She expresses that she has been feeling well and is not sure she wishes to continue with the surgery. She is scared that if she doesn't have the surgery, she will be no longer able to grasp things or to walk anymore.     Burping - increased burping, reports present for 8 months. Significant. Not always related to meals. Denies weight loss, abdominal pain, dysphagia, heartburn, and regurgitation. Does feel a hoarse voice.     Pedal edema - reports some mornings wake up with significant edema. They seem to improve at night time when she elevates them. Denies chest pain, dyspnea, orthopnea, weight changes. Per chart review, pt's weight was 121lbs on 3/15/24. Was 133lbs on 6/6/24 per chart review. Today is 126lbs.         6/21/2024   Pre-Op Questionnaire   Have you ever had a heart attack or stroke?  No   Have you ever had surgery on your heart or blood vessels, such as a stent placement, a coronary artery bypass, or surgery on an artery in your head, neck, heart, or legs? No   Do you have chest pain with activity? No   Do you have a history of heart failure? No   Do you currently have a cold, bronchitis or symptoms of other infection? No   Do you have a cough, shortness of breath, or wheezing? No, but wakes up with a cough and feeling her voice is raspy for at least one month. No fevers/chills. Denies dyspnea, orthopnea, chest pain, or dizziness.    Do you or anyone in your family have previous history of blood clots? No   Do you or does anyone in your family have a serious bleeding problem such as prolonged bleeding following surgeries or cuts? No   Have you ever had problems with anemia or been told to take iron pills? No   Have you had any abnormal blood loss such as black, tarry or bloody stools, or abnormal vaginal bleeding? No   Have you ever had a blood transfusion? (!) YES   Have you ever had a transfusion reaction? No   Are you willing to have a blood transfusion if it is medically needed before, during, or after your surgery? Yes   Have you or any of your relatives ever had problems with anesthesia? Reports after her last procedure she was experiencing some abnormalities, felt like she was in a field with people lifting her arms up.    Do you have sleep apnea, excessive snoring or daytime drowsiness? No   Do you have any artifical heart valves or other implanted medical devices like a pacemaker, defibrillator, or continuous glucose monitor? No   Do you have artificial joints? No   Are you allergic to latex? No      Health Care Directive  Patient does not have a Health Care Directive or Living Will: Discussed advance care planning with patient; information given to patient to review.    Preoperative Review of    reviewed - controlled substances reflected in medication list.    Status of Chronic  Conditions:  ANEMIA - Patient has a recent history of moderate-severe anemia, which has been symptomatic. Work up to date has revealed hemoglobin of 9.1 last checked in March. Treatment has been iron supplementation.     HYPERTENSION - Patient has longstanding history of HTN , currently denies any symptoms referable to elevated blood pressure. Specifically denies chest pain, palpitations, dyspnea, orthopnea, PND or peripheral edema. Blood pressure readings have been in normal range. Current medication regimen is as listed below. Patient denies any side effects of medication.     Patient Active Problem List    Diagnosis Date Noted    Myelomalacia (H) 06/21/2024     Priority: Medium    Postoperative infection of knee (H) 06/21/2024     Priority: Medium    Spinal stenosis in cervical region 06/21/2024     Priority: Medium    Compression fracture of T12 vertebra, initial encounter (H) 03/22/2024     Priority: Medium    Wheelchair dependence 03/12/2024     Priority: Medium    Essential hypertension 12/19/2023     Priority: Medium    Infection of orthopedic implant (H24) 12/01/2023     Priority: Medium    Elevated blood pressure reading without diagnosis of hypertension 06/14/2021     Priority: Medium    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     Priority: Medium     Treated 11/2019 - 3/2020 with Rifampin      Hypovitaminosis D 12/20/2018     Priority: Medium    Fatty liver 06/06/2018     Priority: Medium    Gallbladder polyps 06/06/2018     Priority: Medium    Adjustment disorder with mixed anxiety and depressed mood 12/08/2017     Priority: Medium    Other specified depressive episodes 11/15/2017     Priority: Medium    Closed nondisplaced zone I fracture of sacrum with routine healing, subsequent encounter 09/19/2017     Priority: Medium    Macular drusen, bilateral 10/25/2016     Priority: Medium    Dermatochalasis of eyelid 10/25/2016     Priority: Medium    Pseudophakia of both eyes 10/25/2016     Priority: Medium    Lumbar  spinal stenosis 01/19/2016     Priority: Medium    History of total hip arthroplasty, left 12/08/2015     Priority: Medium    Osteoporosis 12/02/2015     Priority: Medium    Advance care planning 01/28/2015     Priority: Medium     Advance Care Planning 9/14/2017: ACP Review of Chart / Resources Provided:  Reviewed chart for advance care plan.  Bethany Logan has no plan or code status on file. Discussed available resources and provided with information.   Added by Markus Caro  Advance Care Planning 8/3/2016 and 11/2/16: ACP Review of Chart / Resources Provided:  Reviewed chart for advance care plan.  Bethany Logan has no plan or code status on file. Discussed available resources and provided with information. Confirmed code status reflects current choices pending further ACP discussions.  Confirmed/documented legally designated decision makers.  Added by Markus Caro  Advance Care Planning 7/13/2016: ACP Review of Chart / Resources Provided:  Reviewed chart for advance care plan.  Bethany Logan has no plan or code status on file. Confirmed/documented legally designated decision makers.  Added by Markus Caro  Advance Care Planning 1/28/2015: ACP Review and Resources Provided:  Reviewed chart for advance care plan.  Bethany Logan has no plan or code status on file. Mailed available resources and provided with information. Confirmed code status reflects current choices pending further ACP discussions.  Confirmed/documented designated decision maker(s). See permanent comments section of demographics in clinical tab. Added by Maki Esposito on 1/28/2015        Chronic left-sided low back pain with left-sided sciatica 08/19/2014     Priority: Medium    SNHL (sensory-neural hearing loss), asymmetrical 12/05/2013     Priority: Medium    History of total knee arthroplasty - right 10/08/2013     Priority: Medium    Anemia 09/30/2013     Priority: Medium     Constipation      Priority: Medium    OA (osteoarthritis) of knee - bilateral 11/14/2012     Priority: Medium      Past Medical History:   Diagnosis Date    Closed displaced supracondylar fracture without intracondylar extension of lower end of right femur (H) 12/01/2023    Constipation     Hypertension     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) 12/01/2023    Neida-prosthetic supracondylar fracture of femur 12/24/2023     Past Surgical History:   Procedure Laterality Date    CATARACT IOL, RT/LT      COMBINED REPAIR PTOSIS WITH BLEPHAROPLASTY Bilateral 01/09/2017    Procedure: COMBINED REPAIR PTOSIS WITH BLEPHAROPLASTY;  Surgeon: Keven Vázquez MD;  Location: [facility] OR    SMALL BOWEL RESECTION  04/08/2021    SURGICAL HISTORY OF -       Left hip bone surgery    ZZC TOTAL KNEE ARTHROPLASTY  09/25/2013    Right     Current Outpatient Medications   Medication Sig Dispense Refill    acetaminophen (TYLENOL) 325 MG tablet Take 2 tablets (650 mg) by mouth every 6 hours as needed for mild pain. **Hoback 2 tabletas (650 mg) por la boca cada 6 horas edmundo sea necesario para el dolor leve. 100 tablet 1    Ascorbic Acid (VITAMIN C) 500 MG CAPS Take 1 capsule by mouth daily      calcium carbonate-vitamin D (OSCAL) 500-5 MG-MCG tablet Take 1 tablet by mouth twice daily. 180 tablet 1    carboxymethylcellulose PF (CARBOXYMETHYLCELLULOSE SODIUM) 0.5 % ophthalmic solution Place 1 drop into both eyes 4 times daily as needed for dry eyes 30 each 11    ketotifen fumarate 0.035%, ketotifen 0.025%, (ZADITOR) 0.025 % ophthalmic solution Place 1 drop into both eyes 2 times daily 5 mL 11    Lidocaine (LIDOCARE) 4 % Patch Place 1 patch onto the skin every 24 hours To prevent lidocaine toxicity, patient should be patch free for 12 hrs daily. 30 patch 1    losartan (COZAAR) 50 MG tablet Take 1 tablet (50 mg) by mouth daily. * Hoback 1 tableta (50 mg) por la boca cada trevor. 90 tablet 1    magnesium gluconate (MAGONATE) 500 (27 Mg) MG tablet Take  "1 tablet (500 mg) by mouth at bedtime 90 tablet 4    simethicone (MYLICON) 125 MG chewable tablet Take 1 tablet (125 mg) by mouth 2 times daily 60 tablet 0    triamcinolone (KENALOG) 0.1 % external lotion Apply topically 3 times daily 120 mL 1     Allergies   Allergen Reactions    Aspirin Hives      Social History     Tobacco Use    Smoking status: Never    Smokeless tobacco: Never   Substance Use Topics    Alcohol use: No     Family History   Problem Relation Age of Onset    C.A.D. No family hx of     Diabetes No family hx of     Hypertension No family hx of     Cerebrovascular Disease No family hx of     Glaucoma No family hx of     Macular Degeneration No family hx of      History   Drug Use No       Review of Systems  CONSTITUTIONAL: NEGATIVE for fever, chills, change in weight  INTEGUMENTARY/SKIN: NEGATIVE for worrisome rashes, moles or lesions  EYES: NEGATIVE for vision changes or irritation  ENT/MOUTH: NEGATIVE for ear, mouth and throat problems  RESP: NEGATIVE for significant cough or SOB  BREAST: NEGATIVE for masses, tenderness or discharge  CV: NEGATIVE for chest pain, palpitations or peripheral edema  GI: NEGATIVE for nausea, abdominal pain, heartburn, or change in bowel habits  : NEGATIVE for frequency, dysuria, or hematuria  MUSCULOSKELETAL: NEGATIVE for significant arthralgias or myalgia  NEURO: NEGATIVE for weakness, dizziness or paresthesias  ENDOCRINE: NEGATIVE for temperature intolerance, skin/hair changes  HEME: NEGATIVE for bleeding problems  PSYCHIATRIC: NEGATIVE for changes in mood or affect    Objective    /74   Pulse 79   Temp 97.5  F (36.4  C) (Temporal)   Resp 14   Ht 1.461 m (4' 9.5\")   Wt 57.2 kg (126 lb)   LMP  (LMP Unknown)   SpO2 99%   BMI 26.79 kg/m     Estimated body mass index is 26.79 kg/m  as calculated from the following:    Height as of this encounter: 1.461 m (4' 9.5\").    Weight as of this encounter: 57.2 kg (126 lb).  Physical Exam  GENERAL: alert and no " distress  EYES: Eyes grossly normal to inspection, PERRL and conjunctivae and sclerae normal  HENT: ear canals and TM's normal, nose and mouth without ulcers or lesions  NECK: no adenopathy, no asymmetry, masses, or scars  RESP: lungs clear to auscultation - no rales, rhonchi or wheezes  CV: regular rate and rhythm, normal S1 S2, no S3 or S4, no murmur, click or rub. +1 pitting edema bilaterally over ankles and shins bilaterally (no erythema, tenderness)  ABDOMEN: soft, nontender, no hepatosplenomegaly, no masses and bowel sounds normal  MS: no gross musculoskeletal defects noted, no edema  SKIN: no suspicious lesions or rashes  NEURO: Normal strength and tone, mentation intact and speech normal  PSYCH: mentation appears normal, affect normal/bright    Recent Labs   Lab Test 03/22/24  1036 03/11/24  1155 03/04/24  1300   HGB  --  9.1* 9.6*   PLT  --  368 326     --   --    POTASSIUM 4.1  --   --    CR 0.59 0.48* 0.61      Diagnostics  Labs pending at this time.  Results will be reviewed when available.   EKG: appears normal, NSR, normal axis, normal intervals, no acute ST/T changes c/w ischemia, no LVH by voltage criteria, unchanged from previous tracings    Revised Cardiac Risk Index (RCRI)  The patient has the following serious cardiovascular risks for perioperative complications:   - High risk surgery (>5% cardiac complication risk) = 1 point   RCRI Interpretation: 1 point: Class II (low risk - 0.9% complication rate)     Signed Electronically by: NILS Amos CNP  Copy of this evaluation report is provided to requesting physician.

## 2024-06-21 NOTE — TELEPHONE ENCOUNTER
Called and spoke with daughter-in-law Kusum ([facility] on file). Family and pt are not home and on their way to appointment now.  However, Kusum reports that she has pictures of all the medications that patient takes.  Kusum requested this RN be present for appointment. RN explained unfortunately, would not be possible. Did explain that they will have an  for the visit.       Kusum verbalized understanding and agrees with plan.       Lashawn Hernandez RN    [facility]

## 2024-06-21 NOTE — TELEPHONE ENCOUNTER
Hi nursing,    I see some medications that need to be reconciled for this patient. Do you think you would be able to call her prior to her preop appointment (hopefully while she is still at home and can look at them) to update the list?    Thanks so much,  NILS Amos CNP

## 2024-06-21 NOTE — PATIENT INSTRUCTIONS
How to Take Your Medication Before Surgery  Preoperative Medication Instructions   Take all scheduled medications on the day of surgery EXCEPT for modifications listed below:   - ACE/ARB: DO NOT TAKE on day of surgery (minimum 11 hours for general anesthesia).   - Herbal medications and vitamins: DO NOT TAKE 14 days prior to surgery.   - Topicals: DO NOT TAKE day of surgery.     Patient Education   Preparing for Your Surgery  Getting started  A nurse will call you to review your health history and instructions. They will give you an arrival time based on your scheduled surgery time. Please be ready to share:  Your doctor's clinic name and phone number  Your medical, surgical, and anesthesia history  A list of allergies and sensitivities  A list of medicines, including herbal treatments and over-the-counter drugs  Whether the patient has a legal guardian (ask how to send us the papers in advance)  Please tell us if you're pregnant--or if there's any chance you might be pregnant. Some surgeries may injure a fetus (unborn baby), so they require a pregnancy test. Surgeries that are safe for a fetus don't always need a test, and you can choose whether to have one.   If you have a child who's having surgery, please ask for a copy of Preparing for Your Child's Surgery.    Preparing for surgery  Within 10 to 30 days of surgery: Have a pre-op exam (sometimes called an H&P, or History and Physical). This can be done at a clinic or pre-operative center.  If you're having a , you may not need this exam. Talk to your care team.  At your pre-op exam, talk to your care team about all medicines you take. If you need to stop any medicines before surgery, ask when to start taking them again.  We do this for your safety. Many medicines can make you bleed too much during surgery. Some change how well surgery (anesthesia) drugs work.  Call your insurance company to let them know you're having surgery. (If you don't have  insurance, call [phone].)  Call your clinic if there's any change in your health. This includes signs of a cold or flu (sore throat, runny nose, cough, rash, fever). It also includes a scrape or scratch near the surgery site.  If you have questions on the day of surgery, call your hospital or surgery center.  Eating and drinking guidelines  For your safety: Unless your surgeon tells you otherwise, follow the guidelines below.  Eat and drink as usual until 8 hours before you arrive for surgery. After that, no food or milk.  Drink clear liquids until 2 hours before you arrive. These are liquids you can see through, like water, Gatorade, and Propel Water. They also include plain black coffee and tea (no cream or milk), candy, and breath mints. You can spit out gum when you arrive.  If you drink alcohol: Stop drinking it the night before surgery.  If your care team tells you to take medicine on the morning of surgery, it's okay to take it with a sip of water.  Preventing infection  Shower or bathe the night before and morning of your surgery. Follow the instructions your clinic gave you. (If no instructions, use regular soap.)  Don't shave or clip hair near your surgery site. We'll remove the hair if needed.  Don't smoke or vape the morning of surgery. You may chew nicotine gum up to 2 hours before surgery. A nicotine patch is okay.  Note: Some surgeries require you to completely quit smoking and nicotine. Check with your surgeon.  Your care team will make every effort to keep you safe from infection. We will:  Clean our hands often with soap and water (or an alcohol-based hand rub).  Clean the skin at your surgery site with a special soap that kills germs.  Give you a special gown to keep you warm. (Cold raises the risk of infection.)  Wear special hair covers, masks, gowns and gloves during surgery.  Give antibiotic medicine, if prescribed. Not all surgeries need antibiotics.  What to bring on the day of  surgery  Photo ID and insurance card  Copy of your health care directive, if you have one  Glasses and hearing aids (bring cases)  You can't wear contacts during surgery  Inhaler and eye drops, if you use them (tell us about these when you arrive)  CPAP machine or breathing device, if you use them  A few personal items, if spending the night  If you have . . .  A pacemaker, ICD (cardiac defibrillator) or other implant: Bring the ID card.  An implanted stimulator: Bring the remote control.  A legal guardian: Bring a copy of the certified (court-stamped) guardianship papers.  Please remove any jewelry, including body piercings. Leave jewelry and other valuables at home.  If you're going home the day of surgery  You must have a responsible adult drive you home. They should stay with you overnight as well.  If you don't have someone to stay with you, and you aren't safe to go home alone, we may keep you overnight. Insurance often won't pay for this.  After surgery  If it's hard to control your pain or you need more pain medicine, please call your surgeon's office.  Questions?   If you have any questions for your care team, list them here: _________________________________________________________________________________________________________________________________________________________________________ ____________________________________ ____________________________________ ____________________________________  For informational purposes only. Not to replace the advice of your health care provider. Copyright   2003, 2019 [facility]. All rights reserved. Clinically reviewed by Corrie Mcgovern MD. [employer] [id number] - REV 12/22.

## 2024-06-24 ENCOUNTER — TELEPHONE (OUTPATIENT)
Dept: FAMILY MEDICINE | Facility: CLINIC | Age: 84
End: 2024-06-24
Payer: COMMERCIAL

## 2024-06-24 NOTE — PROGRESS NOTES
Benji Delarosa APRN CNP P [place] Nurse Pool - Primary Care  Cc: [facility]  TC team - please fax labs and preop note.    RN team -  Please call the patient to review lab results:    Hello,    Your labs were stable for surgery. Your alk phos was elevated, likely related to all the recent issues you've had with your bones. You and your primary can continue to monitor this as needed. Your hemoglobin has been improving.    Please let me know if you have any concerns/questions.    Thanks,  NILS Amos CNP      Printed pre op visit notes and lab flow sheet and faxed to

## 2024-06-24 NOTE — TELEPHONE ENCOUNTER
This writer attempted to contact patient on 06/24/24      Reason for call resutls and left message.      If patient calls back:   Registered Nurse called.     ----- Message from Benji Delarosa sent at 6/24/2024  4:43 PM CDT -----  TC team - please fax labs and preop note.     RN team -   Please call the patient to review lab results:    Hello,    Your labs were stable for surgery. Your alk phos was elevated, likely related to all the recent issues you've had with your bones. You and your primary can continue to monitor this as needed. Your hemoglobin has been improving.     Please let me know if you have any concerns/questions.     Thanks,  Benji Delarosa, APRN JACQUELIN Corral, SAMUELN, RN  [facility]

## 2024-06-25 ENCOUNTER — APPOINTMENT (OUTPATIENT)
Dept: INTERPRETER SERVICES | Facility: CLINIC | Age: 84
End: 2024-06-25
Payer: COMMERCIAL

## 2024-06-25 NOTE — TELEPHONE ENCOUNTER
Called patient mobile on file and son Conor answered.   CTC on file for son.    RN relayed provider message below via [employer] interp.    No questions or concern at this time.      Alejandra Jones RN  [facility]

## 2024-06-28 ENCOUNTER — PATIENT OUTREACH (OUTPATIENT)
Dept: GERIATRIC MEDICINE | Facility: CLINIC | Age: 84
End: 2024-06-28
Payer: COMMERCIAL

## 2024-06-28 NOTE — PROGRESS NOTES
TRANSITIONS OF CARE (BRIAN) LOG    BRIAN tasks should be completed by the CC within one (1) business day of notification of each transition. Follow up contact with member is required after return to their usual care setting.  Note:  If CC finds out about the transitions fifteen (15) days or more after the member has returned to their usual care setting, no BRIAN log is needed. However, the CC should check in with the member to discuss the transition process, any changes needed to the care plan and document it in a case note.     Member Name:  [facility]ethany Logan [facility] [id number]   [place] [facility] [place] Contact:  Lizzie Wilson RN Agency/County/Care System: [employer]   Transition Communication Actions from Care Management Contact   Transition #1   Notification Date: 06/28/25 Transition Date:   06/25/24 Transition From: Home     Is this the member s usual care setting?               yes Transition To: Hospital, [facility]   Transition Type:  Planned    Documentation from conversation with the member/responsible party, provider, discharging and receiving facility:   Date: 06/28/24: Received notification of admission to hospital with dx of  C3-6 decompression and fusion surgery.  CC contacted Hospital /discharge planner, ADAM Xiong  and left voicemail requesting call back with any questions or concerns, notified of current community services Bethany is receiving.   CC reached out to adult son Sudhakar Jain  regarding transition and left a message requesting a return call.  Reviewed and update care plan as needed.  Notified community service providers and placed services Adult Day Care Homemaking PCA. Care coordinator called [facility] adult day care who are both aware that Bethany is currently hospitalized.  Transition log initiated.   PCP, Ernestina Alvarado, notified of hospitalization via  "EMR.  Adrianne Jiang RN  [facility]  [phone]    7/1/24: Communicated with Kusum, member's daughter-in-law, who said she is discharging home from [facility] today. Reviewed hospital notes, \"Discharge home today  -Follow up with [facility] for 2-4 weeks post-op   Miranda Sher PA-C 10:26 AM  [facility]  Office: [phone]\"   Also read note from , dated 6/30/24, Karen PARK, MSW, LGSW: [phone] F: [phone] that member was discharging home on 6/30 with home care and previous PCA & family supports. I do not see home care orders in the [facility] chart yet, so left a voicemail for Karen asking for return call to discuss.   Will review chart again tomorrow to be sure she goes home, then will contact agencies to inform them. Lizzie Wilson RN [facility], [phone].       Transition #2   Notification Date: 7/2/24 Transition Date: 7/1/24 Transition From: [facility]     Is this the member s usual care setting?               no Transition To: Home   Transition Type:  Planned    Documentation from conversation with the member/responsible party, provider, discharging and receiving facility:   Date: 7/2/24: Received notification of transition to home.  CC contacted member, family Kusum, daughter-in-law, and left message asking for return call . Texted with her on 7/1/24 and she said Bethany was ready to go back home. I asked if someone would be staying with her at her apartment due to needing help with more things after surgery.    Communicated with MEAGHAN Wharton case manager at [facility], who confirmed that the member discharged home 7/1/24 with [facility] (P) [phone] (F): [phone]. Per Akiko, she did not go home on 6/30/24 as planned, as the pharmacy she uses was closed so she would not be able to get her pain medication. They decided to wait until 7/1/24.   Member has a follow-up appointment " with specialist: Yes: scheduled on 7/16/24    Member has had a change in condition: Yes: temporary due to recovering from surgery   Home visit needed: No  Care plan reviewed and updated.  The following home based services Adult Day Care, Homemaking, PCA  were resumed.  New referrals placed: No  Transition log completed.   PCP, Ernestina Alvarado, notified of transition back to home via EMR.       *RETURN TO USUAL CARE SETTING: *Complete tasks below when the member is discharging TO their usual care setting within one (1) business day of notification..      For situations where the Care Coordinator is notified of the discharge prior to the date of discharge, the Care Coordinator must follow up with the member or designated representative to confirm that discharge actually occurred and discuss required BRIAN tasks as outlined in the BRIAN Instructions.  (This includes situations where it may be a  new  usual care setting for the member. (i.e., a community member who decides upon permanent nursing home placement following hospitalization and rehab).    Discuss with Member/Responsible Party:    Check  Yes  - if the member, family member and/or SNF/facility staff manages the following:    If  No  provide explanation in the comments section.          Date completed: 7/2/24 Communicated with member or their designated representative about the following:  care transition process; about changes to the member s health status; plan of care updates; education about transitions and how to prevent unplanned transitions/readmissions    Four Pillars for Optimal Transition:    Check  Yes  - if the member, family member and/or SNF/facility staff manages the following:    If  No  provide explanation in the comments section.          []  Yes     [x]  No Does the member have a follow-up appointment scheduled with primary care or specialist? (Mental health hospitalizations--the appt. should be w/in 7 days)              For mental  health hospitalizations:  []  Yes     []  No     Does the member have a follow-up appointment scheduled with a mental health practitioner within 7 days of discharge?  [x]  Yes     []  No     Has a medication review been completed with member? If no, refer to PCP, home care nurse, MTM, pharmacist  [x]  Yes     []  No     Can the member manage their medications or is there a system in place to manage medications (e.g. home care set-up)?         [x]  Yes     []  No     Can the member verbalize warning signs and symptoms to watch for and how to respond?  [x]  Yes     []  No     Does the member have a copy of and understand their discharge instructions?  If no, assist to obtain copy of discharge instructions, review discharge instructions, and assist to contact PCP to discuss questions about their recent hospitalization.  [x]  Yes     []  No     Does the member have adequate food, housing and transportation?  If no, add goal and discuss additional supports available to the member                                                                                                                                                                                 [x]  Yes     []  No     Is the member safe in their home?  If no, document needs and support provided                                                                                                                                                                          []  Yes     [x]  No     Are there any concerns of vulnerability, abuse, or neglect?  If yes, document concerns and actions taken by Care Coordinator as a mandated                                                                                                                                                                              [x]  Yes     []  No     Does the member use a Personal Health Care Record?  Check  Yes  if visit summary, discharge summary, and/or healthcare summary are being used as  a PHR.                                                                                                                                                                                  [x]  Yes     []  No     Have you reviewed the discharge summary with the member? If  No  provide explanation in comments.  [x]  Yes     []  No     Have you updated the member s care plan/support plan? Add new diagnosis, medications, treatments, goals & interventions, as applicable. If No, provide explanation in comments.    Comments:  Member's family will schedule a follow-up with PCP. She does have a follow-up scheduled with her orthopedic surgeon on 7/16/24.    Notes from conversation with the member/responsible party, provider, discharging and receiving facility (as applicable): communicated with Akiko, inpatient  at [facility] [place] [place], who confirmed the member discharged home with home care on 7/1/24. Per Kusum, daughter-in-law, they did not get prescriptions for mag oxide or ferrous sulfate, both of which are on her [facility] discharge summary. Writer called Akiko back and left detailed voicemail that they did not get the 2 Rxs and to please call them to the [facility] [place] [place] [facility].  Lizzie Wilson RN [facility] [phone]

## 2024-07-03 ENCOUNTER — TELEPHONE (OUTPATIENT)
Dept: FAMILY MEDICINE | Facility: CLINIC | Age: 84
End: 2024-07-03
Payer: COMMERCIAL

## 2024-07-03 NOTE — TELEPHONE ENCOUNTER
Home Care is calling regarding an established patient with [facility].     Requesting orders from: Ernestina Alvarado  Provider is following patient: Need to confirm provider is following     Orders Requested    Skilled Nursing  Request for delay in care   Delay start to date: 7/5/24       Physical Therapy  Request for delay in care   Delay start to date: 7/5/24       Occupational Therapy  Request for delay in care   Delay start to date: 7/5/24 due       HHA (Home Health Aide)  Request for delay in care   Delay start to date: 7/5/24       Information was gathered and will be sent to provider for review.  RN will contact Home Care with information after provider review.  Information was gathered and will be sent to provider to confirm provider will be following patient.  RN will contact Home Care with information after provider review.  Confirmed ok to leave a detailed message with call back.  Contact information confirmed and updated as needed.    Janice Casas RN

## 2024-07-03 NOTE — TELEPHONE ENCOUNTER
Called back number on file, goes to their RN line and MEAGHAN Mccormick picked up who mentioned they can take the verbal order. Relayed provider verbal OK for requested HHC orders below, RN verbalized understanding. No further questions or concerns.    Chante Badillo RN, BSN  [facility]

## 2024-07-05 ENCOUNTER — PATIENT OUTREACH (OUTPATIENT)
Dept: GERIATRIC MEDICINE | Facility: CLINIC | Age: 84
End: 2024-07-05
Payer: COMMERCIAL

## 2024-07-05 ENCOUNTER — TELEPHONE (OUTPATIENT)
Dept: FAMILY MEDICINE | Facility: CLINIC | Age: 84
End: 2024-07-05
Payer: COMMERCIAL

## 2024-07-05 ENCOUNTER — MEDICAL CORRESPONDENCE (OUTPATIENT)
Dept: HEALTH INFORMATION MANAGEMENT | Facility: CLINIC | Age: 84
End: 2024-07-05

## 2024-07-05 DIAGNOSIS — D50.9 IRON DEFICIENCY ANEMIA, UNSPECIFIED IRON DEFICIENCY ANEMIA TYPE: Primary | ICD-10-CM

## 2024-07-05 DIAGNOSIS — E61.1 IRON DEFICIENCY: Primary | ICD-10-CM

## 2024-07-05 RX ORDER — METHOCARBAMOL 500 MG/1
500 TABLET, FILM COATED ORAL
COMMUNITY
Start: 2024-06-30

## 2024-07-05 RX ORDER — AMOXICILLIN 250 MG
1-4 CAPSULE ORAL
COMMUNITY
Start: 2024-06-30 | End: 2024-07-26

## 2024-07-05 RX ORDER — OXYCODONE HYDROCHLORIDE 5 MG/1
5 TABLET ORAL
COMMUNITY
Start: 2024-06-30 | End: 2024-07-26

## 2024-07-05 NOTE — TELEPHONE ENCOUNTER
Should have magnesium at this [facility] pharmacy already. Iron listed as discontinued.  Patient has appt with me 7/26, can address iron at that time.    Ernestina Argueta MD

## 2024-07-05 NOTE — PROGRESS NOTES
[facility] Care Coordination Contact    Dr. Juan Argueta, please see part of discharge med list below - from [facility] on 7/1/24:            According to the AVS in the [facility] chart, they wanted Bethany to take a ferrous sulfate 325 mg (65 mg iron) tablet once daily, but did not send the Rx to her pharmacy.   I tried to reach the inpatient care coordinator to ask the hospitalist to send that in, along with mag ox 400 mg (per discharge list), but they did not return my call and did not send the Rxs in.    The Mag ox dose on the discharge summary is 400 mg daily. Do you want her to resume her hndhp-ln-wnenshzci dose of 500 mg daily?   They checked her magnesium on 6/25/24 at [facility]:    Magnesium  1.6 - 2.6 mg/dL 1.9     Thank you for your time.  Lizzie Wilson RN care coordinator   [facility]   [phone]

## 2024-07-05 NOTE — PROGRESS NOTES
[facility] Care Coordination Contact      [facility] Six-Month Telephone Assessment    6 month telephone assessment completed on 7/5/24.    ER visits: Yes -  [facility]  Hospitalizations: Yes -  [facility]  TCU stays: Yes -  [facility])  to recover from femur fracture and infection of surgical leg, including therapies and IV antibiotics from Dec-3/1/24.  Significant health status changes: yes - Bethany just had neck surgery on 6/25/24 at [facility].   Falls/Injuries: Yes: several falls in the past year, including fall that caused femur fracture   ADL/IADL changes: Yes: Bethany needs supervision and assistance with all mobility and needs someone with her 24 hours per day, per doctor's orders at discharge.   Changes in services: Yes: 45-day temp auth to increase PCA from 4 hours/day to 6 hours per day (effective 7/3/24 - 8/16/24)    Caregiver Assessment follow up:  NA    Goals: See POC in chart for goal progress documentation.      Will see member in 6 months for an annual health risk assessment.   Encouraged member to call CC with any questions or concerns in the meantime.   Lizzie Wilson RN  [facility]   [phone]

## 2024-07-05 NOTE — PROGRESS NOTES
[facility] Care Coordination Contact    Bethany's daughter-in-law, Kusum said she did not receive two of the prescriptions that are on her Discharge Summary from [facility] from 7/1/24.     She needs prescriptions for ferrous sulfate 325 mg (1 tab PO daily) and magnesium oxide 400 mg tablets (1 tab PO daily), according to Discharge Summary (in Epic under her 6/25 - 7/1/24 admission). Bethany uses the [facility] at [address], [facility]) [phone]. I called the pharmacy and they never received these 2 scripts.     +Of note, her hemoglobin was 9.9 on 6/29/24.+    Bethany has a post-op scheduled for 7/16/24 - with neurosurgeon, Aleshia Jackson MD at [facility] (P- [phone], F- [phone]).    Dr. Juan Argueta, do you want to see Bethany for a hospital follow-up as well? If so, please have your team call her daughter-in-law, Kusum (with a ) to schedule this. Thank you.    Lizzie Wilson RN  [facility]   [phone]

## 2024-07-05 NOTE — TELEPHONE ENCOUNTER
Home Care is calling regarding an established patient with [facility].       Requesting orders from: Ernestina Alvarado  Provider is following patient: No       Orders Requested    Skilled Nursing  Request for initial certification (first set of orders)   Frequency:  2x/wk for 1 wks  then 1x/wk for 3 wks  3 PRN      HHA (Home Health Aide)  Request for initial certification (first set of orders)   Frequency:  1x/wk for 3 wks      Confirmed ok to leave a detailed message with call back.  Contact information confirmed and updated as needed.    Alberta Small RN

## 2024-07-08 ENCOUNTER — TELEPHONE (OUTPATIENT)
Dept: FAMILY MEDICINE | Facility: CLINIC | Age: 84
End: 2024-07-08
Payer: COMMERCIAL

## 2024-07-08 ENCOUNTER — MEDICAL CORRESPONDENCE (OUTPATIENT)
Dept: HEALTH INFORMATION MANAGEMENT | Facility: CLINIC | Age: 84
End: 2024-07-08
Payer: COMMERCIAL

## 2024-07-08 RX ORDER — FERROUS SULFATE 325(65) MG
325 TABLET ORAL
Qty: 90 TABLET | Refills: 1 | Status: SHIPPED | OUTPATIENT
Start: 2024-07-08

## 2024-07-08 NOTE — TELEPHONE ENCOUNTER
Home Care is calling regarding an established patient with [facility].     Requesting orders from: Ernestina Alvarado  Provider is following patient: Yes  Is this a 60-day recertification request?  No    Orders Requested    Occupational Therapy  Request for delay in care, service is not able to be provided within same scheduled day. OT asking for delay start of care until this week, the week of July 8.    Information was gathered and will be sent to provider for review.  RN will contact Home Care with information after provider review.  Confirmed ok to leave a detailed message with call back.  Contact information confirmed and updated as needed.    Makeda Barrera RN

## 2024-07-08 NOTE — TELEPHONE ENCOUNTER
Home Care is calling regarding an established patient with [facility].       Requesting orders from: Ernestina Alvarado  Provider is following patient: Yes  Is this a 60-day recertification request?  No    Orders Requested    Physical Therapy  Request for initial certification (first set of orders)   Frequency:  2x/wk for 3 wks      Confirmed ok to leave a detailed message with call back.    Janice Matute RN

## 2024-07-08 NOTE — TELEPHONE ENCOUNTER
Unable to send prescriptions in this encounter.  Older encounter addended and ferrous sulfate sent.    Ernestina Argueta MD

## 2024-07-09 ENCOUNTER — PATIENT OUTREACH (OUTPATIENT)
Dept: GERIATRIC MEDICINE | Facility: CLINIC | Age: 84
End: 2024-07-09
Payer: COMMERCIAL

## 2024-07-09 NOTE — PROGRESS NOTES
[facility] Care Coordination Contact    Dr. Juan Argueta sent Rx for ferrous sulfate to member's pharmacy on 7/8/24. I let Kusum know to pick it up today and start it right away, since she has not had it since discharge.    Also reminded them that iron can cause constipation. Encouraged Bethany to use the Senna-docusate (prescribed at discharge) as needed.  Lizzie Wilson RN  [facility]   [phone]

## 2024-07-09 NOTE — TELEPHONE ENCOUNTER
This writer attempted to contact Yisel on 07/09/24      Reason for call orders and left detailed message.      If patient calls back:   Registered Nurse called.       SAMUEL PratherN, RN  [facility]

## 2024-07-09 NOTE — PROGRESS NOTES
[facility] Care Coordination Contact    Nevaeh, from [facility], emailed writer to ask if member has been discharged yet from her 6/25/24 hospitalization.    Informed her that Bethany is home with 24/7 care from family and gave her the phnone numbers for son, Sudhakar, and daughter-in-law, Kusum to offer services.  Lizzie Wilson RN  [facility]   [phone]

## 2024-07-09 NOTE — TELEPHONE ENCOUNTER
This writer attempted to contact Pippa on 07/09/24      Reason for call orders and left detailed message.      If patient calls back:   Registered Nurse called.       Jayshree Corral, SAMUELN, RN  [facility]

## 2024-07-16 ENCOUNTER — TELEPHONE (OUTPATIENT)
Dept: FAMILY MEDICINE | Facility: CLINIC | Age: 84
End: 2024-07-16
Payer: COMMERCIAL

## 2024-07-16 NOTE — TELEPHONE ENCOUNTER
Forms/Letter Request    Type of form/letter: Transportation ([facility])      Do we have the form/letter: Yes: Faxed in    Who is the form from? [employer] (if other please explain)    Where did/will the form come from? form was faxed in    When is form/letter needed by: ASAP    How would you like the form/letter returned: Fax : [phone]    Patient Notified form requests are processed in 5-7 business days:Yes    Okay to leave a detailed message?: Yes at Cell number on file:    Telephone Information:   Mobile [phone]

## 2024-07-24 DIAGNOSIS — Z53.9 DIAGNOSIS NOT YET DEFINED: Primary | ICD-10-CM

## 2024-07-24 PROCEDURE — G0180 MD CERTIFICATION HHA PATIENT: HCPCS | Performed by: FAMILY MEDICINE

## 2024-07-26 ENCOUNTER — TELEPHONE (OUTPATIENT)
Dept: FAMILY MEDICINE | Facility: CLINIC | Age: 84
End: 2024-07-26

## 2024-07-26 ENCOUNTER — OFFICE VISIT (OUTPATIENT)
Dept: FAMILY MEDICINE | Facility: CLINIC | Age: 84
End: 2024-07-26
Payer: COMMERCIAL

## 2024-07-26 ENCOUNTER — MEDICAL CORRESPONDENCE (OUTPATIENT)
Dept: HEALTH INFORMATION MANAGEMENT | Facility: CLINIC | Age: 84
End: 2024-07-26

## 2024-07-26 VITALS
HEIGHT: 58 IN | BODY MASS INDEX: 25.61 KG/M2 | SYSTOLIC BLOOD PRESSURE: 132 MMHG | DIASTOLIC BLOOD PRESSURE: 73 MMHG | TEMPERATURE: 97.7 F | HEART RATE: 63 BPM | RESPIRATION RATE: 20 BRPM | OXYGEN SATURATION: 99 % | WEIGHT: 122 LBS

## 2024-07-26 DIAGNOSIS — Z23 NEED FOR TDAP VACCINATION: ICD-10-CM

## 2024-07-26 DIAGNOSIS — Z00.00 ENCOUNTER FOR MEDICARE ANNUAL WELLNESS EXAM: Primary | ICD-10-CM

## 2024-07-26 DIAGNOSIS — Z29.11 NEED FOR VACCINATION AGAINST RESPIRATORY SYNCYTIAL VIRUS: ICD-10-CM

## 2024-07-26 DIAGNOSIS — M50.00 INTERVERTEBRAL DISC DISORDER OF CERVICAL REGION WITH MYELOPATHY: ICD-10-CM

## 2024-07-26 DIAGNOSIS — Z98.1 S/P CERVICAL SPINAL FUSION: ICD-10-CM

## 2024-07-26 DIAGNOSIS — D50.8 IRON DEFICIENCY ANEMIA SECONDARY TO INADEQUATE DIETARY IRON INTAKE: ICD-10-CM

## 2024-07-26 DIAGNOSIS — M80.00XA AGE-RELATED OSTEOPOROSIS WITH CURRENT PATHOLOGICAL FRACTURE, INITIAL ENCOUNTER: ICD-10-CM

## 2024-07-26 PROBLEM — T81.49XA POSTOPERATIVE INFECTION OF KNEE (H): Status: RESOLVED | Noted: 2024-06-21 | Resolved: 2024-07-26

## 2024-07-26 PROBLEM — M80.00XD AGE-RELATED OSTEOPOROSIS WITH CURRENT PATHOLOGICAL FRACTURE WITH ROUTINE HEALING: Status: ACTIVE | Noted: 2024-07-05

## 2024-07-26 PROBLEM — M00.9 POSTOPERATIVE INFECTION OF KNEE (H): Status: RESOLVED | Noted: 2024-06-21 | Resolved: 2024-07-26

## 2024-07-26 LAB
ALBUMIN SERPL BCG-MCNC: 4 G/DL (ref 3.5–5.2)
ALP SERPL-CCNC: 155 U/L (ref 40–150)
ALT SERPL W P-5'-P-CCNC: 8 U/L (ref 0–50)
ANION GAP SERPL CALCULATED.3IONS-SCNC: 10 MMOL/L (ref 7–15)
AST SERPL W P-5'-P-CCNC: 23 U/L (ref 0–45)
BILIRUB SERPL-MCNC: 0.2 MG/DL
BUN SERPL-MCNC: 15.3 MG/DL (ref 8–23)
CALCIUM SERPL-MCNC: 10.2 MG/DL (ref 8.8–10.4)
CHLORIDE SERPL-SCNC: 106 MMOL/L (ref 98–107)
CREAT SERPL-MCNC: 0.73 MG/DL (ref 0.51–0.95)
EGFRCR SERPLBLD CKD-EPI 2021: 81 ML/MIN/1.73M2
ERYTHROCYTE [DISTWIDTH] IN BLOOD BY AUTOMATED COUNT: 14.5 % (ref 10–15)
GLUCOSE SERPL-MCNC: 100 MG/DL (ref 70–99)
HCO3 SERPL-SCNC: 23 MMOL/L (ref 22–29)
HCT VFR BLD AUTO: 37.4 % (ref 35–47)
HGB BLD-MCNC: 11.5 G/DL (ref 11.7–15.7)
MCH RBC QN AUTO: 28.8 PG (ref 26.5–33)
MCHC RBC AUTO-ENTMCNC: 30.7 G/DL (ref 31.5–36.5)
MCV RBC AUTO: 94 FL (ref 78–100)
PLATELET # BLD AUTO: 306 10E3/UL (ref 150–450)
POTASSIUM SERPL-SCNC: 4.6 MMOL/L (ref 3.4–5.3)
PROT SERPL-MCNC: 7.4 G/DL (ref 6.4–8.3)
RBC # BLD AUTO: 3.99 10E6/UL (ref 3.8–5.2)
SODIUM SERPL-SCNC: 139 MMOL/L (ref 135–145)
VIT D+METAB SERPL-MCNC: 35 NG/ML (ref 20–50)
WBC # BLD AUTO: 6.3 10E3/UL (ref 4–11)

## 2024-07-26 PROCEDURE — 36415 COLL VENOUS BLD VENIPUNCTURE: CPT | Performed by: FAMILY MEDICINE

## 2024-07-26 PROCEDURE — 82306 VITAMIN D 25 HYDROXY: CPT | Performed by: FAMILY MEDICINE

## 2024-07-26 PROCEDURE — G0439 PPPS, SUBSEQ VISIT: HCPCS | Performed by: FAMILY MEDICINE

## 2024-07-26 PROCEDURE — 80053 COMPREHEN METABOLIC PANEL: CPT | Performed by: FAMILY MEDICINE

## 2024-07-26 PROCEDURE — 85027 COMPLETE CBC AUTOMATED: CPT | Performed by: FAMILY MEDICINE

## 2024-07-26 PROCEDURE — 99214 OFFICE O/P EST MOD 30 MIN: CPT | Mod: 25 | Performed by: FAMILY MEDICINE

## 2024-07-26 RX ORDER — FEEDER CONTAINER WITH PUMP SET
1 EACH MISCELLANEOUS
Qty: 2400 ML | Refills: 11 | Status: SHIPPED | OUTPATIENT
Start: 2024-07-26

## 2024-07-26 RX ORDER — LIDOCAINE 4 G/G
1 PATCH TOPICAL EVERY 24 HOURS
Qty: 30 PATCH | Refills: 2 | Status: SHIPPED | OUTPATIENT
Start: 2024-07-26

## 2024-07-26 SDOH — HEALTH STABILITY: PHYSICAL HEALTH: ON AVERAGE, HOW MANY DAYS PER WEEK DO YOU ENGAGE IN MODERATE TO STRENUOUS EXERCISE (LIKE A BRISK WALK)?: 7 DAYS

## 2024-07-26 ASSESSMENT — SOCIAL DETERMINANTS OF HEALTH (SDOH): HOW OFTEN DO YOU GET TOGETHER WITH FRIENDS OR RELATIVES?: MORE THAN THREE TIMES A WEEK

## 2024-07-26 ASSESSMENT — PAIN SCALES - GENERAL: PAINLEVEL: MODERATE PAIN (4)

## 2024-07-26 NOTE — PATIENT INSTRUCTIONS
At [facility], we strive to deliver an exceptional experience to you, every time we see you. If you receive a survey, please let us know what we are doing well and/or what we could improve upon, as we do value your feedback.  If you have MyChart, you can expect to receive results automatically within 24 hours of their completion.  Your provider will send a note interpreting your results as well.   If you do not have MyChart, you should receive your results in about a week by mail.    Your care team:                            Family Medicine Internal Medicine   MD Ivan Barlow, MD Ernestina Springer, MD Stevan Patel, MD Natalia Hugo, PABryannaC    Golden Connor, MD Pediatrics   Chanell Amezcua, MD Daly Gonzales, MD Lashawn Son, APRN CNP Mulu Daniel APRN CNP   Trev Mosher, MD Janessa Rowell, MD Florecita Mcgraw, CNP     Benji Delarosa, CNP Same-Day Provider (No follow-up visits)   Viri Dillon, APRN, DNP Kristal Almonet, SAI Reid APRN, FNP, BC [facility]     Clinic hours: Monday - Thursday 7 am-6 pm; Fridays 7 am-5 pm.   Urgent care: Monday - Friday 10 am- 8 pm; Saturday and Sunday 9 am-5 pm.    Clinic: [phone]       [facility]: Monday - Thursday 8 am - 7 pm; Friday 8 am - 6 pm  [facility]: [phone]    Patient Education   Consejos de atención preventiva  Se trata de consejos generales que solemos ofrecer para ayudar a las personas a mantenerse saludables. [place] equipo de atención puede darle consejos específicos. Hable con ellos sobre remedios propias necesidades de atención preventiva.  Estilo de beatriz  Ejercítese, edmundo mínimo, 150 minutos a la semana (30 minutos al día, 5 días a la semana).  Realice actividades de fortalecimiento muscular 2 días a la semana, ya que contribuyen al control del peso y a la prevención de enfermedades.  No fume.  Use protector solar  "para prevenir el cáncer de piel.  Cada 2 a 5 años, realice pruebas de detección de radón en harris hogar. Se trata de un gas incoloro e inodoro que puede dañar los pulmones. Para obtener más información, visite www.health.[facility].us y busque \"Radon in Homes\" (Radón en los hogares).  Mantenga las tracie descargadas y bajo llave en un lugar seguro, edmundo alexy caja kim o alexy cámara blindada, o use un candado para tracie y esconda las llaves. Guarde siempre las balas por separado. Para obtener más información, visite Feedtrace.mn.gov y busque \"Safe Gun Storage\" (Almacenamiento seguro fili).  Alimentación  Consuma 5 o más porciones de frutas y verduras al día.  Pruebe el pan de veto, el arroz integral y la pasta integral (en lugar de pan owens, arroz owens y pasta).  Consuma suficiente calcio y vitamina D. Revise la etiqueta de los alimentos y procure alcanzar el 100 % de la ingesta diaria recomendada (IDR).  Exámenes periódicos  Hágase un examen dental y alexy limpieza cada 6 meses.  Visite a harris equipo de atención médica todos los años para hablar sobre lo siguiente:  Todo cambio en harris deo.  Todo medicamento que harris equipo de atención le haya recetado.  Atención preventiva, planificación familiar y formas de prevenir enfermedades crónicas.  Inyecciones (vacunas)   Vacunas contra el virus del papiloma humano (VPH) (hasta los 26 años), si nunca se las hudson colocado.  Vacunas contra la hepatitis B (hasta los 59 años), si nunca se las hudson colocado.  Vacuna contra la COVID-19: vacúnese cuando corresponda.  Vacuna contra la gripe: vacúnese contra la gripe todos los años.  Vacuna contra el tétanos: vacúnese contra el tétanos cada 10 años.  Vacunas contra el neumococo, la hepatitis A y el virus sincicial respiratorio (VSR): pregunte a harris equipo de atención si las necesita en función de harris riesgo.  Vacuna contra el herpes zóster (para personas de 50 años o más).  Pruebas médicas generales  Examen de detección de diabetes:  A partir " de los 35 años, hágase exámenes de detección de diabetes, edmundo mínimo, cada 3 años.  Si tiene menos de 35 años, pregunte a harris equipo de atención si debe hacerlo.  Prueba de colesterol: a los 39 años, comience a hacerse alexy prueba de colesterol cada 5 años o con mayor frecuencia si se lo aconsejan.  Exploración de densidad ósea (DEXA): a los 50 años, pregunte a harris equipo de atención si debe hacerse esta exploración para detectar osteoporosis (fragilidad en los huesos).  Hepatitis C: hágase la prueba, edmundo mínimo, alexy vez en la beatriz.  Examen de detección de aneurismas aórticos abdominales: hable con harris médico sobre la posibilidad de hacerse mindy examen de detección si cumple alguna de las siguientes condiciones:  fumó alguna vez;  y  es hombre según harris sexo biológico;  y  tiene entre 65 y 75 años.  Infecciones de transmisión sexual (ITS)  Antes de los 24 años, pregunte a harris equipo de atención si debe hacerse exámenes de detección de ITS.  Después de los 24 años, hágase exámenes de detección de ITS si está en riesgo. Está en riesgo de contraer alguna ITS (incluido el virus de la inmunodeficiencia adquirida [VIH]) en los siguientes casos:  Tiene relaciones sexuales con más de alexy persona.  No usa preservativos siempre que tiene relaciones sexuales.  A usted o a harris lilli le diagnosticaron alexy infección de transmisión sexual.  Si está en riesgo de contraer el VIH, consulte sobre los medicamentos de la profilaxis de preexposición (Pre-Exposure Prophylaxis, PrEP) para prevenirlo.  Hágase la prueba del VIH, edmundo mínimo, alexy vez en la beatriz, tanto si está en riesgo de contraer el virus edmundo si no.  Pruebas de detección de cáncer  Examen de detección de cáncer de kings uterino: si tiene kings uterino, comience a hacerse pruebas periódicas de detección de cáncer de kings uterino a los 21 años. La mayoría de las personas que se hacen exámenes periódicos de detección y obtienen resultados normales pueden dejar de hacerlo a  partir de los 65 años. Hable sobre esto con harris proveedor.  Exploración de detección de cáncer de mama (mamografía): si alguna vez ha tenido mamas, comience a hacerse mamografías periódicas a partir de los 40 años. Se trata de alexy exploración para detectar el cáncer de mama.  Examen de detección de cáncer de colon: es importante comenzar a hacerse los exámenes de detección de cáncer de colon a los 45 años.  Hágase alexy colonoscopía cada 10 años (o con más frecuencia si está en riesgo) O pregunte a harris proveedor sobre pruebas de heces, edmundo la prueba inmunoquímica fecal (Fecal Immunochemical Test, FIT) cada año o la prueba Cologuard cada 3 años.  Para obtener más información sobre las opciones de las pruebas que tiene a disposición, visite: www.fvfiles.[email or web address].  Si necesita ayuda para hanny alexy decisión, visite: katia.eva/[email or web address].  Prueba de detección de cáncer de próstata: si tiene próstata y está entre los 55 y 69 años, pregunte a harris proveedor si le convendría hacerse alexy prueba anual de detección de cáncer de próstata.  Examen de detección de cáncer de pulmón: si fuma o fumó y tiene entre 50 y 80 años, pregunte a harris equipo de atención si los exámenes continuos de detección de cáncer de pulmón son adecuados para usted.    Solo para uso con fines informativos. Debra documento no pretende sustituir ninguna recomendación médica. Derechos de autor   2023 [employer].   Todos los derechos reservados. Revisión clínica a cargo de  [employer]. C4M [id number] - REV 04/24.  Learning About Activities of Daily Living  What are activities of daily living?     Activities of daily living (ADLs) are the basic self-care tasks you do every day. These include eating, bathing, dressing, and moving around.  As you age, and if you have health problems, you may find that it's harder to do some of these tasks. If so, your doctor can suggest ideas that may help.  To measure what kind of help you  may need, your doctor will ask how well you are able to do ADLs. Let your doctor know if there are any tasks that you are having trouble doing. This is an important first step to getting help. And when you have the help you need, you can stay as independent as possible.  How will a doctor assess your ADLs?  Asking about ADLs is part of a routine health checkup your doctor will likely do as you age. Your health check might be done in a doctor's office, in your home, or at a hospital. The goal is to find out if you are having any problems that could make it hard to care for yourself or that make it unsafe for you to be on your own.  To measure your ADLs, your doctor will ask how hard it is for you to do routine tasks. Your doctor may also want to know if you have changed the way you do a task because of a health problem. Your doctor may watch how you:  Walk back and forth.  Keep your balance while you stand or walk.  Move from sitting to standing or from a bed to a chair.  Button or unbutton a shirt or sweater.  Remove and put on your shoes.  It's common to feel a little worried or anxious if you find you can't do all the things you used to be able to do. Talking with your doctor about ADLs is a way to make sure you're as safe as possible and able to care for yourself as well as you can. You may want to bring a caregiver, friend, or family member to your checkup. They can help you talk to your doctor.  Follow-up care is a key part of your treatment and safety. Be sure to make and go to all appointments, and call your doctor if you are having problems. It's also a good idea to know your test results and keep a list of the medicines you take.  Current as of: October 24, 2023               Content Version: 14.0    2409-0371 Healthwise[employer] Incorporated.   Care instructions adapted under license by your healthcare professional. If you have questions about a medical condition or this instruction, always ask your healthcare  professional. [place], [facility] disclaims any warranty or liability for your use of this information.      Preventing Falls: Care Instructions  Injuries and health problems such as trouble walking or poor eyesight can increase your risk of falling. So can some medicines. But there are things you can do to help prevent falls. You can exercise to get stronger. You can also arrange your home to make it safer.    Talk to your doctor about the medicines you take. Ask if any of them increase the risk of falls and whether they can be changed or stopped.   Try to exercise regularly. It can help improve your strength and balance. This can help lower your risk of falling.     Practice fall safety and prevention.    Wear low-heeled shoes that fit well and give your feet good support. Talk to your doctor if you have foot problems that make this hard.  Carry a cellphone or wear a medical alert device that you can use to call for help.  Use stepladders instead of chairs to reach high objects. Don't climb if you're at risk for falls. Ask for help, if needed.  Wear the correct eyeglasses, if you need them.    Make your home safer.    Remove rugs, cords, clutter, and furniture from walkways.  Keep your house well lit. Use night-lights in hallways and bathrooms.  Install and use sturdy handrails on stairways.  Wear nonskid footwear, even inside. Don't walk barefoot or in socks without shoes.    Be safe outside.    Use handrails, curb cuts, and ramps whenever possible.  Keep your hands free by using a shoulder bag or backpack.  Try to walk in well-lit areas. Watch out for uneven ground, changes in pavement, and debris.  Be careful in the winter. Walk on the grass or gravel when sidewalks are slippery. Use de-icer on steps and walkways. Add non-slip devices to shoes.    Put grab bars and nonskid mats in your shower or tub and near the toilet. Try to use a shower chair or bath bench when bathing.   Get into a tub or shower by  "putting in your weaker leg first. Get out with your strong side first. Have a phone or medical alert device in the bathroom with you.   Where can you learn more?  Go to https://[email or web address]/patiented  Enter G117 in the search box to learn more about \"Preventing Falls: Care Instructions.\"  Current as of: July 17, 2023               Content Version: 14.0    9331-7650 [employer].   Care instructions adapted under license by your healthcare professional. If you have questions about a medical condition or this instruction, always ask your healthcare professional. [employer] disclaims any warranty or liability for your use of this information.      Hearing Loss: Care Instructions  Overview     Hearing loss is a sudden or slow decrease in how well you hear. It can range from slight to profound. Permanent hearing loss can occur with aging. It also can happen when you are exposed long-term to loud noise. Examples include listening to loud music, riding motorcycles, or being around other loud machines.  Hearing loss can affect your work and home life. It can make you feel lonely or depressed. You may feel that you have lost your independence. But hearing aids and other devices can help you hear better and feel connected to others.  Follow-up care is a key part of your treatment and safety. Be sure to make and go to all appointments, and call your doctor if you are having problems. It's also a good idea to know your test results and keep a list of the medicines you take.  How can you care for yourself at home?  Avoid loud noises whenever possible. This helps keep your hearing from getting worse.  Always wear hearing protection around loud noises.  Wear a hearing aid as directed.  A professional can help you pick a hearing aid that will work best for you.  You can also get hearing aids over the counter for mild to moderate hearing loss.  Have hearing tests as your doctor suggests. They can show " "whether your hearing has changed. Your hearing aid may need to be adjusted.  Use other devices as needed. These may include:  Telephone amplifiers and hearing aids that can connect to a television, stereo, radio, or microphone.  Devices that use lights or vibrations. These alert you to the doorbell, a ringing telephone, or a baby monitor.  Television closed-captioning. This shows the words at the bottom of the screen. Most new TVs can do this.  TTY (text telephone). This lets you type messages back and forth on the telephone instead of talking or listening. These devices are also called TDD. When messages are typed on the keyboard, they are sent over the phone line to a receiving TTY. The message is shown on a monitor.  Use text messaging, social media, and email if it is hard for you to communicate by telephone.  Try to learn a listening technique called speechreading. It is not lipreading. You pay attention to people's gestures, expressions, posture, and tone of voice. These clues can help you understand what a person is saying. Face the person you are talking to, and have them face you. Make sure the lighting is good. You need to see the other person's face clearly.  Think about counseling if you need help to adjust to your hearing loss.  When should you call for help?  Watch closely for changes in your health, and be sure to contact your doctor if:    You think your hearing is getting worse.     You have new symptoms, such as dizziness or nausea.   Where can you learn more?  Go to https://www.[employer].net/patiented  Enter R798 in the search box to learn more about \"Hearing Loss: Care Instructions.\"  Current as of: September 27, 2023               Content Version: 14.0    8905-1998 Healthwise, Incorporated.   Care instructions adapted under license by your healthcare professional. If you have questions about a medical condition or this instruction, always ask your healthcare professional. [employer] " Incorporated disclaims any warranty or liability for your use of this information.      Bladder Training: Care Instructions  Your Care Instructions     Bladder training is used to treat urge incontinence and stress incontinence. Urge incontinence means that the need to urinate comes on so fast that you can't get to a toilet in time. Stress incontinence means that you leak urine because of pressure on your bladder. For example, it may happen when you laugh, cough, or lift something heavy.  Bladder training can increase how long you can wait before you have to urinate. It can also help your bladder hold more urine. And it can give you better control over the urge to urinate.  It is important to remember that bladder training takes a few weeks to a few months to make a difference. You may not see results right away, but don't give up.  Follow-up care is a key part of your treatment and safety. Be sure to make and go to all appointments, and call your doctor if you are having problems. It's also a good idea to know your test results and keep a list of the medicines you take.  How can you care for yourself at home?  Work with your doctor to come up with a bladder training program that is right for you. You may use one or more of the following methods.  Delayed urination  In the beginning, try to keep from urinating for 5 minutes after you first feel the need to go.  While you wait, take deep, slow breaths to relax. Kegel exercises can also help you delay the need to go to the bathroom.  After some practice, when you can easily wait 5 minutes to urinate, try to wait 10 minutes before you urinate.  Slowly increase the waiting period until you are able to control when you have to urinate.  Scheduled urination  Empty your bladder when you first wake up in the morning.  Schedule times throughout the day when you will urinate.  Start by going to the bathroom every hour, even if you don't need to go.  Slowly increase the time  "between trips to the bathroom.  When you have found a schedule that works well for you, keep doing it.  If you wake up during the night and have to urinate, do it. Apply your schedule to waking hours only.  Kegel exercises  These tighten and strengthen pelvic muscles, which can help you control the flow of urine. (If doing these exercises causes pain, stop doing them and talk with your doctor.) To do Kegel exercises:  Squeeze your muscles as if you were trying not to pass gas. Or squeeze your muscles as if you were stopping the flow of urine. Your belly, legs, and buttocks shouldn't move.  Hold the squeeze for 3 seconds, then relax for 5 to 10 seconds.  Start with 3 seconds, then add 1 second each week until you are able to squeeze for 10 seconds.  Repeat the exercise 10 times a session. Do 3 to 8 sessions a day.  When should you call for help?  Watch closely for changes in your health, and be sure to contact your doctor if:    Your incontinence is getting worse.     You do not get better as expected.   Where can you learn more?  Go to https://www.[employer].net/patiented  Enter V684 in the search box to learn more about \"Bladder Training: Care Instructions.\"  Current as of: November 15, 2023               Content Version: 14.0    8549-0376 [employer].   Care instructions adapted under license by your healthcare professional. If you have questions about a medical condition or this instruction, always ask your healthcare professional. [employer] disclaims any warranty or liability for your use of this information.      Chronic Pain: Care Instructions  Your Care Instructions     Chronic pain is pain that lasts a long time (months or even years) and may or may not have a clear cause. It is different from acute pain, which usually does have a clear cause--like an injury or illness--and gets better over time. Chronic pain:  Lasts over time but may vary from day to day.  Does not go away " despite efforts to end it.  May disrupt your sleep and lead to fatigue.  May cause depression or anxiety.  May make your muscles tense, causing more pain.  Can disrupt your work, hobbies, home life, and relationships with friends and family.  Chronic pain is a very real condition. It is not just in your head. Treatment can help and usually includes several methods used together, such as medicines, physical therapy, exercise, and other treatments. Learning how to relax and changing negative thought patterns can also help you cope.  Chronic pain is complex. Taking an active role in your treatment will help you better manage your pain. Tell your doctor if you have trouble dealing with your pain. You may have to try several things before you find what works best for you.  Follow-up care is a key part of your treatment and safety. Be sure to make and go to all appointments, and call your doctor if you are having problems. It's also a good idea to know your test results and keep a list of the medicines you take.  How can you care for yourself at home?  Pace yourself. Break up large jobs into smaller tasks. Save harder tasks for days when you have less pain, or go back and forth between hard tasks and easier ones. Take rest breaks.  Relax, and reduce stress. Relaxation techniques such as deep breathing or meditation can help.  Keep moving. Gentle, daily exercise can help reduce pain over the long run. Try low- or no-impact exercises such as walking, swimming, and stationary biking. Do stretches to stay flexible.  Try heat, cold packs, and massage.  Get enough sleep. Chronic pain can make you tired and drain your energy. Talk with your doctor if you have trouble sleeping because of pain.  Think positive. Your thoughts can affect your pain level. Do things that you enjoy to distract yourself when you have pain instead of focusing on the pain. See a movie, read a book, listen to music, or spend time with a friend.  If you  think you are depressed, talk to your doctor about treatment.  Keep a daily pain diary. Record how your moods, thoughts, sleep patterns, activities, and medicine affect your pain. You may find that your pain is worse during or after certain activities or when you are feeling a certain emotion. Having a record of your pain can help you and your doctor find the best ways to treat your pain.  Take pain medicines exactly as directed.  If the doctor gave you a prescription medicine for pain, take it as prescribed.  If you are not taking a prescription pain medicine, ask your doctor if you can take an over-the-counter medicine.  Reducing constipation caused by pain medicine  Talk to your doctor about a laxative. If a laxative doesn't work, your doctor may suggest a prescription medicine.  Include fruits, vegetables, beans, and whole grains in your diet each day. These foods are high in fiber.  If your doctor recommends it, get more exercise. Walking is a good choice. Bit by bit, increase the amount you walk every day. Try for at least 30 minutes on most days of the week.  Schedule time each day for a bowel movement. A daily routine may help. Take your time and do not strain when having a bowel movement.  When should you call for help?   Call your doctor now or seek immediate medical care if:    Your pain gets worse or is out of control.     You feel down or blue, or you do not enjoy things like you once did. You may be depressed, which is common in people with chronic pain. Depression can be treated.     You have vomiting or cramps for more than 2 hours.   Watch closely for changes in your health, and be sure to contact your doctor if:    You cannot sleep because of pain.     You are very worried or anxious about your pain.     You have trouble taking your pain medicine.     You have any concerns about your pain medicine.     You have trouble with bowel movements, such as:  No bowel movement in 3 days.  Blood in the anal  "area, in your stool, or on the toilet paper.  Diarrhea for more than 24 hours.   Where can you learn more?  Go to https://www.[employer].net/patiented  Enter N004 in the search box to learn more about \"Chronic Pain: Care Instructions.\"  Current as of: July 10, 2023               Content Version: 14.0    0824-2336 [employer].   Care instructions adapted under license by your healthcare professional. If you have questions about a medical condition or this instruction, always ask your healthcare professional. [employer]wise, [employer] disclaims any warranty or liability for your use of this information.         "

## 2024-07-26 NOTE — LETTER
July 29, 2024      Bethany Logan  [address]  [facility] [address]        Dear Ms.Valencia Logan,    We are writing to inform you of your test results.    Here is a summary of your recent test results:     -Hemoglobin is decreased indicating anemia.  Anemia can cause fatigue and, occasionally, light-headedness.  ADVISE: Continue the iron prescribed   -White blood cell and platelet counts are normal.   -Liver and gallbladder tests (ALT,AST, Alk phos,bilirubin) Your liver test is normal but your alk phosphatase was elevated.  This is likely related to you osteoporosis.  Well will follow up on this at your next visit.   -Kidney function (GFR) is normal.   -Sodium is normal.   -Potassium is normal.   -Calcium is normal.   -Glucose (diabetic screening test) is normal.   -Vitamin D level is normal and getting 1000 IU daily in your diet or supplements is recommended.   For additional lab test information, labtestsonline.org is an excellent reference.     Resulted Orders   CBC with platelets   Result Value Ref Range    WBC Count 6.3 4.0 - 11.0 10e3/uL    RBC Count 3.99 3.80 - 5.20 10e6/uL    Hemoglobin 11.5 (L) 11.7 - 15.7 g/dL    Hematocrit 37.4 35.0 - 47.0 %    MCV 94 78 - 100 fL    MCH 28.8 26.5 - 33.0 pg    MCHC 30.7 (L) 31.5 - 36.5 g/dL    RDW 14.5 10.0 - 15.0 %    Platelet Count 306 150 - 450 10e3/uL   Vitamin D Deficiency   Result Value Ref Range    Vitamin D, Total (25-Hydroxy) 35 20 - 50 ng/mL      Comment:      optimum levels    Narrative    Season, race, dietary intake, and treatment affect the concentration of 25-hydroxy-Vitamin D. Values may decrease during winter months and increase during summer months.    Vitamin D determination is routinely performed by an immunoassay specific for 25 hydroxyvitamin D3.  If an individual is on vitamin D2(ergocalciferol) supplementation, please specify 25 OH vitamin D2 and D3 level determination by LCMSMS test VITD23.     Comprehensive  metabolic panel (BMP + Alb, Alk Phos, ALT, AST, Total. Bili, TP)   Result Value Ref Range    Sodium 139 135 - 145 mmol/L    Potassium 4.6 3.4 - 5.3 mmol/L    Carbon Dioxide (CO2) 23 22 - 29 mmol/L    Anion Gap 10 7 - 15 mmol/L    Urea Nitrogen 15.3 8.0 - 23.0 mg/dL    Creatinine 0.73 0.51 - 0.95 mg/dL    GFR Estimate 81 >60 mL/min/1.73m2      Comment:      eGFR calculated using 2021 CKD-EPI equation.    Calcium 10.2 8.8 - 10.4 mg/dL      Comment:      Reference intervals for this test were updated on 7/16/2024 to reflect our healthy population more accurately. There may be differences in the flagging of prior results with similar values performed with this method. Those prior results can be interpreted in the context of the updated reference intervals.    Chloride 106 98 - 107 mmol/L    Glucose 100 (H) 70 - 99 mg/dL    Alkaline Phosphatase 155 (H) 40 - 150 U/L    AST 23 0 - 45 U/L    ALT 8 0 - 50 U/L    Protein Total 7.4 6.4 - 8.3 g/dL    Albumin 4.0 3.5 - 5.2 g/dL    Bilirubin Total 0.2 <=1.2 mg/dL       If you have any questions or concerns, please call the clinic at the number listed above.       Sincerely,      Ernestina Argueta MD

## 2024-07-26 NOTE — TELEPHONE ENCOUNTER
Received provider signed form. Faxed to [facility], [phone], right fax confirmed at 2:28 pm today, 7/26/2024. Copy to TC and abstracting.  Deyanira Cheng MA  [facility]   Primary Care

## 2024-07-26 NOTE — TELEPHONE ENCOUNTER
Please notify patient to discuss Ensure with Care Coordinator when they call.  Patient should go to another local pharmacy for vaccines needed.    Ernestina Argueta MD

## 2024-07-26 NOTE — TELEPHONE ENCOUNTER
This writer attempted to contact patient on 07/26/24      Reason for call provider message and left message.      If patient calls back:   Registered Nurse called. Please relay below provider message. Thanks!        Lashawn Hernandez RN

## 2024-07-26 NOTE — PROGRESS NOTES
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  - Lidocaine (LIDOCARE) 4 % Patch; Place 1 patch onto the skin every 24 hours To prevent lidocaine toxicity, patient should be patch free for 12 hrs daily.  - Primary Care - Care Coordination Referral; Future  - Nutritional Supplements (ENSURE HIGH PROTEIN); Take 1 Can by mouth 3 times daily (with meals)    S/P cervical spinal fusion  As above  - Lidocaine (LIDOCARE) 4 % Patch; Place 1 patch onto the skin every 24 hours To prevent lidocaine toxicity, patient should be patch free for 12 hrs daily.  - Primary Care - Care Coordination Referral; Future  - Nutritional Supplements (ENSURE HIGH PROTEIN); Take 1 Can by mouth 3 times daily (with meals)    Iron deficiency anemia secondary to inadequate dietary iron intake  Recheck labs and continue iron for now.  - CBC with platelets; Future  - CBC with platelets    Age-related osteoporosis with current pathological fracture, initial encounter  Check labs and continue supplements - discussed treatment but patient would like to defer for now.  Follow up in 3 months to reassess.  - Vitamin D Deficiency; Future  - Comprehensive metabolic panel (BMP + Alb, Alk Phos, ALT, AST, Total. Bili, TP); Future  - Vitamin D Deficiency  - Comprehensive metabolic panel (BMP + Alb, Alk Phos, ALT, AST, Total. Bili, TP)    Need for Tdap vaccination    - Tdap, tetanus-diptheria-acell pertussis, (BOOSTRIX) 5-2.5-18.5 LF-MCG/0.5 EBONIE injection; Inject 0.5 mLs into the muscle once for 1 dose    Need for vaccination against respiratory syncytial virus    - RSV vaccine, bivalent, ABRYSVO, injection; Inject 0.5 mLs into the muscle once for 1  dose      Counseling  Appropriate preventive services were addressed with this patient via screening, questionnaire, or discussion as appropriate for fall prevention, nutrition, physical activity, Tobacco-use cessation, weight loss and cognition.  Checklist reviewing preventive services available has been given to the patient.  Reviewed patient's diet, addressing concerns and/or questions.   Updated plan of care.  Patient reported difficulty with activities of daily living were addressed today.The patient was provided with written information regarding signs of hearing loss.   Information on urinary incontinence and treatment options given to patient.   I have reviewed Opioid Use Disorder and Substance Use Disorder risk factors and made any needed referrals.       Tyrell Sims is a 83 year old, presenting for the following:  Physical        7/26/2024     8:05 AM   Additional Questions   Roomed by melvin   Accompanied by daughter         Health Care Directive  Patient does not have a Health Care Directive or Living Will: Advance Directive received and scanned. Click on Code in the patient header to view.    HPI  Doing okay after surgery.  Will start PT soon.  Requesting lidocaine and Ensure refills.  Taking iron and having black stools.  Osteoporosis related to compression fractures and cervical stenosis          7/26/2024   General Health   How would you rate your overall physical health? Good   Feel stress (tense, anxious, or unable to sleep) Not at all            7/26/2024   Nutrition   Diet: Regular (no restrictions)            7/26/2024   Exercise   Days per week of moderate/strenous exercise 7 days            7/26/2024   Social Factors   Frequency of gathering with friends or relatives More than three times a week   Worry food won't last until get money to buy more No   Food not last or not have enough money for food? No   Do you have housing? (Housing is defined as stable permanent housing and does not  include staying ouside in a car, in a tent, in an abandoned building, in an overnight shelter, or couch-surfing.) Yes   Are you worried about losing your housing? No   Lack of transportation? No   Unable to get utilities (heat,electricity)? No            7/26/2024   Fall Risk   Fallen 2 or more times in the past year? Yes   Trouble with walking or balance? No   Gait Speed Test (Document in seconds) 9.02   Gait Speed Test Interpretation Greater than 5.01 seconds - ABNORMAL             7/26/2024   Activities of Daily Living- Home Safety   Needs help with the following daily activites Shopping   Safety concerns in the home None of the above            7/26/2024   Dental   Dentist two times every year? Yes            7/26/2024   Hearing Screening   Hearing concerns? (!) I NEED TO ASK PEOPLE TO SPEAK UP OR REPEAT THEMSELVES.    (!) IT'S HARD TO FOLLOW A CONVERSATION IN A NOISY RESTAURANT OR CROWDED ROOM.    (!) TROUBLE UNDERSTANDING SOFT OR WHISPERED SPEECH.       Multiple values from one day are sorted in reverse-chronological order         7/26/2024   Driving Risk Screening   Patient/family members have concerns about driving No            7/26/2024   General Alertness/Fatigue Screening   Have you been more tired than usual lately? No            7/26/2024   Urinary Incontinence Screening   Bothered by leaking urine in past 6 months Yes                 Today's PHQ-2 Score:       3/15/2024     9:05 AM   PHQ-2 ( 1999 Pfizer)   Q1: Little interest or pleasure in doing things 2   Q2: Feeling down, depressed or hopeless 2   PHQ-2 Score 4   Q1: Little interest or pleasure in doing things More than half the days   Q2: Feeling down, depressed or hopeless More than half the days   PHQ-2 Score 4         7/26/2024   Substance Use   Alcohol more than 3/day or more than 7/wk No   Do you have a current opioid prescription? (!) YES   How severe/bad is pain from 1 to 10? 8/10   Do you use any other substances recreationally? No             7/26/2024     9:33 AM   OPIOID RISK TOOL TOTAL SCORE   Total Score 0     Low Risk (0-3)  Moderate Risk (4-7)  High Risk (>8)  Social History     Tobacco Use    Smoking status: Never     Passive exposure: Never    Smokeless tobacco: Never   Vaping Use    Vaping status: Never Used   Substance Use Topics    Alcohol use: No    Drug use: No                        Reviewed and updated as needed this visit by Provider   Tobacco  Allergies  Meds  Problems  Med Hx  Surg Hx  Fam Hx              Current providers sharing in care for this patient include:  Patient Care Team:  Ernestina Alvarado MD as PCP - General (Family Medicine)  Ernestina Alvarado MD as Assigned PCP  Samantha Bruce MD as Assigned Surgical Provider  Lizzie Wilson RN as Lead Care Coordinator (Primary Care - CC)  Samantha Bruce MD as MD (Ophthalmology)  Aleshia Jackson MD as MD (Neurological Surgery)    The following health maintenance items are reviewed in Epic and correct as of today:  Health Maintenance   Topic Date Due    RSV VACCINE (Pregnancy & 60+) (1 - 1-dose 60+ series) Never done    COVID-19 Vaccine (5 - 2023-24 season) 09/01/2023    DTAP/TDAP/TD IMMUNIZATION (2 - Td or Tdap) 07/25/2024    INFLUENZA VACCINE (1) 09/01/2024    ANNUAL REVIEW OF HM ORDERS  03/15/2025    EYE EXAM  04/29/2025    BMP  06/21/2025    MEDICARE ANNUAL WELLNESS VISIT  07/26/2025    FALL RISK ASSESSMENT  07/26/2025    ADVANCE CARE PLANNING  06/21/2029    DEXA  05/19/2030    PHQ-2 (once per calendar year)  Completed    Pneumococcal Vaccine: 65+ Years  Completed    ZOSTER IMMUNIZATION  Completed    IPV IMMUNIZATION  Aged Out    HPV IMMUNIZATION  Aged Out    MENINGITIS IMMUNIZATION  Aged Out    RSV MONOCLONAL ANTIBODY  Aged Out         Review of Systems  Constitutional, HEENT, cardiovascular, pulmonary, gi and gu systems are negative, except as otherwise noted.     Objective    Exam  Pulse 63   Temp 97.7  F (36.5  " C) (Tympanic)   Ht 1.467 m (4' 9.75\")   Wt 55.3 kg (122 lb)   LMP  (LMP Unknown)   SpO2 99%   BMI 25.72 kg/m     Estimated body mass index is 25.72 kg/m  as calculated from the following:    Height as of this encounter: 1.467 m (4' 9.75\").    Weight as of this encounter: 55.3 kg (122 lb).    Physical Exam  GENERAL: alert and no distress  NECK: no adenopathy, no asymmetry, masses, or scars  RESP: lungs clear to auscultation - no rales, rhonchi or wheezes  CV: regular rate and rhythm, normal S1 S2, no S3 or S4, no murmur, click or rub, no peripheral edema  ABDOMEN: soft, nontender, no hepatosplenomegaly, no masses and bowel sounds normal  MS: neck in brace, ambulating with walker  PSYCH: mentation appears normal, affect normal/bright        7/26/2024   Mini Cog   Mini-Cog Not Completed (choose reason) Language barrier and no  present                 Signed Electronically by: Ernestina Argueta MD    "

## 2024-07-26 NOTE — TELEPHONE ENCOUNTER
[facility] pharmacist is calling.    [facility] does not administer vaccines and does not have nutritional supplement ENSURE.    Alberta Small RN, BSN  [facility]

## 2024-07-29 NOTE — TELEPHONE ENCOUNTER
Called and spoke with patient's son Sudhakar (consent to communicate on file) and relayed message via .     Janice Matute RN    [facility]- Primary Care

## 2024-07-29 NOTE — RESULT ENCOUNTER NOTE
Ms. Emilee Logan,    Here is a summary of your recent test results:    -Hemoglobin is decreased indicating anemia.  Anemia can cause fatigue and, occasionally, light-headedness.  ADVISE: Continue the iron prescribed  -White blood cell and platelet counts are normal.  -Liver and gallbladder tests (ALT,AST, Alk phos,bilirubin) Your liver test is normal but your alk phosphatase was elevated.  This is likely related to you osteoporosis.  Well will follow up on this at your next visit.  -Kidney function (GFR) is normal.  -Sodium is normal.  -Potassium is normal.  -Calcium is normal.  -Glucose (diabetic screening test) is normal.  -Vitamin D level is normal and getting 1000 IU daily in your diet or supplements is recommended.   For additional lab test information, labtestsonline.org is an excellent reference.    Please contact the clinic if you have additional questions.  Thank you.    Sincerely,    Ernestina Argueta MD

## 2024-07-30 ENCOUNTER — PATIENT OUTREACH (OUTPATIENT)
Dept: GERIATRIC MEDICINE | Facility: CLINIC | Age: 84
End: 2024-07-30
Payer: COMMERCIAL

## 2024-08-01 NOTE — PROGRESS NOTES
"[facility] Care Coordination Contact    Received care coordination referral from PCP related to \"furniture\".    Contacted member's daughter-in-law, Kusum, and asked what they are looking for. She said they would like a recliner so Bethany could be \"more comfortable\".    Member is independent with transfers, so she does not meet the [place]/Med[place] criteria for a lift mechanism and furniture is not covered by waiver.  Explained to  and Kusum and they voiced understanding.    Writer suggested looking online at places such as [employer], [employer], etc or in stores such as [employer]. They voiced understanding and agreed with the plan.  Lizzie Wilson RN  [facility]   [phone]        "

## 2024-08-02 DIAGNOSIS — I10 HYPERTENSION GOAL BP (BLOOD PRESSURE) < 140/90: ICD-10-CM

## 2024-08-02 NOTE — TELEPHONE ENCOUNTER
This writer attempted to contact patient via  on 08/02/24      Reason for call med question and left message.      If patient calls back:   Route to RN line, relay message below from refill team on Losartan refill     to [facility]       8/2/24  3:12 PM  Clinic RN: Please contact patient because patient should have run out of this medication January 2024. Confirm patient is taking this medication as prescribed. Document findings and route refill encounter to provider for approval or denial.    __________________________________________    Chante Badillo, RN, BSN  [facility]

## 2024-08-05 RX ORDER — LOSARTAN POTASSIUM 50 MG/1
TABLET ORAL
Qty: 90 TABLET | Refills: 3 | Status: SHIPPED | OUTPATIENT
Start: 2024-08-05

## 2024-08-05 NOTE — TELEPHONE ENCOUNTER
Provider- Refill RN unable to refill Losartan due to greater than 90 gap with medication as it was last prescribed on 7/2023..   RN called and spoke with dtr-in-law, she reports that there is a gap due to patient being in-patient for about 5 months. Please review refill request and approve if appropriate. Thanks!      Lashawn Hernandez RN    [facility]            Called and dtr-in-law Kusum answers phone (invalid CTC on file). RN Exocaine that unable to speak with dtr-in-law as the most up to date CTC has different name.     RN spoke with patient and she gives verbal permission to speak with Kusum regarding medical information.       RN asked regarding the Losartan and gap in medication. Dtr reports that reason for gap in our system is due to pt being in-patient for months (11/23-4/24) and pt was getting Losartan in-patient. After discharged, they used what they had left over at home.     They thought Dr. Springer renewed at 7/26/24 office visit. She reports that pt has run out of medication.       RN informed dtr-in-law that will send message to Dr. Springer for re-review regarding refill request.       Dtr-in-law verbalized understanding and agrees with plan.       Lashawn Hernandez RN    [facility]

## 2024-08-05 NOTE — TELEPHONE ENCOUNTER
Marissa herman from [facility] pharmacy calling stating that patient keeps calling them regarding the losartan Rx. Marissa reports that they received a fax from us stating that pt needs to call clinic.   Marissa reports that patient states she has tried calling the clinic. RN does not see any note of patient calling clinic recently. We have left messages.       Marissa requested we call at [phone] to speak with patient as she has been speaking with patient directly.         Lashawn Hernandez RN    [facility]

## 2024-08-21 ENCOUNTER — APPOINTMENT (OUTPATIENT)
Dept: INTERPRETER SERVICES | Facility: CLINIC | Age: 84
End: 2024-08-21
Payer: COMMERCIAL

## 2024-08-21 ENCOUNTER — TELEPHONE (OUTPATIENT)
Dept: FAMILY MEDICINE | Facility: CLINIC | Age: 84
End: 2024-08-21
Payer: COMMERCIAL

## 2024-08-21 DIAGNOSIS — R14.2 BELCHING: Primary | ICD-10-CM

## 2024-08-21 NOTE — TELEPHONE ENCOUNTER
Pharmacy requesting discontinued scripts:    simethicone (MYLICON) 125 MG chewable tablet (Discontinued) 60 tablet 0 6/21/2024 7/26/2024 No       methocarbamol (ROBAXIN) 500 MG tablet -- -- 6/30/2024

## 2024-08-21 NOTE — TELEPHONE ENCOUNTER
Please call pt and get more information about these meds and send to pcp.  Rahel Gilbert BSN, RN

## 2024-08-21 NOTE — TELEPHONE ENCOUNTER
Writer called patient with . Writer left voicemail to call clinic back at [phone].    If patient calls back:    Get more information about medications listed below. Is she on both medications?        Dex Edge RN  [facility]

## 2024-08-22 ENCOUNTER — APPOINTMENT (OUTPATIENT)
Dept: INTERPRETER SERVICES | Facility: CLINIC | Age: 84
End: 2024-08-22
Payer: COMMERCIAL

## 2024-08-22 NOTE — TELEPHONE ENCOUNTER
This writer attempted to contact patient via  on 08/22/24      Reason for call med question and left message.      If patient calls back:   Route to RN line, please ask patient questions re: refill requests below for simethicone (discontinued) and methocarbamol (historical).      Chante Badillo, RN, BSN  [facility]

## 2024-08-23 ENCOUNTER — APPOINTMENT (OUTPATIENT)
Dept: INTERPRETER SERVICES | Facility: CLINIC | Age: 84
End: 2024-08-23
Payer: COMMERCIAL

## 2024-08-23 RX ORDER — SIMETHICONE 125 MG
125 TABLET,CHEWABLE ORAL 2 TIMES DAILY
Qty: 60 TABLET | Refills: 1 | Status: SHIPPED | OUTPATIENT
Start: 2024-08-23

## 2024-08-23 NOTE — TELEPHONE ENCOUNTER
Patient contacted with . Patient's daughter-in-law is with patient.  She is patient's PCA.  Patient is hard of hearing.    Patient is still in need of simethicone.  Originally prescribed by Benji Delarosa.  Patient is still belching.  Please address refill.  It has been discontinued as therapy completed.      FYI  Robaxin came from outside provider.  Can either contact original prescriber or schedule a visit with PCP.    Also requesting Oxycodone.  PCP discontinued Oxycodone.  Instead lidocaine.  Patient is using lidocaine patches and it works.  Patient will continue using patches.    Alberta Small RN, BSN  [facility]

## 2024-09-04 ENCOUNTER — APPOINTMENT (OUTPATIENT)
Dept: INTERPRETER SERVICES | Facility: CLINIC | Age: 84
End: 2024-09-04
Payer: COMMERCIAL

## 2024-10-15 ENCOUNTER — OFFICE VISIT (OUTPATIENT)
Dept: URGENT CARE | Facility: URGENT CARE | Age: 84
End: 2024-10-15
Payer: COMMERCIAL

## 2024-10-15 VITALS
SYSTOLIC BLOOD PRESSURE: 125 MMHG | OXYGEN SATURATION: 96 % | DIASTOLIC BLOOD PRESSURE: 79 MMHG | WEIGHT: 117 LBS | HEART RATE: 95 BPM | TEMPERATURE: 97.6 F | BODY MASS INDEX: 24.67 KG/M2 | RESPIRATION RATE: 18 BRPM

## 2024-10-15 DIAGNOSIS — A09 TRAVELER'S DIARRHEA: Primary | ICD-10-CM

## 2024-10-15 PROCEDURE — 99213 OFFICE O/P EST LOW 20 MIN: CPT | Performed by: PHYSICIAN ASSISTANT

## 2024-10-15 RX ORDER — LOPERAMIDE HYDROCHLORIDE 2 MG/1
2 TABLET ORAL 4 TIMES DAILY PRN
Qty: 30 TABLET | Refills: 0 | Status: SHIPPED | OUTPATIENT
Start: 2024-10-15

## 2024-10-15 RX ORDER — AZITHROMYCIN 500 MG/1
500 TABLET, FILM COATED ORAL DAILY
Qty: 3 TABLET | Refills: 0 | Status: SHIPPED | OUTPATIENT
Start: 2024-10-15 | End: 2024-10-18

## 2024-10-15 ASSESSMENT — ENCOUNTER SYMPTOMS
HEMATURIA: 0
DYSURIA: 0
VOMITING: 0
FATIGUE: 0
CARDIOVASCULAR NEGATIVE: 1
FREQUENCY: 0
NAUSEA: 1
BLOOD IN STOOL: 0
FLANK PAIN: 0
PALPITATIONS: 0
ABDOMINAL PAIN: 1
CONSTIPATION: 0
FEVER: 0
CHILLS: 0
DIARRHEA: 1

## 2024-10-15 ASSESSMENT — PAIN SCALES - GENERAL: PAINLEVEL: MODERATE PAIN (5)

## 2024-10-15 NOTE — PROGRESS NOTES
Tyrell Sims is a 84 year old, presenting for the following health issues with daughter in law and :  Diarrhea (Diarrhea, stomach pain - since Friday (6 times a day))    HPI   Diarrhea  Onset/Duration: 4days  Description:       Consistency of stool: watery       Blood in stool: No       Number of loose stools past 24 hours: 3-4  Progression of Symptoms: same  Accompanying signs and symptoms:       Fever: No       Nausea/Vomiting: YES       Abdominal pain: YES       Weight loss: No       Episodes of constipation: No  History   Ill contacts: No  Recent use of antibiotics: No  Recent travels: YES- recently returned from [place]  Recent medication-new or changes(Rx or OTC): No  Precipitating or alleviating factors: None  Therapies tried and outcome: yuly cagle, rest, fluids, dietary changes with some relief    Patient Active Problem List   Diagnosis    OA (osteoarthritis) of knee - bilateral    Constipation    History of total knee arthroplasty - right    SNHL (sensory-neural hearing loss), asymmetrical    Chronic left-sided low back pain with left-sided sciatica    Advance care planning    Osteoporosis    History of total hip arthroplasty, left    Lumbar spinal stenosis    Macular drusen, bilateral    Dermatochalasis of eyelid    Pseudophakia of both eyes    Closed nondisplaced zone I fracture of sacrum with routine healing, subsequent encounter    Other specified depressive episodes    Adjustment disorder with mixed anxiety and depressed mood    Fatty liver    Gallbladder polyps    Hypovitaminosis D    Latent tuberculosis    Elevated blood pressure reading without diagnosis of hypertension    Infection of orthopedic implant (H)    Anemia    Compression fracture of T12 vertebra, initial encounter (H)    Essential hypertension    Wheelchair dependence    Myelomalacia (H)    Spinal stenosis in cervical region    Age-related osteoporosis with current pathological fracture with routine healing     Cervical disc disorder with myelopathy, unspecified cervical region     Current Outpatient Medications   Medication Sig Dispense Refill    acetaminophen (TYLENOL) 325 MG tablet Take 2 tablets (650 mg) by mouth every 6 hours as needed for mild pain. **[place] 2 tabletas (650 mg) por la boca cada 6 horas edmundo sea necesario para el dolor leve. 100 tablet 1    Ascorbic Acid (VITAMIN C) 500 MG CAPS Take 1 capsule by mouth daily      calcium carbonate-vitamin D (OSCAL) 500-5 MG-MCG tablet Take 1 tablet by mouth twice daily. 180 tablet 1    carboxymethylcellulose PF (CARBOXYMETHYLCELLULOSE SODIUM) 0.5 % ophthalmic solution Place 1 drop into both eyes 4 times daily as needed for dry eyes 30 each 11    ferrous sulfate (FEROSUL) 325 (65 Fe) MG tablet Take 1 tablet (325 mg) by mouth daily (with breakfast) 90 tablet 1    ketotifen fumarate 0.035%, ketotifen 0.025%, (ZADITOR) 0.025 % ophthalmic solution Place 1 drop into both eyes 2 times daily 5 mL 11    Lidocaine (LIDOCARE) 4 % Patch Place 1 patch onto the skin every 24 hours To prevent lidocaine toxicity, patient should be patch free for 12 hrs daily. 30 patch 2    losartan (COZAAR) 50 MG tablet Take 1 tablet (50 mg) by mouth daily. 90 tablet 3    magnesium gluconate (MAGONATE) 500 (27 Mg) MG tablet Take 1 tablet (500 mg) by mouth at bedtime 90 tablet 4    methocarbamol (ROBAXIN) 500 MG tablet Take 500 mg by mouth Every 6 hours as needed      Nutritional Supplements (ENSURE HIGH PROTEIN) Take 1 Can by mouth 3 times daily (with meals) 2400 mL 11    simethicone (MYLICON) 125 MG chewable tablet Take 1 tablet (125 mg) by mouth 2 times daily. 60 tablet 1    triamcinolone (KENALOG) 0.1 % external lotion Apply topically 3 times daily 120 mL 1     No current facility-administered medications for this visit.        Allergies   Allergen Reactions    Aspirin Hives     Review of Systems   Constitutional:  Negative for chills, fatigue and fever.   Cardiovascular: Negative.  Negative for  chest pain, palpitations and leg swelling.   Gastrointestinal:  Positive for abdominal pain, diarrhea and nausea. Negative for blood in stool, constipation and vomiting.   Genitourinary:  Negative for dysuria, flank pain, frequency, hematuria, pelvic pain, urgency, vaginal bleeding and vaginal discharge.   All other systems reviewed and are negative.          Objective    /79 (BP Location: Left arm, Patient Position: Sitting, Cuff Size: Adult Regular)   Pulse 95   Temp 97.6  F (36.4  C) (Tympanic)   Resp 18   Wt 53.1 kg (117 lb)   LMP  (LMP Unknown)   SpO2 96%   BMI 24.67 kg/m    Body mass index is 24.67 kg/m .  Physical Exam  Vitals and nursing note reviewed.   Constitutional:       General: She is not in acute distress.     Appearance: Normal appearance. She is well-developed and normal weight. She is not ill-appearing.   Cardiovascular:      Rate and Rhythm: Normal rate and regular rhythm.      Pulses: Normal pulses.      Heart sounds: Normal heart sounds, S1 normal and S2 normal. No murmur heard.     No friction rub. No gallop.   Pulmonary:      Effort: Pulmonary effort is normal. No accessory muscle usage or respiratory distress.      Breath sounds: Normal breath sounds and air entry. No decreased breath sounds, wheezing, rhonchi or rales.   Abdominal:      General: Abdomen is flat. Bowel sounds are normal.      Palpations: Abdomen is soft. There is no hepatomegaly, splenomegaly or mass.      Tenderness: There is generalized abdominal tenderness. There is no right CVA tenderness, left CVA tenderness, guarding or rebound. Negative signs include Leo's sign, Rovsing's sign, McBurney's sign, psoas sign and obturator sign.      Hernia: No hernia is present.   Genitourinary:     Comments: Declined pelvic exam.  Skin:     General: Skin is warm and dry.   Neurological:      Mental Status: She is alert and oriented to person, place, and time.   Psychiatric:         Mood and Affect: Mood normal.          Behavior: Behavior normal.         Thought Content: Thought content normal.         Judgment: Judgment normal.              Assessment/Plan:  Traveler's diarrhea:  With recent travel to [place].  Will give azithromycin as directed and imodium as needed for diarrhea.  Recommended increase fluids, probiotics, BRAT diet and tylenol/ibuprofen as needed for pain.  Recheck in clinic if symptoms worsen or if symptoms do not improve.  To the ER if worsening pain, fevers, uncontrollable vomiting or bloody stools.  -     azithromycin (ZITHROMAX) 500 MG tablet; Take 1 tablet (500 mg) by mouth daily for 3 days.  -     loperamide (IMODIUM A-D) 2 MG tablet; Take 1 tablet (2 mg) by mouth 4 times daily as needed for diarrhea.        Lesa Aguayo PA-C   None

## 2024-10-21 NOTE — PROGRESS NOTES
[facility] Care Coordination Contact      [facility] Six-Month Telephone Assessment    6 month telephone assessment completed on 04/02/19.    ER visits: No  Hospitalizations: No  TCU stays: No  Significant health status changes: None  Falls/Injuries: No  ADL/IADL changes: No  Changes in services: No    Caregiver Assessment follow up:  N/A    Goals: See POC in chart for goal progress documentation.      Will see member in 6 months for an annual health risk assessment.   Encouraged member to call CC with any questions or concerns in the meantime.   Jennifer Mahmood RN  [facility] Care Coordinator  [phone]           Spine appears normal, range of motion is not limited, no muscle or joint tenderness

## 2024-10-25 ENCOUNTER — TELEPHONE (OUTPATIENT)
Dept: FAMILY MEDICINE | Facility: CLINIC | Age: 84
End: 2024-10-25
Payer: COMMERCIAL

## 2024-10-25 NOTE — TELEPHONE ENCOUNTER
Forms/Letter Request    Type of form/letter: Transportation ([employer])      Is Release of Information needed?: No    Do we have the form/letter: Yes: Placed in MD's box    Who is the form from? Insurance comp    Where did/will the form come from? form was faxed in    When is form/letter needed by: asap    How would you like the form/letter returned: Fax : [phone]    Patient Notified form requests are processed in 5-7 business days:Yes

## 2024-10-29 ENCOUNTER — PATIENT OUTREACH (OUTPATIENT)
Dept: GERIATRIC MEDICINE | Facility: CLINIC | Age: 84
End: 2024-10-29

## 2024-10-29 NOTE — PROGRESS NOTES
[facility] Care Coordination Contact    Received message from member's daughter-in-law, Kusum, stating Bethany's walker needs to be repaired again. I think this has been repaired twice already in the past year.    Emailed [facility] and asked them to contact the member's son, Sudhakar ([phone]), to schedule a time to look at the walker. Informed Kusum that they should hear from [facility] about this.  Lizzie Wilson RN  [facility]   [phone]

## 2024-10-30 ENCOUNTER — TELEPHONE (OUTPATIENT)
Dept: AUDIOLOGY | Facility: CLINIC | Age: 84
End: 2024-10-30
Payer: COMMERCIAL

## 2024-10-30 DIAGNOSIS — H90.3 SENSORINEURAL HEARING LOSS, BILATERAL: Primary | ICD-10-CM

## 2024-10-30 PROCEDURE — V5266 BATTERY FOR HEARING DEVICE: HCPCS | Performed by: AUDIOLOGIST

## 2024-10-30 NOTE — TELEPHONE ENCOUNTER
Patient calls to request her 3 month supply of batteries be mailed to her home address. Mailed 24 size 312 batteries to the address on file.      CHARGES:              [id number] Batteries ($1). Total: $24 Bill to patient's insurance.     Octaviano Galloway MA, CCC-A  [place] Licensed Audiologist #[id number]

## 2024-10-30 NOTE — TELEPHONE ENCOUNTER
Forms received and faxed to [facility] at [phone].  A copy placed in TC bin and Abstract.

## 2024-10-30 NOTE — TELEPHONE ENCOUNTER
[facility] Call Center    Phone Message    May a detailed message be left on voicemail: yes     Reason for Call: Other: The pt is requesting batteries for her hearing aids. The pt used to go the the [facility] audiologist, Octaviano Galloway. The pt uses size 312 batteries. Please mail to the current address. Thanks.     Action Taken: Message routed to:  Other: LELE AUDIO    Travel Screening: Not Applicable     Date of Service:

## 2024-11-15 DIAGNOSIS — M50.00 INTERVERTEBRAL DISC DISORDER OF CERVICAL REGION WITH MYELOPATHY: ICD-10-CM

## 2024-11-15 DIAGNOSIS — L30.9 DERMATITIS: ICD-10-CM

## 2024-11-15 DIAGNOSIS — Z98.1 S/P CERVICAL SPINAL FUSION: ICD-10-CM

## 2024-11-15 RX ORDER — LIDOCAINE PAIN RELIEF 40 MG/1000MG
PATCH TOPICAL
Qty: 30 PATCH | Refills: 2 | Status: SHIPPED | OUTPATIENT
Start: 2024-11-15

## 2024-11-15 RX ORDER — TRIAMCINOLONE ACETONIDE 1 MG/ML
LOTION TOPICAL
Qty: 120 ML | Refills: 1 | Status: SHIPPED | OUTPATIENT
Start: 2024-11-15

## 2024-11-20 ENCOUNTER — PATIENT OUTREACH (OUTPATIENT)
Dept: GERIATRIC MEDICINE | Facility: CLINIC | Age: 84
End: 2024-11-20
Payer: COMMERCIAL

## 2024-11-20 NOTE — PROGRESS NOTES
"[facility] Care Coordination Contact    Member's daughter-in-law sent a text with photos of a [facility] Denial letter for provider \"Crescencio Camilo\" on 12/18/23.    Care coordinator reviewed member's chart and she was admitted to [facility] on 12/18/23. Labs were ordered that day by Crescencio De Leon MD. There is a difference in the spelling of the last name between the denial letter and the chart.     Unsure if name discrepancy is the reason for the denial, but advised member's family to contact [facility] at the number on the letter and ask for a  to discuss denial letter.   Lizzie Wilson RN  [facility]   [phone]        "

## 2024-12-03 ENCOUNTER — PATIENT OUTREACH (OUTPATIENT)
Dept: GERIATRIC MEDICINE | Facility: CLINIC | Age: 84
End: 2024-12-03
Payer: COMMERCIAL

## 2024-12-03 NOTE — PROGRESS NOTES
[facility] Care Coordination Contact    Called family Kusum, daughter-in-law,  to schedule annual HRA home visit. HRA has been scheduled for Tues, 12/17/24, at 12:00 PM.  Submitted online request to [employer] for a  for this assessment.    Discovered prior commitment, so messaged with Kusum and we changed visit to Wed, 12/18, at 3:00. Emailed [employer] to change the date and time accordingly.    Lizzie Wilson RN  [facility]   [phone]

## 2024-12-06 PROBLEM — E66.812 CLASS 2 SEVERE OBESITY DUE TO EXCESS CALORIES WITH SERIOUS COMORBIDITY IN ADULT (H): Status: ACTIVE | Noted: 2024-12-06

## 2024-12-06 PROBLEM — E66.01 CLASS 2 SEVERE OBESITY DUE TO EXCESS CALORIES WITH SERIOUS COMORBIDITY IN ADULT (H): Status: ACTIVE | Noted: 2024-12-06

## 2024-12-09 ENCOUNTER — TELEPHONE (OUTPATIENT)
Dept: FAMILY MEDICINE | Facility: CLINIC | Age: 84
End: 2024-12-09
Payer: COMMERCIAL

## 2024-12-09 NOTE — TELEPHONE ENCOUNTER
RN called and spoke with patient. She gives verbal permission to speak with dtr-in law Kusum. Pt states she prefers we speak with dtr-in-law as pt does not understand medication information.     RN will call dtr in-law Kusum.           ----- Message from Ernestina Argueta sent at 12/9/2024 12:52 PM CST -----  Please call with     Here is a summary of your recent test results:    Liver enzymes levels have improved but an additional liver test (GGT) is elevated.  I would like her to have a liver ultrasound to check on this.  An order has been placed.  Vitamin D and blood counts are normal.    Please contact the clinic if you have additional questions.  Thank you.    Sincerely,    Ernestina Argueta MD

## 2024-12-09 NOTE — TELEPHONE ENCOUNTER
RN called and spoke with dtr-in-law Kusum.     Relayed below provider message as written. Dtr-in-law verbalized understanding and agrees with plan. Imaging scheduling number was given and she was transferred to scheduling.     No further questions/concerns.         Lashawn Hernandez RN    [facility]

## 2024-12-10 ENCOUNTER — PATIENT OUTREACH (OUTPATIENT)
Dept: GERIATRIC MEDICINE | Facility: CLINIC | Age: 84
End: 2024-12-10
Payer: COMMERCIAL

## 2024-12-10 NOTE — PROGRESS NOTES
[facility] Care Coordination Contact    Kusum confirmed the walker has been repaired.  Lizzie Wilson RN  [facility]   [phone]

## 2024-12-10 NOTE — PROGRESS NOTES
[facility] Care Coordination Contact    Camila Herrera ([phone]) from [facility]  left voicemail that Bethany started going there on Monday, 12/9/24, and they need an auth.     Also received a text from Kusum, member's daughter-in-law, who stated her last day at [facility] was on Wed, 12/4/24.  Left voicemail for the Haitian  at [facility], informing them the member's last day there was 12/4/24 and that she started a new program on 12/9/24.    Spoke with Camila at [employer] and got her contact info, NPI & UMPI:   [email or web address]   NPI/UMPI: [id number], [id number]    Updated [facility] plan of care and tasked RAPHAEL Vee, to submit new auth.    Informed Camila that we would submit an auth to [facility] for 3 days per week through 12/31/24 and I am assessing Bethany next week. A second auth will be submitted after that, which will start 1/1/25.  Lizzie Wilson RN  [facility]   [phone]

## 2024-12-18 ENCOUNTER — PATIENT OUTREACH (OUTPATIENT)
Dept: GERIATRIC MEDICINE | Facility: CLINIC | Age: 84
End: 2024-12-18
Payer: COMMERCIAL

## 2024-12-18 ENCOUNTER — TELEPHONE (OUTPATIENT)
Dept: FAMILY MEDICINE | Facility: CLINIC | Age: 84
End: 2024-12-18
Payer: COMMERCIAL

## 2024-12-18 ASSESSMENT — ACTIVITIES OF DAILY LIVING (ADL)
DEPENDENT_IADLS:: CLEANING;COOKING;LAUNDRY;SHOPPING;MEAL PREPARATION;MEDICATION MANAGEMENT;MONEY MANAGEMENT;TRANSPORTATION;INCONTINENCE

## 2024-12-18 NOTE — Clinical Note
Hi Dr. Juan Argueta, This is an FYI that I saw Bethany for her annual home visit and health risk assessment in December. She is doing fine living in her apartment, with the supports she gets through the Elderly Waiver and informal supports from her family. Bethany's services will stay the same as last year, except for a decrease of 30 mins daily of PCA. At the time of last year's assessment, Bethany was still in TCU and was on IV antibiotics for the infection of her knee prosthesis after femur fracture and hip surgery. So, she is a little bit more independent than she was at that time.  Please let me know if I can be of any help with this patient.  Thanks, Lizzie Wilson, RN care coordinator  [facility]  [phone]

## 2024-12-18 NOTE — TELEPHONE ENCOUNTER
Daughter-in-law, Yumiko (CTC on file) calling w/  with questions regarding new medication, ibandronate (Boniva).     Asking what Boniva is for. Writer explained this is for osteoporosis. Also explained how to take this 1st thing in AM with full glass of water, 60 mins before any food, beverage or other meds. Do not lie down for 60 mins after taking medication.    Daughter in law verbalized understanding. No further questions or concerns.      Chante Badillo, RN, BSN  [facility]  [place], [place] and [place]

## 2024-12-20 ENCOUNTER — ANCILLARY PROCEDURE (OUTPATIENT)
Dept: ULTRASOUND IMAGING | Facility: CLINIC | Age: 84
End: 2024-12-20
Attending: FAMILY MEDICINE
Payer: COMMERCIAL

## 2024-12-20 DIAGNOSIS — R74.8 ELEVATED SERUM GGT LEVEL: ICD-10-CM

## 2024-12-20 DIAGNOSIS — R74.01 ELEVATION OF LEVELS OF LIVER TRANSAMINASE LEVELS: ICD-10-CM

## 2024-12-20 PROCEDURE — 76705 ECHO EXAM OF ABDOMEN: CPT | Mod: GC | Performed by: RADIOLOGY

## 2024-12-30 ENCOUNTER — PATIENT OUTREACH (OUTPATIENT)
Dept: GERIATRIC MEDICINE | Facility: CLINIC | Age: 84
End: 2024-12-30
Payer: COMMERCIAL

## 2024-12-31 ENCOUNTER — PATIENT OUTREACH (OUTPATIENT)
Dept: GERIATRIC MEDICINE | Facility: CLINIC | Age: 84
End: 2024-12-31
Payer: COMMERCIAL

## 2024-12-31 NOTE — TELEPHONE ENCOUNTER
[facility] Care Coordination Contact    Received after visit chart from care coordinator.  Completed following tasks: Submitted referrals/auths for Consultation Services and Updated services in Database.    [facility]:  Emailed required CFSS documents to [facility].  Mailed member supplemental summary chart and assessment summary letter.     Carolyn Silver  Care Management Specialist  [facility]  [phone]

## 2024-12-31 NOTE — PROGRESS NOTES
[facility] Care Coordination Contact    Care coordinator completed [place] assessment documentation from 12/18/24 (still need to complete support plan) and tasked RAPHAEL Leon, to process PCA/CFSS task.   Care coordinator completed referral form and emailed to [facility].  Member's son, Sudhakar, is the contact for this agency, since Kusum is Bethany's PCA.  Lizzie Wilson RN  [facility]   [phone]

## 2024-12-31 NOTE — PROGRESS NOTES
"[facility] Care Coordination Contact    Received text from member's daughter-in-law, Kusum, stating the PCA/CFSS agency ([facility]) told them she would not have PCA or homemaking services starting 1/1, due to not having the new auth/SA.     This care coordinator called the agency and spoke with Renetta and told her the member's assessment was done on 12/18/24 and we have 10 days to get the report sent to [facility], then they are taking about 2 weeks to process and write the auths.     Renetta asked for the new PCA/CFSS & homemaking hours for the year as well as the waiver span dates. Writer informed her the PCA/CFSS hours have decreased this year to 3 hours per day and homemaking will stay the same (4 hours per week). Waiver span is 1/1/25 - 12/31/2025. Renetta said she just needed that information and \"everything is good to go\".   Lizzie Wilson RN  [facility]   [phone]      "

## 2025-01-10 ENCOUNTER — PATIENT OUTREACH (OUTPATIENT)
Dept: GERIATRIC MEDICINE | Facility: CLINIC | Age: 85
End: 2025-01-10
Payer: COMMERCIAL

## 2025-01-10 NOTE — PROGRESS NOTES
[facility] Care Coordination Contact    Received message from member that her PCA/CFSS hours start on 1/23/25, not 1/1/25 as expected. She asked writer to call back to discuss.  Care coordinator called [facility] ([phone]) and spoke with Renetta about this.   Renetta also told member that homemaking would only be one hour per week, which is a decrease from 4 hours per week. Unsure where she got that information, so awaiting email from Renetta.    Tasked [employer], to follow-up with [employer]ens about this - to see if they need to do an auth to fill the gap between 1/1 - 1/22/25.  Informed Kusum and Bethany's granddaughter, Lynn, about this and advised I would let them know as soon as we hear back from [facility].  Lizzie Wilson RN  [facility]   [phone]

## 2025-01-12 NOTE — PROGRESS NOTES
[facility] Care Coordination Contact    [facility] Home Visit Assessment     Home visit for Health Risk Assessment with Bethany Logan completed on December 18, 2024    Type of residence:: Apartment - handicap accessible  Current living arrangement:: I live in a private home, I live alone     Assessment completed with:: Patient, Family, Other    Current Care Plan  Member currently receiving the following home care services: N/A  Member currently receiving the following community resources: County Programs, County Worker, Adult Day Care, DME, Housekeeping/Chore Agency, Lifeline, PCA (CFSS), Transportation Services      Medication Review  Medication reconciliation completed in Epic: No  Medication set-up & administration: Family/informal caregiver sets up weekly.  Self-administers medications and family helps when they are with Bethany. Family members also call Bethany to remind her when to take her meds .  Medication Risk Assessment Medication (1 or more, place referral to MTM): N/A: No risk factors identified  MTM Referral Placed: No: No risk factors idenified    Mental/Behavioral Health   Depression Screening: PHQ-2 score: 0  Bethany denied any concerns with mental health or mood currently, but she has a history of depression and isolating herself. The services, and informal supports she has in place have made a positive impact on Bethany's mental health and mood. According to chart review and member report, she discontinued antidepressant medication last summer.      Mental health DX:: Mixed anxiety and depression        Falls Assessment:   Fallen 2 or more times in the past year?: (!) Yes   Any fall with injury in the past year?: No, but she had a fall with femur fracture in 2023.    ADL/IADL Dependencies:   Dependent ADLs:: Ambulation-walker, Bathing, Dressing, Grooming, Incontinence, Toileting  Dependent IADLs:: Cleaning, Cooking, Laundry, Shopping, Meal Preparation, Medication Management,  Money Management, Transportation, Incontinence    Health Plan sponsored benefits: [facility]: Shared information regarding [employer]. Reviewed preventative health screening and health plan supplemental benefits/incentives. Reviewed medication disposal form and transition of care member handout.    CFSS Assessment completed at visit: Yes Annual CFSS assessment indicated 3 hours per day of CFSS. This is a decrease from the  previous assessment. Bethany's last assessment was done when she was getting ready to discharge from TCU after a fall with femur fracture, hip replacement revision surgery, and IV antibiotic treatment of infection of previous orthopedic implant. She needed more hands-on help and supervision, as well as daily IV antibiotic infusions at that time. Bethany recovered and is stable and able to be slightly more independent than last year.    Elderly Waiver Eligibility: Yes-will continue on EW    Care Plan & Recommendations: Recommend Bethany continue with PCA/CFSS, Homemaking, Adult , Waiver Transportation, PERS/Lifeline, and monthly incontinence supplies (adding wipes).     See [place] for detailed assessment information.    Follow-Up Plan: Member informed of future contact, plan to f/u with member with a 6 month telephone assessment.  Contact information shared with member and family, encouraged member to call with any questions or concerns at any time.    [place] care continuum providers: Please see Snapshot and Care Management Flowsheets for Specific details of care plan.    This CC note routed to PCP, Ernestina Alvarado.    Lizzie Wilson RN  [facility]   [phone]

## 2025-01-14 ENCOUNTER — PATIENT OUTREACH (OUTPATIENT)
Dept: GERIATRIC MEDICINE | Facility: CLINIC | Age: 85
End: 2025-01-14
Payer: COMMERCIAL

## 2025-01-14 NOTE — PROGRESS NOTES
[facility] Care Coordination Contact    [facility] responded that the previous auth, that was supposed to end 12/31/24, was extended to meet the new start date of the new service authorization (1/23/25).   Bethany has 4 hours per day through 1/22/25, then the decrease to 3 hours per day is effective 1/23/25.    Care coordinator left a voicemail and emailed Renetta ([email or web address]) at [facility] to inform her of [facility]'s response. Advised her to check [facility]'s Provider Portal for the updated dates and to contact the [facility] Provider Line if they have further questions.Also asked her to let me know if there is anything else she needs to discuss with this care coordinator.    Called Kusum Bailey and informed them of [facility]'s response and that she is fine to use the hours that she worked with Bethany over the past 2 weeks. I told them I emailed and left a message for the agency (Renetta) and if they tell her she can't get paid for the past 2 weeks, that they need to call me back, as the hours were approved by [facility]. Kusum Bailey voiced understanding and agreed with the plan.    I also told Kusum that the homemaking hours will remain the same as last year - 4 hours per week.  Lizzie Wilson RN  [facility]   [phone]

## 2025-01-14 NOTE — PROGRESS NOTES
[facility] Care Coordination Contact    Received after visit chart from care coordinator.  Completed following tasks: Mailed copy of support plan to member, Mailed [place] signature sheet pages 3-4, Mailed Safe Medication Disposal , Mailed Transition of Care Member Handout, Submitted referrals/auths for Homemaking, ADC, Transportation, PERS, and Updated services in Database.   and Provider Signature - No Support Plan Shared:  Member indicates that they do not want their support plan shared with any EW providers.    Nanda Cheema  Care Management Specialist  [facility]  [phone]

## 2025-01-15 DIAGNOSIS — R52 PAIN, UNSPECIFIED: ICD-10-CM

## 2025-01-15 NOTE — PROGRESS NOTES
[facility] Care Coordination Contact    Care coordinator received text from [employer] that she was told the PCA can't be paid for the past 2 weeks because the agency has not received an auth from [facility] yet.     Also received voicemail from Renetta at [facility] ([phone], [phone]) this morning and she confirmed that the [facility] Provider Portal shows the previous PCA auth was extended to 1/22/25. Then the decreased PCA hours start with the new auth on 1/23/25.   Renetta confirmed that the PCA will be paid for the past 2 weeks. Also spoke with Camila,  coordinator for the agency, per member's request. She had already spoken with Renetta and knows that there is no gap in PCA services and that [employer] will be paid for the past 2 weeks. Care coordinator also called Lani at [facility] and left her a message with the above information so they are all aware and can reassure the member and PCA.    Renetta also asked care coordinator about the status of the homemaking auth for the new waiver span. Care coordinator told her it was submitted to [facility] (1/14/25) and will likely take them a couple of weeks to process. Also informed her that the new auth dates are 1/1/25 - 12/31/2 and securely emailed the WSAF for homemaking to Renetta, as she said it would be helpful to have it in writing. She will talk to her supervisor to make sure the homemaker gets paid for the past 2 weeks as well.    Care coordinator texted [employer] about all of the above information and she thanked me.   Lizzie Wilson RN  [facility]   [phone]

## 2025-01-16 RX ORDER — PSEUDOEPHED/ACETAMINOPH/DIPHEN 30MG-500MG
TABLET ORAL
Qty: 100 TABLET | Refills: 11 | Status: SHIPPED | OUTPATIENT
Start: 2025-01-16

## 2025-01-16 NOTE — TELEPHONE ENCOUNTER
Clinic RN: Please investigate patient's chart or contact patient if the information cannot be found because patient should have run out of this medication on 12/27/23. Confirm patient is taking this medication as prescribed. Document findings and route refill encounter to provider for approval or denial.    Roverto Valdez, RN, BSN, MSN  RN Lead

## 2025-01-21 ENCOUNTER — PATIENT OUTREACH (OUTPATIENT)
Dept: GERIATRIC MEDICINE | Facility: CLINIC | Age: 85
End: 2025-01-21
Payer: COMMERCIAL

## 2025-01-21 NOTE — PROGRESS NOTES
[facility] Care Coordination Contact    Received voicemail from Dexter at [facility] ([facility]). [facility] denied their claim for the first week of January and the previous auth ended 12/31/24.    Care coordinator returned Dexter's call and left voicemail that the new auth starts 1/1/25 and was submitted to [facility] on 1/14/25. Advised if they have not received the approval letter from [facility] in the next week to 10 days that they should follow-up with [facility] Provider services.  Also left my phone number and email for any further questions.  Lizzie Wilson RN  [facility]   [phone]

## 2025-01-28 ENCOUNTER — PATIENT OUTREACH (OUTPATIENT)
Dept: GERIATRIC MEDICINE | Facility: CLINIC | Age: 85
End: 2025-01-28
Payer: COMMERCIAL

## 2025-01-28 NOTE — PROGRESS NOTES
[facility] Care Coordination Contact    Information from the [employer]:    [facility] CFSS: Initial Assessment CFSS:  1/1/2025 12/31/2025 Medically Necessary 6 units    [facility] PCA: Assess Reduce / Deny PCA:  1/23/2025 6/30/2025 Reduced Services 1908 units        This reflects the reduction of PCA hours to 3 per day, starting 1/23/25. 1/23/25 - 6/30/25 = 159 days.  1908 units / 159 days  = 12 units per day    Lizzie Wilson RN  [facility]   [phone]

## 2025-03-24 DIAGNOSIS — D50.9 IRON DEFICIENCY ANEMIA, UNSPECIFIED IRON DEFICIENCY ANEMIA TYPE: ICD-10-CM

## 2025-03-25 RX ORDER — FERROUS SULFATE 325(65) MG
TABLET ORAL
Qty: 90 TABLET | Refills: 1 | Status: SHIPPED | OUTPATIENT
Start: 2025-03-25

## 2025-04-16 ENCOUNTER — PATIENT OUTREACH (OUTPATIENT)
Dept: GERIATRIC MEDICINE | Facility: CLINIC | Age: 85
End: 2025-04-16
Payer: COMMERCIAL

## 2025-04-16 NOTE — PROGRESS NOTES
[facility] Care Coordination Contact    Message received from  Lizzie Wilson.  CHW spoke member's PCA (Peggy) to provide assistance about member requested order a GrandPad from [facility].   CHW called and spoke to [facility] to let them know that  FVP member needs a GrandPad.  [facility] took CHW information and member's information as well and [facility] mentioned that they will call CHW back once they confirm who is calling and verify member's eligibility.    Lorena from [facility] GrandPad program [phone] called CHW back and confirmed that member's GrandPad has been ordered, everything's been taken care of.  GrandPad delivery, it will take around five business days.    Additionally, CHW informed member's PCA it was a good time to schedule the Eye exam appointment.  CHW called members' PCA again and provided the following clinic information:    Eye exam  [facility]  Address: [address] [place] [address],  [address]  [place] [address]  Phone : [phone]    Date: Friday, June 06/2025  Time: 9:30 AM      DANETTE Balderas  [facility]  [phone]

## 2025-04-23 DIAGNOSIS — M50.00 INTERVERTEBRAL DISC DISORDER OF CERVICAL REGION WITH MYELOPATHY: ICD-10-CM

## 2025-04-23 DIAGNOSIS — Z98.1 S/P CERVICAL SPINAL FUSION: ICD-10-CM

## 2025-04-23 RX ORDER — LIDOCAINE 4 G/G
PATCH TOPICAL
Qty: 30 PATCH | Refills: 1 | Status: SHIPPED | OUTPATIENT
Start: 2025-04-23

## 2025-05-16 ENCOUNTER — TELEPHONE (OUTPATIENT)
Dept: AUDIOLOGY | Facility: CLINIC | Age: 85
End: 2025-05-16
Payer: COMMERCIAL

## 2025-05-16 DIAGNOSIS — H90.3 SENSORINEURAL HEARING LOSS, BILATERAL: Primary | ICD-10-CM

## 2025-05-16 PROCEDURE — V5266 BATTERY FOR HEARING DEVICE: HCPCS

## 2025-05-16 NOTE — TELEPHONE ENCOUNTER
[facility] Call Center    Phone Message    May a detailed message be left on voicemail: yes     Reason for Call: Other: Please mail hearing aid batteries to pt.   [place] location, thanks     Action Taken: Other: AUD    Travel Screening: Not Applicable     Date of Service:

## 2025-05-19 NOTE — CONFIDENTIAL NOTE
Patient calls to request her 3 month supply of batteries be mailed to her home address. Mailed 24 size 312 batteries to the address on file.      CHARGES:              [id number] Batteries ($1). Total: $24 Bill to patient's insurance.    Pam Adams.   Doctor of Audiology  License #[id number]

## 2025-05-22 DIAGNOSIS — H35.3222 EXUDATIVE AGE-RELATED MACULAR DEGENERATION OF LEFT EYE WITH INACTIVE CHOROIDAL NEOVASCULARIZATION (H): Primary | ICD-10-CM

## 2025-06-02 DIAGNOSIS — H35.30 AGE-RELATED MACULAR DEGENERATION: ICD-10-CM

## 2025-06-02 DIAGNOSIS — E55.9 HYPOVITAMINOSIS D: ICD-10-CM

## 2025-06-04 RX ORDER — ASPIRIN 325 MG
1 TABLET ORAL 4 TIMES DAILY
Qty: 30 EACH | Refills: 5 | Status: SHIPPED | OUTPATIENT
Start: 2025-06-04

## 2025-06-04 NOTE — TELEPHONE ENCOUNTER
Last Written Prescription:  carboxymethylcellulose PF (CARBOXYMETHYLCELLULOSE SODIUM) 0.5 % ophthalmic solution 30 each 11 11/18/2022 -- --   Sig - Route: Place 1 drop into both eyes 4 times daily as needed for dry eyes - Both Eyes     ----------------------  Last Visit Date: 4-29-24    Future Visit Date: 6-6-25  ----------------------        Refill decision: Medication unable to be refilled by RN due to: Other:Gap in Refill        Request from pharmacy:  Requested Prescriptions   Pending Prescriptions Disp Refills    LUBRICANT EYE DROPS PF 0.5 % ophthalmic solution [Pharmacy Med Name: Carboxymethylcellulose Sod PF 0.5 % Ophthalmic Solution (REFRESH PLUS)]  11     Sig: Place 1 drop into BOTH eyes 4 times daily.       There is no refill protocol information for this order

## 2025-06-05 ENCOUNTER — PATIENT OUTREACH (OUTPATIENT)
Dept: GERIATRIC MEDICINE | Facility: CLINIC | Age: 85
End: 2025-06-05
Payer: COMMERCIAL

## 2025-06-05 NOTE — PROGRESS NOTES
[facility] Care Coordination Contact      [facility] Six-Month Telephone Assessment    6 month telephone assessment completed on 6/5/25.    ER visits: No  Hospitalizations: No  TCU stays: No  Significant health status changes: no   Falls/Injuries: No  ADL/IADL changes: No  Changes in services: No     Caregiver Assessment follow up:  talked with daughter-in-law and granddaughter. They were told by the PCA agency that Bethany's PCA auth is expiring 6/30 and they will stop services if they don't get a new auth. Explained to them that I will submit a new auth to [facility] to extend PCA for 3 more months, while the CFSS Service Delivery Plan is being completed. Revised [employer] support plan, tasked RAPHAEL Vee, to submit auth and left voicemail for PCA coordinator at [facility], advising an auth will be submitted (for 7/1 - 9/30/25). Also asked them to let me know if they are a CFSS provider.     Goals: See Support Plan for goal progress documentation.      Will see member in 6 months for an annual health risk assessment.   Encouraged member to call CC with any questions or concerns in the meantime.   Lizzie Wilson RN  [facility]   [phone]

## 2025-06-12 ENCOUNTER — MEDICAL CORRESPONDENCE (OUTPATIENT)
Dept: HEALTH INFORMATION MANAGEMENT | Facility: CLINIC | Age: 85
End: 2025-06-12
Payer: COMMERCIAL

## 2025-06-12 ENCOUNTER — TELEPHONE (OUTPATIENT)
Dept: FAMILY MEDICINE | Facility: CLINIC | Age: 85
End: 2025-06-12
Payer: COMMERCIAL

## 2025-06-12 NOTE — TELEPHONE ENCOUNTER
Forms/Letter Request    Type of form/letter: [employer] - Prescription/ Certificate of Medical Necessity     Do we have the form/letter: Yes: placed in provider bin     Who is the form from? [employer]  (if other please explain)    Where did/will the form come from? form was faxed in    When is form/letter needed by: asap    How would you like the form/letter returned: Fax : [phone]    Patient Notified form requests are processed in 5-7 business days:N/A    Okay to leave a detailed message?: Yes at Cell number on file:    Telephone Information:   Mobile [phone]   Mobile [phone]

## 2025-06-12 NOTE — TELEPHONE ENCOUNTER
Form faxed  Deyanira Cheng MA/SETH    Received provider signed Prescription/ Certificate of Medical Necessity form and faxed to Sheri, [phone], right fax confirmed at 3:59 pm today, 6/12/2025.Copy to TC and abstracting.  Deyanira Cheng MA/  [facility]   Primary Care

## 2025-06-16 DIAGNOSIS — H35.30 AGE-RELATED MACULAR DEGENERATION: ICD-10-CM

## 2025-06-17 ENCOUNTER — PATIENT OUTREACH (OUTPATIENT)
Dept: GERIATRIC MEDICINE | Facility: CLINIC | Age: 85
End: 2025-06-17
Payer: COMMERCIAL

## 2025-06-17 RX ORDER — NICOTINE POLACRILEX 2 MG
LOZENGE BUCCAL
Qty: 5 ML | Refills: 11 | Status: SHIPPED | OUTPATIENT
Start: 2025-06-17

## 2025-06-17 NOTE — PROGRESS NOTES
3 month PCA extension submitted to evens Cheema  Care Management Specialist  [facility]  [phone]

## 2025-06-30 DIAGNOSIS — E55.9 HYPOVITAMINOSIS D: ICD-10-CM

## 2025-07-02 ENCOUNTER — PATIENT OUTREACH (OUTPATIENT)
Dept: GERIATRIC MEDICINE | Facility: CLINIC | Age: 85
End: 2025-07-02
Payer: COMMERCIAL

## 2025-07-02 NOTE — PROGRESS NOTES
[facility] Care Coordination Contact    Received call from Jeancarlos at [facility], PCA agency, stating he received my email yesterday about Bethany's PCA auth being extended (communication form being sent to [facility] on 6/17/25 & advising them to contact [facility] if they have not received the auth letter yet).    Jeancarlos asked me to send him the auth dates and units. Care coordinator emailed Jeancarlos, informing him the PCA auth dates are 7/1/25 - 9/30/25; a 3 month extension, per [facility] policy, while CFSS SDP is being completed.    Care coordinator has also spoken with Renetta Sheppard, Outreach Services Department, Phone: [phone] Ext: [phone], outreach@"[email or web address]ma and she said she would update their system, noting that the auth is in process and there shouldn't be any lapse in coverage.     Lizzie Wilson RN  [facility]   [phone]

## 2025-07-10 NOTE — PROGRESS NOTES
[facility] Care Coordination Contact    Care coordinator received text message from member's family that they were told everything was ok and Bethany can get her normal PCA hours as of 7/3/25.  Care coordinator reminded them that PCA hours are flexible and if she needs to use hours from 7/1 & 7/2 on different days that is okay. Advised her to discuss the details of this with PCA agency.    Nanda CMS, submitted the PCA Communication Form to [facility] on 6/17/25, so there should not be a lapse in her PCA services    Lizzie Wilson RN  [facility]   [phone]

## 2025-07-17 DIAGNOSIS — H10.13 ALLERGIC CONJUNCTIVITIS OF BOTH EYES: ICD-10-CM

## 2025-07-17 DIAGNOSIS — H35.3112 INTERMEDIATE STAGE NONEXUDATIVE AGE-RELATED MACULAR DEGENERATION OF RIGHT EYE: ICD-10-CM

## 2025-07-17 DIAGNOSIS — H04.123 DRY EYES: ICD-10-CM

## 2025-07-17 DIAGNOSIS — H43.813 PVD (POSTERIOR VITREOUS DETACHMENT), BOTH EYES: ICD-10-CM

## 2025-07-17 DIAGNOSIS — H35.3222 EXUDATIVE AGE-RELATED MACULAR DEGENERATION OF LEFT EYE WITH INACTIVE CHOROIDAL NEOVASCULARIZATION (H): ICD-10-CM

## 2025-07-21 RX ORDER — ANTIOX #8/OM3/DHA/EPA/LUT/ZEAX 250-2.5 MG
1 CAPSULE ORAL 2 TIMES DAILY
Qty: 60 CAPSULE | Refills: 11 | Status: SHIPPED | OUTPATIENT
Start: 2025-07-21

## 2025-07-21 NOTE — TELEPHONE ENCOUNTER
Rx for AREDS eye vitamins sent with ok to use per ophthalmology if ok per primary medical doctor.    Roverto Kimbrough RN 11:47 AM 07/21/25

## 2025-07-21 NOTE — TELEPHONE ENCOUNTER
"Multiple Vitamins-Minerals (PRESERVISION AREDS 2) CAPS   Start 4/29/24    End  6/21/24  Disp  60  R  11  Take 1 tablet by mouth 2 times daily     Discontinued Stopped by Patient (No AVS) Benji Delarosa APRN CNP 6/21/24 1526     Samantha Bruce MD (Physician)  Ophthalmology   lv 6/6/25  [place]  6/8/26  \" recommend AREDS2 and Amsler\"    Medication unable to be refilled by RN due to: Other:  end date 6/21/24  [facility]    Request from pharmacy:  Requested Prescriptions   Pending Prescriptions Disp Refills    Multiple Vitamins-Minerals (PRESERVISION AREDS 2) CAPS [Pharmacy Med Name: PreserVision AREDS 2 Oral Capsule] 60 capsule 11     Sig: Take 1 capsule by mouth twice daily.       Vitamin Supplements Protocol Failed - 7/21/2025 10:40 AM        Failed - Medication is active on med list and the sig matches. RN to manually verify dose and sig if red X/fail.     If the protocol passes (green check), you do not need to verify med dose and sig.    A prescription matches if they are the same clinical intention.    For Example: once daily and every morning are the same.    The protocol can not identify upper and lower case letters as matching and will fail.     For Example: Take 1 tablet (50 mg) by mouth daily     TAKE 1 TABLET (50 MG) BY MOUTH DAILY    For all fails (red x), verify dose and sig.    If the refill does match what is on file, the RN can still proceed to approve the refill request.       If they do not match, route to the appropriate provider.             Failed - Medication indicated for associated diagnosis     The medication is prescribed for one or more of the following conditions:     Vitamin deficiency   Osteopenia   Osteoporosis   Nutrition counseling   Nutrition education   Feeding ability finding   Bone density finding   Morbid Obesity   History of bypass of stomach   Idiopathic peripheral neuropathy   General examination of patient   Vitamin D deficiency   Folate deficiency anemia due to dietary " causes   Sleeve resection of stomach   Rheumatoid factor level - finding   Crohn's disease   Psoriatic arthritis   Transplant of Kidney   Arthropathy   Alcoholism   Malabsorption syndrome   Disorder of bone and articular cartilage   Cystic Fibrosis  Bronchiectasis            Passed - The patient does not have an order for high-dose vitamin D        Passed - The patient is 2 years of age or older        Passed - Recent (12 month) or future (90 days) visit with authorizing provider's specialty (provided they have been seen in the past 15 months)     The patient must have completed an in-person or virtual visit within the past 12 months or has a future visit scheduled within the next 90 days with the authorizing provider s specialty.  Urgent care and e-visits do not qualify as an office visit for this protocol.

## 2025-08-04 ENCOUNTER — PATIENT OUTREACH (OUTPATIENT)
Dept: CARE COORDINATION | Facility: CLINIC | Age: 85
End: 2025-08-04
Payer: COMMERCIAL

## 2025-08-11 DIAGNOSIS — I10 HYPERTENSION GOAL BP (BLOOD PRESSURE) < 140/90: ICD-10-CM

## 2025-08-12 DIAGNOSIS — R14.2 BELCHING: ICD-10-CM

## 2025-08-12 RX ORDER — LOSARTAN POTASSIUM 50 MG/1
50 TABLET ORAL DAILY
Qty: 90 TABLET | Refills: 0 | Status: SHIPPED | OUTPATIENT
Start: 2025-08-12

## 2025-08-12 RX ORDER — SIMETHICONE 125 MG/1
TABLET, CHEWABLE ORAL
Qty: 60 TABLET | Refills: 1 | Status: SHIPPED | OUTPATIENT
Start: 2025-08-12

## 2025-08-22 ENCOUNTER — TELEPHONE (OUTPATIENT)
Dept: FAMILY MEDICINE | Facility: CLINIC | Age: 85
End: 2025-08-22
Payer: COMMERCIAL

## 2025-08-25 PROBLEM — R03.0 ELEVATED BLOOD PRESSURE READING WITHOUT DIAGNOSIS OF HYPERTENSION: Status: RESOLVED | Noted: 2021-06-14 | Resolved: 2025-08-25
